# Patient Record
Sex: FEMALE | Race: BLACK OR AFRICAN AMERICAN | Employment: UNEMPLOYED | URBAN - METROPOLITAN AREA
[De-identification: names, ages, dates, MRNs, and addresses within clinical notes are randomized per-mention and may not be internally consistent; named-entity substitution may affect disease eponyms.]

---

## 2017-02-04 ENCOUNTER — APPOINTMENT (OUTPATIENT)
Dept: GENERAL RADIOLOGY | Age: 47
End: 2017-02-04
Attending: PHYSICIAN ASSISTANT
Payer: SELF-PAY

## 2017-02-04 ENCOUNTER — HOSPITAL ENCOUNTER (EMERGENCY)
Age: 47
Discharge: HOME | End: 2017-02-04
Attending: EMERGENCY MEDICINE
Payer: SELF-PAY

## 2017-02-04 VITALS
DIASTOLIC BLOOD PRESSURE: 86 MMHG | TEMPERATURE: 97.3 F | OXYGEN SATURATION: 97 % | RESPIRATION RATE: 20 BRPM | SYSTOLIC BLOOD PRESSURE: 123 MMHG | HEIGHT: 64 IN | BODY MASS INDEX: 43.36 KG/M2 | HEART RATE: 110 BPM | WEIGHT: 254 LBS

## 2017-02-04 DIAGNOSIS — R06.2 WHEEZING: ICD-10-CM

## 2017-02-04 DIAGNOSIS — N30.91 CYSTITIS WITH HEMATURIA: Primary | ICD-10-CM

## 2017-02-04 LAB
APPEARANCE UR: ABNORMAL
BACTERIA URNS QL MICRO: ABNORMAL /HPF
BILIRUB UR QL: NEGATIVE
COLOR UR: ABNORMAL
EPITH CASTS URNS QL MICRO: ABNORMAL /LPF (ref 0–5)
GLUCOSE UR STRIP.AUTO-MCNC: NEGATIVE MG/DL
HGB UR QL STRIP: ABNORMAL
KETONES UR QL STRIP.AUTO: NEGATIVE MG/DL
LEUKOCYTE ESTERASE UR QL STRIP.AUTO: ABNORMAL
NITRITE UR QL STRIP.AUTO: POSITIVE
PH UR STRIP: 5 [PH] (ref 5–8)
PROT UR STRIP-MCNC: 30 MG/DL
RBC #/AREA URNS HPF: ABNORMAL /HPF (ref 0–5)
SP GR UR REFRACTOMETRY: 1.02 (ref 1–1.03)
TRICHOMONAS UR QL MICRO: ABNORMAL
UROBILINOGEN UR QL STRIP.AUTO: 2 EU/DL (ref 0.2–1)
WBC CASTS URNS QL MICRO: ABNORMAL /LPF
WBC URNS QL MICRO: ABNORMAL /HPF (ref 0–4)

## 2017-02-04 PROCEDURE — 77030013140 HC MSK NEB VYRM -A

## 2017-02-04 PROCEDURE — 81001 URINALYSIS AUTO W/SCOPE: CPT

## 2017-02-04 PROCEDURE — 87077 CULTURE AEROBIC IDENTIFY: CPT

## 2017-02-04 PROCEDURE — 94640 AIRWAY INHALATION TREATMENT: CPT

## 2017-02-04 PROCEDURE — 74011000250 HC RX REV CODE- 250: Performed by: PHYSICIAN ASSISTANT

## 2017-02-04 PROCEDURE — 87186 SC STD MICRODIL/AGAR DIL: CPT

## 2017-02-04 PROCEDURE — 71020 XR CHEST PA LAT: CPT

## 2017-02-04 PROCEDURE — 99283 EMERGENCY DEPT VISIT LOW MDM: CPT

## 2017-02-04 PROCEDURE — 74011250637 HC RX REV CODE- 250/637: Performed by: PHYSICIAN ASSISTANT

## 2017-02-04 PROCEDURE — 74011636637 HC RX REV CODE- 636/637: Performed by: PHYSICIAN ASSISTANT

## 2017-02-04 PROCEDURE — 87086 URINE CULTURE/COLONY COUNT: CPT

## 2017-02-04 RX ORDER — PREDNISONE 20 MG/1
60 TABLET ORAL
Status: COMPLETED | OUTPATIENT
Start: 2017-02-04 | End: 2017-02-04

## 2017-02-04 RX ORDER — IPRATROPIUM BROMIDE AND ALBUTEROL SULFATE 2.5; .5 MG/3ML; MG/3ML
3 SOLUTION RESPIRATORY (INHALATION)
Status: COMPLETED | OUTPATIENT
Start: 2017-02-04 | End: 2017-02-04

## 2017-02-04 RX ORDER — ALBUTEROL SULFATE 90 UG/1
2 AEROSOL, METERED RESPIRATORY (INHALATION)
Status: COMPLETED | OUTPATIENT
Start: 2017-02-04 | End: 2017-02-04

## 2017-02-04 RX ORDER — CIPROFLOXACIN 500 MG/1
500 TABLET ORAL 2 TIMES DAILY
Qty: 6 TAB | Refills: 0 | Status: SHIPPED | OUTPATIENT
Start: 2017-02-04 | End: 2017-02-07

## 2017-02-04 RX ORDER — PHENAZOPYRIDINE HYDROCHLORIDE 200 MG/1
200 TABLET, FILM COATED ORAL 3 TIMES DAILY
Qty: 6 TAB | Refills: 0 | Status: SHIPPED | OUTPATIENT
Start: 2017-02-04 | End: 2017-02-06

## 2017-02-04 RX ADMIN — PREDNISONE 60 MG: 20 TABLET ORAL at 13:25

## 2017-02-04 RX ADMIN — IPRATROPIUM BROMIDE AND ALBUTEROL SULFATE 3 ML: .5; 3 SOLUTION RESPIRATORY (INHALATION) at 12:51

## 2017-02-04 RX ADMIN — ALBUTEROL SULFATE 2 PUFF: 90 AEROSOL, METERED RESPIRATORY (INHALATION) at 13:26

## 2017-02-04 NOTE — ED NOTES
I have reviewed discharge instructions with the patient. The patient verbalized understanding. all discharge education and paperwork given including follow up care. No questions at this time.

## 2017-02-04 NOTE — DISCHARGE INSTRUCTIONS
Urinary Tract Infection in Women: Care Instructions  Your Care Instructions    A urinary tract infection, or UTI, is a general term for an infection anywhere between the kidneys and the urethra (where urine comes out). Most UTIs are bladder infections. They often cause pain or burning when you urinate. UTIs are caused by bacteria and can be cured with antibiotics. Be sure to complete your treatment so that the infection goes away. Follow-up care is a key part of your treatment and safety. Be sure to make and go to all appointments, and call your doctor if you are having problems. It's also a good idea to know your test results and keep a list of the medicines you take. How can you care for yourself at home? · Take your antibiotics as directed. Do not stop taking them just because you feel better. You need to take the full course of antibiotics. · Drink extra water and other fluids for the next day or two. This may help wash out the bacteria that are causing the infection. (If you have kidney, heart, or liver disease and have to limit fluids, talk with your doctor before you increase your fluid intake.)  · Avoid drinks that are carbonated or have caffeine. They can irritate the bladder. · Urinate often. Try to empty your bladder each time. · To relieve pain, take a hot bath or lay a heating pad set on low over your lower belly or genital area. Never go to sleep with a heating pad in place. To prevent UTIs  · Drink plenty of water each day. This helps you urinate often, which clears bacteria from your system. (If you have kidney, heart, or liver disease and have to limit fluids, talk with your doctor before you increase your fluid intake.)  · Consider adding cranberry juice to your diet. · Urinate when you need to. · Urinate right after you have sex. · Change sanitary pads often. · Avoid douches, bubble baths, feminine hygiene sprays, and other feminine hygiene products that have deodorants.   · After going to the bathroom, wipe from front to back. When should you call for help? Call your doctor now or seek immediate medical care if:  · Symptoms such as fever, chills, nausea, or vomiting get worse or appear for the first time. · You have new pain in your back just below your rib cage. This is called flank pain. · There is new blood or pus in your urine. · You have any problems with your antibiotic medicine. Watch closely for changes in your health, and be sure to contact your doctor if:  · You are not getting better after taking an antibiotic for 2 days. · Your symptoms go away but then come back. Where can you learn more? Go to http://star-jean.info/. Enter J713 in the search box to learn more about \"Urinary Tract Infection in Women: Care Instructions. \"  Current as of: August 12, 2016  Content Version: 11.1  © 1385-6092 [employer]. Care instructions adapted under license by [employer] (which disclaims liability or warranty for this information). If you have questions about a medical condition or this instruction, always ask your healthcare professional. [place] [address] any warranty or liability for your use of this information. Wheezing or Bronchoconstriction: Care Instructions  Your Care Instructions  Wheezing is a whistling noise made during breathing. It occurs when the small airways, or bronchial tubes, that lead to your lungs swell or contract (spasm) and become narrow. This narrowing is called bronchoconstriction. When your airways constrict, it is hard for air to pass through and this makes it hard for you to breathe. Wheezing and bronchoconstriction can be caused by many problems, including:  · An infection such as the flu or a cold. · Allergies such as hay fever. · Diseases such as asthma or chronic obstructive pulmonary disease. · Smoking. Treatment for your wheezing depends on what is causing the problem.  Your wheezing may get better without treatment. But you may need to pay attention to things that cause your wheezing and avoid them. Or you may need medicine to help treat the wheezing and to reduce the swelling or to relieve spasms in your lungs. Follow-up care is a key part of your treatment and safety. Be sure to make and go to all appointments, and call your doctor if you are having problems. It is also a good idea to know your test results and keep a list of the medicines you take. How can you care for yourself at home? · Take your medicine exactly as prescribed. Call your doctor if you think you are having a problem with your medicine. You will get more details on the specific medicine your doctor prescribes. · If your doctor prescribed antibiotics, take them as directed. Do not stop taking them just because you feel better. You need to take the full course of antibiotics. · Breathe moist air from a humidifier, hot shower, or sink filled with hot water. This may help ease your symptoms and make it easier for you to breathe. · If you have congestion in your nose and throat, drinking plenty of fluids, especially hot fluids, may help relieve your symptoms. If you have kidney, heart, or liver disease and have to limit fluids, talk with your doctor before you increase the amount of fluids you drink. · If you have mucus in your airways, it may help to breathe deeply and cough. · Do not smoke or allow others to smoke around you. Smoking can make your wheezing worse. If you need help quitting, talk to your doctor about stop-smoking programs and medicines. These can increase your chances of quitting for good. · Avoid things that may cause your wheezing. These may include colds, smoke, air pollution, dust, pollen, pets, cockroaches, stress, and cold air. When should you call for help? Call 911 anytime you think you may need emergency care. For example, call if:  · You have severe trouble breathing.   · You passed out (lost consciousness). Call your doctor now or seek immediate medical care if:  · You cough up yellow, dark brown, or bloody mucus (sputum). · You have new or worse shortness of breath. · Your wheezing is not getting better or it gets worse after you start taking your medicine. Watch closely for changes in your health, and be sure to contact your doctor if:  · You do not get better as expected. Where can you learn more? Go to http://star-jean.info/. Enter 454 9693 in the search box to learn more about \"Wheezing or Bronchoconstriction: Care Instructions. \"  Current as of: May 23, 2016  Content Version: 11.1  © 5167-1626 [employer]. Care instructions adapted under license by [employer] (which disclaims liability or warranty for this information). If you have questions about a medical condition or this instruction, always ask your healthcare professional. [address] any warranty or liability for your use of this information.

## 2017-02-04 NOTE — ED TRIAGE NOTES
Pt,  C/op lower abdominal pain, urinary frequency  For 3 days,  Also  C/o wheezing, shortness of breath started today Hx asthma

## 2017-02-04 NOTE — ED PROVIDER NOTES
HPI Comments: 12:29 PM Ganesh Damon is a 40 y.o. female with a history of asthma who presents to the ED c/o abd cramping with increased urinary frequency for ~2-3 days. Pt states she \"just feels awful all over\". Pt is also c/o SOB and wheezing since this AM. No CP. Pt also has a productive cough with green sputum. Pt claims she has not had an inhaler since 2009. Pt had a total hysterectomy, states no chance of pregnancy. Usually smokes, but claims has not this week due to \"being unable to breathe\". No other concerns. PCP: Alla Ybarra MD      The history is provided by the patient. Past Medical History:   Diagnosis Date    Asthma     Heart failure ([address]     Hypertension        Past Surgical History:   Procedure Laterality Date    Hx myomectomy        fibroid tumo removed    Hx orthopaedic  March 2012 ORIF left fibula    Pr breast surgery procedure unlisted       redfuction    Hx gyn       hysterectomy  btl         No family history on file. Social History     Social History    Marital status: SINGLE     Spouse name: N/A    Number of children: N/A    Years of education: N/A     Occupational History    Not on file. Social History Main Topics    Smoking status: Current Every Day Smoker     Packs/day: 0.50     Years: 18.00    Smokeless tobacco: Not on file    Alcohol use Yes      Comment: socially     Drug use: No    Sexual activity: Yes     Partners: Male     Birth control/ protection: Condom     Other Topics Concern    Not on file     Social History Narrative         ALLERGIES: Review of patient's allergies indicates no known allergies. Review of Systems   Constitutional: Negative for chills, fatigue and fever. HENT: Negative. Negative for sore throat. Eyes: Negative. Respiratory: Positive for cough, shortness of breath and wheezing. Cardiovascular: Negative for chest pain and palpitations. Gastrointestinal: Positive for abdominal pain.  Negative for nausea and vomiting. Genitourinary: Positive for frequency. Negative for dysuria. Musculoskeletal: Negative. Skin: Negative. Neurological: Negative for dizziness, weakness, light-headedness and headaches. Psychiatric/Behavioral: Negative. All other systems reviewed and are negative. Vitals:    02/04/17 1230   BP: (!) 146/103   Pulse: (!) 112   Resp: 20   Temp: 97.7 °F (36.5 °C)   SpO2: 97%   Weight: 115.2 kg (254 lb)   Height: 5' 4\" (1.626 m)            Physical Exam   Constitutional: She is oriented to person, place, and time. She appears well-developed and well-nourished. HENT:   Head: Normocephalic and atraumatic. Mouth/Throat: Oropharynx is clear and moist.   Eyes: Conjunctivae are normal. No scleral icterus. Neck: Neck supple. No JVD present. No tracheal deviation present. Cardiovascular: Regular rhythm and normal heart sounds. Tachycardia present. Pulmonary/Chest: Effort normal. No tachypnea. No respiratory distress. She has decreased breath sounds. She has wheezes. She has rhonchi. Abdominal: Soft. There is no tenderness. There is no CVA tenderness. Musculoskeletal: Normal range of motion. Lymphadenopathy:     She has no cervical adenopathy. Neurological: She is alert and oriented to person, place, and time. She has normal strength. Gait normal.   Skin: Skin is warm and dry. Psychiatric: She has a normal mood and affect. Nursing note and vitals reviewed. MDM  Number of Diagnoses or Management Options  Diagnosis management comments: 12:37 PM  41 y/o female c/o wheezing, cough, increased shortness of breath and increased urinary frequency. Resp. Since last night, and urinary freq for several days. Will get UA and give duoneb and plan for reeval.  Pt states she has needed an inhaler in the past, however does not have one. Tony Beebe PA-C    1:12 PM  UA positive for nitrites. Discussed results with pt.   Also improvement in wheezing and chest tightness after duoneb. Will give another albuterol tx and prednisone tab. Pending cxr, will plan for discharge. All questions answered and patient in agreement with plan of care. Will plan for discharge. Briseida Patel PA-C    Clinical Impression:  Acute cystitis w/ hematuria, wheezing, Elevated blood pressure    One or more blood pressure readings were noted elevated during the Pt's presentation in the emergency department this date. This abnormal reading has been cited in the Pt's diagnosis, and they have been encouraged to follow up with their primary care physician, or referred to a consultant for further evaluation and treatment. Amount and/or Complexity of Data Reviewed  Clinical lab tests: ordered and reviewed  Tests in the radiology section of CPT®: ordered    Risk of Complications, Morbidity, and/or Mortality  Presenting problems: low  Diagnostic procedures: low  Management options: low    Critical Care  Total time providing critical care: < 30 minutes    Patient Progress  Patient progress: improved    ED Course       Procedures    Vitals:  Patient Vitals for the past 12 hrs:   Temp Pulse Resp BP SpO2   02/04/17 1230 97.7 °F (36.5 °C) (!) 112 20 (!) 146/103 97 %   97%. Percentage is within normal limits. Medications ordered:   Medications   albuterol-ipratropium (DUO-NEB) 2.5 MG-0.5 MG/3 ML (3 mL Nebulization Given 2/4/17 1251)   albuterol (PROVENTIL HFA, VENTOLIN HFA, PROAIR HFA) inhaler 2 Puff (2 Puffs Inhalation Given 2/4/17 1326)   predniSONE (DELTASONE) tablet 60 mg (60 mg Oral Given 2/4/17 1325)       X-Ray, CT or other radiology findings:  XR CHEST PA LAT      Cardiomegaly, otherwise normal.   (Interpreted by NARCISO Bryant)       Progress notes, Consult notes or Re-evaluation:   Discussed all results with pt and pt agrees with plan for discharge. Pt is to follow up with PCP. All questions answered at this time. ED warnings given for any new or worsening symptoms. Pt voices understanding.  Pt discharged in stable condition. Disposition:  Diagnosis:   1. Cystitis with hematuria    2. Wheezing    3. Elevated blood pressure        Disposition: DISCHARGED    Follow-up Information     Follow up With Details Comments Contact Info    [facility] EMERGENCY DEPT  If symptoms worsen [address] [place] [address]    Uzma Dickey MD Call in 1 week As needed [id number]  27  [phone]            Scribe Attestation:     Deanna Quijano, scribing for and in the presence of Jenny Templeton February 04, 2017 at 1:33 PM     Signed by: Dontrell Roach, February 04, 2017 at 1:33 PM     Physician Attestation:   I personally performed the services described in this documentation, reviewed and edited the documentation which was dictated to the scribe in my presence, and it accurately records my words and actions.  Jenny Templeton  February 04, 2017

## 2017-02-06 ENCOUNTER — APPOINTMENT (OUTPATIENT)
Dept: GENERAL RADIOLOGY | Age: 47
End: 2017-02-06
Attending: EMERGENCY MEDICINE
Payer: SELF-PAY

## 2017-02-06 ENCOUNTER — HOSPITAL ENCOUNTER (EMERGENCY)
Age: 47
Discharge: HOME | End: 2017-02-06
Attending: EMERGENCY MEDICINE
Payer: SELF-PAY

## 2017-02-06 ENCOUNTER — APPOINTMENT (OUTPATIENT)
Dept: CT IMAGING | Age: 47
End: 2017-02-06
Attending: EMERGENCY MEDICINE
Payer: SELF-PAY

## 2017-02-06 VITALS
DIASTOLIC BLOOD PRESSURE: 97 MMHG | HEIGHT: 64 IN | OXYGEN SATURATION: 100 % | TEMPERATURE: 98.1 F | HEART RATE: 99 BPM | RESPIRATION RATE: 16 BRPM | BODY MASS INDEX: 43.36 KG/M2 | SYSTOLIC BLOOD PRESSURE: 119 MMHG | WEIGHT: 254 LBS

## 2017-02-06 DIAGNOSIS — R00.0 TACHYCARDIA: ICD-10-CM

## 2017-02-06 DIAGNOSIS — I50.9 ACUTE ON CHRONIC CONGESTIVE HEART FAILURE, UNSPECIFIED CONGESTIVE HEART FAILURE TYPE: ICD-10-CM

## 2017-02-06 DIAGNOSIS — R06.02 SHORTNESS OF BREATH: Primary | ICD-10-CM

## 2017-02-06 LAB
AMPHET UR QL SCN: NEGATIVE
ANION GAP BLD CALC-SCNC: 11 MMOL/L (ref 3–18)
ATRIAL RATE: 104 BPM
BARBITURATES UR QL SCN: NEGATIVE
BASOPHILS # BLD AUTO: 0 K/UL (ref 0–0.1)
BASOPHILS # BLD: 0 % (ref 0–2)
BENZODIAZ UR QL: NEGATIVE
BNP SERPL-MCNC: 3023 PG/ML (ref 0–450)
BUN SERPL-MCNC: 17 MG/DL (ref 7–18)
BUN/CREAT SERPL: 21 (ref 12–20)
CALCIUM SERPL-MCNC: 9.2 MG/DL (ref 8.5–10.1)
CALCULATED P AXIS, ECG09: 64 DEGREES
CALCULATED R AXIS, ECG10: -30 DEGREES
CALCULATED T AXIS, ECG11: 72 DEGREES
CANNABINOIDS UR QL SCN: NEGATIVE
CHLORIDE SERPL-SCNC: 102 MMOL/L (ref 100–108)
CK MB CFR SERPL CALC: 2.6 % (ref 0–4)
CK MB CFR SERPL CALC: ABNORMAL % (ref 0–4)
CK MB SERPL-MCNC: 1.1 NG/ML (ref 0.5–3.6)
CK MB SERPL-MCNC: <0.5 NG/ML (ref 0.5–3.6)
CK SERPL-CCNC: 43 U/L (ref 26–192)
CK SERPL-CCNC: 48 U/L (ref 26–192)
CO2 SERPL-SCNC: 25 MMOL/L (ref 21–32)
COCAINE UR QL SCN: NEGATIVE
CREAT SERPL-MCNC: 0.81 MG/DL (ref 0.6–1.3)
D DIMER PPP FEU-MCNC: 0.54 UG/ML(FEU)
DIAGNOSIS, 93000: NORMAL
DIFFERENTIAL METHOD BLD: ABNORMAL
EOSINOPHIL # BLD: 0.1 K/UL (ref 0–0.4)
EOSINOPHIL NFR BLD: 1 % (ref 0–5)
ERYTHROCYTE [DISTWIDTH] IN BLOOD BY AUTOMATED COUNT: 16.2 % (ref 11.6–14.5)
FLUAV AG NPH QL IA: NEGATIVE
FLUBV AG NOSE QL IA: NEGATIVE
GLUCOSE SERPL-MCNC: 120 MG/DL (ref 74–99)
HCT VFR BLD AUTO: 36.9 % (ref 35–45)
HDSCOM,HDSCOM: NORMAL
HGB BLD-MCNC: 12.2 G/DL (ref 12–16)
LYMPHOCYTES # BLD AUTO: 37 % (ref 21–52)
LYMPHOCYTES # BLD: 3 K/UL (ref 0.9–3.6)
MCH RBC QN AUTO: 30.1 PG (ref 24–34)
MCHC RBC AUTO-ENTMCNC: 33.1 G/DL (ref 31–37)
MCV RBC AUTO: 91.1 FL (ref 74–97)
METHADONE UR QL: NEGATIVE
MONOCYTES # BLD: 0.4 K/UL (ref 0.05–1.2)
MONOCYTES NFR BLD AUTO: 5 % (ref 3–10)
NEUTS SEG # BLD: 4.7 K/UL (ref 1.8–8)
NEUTS SEG NFR BLD AUTO: 57 % (ref 40–73)
OPIATES UR QL: NEGATIVE
P-R INTERVAL, ECG05: 164 MS
PCP UR QL: NEGATIVE
PLATELET # BLD AUTO: 307 K/UL (ref 135–420)
PMV BLD AUTO: 9.5 FL (ref 9.2–11.8)
POTASSIUM SERPL-SCNC: 3.7 MMOL/L (ref 3.5–5.5)
Q-T INTERVAL, ECG07: 304 MS
QRS DURATION, ECG06: 76 MS
QTC CALCULATION (BEZET), ECG08: 399 MS
RBC # BLD AUTO: 4.05 M/UL (ref 4.2–5.3)
SODIUM SERPL-SCNC: 138 MMOL/L (ref 136–145)
T4 FREE SERPL-MCNC: 1.4 NG/DL (ref 0.7–1.5)
TROPONIN I SERPL-MCNC: <0.02 NG/ML (ref 0–0.04)
TROPONIN I SERPL-MCNC: <0.02 NG/ML (ref 0–0.04)
TSH SERPL DL<=0.05 MIU/L-ACNC: <0.01 UIU/ML (ref 0.36–3.74)
VENTRICULAR RATE, ECG03: 104 BPM
WBC # BLD AUTO: 8.2 K/UL (ref 4.6–13.2)

## 2017-02-06 PROCEDURE — 74011000250 HC RX REV CODE- 250: Performed by: EMERGENCY MEDICINE

## 2017-02-06 PROCEDURE — 80048 BASIC METABOLIC PNL TOTAL CA: CPT | Performed by: EMERGENCY MEDICINE

## 2017-02-06 PROCEDURE — 96374 THER/PROPH/DIAG INJ IV PUSH: CPT

## 2017-02-06 PROCEDURE — 85025 COMPLETE CBC W/AUTO DIFF WBC: CPT | Performed by: EMERGENCY MEDICINE

## 2017-02-06 PROCEDURE — 93005 ELECTROCARDIOGRAM TRACING: CPT

## 2017-02-06 PROCEDURE — 94640 AIRWAY INHALATION TREATMENT: CPT

## 2017-02-06 PROCEDURE — 99285 EMERGENCY DEPT VISIT HI MDM: CPT

## 2017-02-06 PROCEDURE — 84443 ASSAY THYROID STIM HORMONE: CPT | Performed by: EMERGENCY MEDICINE

## 2017-02-06 PROCEDURE — 87804 INFLUENZA ASSAY W/OPTIC: CPT | Performed by: EMERGENCY MEDICINE

## 2017-02-06 PROCEDURE — 80307 DRUG TEST PRSMV CHEM ANLYZR: CPT | Performed by: EMERGENCY MEDICINE

## 2017-02-06 PROCEDURE — 74011250636 HC RX REV CODE- 250/636: Performed by: EMERGENCY MEDICINE

## 2017-02-06 PROCEDURE — 96375 TX/PRO/DX INJ NEW DRUG ADDON: CPT

## 2017-02-06 PROCEDURE — 83880 ASSAY OF NATRIURETIC PEPTIDE: CPT | Performed by: EMERGENCY MEDICINE

## 2017-02-06 PROCEDURE — 82550 ASSAY OF CK (CPK): CPT | Performed by: EMERGENCY MEDICINE

## 2017-02-06 PROCEDURE — 85379 FIBRIN DEGRADATION QUANT: CPT | Performed by: EMERGENCY MEDICINE

## 2017-02-06 PROCEDURE — 77030013140 HC MSK NEB VYRM -A

## 2017-02-06 PROCEDURE — 71275 CT ANGIOGRAPHY CHEST: CPT

## 2017-02-06 PROCEDURE — 71010 XR CHEST PORT: CPT

## 2017-02-06 PROCEDURE — 74011636320 HC RX REV CODE- 636/320: Performed by: EMERGENCY MEDICINE

## 2017-02-06 PROCEDURE — 84439 ASSAY OF FREE THYROXINE: CPT | Performed by: EMERGENCY MEDICINE

## 2017-02-06 RX ORDER — POTASSIUM CHLORIDE 1500 MG/1
20 TABLET, FILM COATED, EXTENDED RELEASE ORAL DAILY
Qty: 5 TAB | Refills: 0 | Status: SHIPPED | OUTPATIENT
Start: 2017-02-06 | End: 2017-02-11

## 2017-02-06 RX ORDER — IPRATROPIUM BROMIDE AND ALBUTEROL SULFATE 2.5; .5 MG/3ML; MG/3ML
3 SOLUTION RESPIRATORY (INHALATION)
Status: COMPLETED | OUTPATIENT
Start: 2017-02-06 | End: 2017-02-06

## 2017-02-06 RX ORDER — FUROSEMIDE 20 MG/1
20 TABLET ORAL DAILY
Qty: 5 TAB | Refills: 0 | Status: SHIPPED | OUTPATIENT
Start: 2017-02-06 | End: 2017-02-11

## 2017-02-06 RX ORDER — MORPHINE SULFATE 2 MG/ML
2 INJECTION, SOLUTION INTRAMUSCULAR; INTRAVENOUS
Status: COMPLETED | OUTPATIENT
Start: 2017-02-06 | End: 2017-02-06

## 2017-02-06 RX ORDER — FUROSEMIDE 10 MG/ML
40 INJECTION INTRAMUSCULAR; INTRAVENOUS
Status: COMPLETED | OUTPATIENT
Start: 2017-02-06 | End: 2017-02-06

## 2017-02-06 RX ADMIN — Medication 2 MG: at 12:03

## 2017-02-06 RX ADMIN — IPRATROPIUM BROMIDE AND ALBUTEROL SULFATE 3 ML: .5; 3 SOLUTION RESPIRATORY (INHALATION) at 12:03

## 2017-02-06 RX ADMIN — FUROSEMIDE 40 MG: 10 INJECTION, SOLUTION INTRAVENOUS at 12:02

## 2017-02-06 RX ADMIN — IOPAMIDOL 100 ML: 755 INJECTION, SOLUTION INTRAVENOUS at 12:44

## 2017-02-06 NOTE — ED PROVIDER NOTES
HPI Comments: Lovina Essex is a 40 y.o. female presenting with complaints of shortness of breath. States symptoms have been present for the past 3-5 days. Was seen over the weekend however symptoms persisted and this morning she developed central chest pain that she describes a \"tightness\" sensation. Endorses worsening dyspnea with exertion and laying flat. Also endorses cough productive of white sputum. Denies hemoptysis, fever, ha, blurry vision, URI symptoms or any other complaints. Does have h/o tobacco use but has not smoked in the past week due to her symptoms. Endorses family h/o CAD. Pt has not used her inhaler today but denies significant relief yesterday. Denies any h/o VTE. The history is provided by the patient. Past Medical History:   Diagnosis Date    Asthma     Heart failure ([address]     Hypertension        Past Surgical History:   Procedure Laterality Date    Hx myomectomy        fibroid tumo removed    Hx orthopaedic  March 2012 ORIF left fibula    Pr breast surgery procedure unlisted       redfuction    Hx gyn       hysterectomy  btl         History reviewed. No pertinent family history. Social History     Social History    Marital status: SINGLE     Spouse name: N/A    Number of children: N/A    Years of education: N/A     Occupational History    Not on file. Social History Main Topics    Smoking status: Current Every Day Smoker     Packs/day: 0.50     Years: 18.00    Smokeless tobacco: Not on file    Alcohol use Yes      Comment: socially     Drug use: No    Sexual activity: Yes     Partners: Male     Birth control/ protection: Condom     Other Topics Concern    Not on file     Social History Narrative         ALLERGIES: Review of patient's allergies indicates no known allergies. Review of Systems   Constitutional: Negative for fever. HENT: Negative for congestion. Eyes: Negative for visual disturbance.    Respiratory: Positive for cough, chest tightness and shortness of breath. Cardiovascular: Positive for chest pain. Negative for palpitations and leg swelling. Gastrointestinal: Negative for abdominal pain, nausea and vomiting. Genitourinary: Negative for dysuria. Musculoskeletal: Negative. Neurological: Negative for speech difficulty and headaches. All other systems reviewed and are negative. Vitals:    02/06/17 1100 02/06/17 1130 02/06/17 1315 02/06/17 1430   BP: 138/86 119/82 125/89    Pulse: (!) 101 99 (!) 105 79   Resp: 25 17 23 27   Temp:       SpO2: 97% 100% 99% 96%   Weight:       Height:                Physical Exam   Constitutional: She is oriented to person, place, and time. No distress. HENT:   Head: Atraumatic. Mouth/Throat: Oropharynx is clear and moist.   Eyes: Conjunctivae are normal. Pupils are equal, round, and reactive to light. No scleral icterus. Neck: Normal range of motion. Neck supple. No JVD present. Cardiovascular: Regular rhythm and normal heart sounds. tachycardic   Pulmonary/Chest: She exhibits no tenderness. Increased work of breathing. Scant wheezing in bilateral upper lobes. Diminished in bilateral lower lobes. Abdominal: Soft. Bowel sounds are normal. She exhibits no distension. There is no tenderness. There is no rebound and no guarding. Musculoskeletal: Normal range of motion. She exhibits no edema or tenderness. Neurological: She is alert and oriented to person, place, and time. No cranial nerve deficit. Skin: Skin is warm and dry. Psychiatric: She has a normal mood and affect. Nursing note and vitals reviewed. MDM  Number of Diagnoses or Management Options  Diagnosis management comments: Blanca Abrams is a 40 y.o. female presenting with cp, sob, orthopnea, cough and tachycardia. DDx includes ACS, PE, aortic pathology, anemia, CHF exacerbation, COPD exacerbation, pna. ekg reassuring. Will give duoneb while work up obtained.      ED Course       Procedures    Vitals:  Patient Vitals for the past 12 hrs:   Temp Pulse Resp BP SpO2   02/06/17 1430 - 79 27 - 96 %   02/06/17 1315 - (!) 105 23 125/89 99 %   02/06/17 1130 - 99 17 119/82 100 %   02/06/17 1100 - (!) 101 25 138/86 97 %   02/06/17 1055 - - - - 99 %   02/06/17 1045 - (!) 104 30 (!) 125/93 98 %   02/06/17 1035 98.1 °F (36.7 °C) (!) 108 18 (!) 145/100 98 %           EKG interpretation by ED Physician:  1050 - sinus tachycardia, rate of 104, left axis deviation, QTc 399ms, no STEMI    1419 - NSR, rate of 99, no significant change from previous, no STEMI      X-Ray, CT or other radiology findings or impressions:  CTA CHEST W WO CONT   Final Result      XR CHEST PORT   Final Result              Progress notes, Consult notes or additional Procedure notes:   4:52 PM I have discussed results of work up with patient. She has had good diuresis with lasix in ED. Ambulating around ED without difficulty. Per chart review h/o tachycardia, worked up previously pt states no clear dx. Free T4 wnl. I have instructed her to follow up with her pcp and cardiologist as outpatient and Return precautions discussed. Patient stated verbal understanding and agrees with course and plan. Disposition:  Diagnosis:   1. Shortness of breath    2. Acute on chronic congestive heart failure, unspecified congestive heart failure type ([address]    3.  Tachycardia        Disposition: Discharged home in stable condition      Chris Mcclellan MD

## 2017-02-06 NOTE — ED NOTES
I have reviewed discharge instructions with the patient. The patient verbalized understanding. All discharge education and paperwork given including follow up care, no questions at this time.

## 2017-02-06 NOTE — ED NOTES
Pt hourly rounding competed. Safety   Pt (*) resting on stretcher with side rails up and call bell in reach. () in chair    () in parents arms. Toileting   Pt offered ()Bedpan     ()Assistance to Restroom     ()Urinal  Ongoing Updates  Updated on plan of care and status of test results.   Pain Management  Inquired as to comfort and offered comfort measures:    () warm blankets   (*) dimmed lights

## 2017-02-06 NOTE — ED TRIAGE NOTES
Patient originally came in for wheezing, but then stated that she was having chest pain since early this morning. Has a prescription for lasix but has not taken in a week as she has run out and has not made an appointment to get a refill. Nurse reminded patient about the importance of making an appointment or requesting a refill when medication is running low as this is an important medication to take. Patient verbalized understanding. Patient was here on Saturday for same symptoms.

## 2017-02-06 NOTE — ED NOTES
Pt hourly rounding competed. Safety   Pt (*) resting on stretcher with side rails up and call bell in reach. () in chair    () in parents arms. Toileting   Pt offered ()Bedpan     ()Assistance to Restroom     ()Urinal  Ongoing Updates  Updated on plan of care and status of test results.   Pain Management  Inquired as to comfort and offered comfort measures:    (*) warm blankets   () dimmed lights

## 2017-02-07 LAB
ATRIAL RATE: 99 BPM
BACTERIA SPEC CULT: NORMAL
BACTERIA SPEC CULT: NORMAL
CALCULATED P AXIS, ECG09: 61 DEGREES
CALCULATED R AXIS, ECG10: -30 DEGREES
CALCULATED T AXIS, ECG11: 62 DEGREES
DIAGNOSIS, 93000: NORMAL
P-R INTERVAL, ECG05: 160 MS
Q-T INTERVAL, ECG07: 370 MS
QRS DURATION, ECG06: 80 MS
QTC CALCULATION (BEZET), ECG08: 474 MS
SERVICE CMNT-IMP: NORMAL
VENTRICULAR RATE, ECG03: 99 BPM

## 2017-02-16 ENCOUNTER — APPOINTMENT (OUTPATIENT)
Dept: GENERAL RADIOLOGY | Age: 47
End: 2017-02-16
Attending: EMERGENCY MEDICINE
Payer: SELF-PAY

## 2017-02-16 ENCOUNTER — HOSPITAL ENCOUNTER (EMERGENCY)
Age: 47
Discharge: HOME | End: 2017-02-16
Attending: EMERGENCY MEDICINE
Payer: SELF-PAY

## 2017-02-16 VITALS
SYSTOLIC BLOOD PRESSURE: 137 MMHG | TEMPERATURE: 97.4 F | HEART RATE: 112 BPM | OXYGEN SATURATION: 91 % | BODY MASS INDEX: 42.17 KG/M2 | DIASTOLIC BLOOD PRESSURE: 95 MMHG | HEIGHT: 64 IN | RESPIRATION RATE: 22 BRPM | WEIGHT: 247 LBS

## 2017-02-16 DIAGNOSIS — R06.02 SOB (SHORTNESS OF BREATH): ICD-10-CM

## 2017-02-16 DIAGNOSIS — I50.9 ACUTE ON CHRONIC CONGESTIVE HEART FAILURE, UNSPECIFIED CONGESTIVE HEART FAILURE TYPE: Primary | ICD-10-CM

## 2017-02-16 LAB
ANION GAP BLD CALC-SCNC: 11 MMOL/L (ref 3–18)
ATRIAL RATE: 109 BPM
BASOPHILS # BLD AUTO: 0 K/UL (ref 0–0.1)
BASOPHILS # BLD: 1 % (ref 0–2)
BNP SERPL-MCNC: 2933 PG/ML (ref 0–450)
BUN SERPL-MCNC: 12 MG/DL (ref 7–18)
BUN/CREAT SERPL: 13 (ref 12–20)
CALCIUM SERPL-MCNC: 8.9 MG/DL (ref 8.5–10.1)
CALCULATED P AXIS, ECG09: 39 DEGREES
CALCULATED R AXIS, ECG10: -13 DEGREES
CALCULATED T AXIS, ECG11: 83 DEGREES
CHLORIDE SERPL-SCNC: 102 MMOL/L (ref 100–108)
CO2 SERPL-SCNC: 24 MMOL/L (ref 21–32)
CREAT SERPL-MCNC: 0.91 MG/DL (ref 0.6–1.3)
DIAGNOSIS, 93000: NORMAL
DIFFERENTIAL METHOD BLD: ABNORMAL
EOSINOPHIL # BLD: 0.1 K/UL (ref 0–0.4)
EOSINOPHIL NFR BLD: 1 % (ref 0–5)
ERYTHROCYTE [DISTWIDTH] IN BLOOD BY AUTOMATED COUNT: 15.8 % (ref 11.6–14.5)
GLUCOSE SERPL-MCNC: 128 MG/DL (ref 74–99)
HCT VFR BLD AUTO: 36.9 % (ref 35–45)
HGB BLD-MCNC: 12 G/DL (ref 12–16)
LYMPHOCYTES # BLD AUTO: 48 % (ref 21–52)
LYMPHOCYTES # BLD: 4.1 K/UL (ref 0.9–3.6)
MAGNESIUM SERPL-MCNC: 2.2 MG/DL (ref 1.8–2.4)
MCH RBC QN AUTO: 29.9 PG (ref 24–34)
MCHC RBC AUTO-ENTMCNC: 32.5 G/DL (ref 31–37)
MCV RBC AUTO: 91.8 FL (ref 74–97)
MONOCYTES # BLD: 0.5 K/UL (ref 0.05–1.2)
MONOCYTES NFR BLD AUTO: 5 % (ref 3–10)
NEUTS SEG # BLD: 3.8 K/UL (ref 1.8–8)
NEUTS SEG NFR BLD AUTO: 45 % (ref 40–73)
P-R INTERVAL, ECG05: 172 MS
PLATELET # BLD AUTO: 324 K/UL (ref 135–420)
PMV BLD AUTO: 9.4 FL (ref 9.2–11.8)
POTASSIUM SERPL-SCNC: 3.8 MMOL/L (ref 3.5–5.5)
Q-T INTERVAL, ECG07: 296 MS
QRS DURATION, ECG06: 80 MS
QTC CALCULATION (BEZET), ECG08: 398 MS
RBC # BLD AUTO: 4.02 M/UL (ref 4.2–5.3)
SODIUM SERPL-SCNC: 137 MMOL/L (ref 136–145)
VENTRICULAR RATE, ECG03: 109 BPM
WBC # BLD AUTO: 8.5 K/UL (ref 4.6–13.2)

## 2017-02-16 PROCEDURE — 99285 EMERGENCY DEPT VISIT HI MDM: CPT

## 2017-02-16 PROCEDURE — 77030013140 HC MSK NEB VYRM -A

## 2017-02-16 PROCEDURE — 74011000250 HC RX REV CODE- 250: Performed by: EMERGENCY MEDICINE

## 2017-02-16 PROCEDURE — 96374 THER/PROPH/DIAG INJ IV PUSH: CPT

## 2017-02-16 PROCEDURE — 80048 BASIC METABOLIC PNL TOTAL CA: CPT | Performed by: EMERGENCY MEDICINE

## 2017-02-16 PROCEDURE — 93005 ELECTROCARDIOGRAM TRACING: CPT

## 2017-02-16 PROCEDURE — 83735 ASSAY OF MAGNESIUM: CPT | Performed by: EMERGENCY MEDICINE

## 2017-02-16 PROCEDURE — 74011250636 HC RX REV CODE- 250/636: Performed by: PHYSICIAN ASSISTANT

## 2017-02-16 PROCEDURE — 94640 AIRWAY INHALATION TREATMENT: CPT

## 2017-02-16 PROCEDURE — 85025 COMPLETE CBC W/AUTO DIFF WBC: CPT | Performed by: EMERGENCY MEDICINE

## 2017-02-16 PROCEDURE — 83880 ASSAY OF NATRIURETIC PEPTIDE: CPT | Performed by: PHYSICIAN ASSISTANT

## 2017-02-16 PROCEDURE — 71010 XR CHEST PORT: CPT

## 2017-02-16 RX ORDER — IPRATROPIUM BROMIDE AND ALBUTEROL SULFATE 2.5; .5 MG/3ML; MG/3ML
3 SOLUTION RESPIRATORY (INHALATION)
Status: COMPLETED | OUTPATIENT
Start: 2017-02-16 | End: 2017-02-16

## 2017-02-16 RX ORDER — ALBUTEROL SULFATE 0.83 MG/ML
5 SOLUTION RESPIRATORY (INHALATION)
Status: COMPLETED | OUTPATIENT
Start: 2017-02-16 | End: 2017-02-16

## 2017-02-16 RX ORDER — FUROSEMIDE 20 MG/1
TABLET ORAL
Qty: 60 TAB | Refills: 0 | Status: SHIPPED | OUTPATIENT
Start: 2017-02-16 | End: 2017-03-06

## 2017-02-16 RX ORDER — ALBUTEROL SULFATE 2.5 MG/.5ML
2.5 SOLUTION RESPIRATORY (INHALATION)
Status: DISCONTINUED | OUTPATIENT
Start: 2017-02-16 | End: 2017-02-16 | Stop reason: CLARIF

## 2017-02-16 RX ORDER — FUROSEMIDE 10 MG/ML
40 INJECTION INTRAMUSCULAR; INTRAVENOUS
Status: COMPLETED | OUTPATIENT
Start: 2017-02-16 | End: 2017-02-16

## 2017-02-16 RX ADMIN — FUROSEMIDE 40 MG: 10 INJECTION, SOLUTION INTRAVENOUS at 04:25

## 2017-02-16 RX ADMIN — ALBUTEROL SULFATE 5 MG: 2.5 SOLUTION RESPIRATORY (INHALATION) at 02:58

## 2017-02-16 RX ADMIN — IPRATROPIUM BROMIDE AND ALBUTEROL SULFATE 3 ML: .5; 3 SOLUTION RESPIRATORY (INHALATION) at 01:23

## 2017-02-16 NOTE — ED PROVIDER NOTES
HPI Comments: 39yoF with hx of asthma, HTN, CHF to ED c/o SOB, cough, wheezing. Reports cough is productive with yellow sputum. Chest is sore from coughing. Reports bilat LE edema which resolves with Lasix. No fever, chills, N/V/D, abd pain, HA, dizziness or any other complaints. Pt states she was seen here on 2/6 for the same, sent home with rx for 5 days of lasix and has run out. Pt states she is unable to get appt with PCP for refill. Pt states she quit smoking 2 months ago. Patient is a 40 y.o. female presenting with cough. The history is provided by the patient. Cough   Associated symptoms include shortness of breath. Past Medical History:   Diagnosis Date    Asthma     Heart failure ([address]     Hypertension        Past Surgical History:   Procedure Laterality Date    Hx myomectomy        fibroid tumo removed    Hx orthopaedic  March 2012 ORIF left fibula    Pr breast surgery procedure unlisted       redfuction    Hx gyn       hysterectomy  btl         History reviewed. No pertinent family history. Social History     Social History    Marital status: SINGLE     Spouse name: N/A    Number of children: N/A    Years of education: N/A     Occupational History    Not on file. Social History Main Topics    Smoking status: Current Every Day Smoker     Packs/day: 0.50     Years: 18.00    Smokeless tobacco: Not on file    Alcohol use Yes      Comment: socially     Drug use: No    Sexual activity: Yes     Partners: Male     Birth control/ protection: Condom     Other Topics Concern    Not on file     Social History Narrative         ALLERGIES: Review of patient's allergies indicates no known allergies. Review of Systems   Respiratory: Positive for cough and shortness of breath. All other systems reviewed and are negative.       Vitals:    02/16/17 0032 02/16/17 0255 02/16/17 0300 02/16/17 0315   BP: (!) 146/107 121/88 116/83 (!) 127/95   Pulse: (!) 117 (!) 107 (!) 108 (!) 109 Resp: 22 23 (!) 31 26   Temp: 97.4 °F (36.3 °C)      SpO2: 99% 100%  (!) 84%   Weight: 112 kg (247 lb)      Height: 5' 4\" (1.626 m)               Physical Exam   Constitutional: She is oriented to person, place, and time. She appears well-developed and well-nourished. No distress. HENT:   Head: Normocephalic and atraumatic. Right Ear: External ear normal.   Left Ear: External ear normal.   Nose: Nose normal.   Mouth/Throat: Oropharynx is clear and moist.   Eyes: Conjunctivae and EOM are normal. Pupils are equal, round, and reactive to light. Neck: Normal range of motion. Neck supple. Cardiovascular: Regular rhythm. Tachycardia present. Pulmonary/Chest: Effort normal and breath sounds normal. No respiratory distress. She has no wheezes. Abdominal: Soft. She exhibits no distension. There is no tenderness. Musculoskeletal: Normal range of motion. She exhibits no edema. Lymphadenopathy:     She has no cervical adenopathy. Neurological: She is alert and oriented to person, place, and time. She has normal strength. Gait normal.   Skin: Skin is warm and dry. No rash noted. She is not diaphoretic. Psychiatric: She has a normal mood and affect. MDM  Number of Diagnoses or Management Options  Acute on chronic congestive heart failure, unspecified congestive heart failure type [facility]):   SOB (shortness of breath):   Diagnosis management comments: Ddx: CHF exacerbation, fluid overload, asthma exacerbation, edema. Pt with SOB and LE edema after running out of lasix. Plan to check labs, ekg, chest xray and give lasix. 5:05 AM Labs and xray reviewed with pt. Pt states \"I feel 1000% better. \" Reports she has urinated x 3 after Lasix. Denies any complaints at this time. Tachycardia noted, previous charts reviewed and tachycardia noted during other visits as well.  NARCISO Ceballos         Amount and/or Complexity of Data Reviewed  Clinical lab tests: ordered and reviewed  Tests in the radiology section of CPT®: ordered and reviewed  Discuss the patient with other providers: yes    Risk of Complications, Morbidity, and/or Mortality  Presenting problems: moderate  Diagnostic procedures: moderate  Management options: low    Patient Progress  Patient progress: improved    ED Course       Procedures

## 2017-02-16 NOTE — ROUTINE PROCESS
I have reviewed discharge instructions with the patient. The patient verbalized understanding. Patient armband removed and given to patient to take home. Patient was informed of the privacy risks if armband lost or stolen. Pt ambulated to car without any distress.

## 2017-02-16 NOTE — ED TRIAGE NOTES
Patient states that for about a week she has had a productive cough of yellowish sputum states that tonight she noticed some red streaks in the sputum. Patient states that her chest is sore from coughing. States that she was seen here the other day for the cough. Patient with expiratory wheezing mainly on left side.

## 2017-03-06 ENCOUNTER — HOSPITAL ENCOUNTER (EMERGENCY)
Age: 47
Discharge: HOME | End: 2017-03-06
Attending: EMERGENCY MEDICINE
Payer: SELF-PAY

## 2017-03-06 ENCOUNTER — APPOINTMENT (OUTPATIENT)
Dept: GENERAL RADIOLOGY | Age: 47
End: 2017-03-06
Attending: EMERGENCY MEDICINE
Payer: SELF-PAY

## 2017-03-06 VITALS
TEMPERATURE: 98 F | SYSTOLIC BLOOD PRESSURE: 116 MMHG | WEIGHT: 254 LBS | BODY MASS INDEX: 43.36 KG/M2 | OXYGEN SATURATION: 98 % | HEART RATE: 112 BPM | DIASTOLIC BLOOD PRESSURE: 86 MMHG | HEIGHT: 64 IN | RESPIRATION RATE: 36 BRPM

## 2017-03-06 DIAGNOSIS — I50.21 ACUTE SYSTOLIC CONGESTIVE HEART FAILURE (HCC): Primary | ICD-10-CM

## 2017-03-06 LAB
ALBUMIN SERPL BCP-MCNC: 2.9 G/DL (ref 3.4–5)
ALBUMIN/GLOB SERPL: 0.6 {RATIO} (ref 0.8–1.7)
ALP SERPL-CCNC: 103 U/L (ref 45–117)
ALT SERPL-CCNC: 27 U/L (ref 13–56)
ANION GAP BLD CALC-SCNC: 9 MMOL/L (ref 3–18)
APPEARANCE UR: ABNORMAL
AST SERPL W P-5'-P-CCNC: 36 U/L (ref 15–37)
BACTERIA URNS QL MICRO: ABNORMAL /HPF
BASOPHILS # BLD AUTO: 0 K/UL (ref 0–0.06)
BASOPHILS # BLD: 0 % (ref 0–2)
BILIRUB SERPL-MCNC: 2 MG/DL (ref 0.2–1)
BILIRUB UR QL: NEGATIVE
BNP SERPL-MCNC: 2699 PG/ML (ref 0–450)
BUN SERPL-MCNC: 9 MG/DL (ref 7–18)
BUN/CREAT SERPL: 10 (ref 12–20)
CALCIUM SERPL-MCNC: 8.7 MG/DL (ref 8.5–10.1)
CHLORIDE SERPL-SCNC: 103 MMOL/L (ref 100–108)
CO2 SERPL-SCNC: 25 MMOL/L (ref 21–32)
COLOR UR: YELLOW
CREAT SERPL-MCNC: 0.94 MG/DL (ref 0.6–1.3)
DIFFERENTIAL METHOD BLD: ABNORMAL
EOSINOPHIL # BLD: 0 K/UL (ref 0–0.4)
EOSINOPHIL NFR BLD: 1 % (ref 0–5)
EPITH CASTS URNS QL MICRO: ABNORMAL /LPF (ref 0–5)
ERYTHROCYTE [DISTWIDTH] IN BLOOD BY AUTOMATED COUNT: 15.5 % (ref 11.6–14.5)
GLOBULIN SER CALC-MCNC: 4.5 G/DL (ref 2–4)
GLUCOSE SERPL-MCNC: 169 MG/DL (ref 74–99)
GLUCOSE UR STRIP.AUTO-MCNC: NEGATIVE MG/DL
HCT VFR BLD AUTO: 35.9 % (ref 35–45)
HGB BLD-MCNC: 11.6 G/DL (ref 12–16)
HGB UR QL STRIP: NEGATIVE
KETONES UR QL STRIP.AUTO: NEGATIVE MG/DL
LEUKOCYTE ESTERASE UR QL STRIP.AUTO: ABNORMAL
LIPASE SERPL-CCNC: 72 U/L (ref 73–393)
LYMPHOCYTES # BLD AUTO: 41 % (ref 21–52)
LYMPHOCYTES # BLD: 3.1 K/UL (ref 0.9–3.6)
MCH RBC QN AUTO: 29.8 PG (ref 24–34)
MCHC RBC AUTO-ENTMCNC: 32.3 G/DL (ref 31–37)
MCV RBC AUTO: 92.3 FL (ref 74–97)
MONOCYTES # BLD: 0.4 K/UL (ref 0.05–1.2)
MONOCYTES NFR BLD AUTO: 5 % (ref 3–10)
NEUTS SEG # BLD: 4 K/UL (ref 1.8–8)
NEUTS SEG NFR BLD AUTO: 53 % (ref 40–73)
NITRITE UR QL STRIP.AUTO: NEGATIVE
PH UR STRIP: 5 [PH] (ref 5–8)
PLATELET # BLD AUTO: 274 K/UL (ref 135–420)
PMV BLD AUTO: 9.4 FL (ref 9.2–11.8)
POTASSIUM SERPL-SCNC: 3.8 MMOL/L (ref 3.5–5.5)
PROT SERPL-MCNC: 7.4 G/DL (ref 6.4–8.2)
PROT UR STRIP-MCNC: NEGATIVE MG/DL
RBC # BLD AUTO: 3.89 M/UL (ref 4.2–5.3)
RBC #/AREA URNS HPF: ABNORMAL /HPF (ref 0–5)
SODIUM SERPL-SCNC: 137 MMOL/L (ref 136–145)
SP GR UR REFRACTOMETRY: 1.02 (ref 1–1.03)
TRICHOMONAS UR QL MICRO: ABNORMAL
UROBILINOGEN UR QL STRIP.AUTO: 2 EU/DL (ref 0.2–1)
WBC # BLD AUTO: 7.6 K/UL (ref 4.6–13.2)
WBC URNS QL MICRO: ABNORMAL /HPF (ref 0–4)

## 2017-03-06 PROCEDURE — 74011250636 HC RX REV CODE- 250/636: Performed by: EMERGENCY MEDICINE

## 2017-03-06 PROCEDURE — 74011250637 HC RX REV CODE- 250/637: Performed by: EMERGENCY MEDICINE

## 2017-03-06 PROCEDURE — 93971 EXTREMITY STUDY: CPT

## 2017-03-06 PROCEDURE — 93005 ELECTROCARDIOGRAM TRACING: CPT

## 2017-03-06 PROCEDURE — 99285 EMERGENCY DEPT VISIT HI MDM: CPT

## 2017-03-06 PROCEDURE — 81001 URINALYSIS AUTO W/SCOPE: CPT | Performed by: EMERGENCY MEDICINE

## 2017-03-06 PROCEDURE — 96374 THER/PROPH/DIAG INJ IV PUSH: CPT

## 2017-03-06 PROCEDURE — 85025 COMPLETE CBC W/AUTO DIFF WBC: CPT | Performed by: EMERGENCY MEDICINE

## 2017-03-06 PROCEDURE — 83690 ASSAY OF LIPASE: CPT | Performed by: EMERGENCY MEDICINE

## 2017-03-06 PROCEDURE — 83880 ASSAY OF NATRIURETIC PEPTIDE: CPT | Performed by: EMERGENCY MEDICINE

## 2017-03-06 PROCEDURE — 71010 XR CHEST PORT: CPT

## 2017-03-06 PROCEDURE — 80053 COMPREHEN METABOLIC PANEL: CPT | Performed by: EMERGENCY MEDICINE

## 2017-03-06 RX ORDER — FUROSEMIDE 40 MG/1
40 TABLET ORAL DAILY
Qty: 20 TAB | Refills: 0 | Status: SHIPPED | OUTPATIENT
Start: 2017-03-06 | End: 2017-03-27

## 2017-03-06 RX ORDER — FUROSEMIDE 10 MG/ML
40 INJECTION INTRAMUSCULAR; INTRAVENOUS
Status: COMPLETED | OUTPATIENT
Start: 2017-03-06 | End: 2017-03-06

## 2017-03-06 RX ORDER — LISINOPRIL 10 MG/1
5 TABLET ORAL DAILY
Qty: 15 TAB | Refills: 0 | Status: SHIPPED | OUTPATIENT
Start: 2017-03-06 | End: 2017-03-27

## 2017-03-06 RX ORDER — ONDANSETRON 4 MG/1
4 TABLET, ORALLY DISINTEGRATING ORAL
Status: COMPLETED | OUTPATIENT
Start: 2017-03-06 | End: 2017-03-06

## 2017-03-06 RX ORDER — LISINOPRIL 10 MG/1
5 TABLET ORAL
Status: COMPLETED | OUTPATIENT
Start: 2017-03-06 | End: 2017-03-06

## 2017-03-06 RX ADMIN — ONDANSETRON 4 MG: 4 TABLET, ORALLY DISINTEGRATING ORAL at 22:10

## 2017-03-06 RX ADMIN — FUROSEMIDE 40 MG: 10 INJECTION, SOLUTION INTRAVENOUS at 22:11

## 2017-03-06 RX ADMIN — LISINOPRIL 5 MG: 10 TABLET ORAL at 22:10

## 2017-03-06 NOTE — ED TRIAGE NOTES
Abdominal pain, bilateral leg swelling, sob x 2 days. History of CHF. States that her abdominal area is swelling as well.

## 2017-03-07 LAB
ATRIAL RATE: 109 BPM
CALCULATED P AXIS, ECG09: 67 DEGREES
CALCULATED R AXIS, ECG10: 18 DEGREES
CALCULATED T AXIS, ECG11: 66 DEGREES
DIAGNOSIS, 93000: NORMAL
P-R INTERVAL, ECG05: 168 MS
Q-T INTERVAL, ECG07: 296 MS
QRS DURATION, ECG06: 80 MS
QTC CALCULATION (BEZET), ECG08: 398 MS
VENTRICULAR RATE, ECG03: 109 BPM

## 2017-03-07 NOTE — DISCHARGE INSTRUCTIONS
Heart Failure: Care Instructions  Your Care Instructions    Heart failure occurs when your heart does not pump as much blood as the body needs. Failure does not mean that the heart has stopped pumping but rather that it is not pumping as well as it should. Over time, this causes fluid buildup in your lungs and other parts of your body. Fluid buildup can cause shortness of breath, fatigue, swollen ankles, and other problems. By taking medicines regularly, reducing sodium (salt) in your diet, checking your weight every day, and making lifestyle changes, you can feel better and live longer. Follow-up care is a key part of your treatment and safety. Be sure to make and go to all appointments, and call your doctor if you are having problems. It's also a good idea to know your test results and keep a list of the medicines you take. How can you care for yourself at home? Medicines  · Be safe with medicines. Take your medicines exactly as prescribed. Call your doctor if you think you are having a problem with your medicine. · Do not take any vitamins, over-the-counter medicine, or herbal products without talking to your doctor first. Zetta Hipps not take ibuprofen (Advil or Motrin) and naproxen (Aleve) without talking to your doctor first. They could make your heart failure worse. · You may be taking some of the following medicine. ¨ Beta-blockers can slow heart rate, decrease blood pressure, and improve your condition. Taking a beta-blocker may lower your chance of needing to be hospitalized. ¨ Angiotensin-converting enzyme inhibitors (ACEIs) reduce the heart's workload, lower blood pressure, and reduce swelling. Taking an ACEI may lower your chance of needing to be hospitalized again. ¨ Angiotensin II receptor blockers (ARBs) work like ACEIs. Your doctor may prescribe them instead of ACEIs. ¨ Diuretics, also called water pills, reduce swelling.   ¨ Potassium supplements replace this important mineral, which is sometimes lost with diuretics. ¨ Aspirin and other blood thinners prevent blood clots, which can cause a stroke or heart attack. You will get more details on the specific medicines your doctor prescribes. Diet  · Your doctor may suggest that you limit sodium to 2,000 milligrams (mg) a day or less. That is less than 1 teaspoon of salt a day, including all the salt you eat in cooking or in packaged foods. People get most of their sodium from processed foods. Fast food and restaurant meals also tend to be very high in sodium. · Ask your doctor how much liquid you can drink each day. You may have to limit liquids. Weight  · Weigh yourself without clothing at the same time each day. Record your weight. Call your doctor if you gain more than 3 pounds in 2 to 3 days. A sudden weight gain may mean that your heart failure is getting worse. Activity level  · Start light exercise (if your doctor says it is okay). Even if you can only do a small amount, exercise will help you get stronger, have more energy, and manage your weight and your stress. Walking is an easy way to get exercise. Start out by walking a little more than you did before. Bit by bit, increase the amount you walk. · When you exercise, watch for signs that your heart is working too hard. You are pushing yourself too hard if you cannot talk while you are exercising. If you become short of breath or dizzy or have chest pain, stop, sit down, and rest.  · If you feel \"wiped out\" the day after you exercise, walk slower or for a shorter distance until you can work up to a better pace. · Get enough rest at night. Sleeping with 1 or 2 pillows under your upper body and head may help you breathe easier. Lifestyle changes  · Do not smoke. Smoking can make a heart condition worse. If you need help quitting, talk to your doctor about stop-smoking programs and medicines. These can increase your chances of quitting for good.  Quitting smoking may be the most important step you can take to protect your heart. · Limit alcohol to 2 drinks a day for men and 1 drink a day for women. Too much alcohol can cause health problems. · Avoid getting sick from colds and the flu. Get a pneumococcal vaccine shot. If you have had one before, ask your doctor whether you need another dose. Get a flu shot each year. If you must be around people with colds or the flu, wash your hands often. When should you call for help? Call 911 if you have symptoms of sudden heart failure such as:  · You have severe trouble breathing. · You cough up pink, foamy mucus. · You have a new irregular or rapid heartbeat. Call your doctor now or seek immediate medical care if:  · You have new or increased shortness of breath. · You are dizzy or lightheaded, or you feel like you may faint. · You have sudden weight gain, such as 3 pounds or more in 2 to 3 days. · You have increased swelling in your legs, ankles, or feet. · You are suddenly so tired or weak that you cannot do your usual activities. Watch closely for changes in your health, and be sure to contact your doctor if:  · You develop new symptoms. Where can you learn more? Go to http://star-jean.info/. Enter F438 in the search box to learn more about \"Heart Failure: Care Instructions. \"  Current as of: January 27, 2016  Content Version: 11.1  © 2934-5684 [employer]. Care instructions adapted under license by [employer] (which disclaims liability or warranty for this information). If you have questions about a medical condition or this instruction, always ask your healthcare professional. [place] [address] any warranty or liability for your use of this information.

## 2017-03-07 NOTE — ED NOTES
Increasing dyspnea, known  CHF,  Suspect recurrence or worsening of condition  Left leg swelling, significant.  Will order a duplex study  Abdominal pain, resolving, follow

## 2017-03-07 NOTE — ED NOTES
Hourly rounding-Pt resting on stretcher, side rails up, call bell in reach, vitals stable, pt updated on plan of care, no issues or complaints at this time.

## 2017-03-07 NOTE — PROCEDURES
[facility]  *** FINAL REPORT ***    Name: Nik Abdi  MRN: [id number]  : 02 Sep 1970  HIS Order #: [id number]  [id number] [facility] Visit #: [id number]  Date: 06 Mar 2017    TYPE OF TEST: Peripheral Venous Testing    REASON FOR TEST  Limb swelling    Left Leg:-  Deep venous thrombosis:           No  Superficial venous thrombosis:    No  Deep venous insufficiency:        Not examined  Superficial venous insufficiency: Not examined      INTERPRETATION/FINDINGS  Left leg :  Duplex images were obtained using 2-D gray scale, color flow, and  spectral Doppler analysis. 1. No evidence of deep venous thrombosis detected in the veins  visualized. 2. Deep veins visualized include the common femoral, femoral,  popliteal, posterior tibial and peroneal veins. 3. No evidence of superficial thrombosis detected. 4. Superficial veins visualized include the proximal great saphenous  vein. 5. No evidence of deep vein thrombosis in the contralateral common  femoral vein. 6. Pulsatile flow detected. ADDITIONAL COMMENTS  Limitations: Swelling  Unable to perform adequate compression analysis of the left peroneal  veins. Patency of these vessels is demonstrated by color flow and  Doppler signal. Cannot rule out non-occlusive thrombus in this  segment. I have personally reviewed the data relevant to the interpretation of  this  study. TECHNOLOGIST: Courtney Tyson RVT  Signed: 2017 09:13 PM    PHYSICIAN: Narendra Hines MD  Signed: 2017 04:40 PM

## 2017-03-07 NOTE — ED PROVIDER NOTES
HPI Comments: 7:36 PM Suzi Stout is a 40 y.o. female with a history of CHF, hypertension, asthma who presents to ED complaining of sudden onset left leg swelling for the last 2 days. Patient denies recent travel or history of blood clots. Patient states that she has had surgery on her left leg in 2011 because it was \"broken in 3 spots. \" Patient also reports intermittent chest pain, shortness of breath, vomiting. Patient was recently treated for a UTI. Patient states that she takes 20 mg of Lasix and 81 mg of Aspirin for her CHF. No known drug allergies. No other complaints, associated symptoms or modifying factors at this time. PCP: Jarad Hager MD    The history is provided by the patient. Past Medical History:   Diagnosis Date    Asthma     Heart failure ([address]     Hypertension        Past Surgical History:   Procedure Laterality Date    BREAST SURGERY PROCEDURE UNLISTED      redfuction    HX GYN      hysterectomy  btl    HX MYOMECTOMY       fibroid tumo removed    HX ORTHOPAEDIC  March 2012 ORIF left fibula         No family history on file. Social History     Social History    Marital status: SINGLE     Spouse name: N/A    Number of children: N/A    Years of education: N/A     Occupational History    Not on file. Social History Main Topics    Smoking status: Current Every Day Smoker     Packs/day: 0.50     Years: 18.00    Smokeless tobacco: Not on file    Alcohol use Yes      Comment: socially     Drug use: No    Sexual activity: Yes     Partners: Male     Birth control/ protection: Condom     Other Topics Concern    Not on file     Social History Narrative         ALLERGIES: Review of patient's allergies indicates no known allergies. Review of Systems   Constitutional: Negative. Negative for activity change, appetite change and fever.    HENT: Negative for congestion, ear discharge, ear pain, facial swelling, nosebleeds, postnasal drip, sinus pressure, sneezing and tinnitus. Eyes: Negative for pain, discharge, redness and visual disturbance. Respiratory: Positive for shortness of breath. Negative for apnea, cough, choking, chest tightness, wheezing and stridor. Cardiovascular: Positive for chest pain and leg swelling (left). Gastrointestinal: Positive for vomiting. Negative for anal bleeding, blood in stool, constipation and diarrhea. Genitourinary: Negative for decreased urine volume, difficulty urinating, dyspareunia, dysuria, flank pain, frequency, hematuria, pelvic pain, urgency, vaginal bleeding and vaginal discharge. Musculoskeletal: Positive for joint swelling (left leg/ankle). Negative for arthralgias, back pain, gait problem, myalgias, neck pain and neck stiffness. Skin: Negative for color change. Neurological: Negative for dizziness, tremors, seizures, speech difficulty, weakness, numbness and headaches. Hematological: Negative for adenopathy. Does not bruise/bleed easily. Psychiatric/Behavioral: Negative for agitation, dysphoric mood and self-injury. The patient is not nervous/anxious. Vitals:    03/06/17 2027 03/06/17 2028 03/06/17 2131 03/06/17 2132   BP: 116/86  110/80    Pulse:  (!) 110  (!) 113   Resp:  24  19   Temp:       SpO2:  100%  99%   Weight:       Height:                Physical Exam   Constitutional: She is oriented to person, place, and time. She appears well-developed and well-nourished. HENT:   Head: Normocephalic and atraumatic. Right Ear: External ear normal.   Left Ear: External ear normal.   Nose: Nose normal.   Mouth/Throat: Oropharynx is clear and moist.   Eyes: Conjunctivae and EOM are normal. Pupils are equal, round, and reactive to light. Neck: Normal range of motion. Neck supple. Cardiovascular: Regular rhythm, normal heart sounds and intact distal pulses. Pulmonary/Chest: Effort normal and breath sounds normal. No respiratory distress. She has no wheezes. She has no rales.  She exhibits no tenderness. Abdominal: Soft. Bowel sounds are normal. She exhibits no distension and no mass. There is no tenderness. There is no rebound and no guarding. Musculoskeletal: Normal range of motion. Left lower leg: She exhibits swelling and edema. Left calf tenderness and swelling is significant compared to right leg. The entire left leg is swollen. Neurological: She is alert and oriented to person, place, and time. Skin: Skin is warm and dry. No rash noted. No erythema. Psychiatric: She has a normal mood and affect. Her behavior is normal. Judgment normal.   Nursing note and vitals reviewed. MDM  Number of Diagnoses or Management Options  Acute systolic congestive heart failure [facility]):   Diagnosis management comments: Acute on chronic CHF. Will evaluate for etiologies for worsening, adjust medications  Left leg swelling, [place] 2, will evaluate for DVT       Amount and/or Complexity of Data Reviewed  Clinical lab tests: ordered and reviewed  Tests in the radiology section of CPT®: ordered and reviewed    Risk of Complications, Morbidity, and/or Mortality  Presenting problems: moderate      ED Course       Procedures    Vitals:  Patient Vitals for the past 12 hrs:   Temp Pulse Resp BP SpO2   03/06/17 2132 - (!) 113 19 - 99 %   03/06/17 2131 - - - 110/80 -   03/06/17 2028 - (!) 110 24 - 100 %   03/06/17 2027 - - - 116/86 -   03/06/17 1916 - (!) 112 24 - 100 %   03/06/17 1915 - - - (!) 130/91 100 %   03/06/17 1857 98 °F (36.7 °C) (!) 117 16 (!) 152/103 99 %     99% on RA, indicating adequate oxygenation.     Medications ordered:   Medications   furosemide (LASIX) injection 40 mg (not administered)         Lab findings:  Recent Results (from the past 12 hour(s))   CBC WITH AUTOMATED DIFF    Collection Time: 03/06/17  7:30 PM   Result Value Ref Range    WBC 7.6 4.6 - 13.2 K/uL    RBC 3.89 (L) 4.20 - 5.30 M/uL    HGB 11.6 (L) 12.0 - 16.0 g/dL    HCT 35.9 35.0 - 45.0 %    MCV 92.3 74.0 - 97.0 FL MCH 29.8 24.0 - 34.0 PG    MCHC 32.3 31.0 - 37.0 g/dL    RDW 15.5 (H) 11.6 - 14.5 %    PLATELET 831 945 - 593 K/uL    MPV 9.4 9.2 - 11.8 FL    NEUTROPHILS 53 40 - 73 %    LYMPHOCYTES 41 21 - 52 %    MONOCYTES 5 3 - 10 %    EOSINOPHILS 1 0 - 5 %    BASOPHILS 0 0 - 2 %    ABS. NEUTROPHILS 4.0 1.8 - 8.0 K/UL    ABS. LYMPHOCYTES 3.1 0.9 - 3.6 K/UL    ABS. MONOCYTES 0.4 0.05 - 1.2 K/UL    ABS. EOSINOPHILS 0.0 0.0 - 0.4 K/UL    ABS. BASOPHILS 0.0 0.0 - 0.06 K/UL    DF AUTOMATED     METABOLIC PANEL, COMPREHENSIVE    Collection Time: 03/06/17  7:30 PM   Result Value Ref Range    Sodium 137 136 - 145 mmol/L    Potassium 3.8 3.5 - 5.5 mmol/L    Chloride 103 100 - 108 mmol/L    CO2 25 21 - 32 mmol/L    Anion gap 9 3.0 - 18 mmol/L    Glucose 169 (H) 74 - 99 mg/dL    BUN 9 7.0 - 18 MG/DL    Creatinine 0.94 0.6 - 1.3 MG/DL    BUN/Creatinine ratio 10 (L) 12 - 20      GFR est AA >60 >60 ml/min/1.73m2    GFR est non-AA >60 >60 ml/min/1.73m2    Calcium 8.7 8.5 - 10.1 MG/DL    Bilirubin, total 2.0 (H) 0.2 - 1.0 MG/DL    ALT (SGPT) 27 13 - 56 U/L    AST (SGOT) 36 15 - 37 U/L    Alk.  phosphatase 103 45 - 117 U/L    Protein, total 7.4 6.4 - 8.2 g/dL    Albumin 2.9 (L) 3.4 - 5.0 g/dL    Globulin 4.5 (H) 2.0 - 4.0 g/dL    A-G Ratio 0.6 (L) 0.8 - 1.7     LIPASE    Collection Time: 03/06/17  7:30 PM   Result Value Ref Range    Lipase 72 (L) 73 - 393 U/L   URINALYSIS W/ RFLX MICROSCOPIC    Collection Time: 03/06/17  7:30 PM   Result Value Ref Range    Color YELLOW      Appearance CLOUDY      Specific gravity 1.016 1.005 - 1.030      pH (UA) 5.0 5.0 - 8.0      Protein NEGATIVE  NEG mg/dL    Glucose NEGATIVE  NEG mg/dL    Ketone NEGATIVE  NEG mg/dL    Bilirubin NEGATIVE  NEG      Blood NEGATIVE  NEG      Urobilinogen 2.0 (H) 0.2 - 1.0 EU/dL    Nitrites NEGATIVE  NEG      Leukocyte Esterase SMALL (A) NEG     PRO-BNP    Collection Time: 03/06/17  7:30 PM   Result Value Ref Range    NT pro-BNP 2699 (H) 0 - 450 PG/ML   URINE MICROSCOPIC ONLY Collection Time: 03/06/17  7:30 PM   Result Value Ref Range    WBC 5 to 9 0 - 4 /hpf    RBC NONE 0 - 5 /hpf    Epithelial cells 1+ 0 - 5 /lpf    Bacteria FEW (A) NEG /hpf    Trichomonas FEW (A) NEG     EKG, 12 LEAD, INITIAL    Collection Time: 03/06/17  7:35 PM   Result Value Ref Range    Ventricular Rate 109 BPM    Atrial Rate 109 BPM    P-R Interval 168 ms    QRS Duration 80 ms    Q-T Interval 296 ms    QTC Calculation (Bezet) 398 ms    Calculated P Axis 67 degrees    Calculated R Axis 18 degrees    Calculated T Axis 66 degrees    Diagnosis       Sinus tachycardia  Possible Left atrial enlargement  Low voltage QRS  Cannot rule out Anterior infarct (cited on or before 06-FEB-2017)  Abnormal ECG  When compared with ECG of 16-FEB-2017 04:00,  No significant change was found         EKG interpretation by ED Physician:  EKG was interpreted by me at 19:37 and showed sinus tachycardia at a rate of 109 bpm. No STEMI. X-Ray, CT or other radiology findings or impressions:  DUPLEX LOWER EXT VENOUS LEFT         XR CHEST PORT   Final Result        Reevaluation of patient:   I informed the patient of the plan for discharge and follow up. Patient voiced understanding and was amendable to the proposed plan. All questions were answered. Disposition:  Diagnosis:   1. Acute systolic congestive heart failure (HCC)        Disposition: Discharged in stable condition. I gave the patient strict verbal and written instructions for discharge, follow up, and to return to the emergency department for any new or worsening signs or symptoms. Follow-up Information     None           Patient's Medications   Start Taking    No medications on file   Continue Taking    ALBUTEROL (ACCUNEB) 1.25 MG/3 ML NEBU    Take 3 mL by inhalation every four (4) hours as needed (wheezing). ASPIRIN 81 MG CHEWABLE TABLET    Take 81 mg by mouth daily.       FUROSEMIDE (LASIX) 20 MG TABLET    1 tab PO BID   These Medications have changed    No medications on file   Stop Taking    No medications on file     Scribe [address] [facility] for and in the presence of Gerardo Huerta MD 7:39 PM, 03/06/17. Signed by: Dontrell Ivy, 7:39 PM, 03/06/17. Provider Attestation:   I personally performed the services described in the documentation, reviewed the documentation, as recorded by the scribe in my presence, and it accurately and completely records my words and actions.  March 07, 2017 at 7:52 AM - Pepito Epstein MD

## 2017-03-07 NOTE — ED NOTES
Pt states she has been felling \"bloated and full\" in her abdomen since yesterday, states she has been vomiting since this morning and having sudden onset intermitent Chest pain 7/10 and SOB since this morning, pt also states her legs have been more swollen than usual, states she has been \"pooping and peeing\" less than usual which is unusual because she is taking lasix and states that she normally urinates a lot more. Pt is clear, had a BM earlier today, was treated recently for UTI, has hx of CHF. Pts left leg is swollen more than right with pitting edema present in both legs at this time. Pts abdomen is soft and non tender, CP is not reproducible. Pt denies recent illness or fever. No hx of blood clots, pedal pulses are present, calves are non tender,.

## 2017-03-15 ENCOUNTER — APPOINTMENT (OUTPATIENT)
Dept: GENERAL RADIOLOGY | Age: 47
End: 2017-03-15
Attending: EMERGENCY MEDICINE
Payer: SELF-PAY

## 2017-03-15 ENCOUNTER — APPOINTMENT (OUTPATIENT)
Dept: CT IMAGING | Age: 47
End: 2017-03-15
Attending: EMERGENCY MEDICINE
Payer: SELF-PAY

## 2017-03-15 ENCOUNTER — HOSPITAL ENCOUNTER (EMERGENCY)
Age: 47
Discharge: HOME | End: 2017-03-16
Attending: EMERGENCY MEDICINE
Payer: SELF-PAY

## 2017-03-15 DIAGNOSIS — R07.9 CHEST PAIN, UNSPECIFIED TYPE: ICD-10-CM

## 2017-03-15 DIAGNOSIS — R06.02 SOB (SHORTNESS OF BREATH): Primary | ICD-10-CM

## 2017-03-15 DIAGNOSIS — R60.0 BILATERAL LOWER EXTREMITY EDEMA: ICD-10-CM

## 2017-03-15 LAB
ANION GAP BLD CALC-SCNC: 6 MMOL/L (ref 3–18)
BASOPHILS # BLD AUTO: 0 K/UL (ref 0–0.1)
BASOPHILS # BLD: 0 % (ref 0–2)
BNP SERPL-MCNC: 1952 PG/ML (ref 0–450)
BUN SERPL-MCNC: 14 MG/DL (ref 7–18)
BUN/CREAT SERPL: 15 (ref 12–20)
CALCIUM SERPL-MCNC: 8.5 MG/DL (ref 8.5–10.1)
CHLORIDE SERPL-SCNC: 104 MMOL/L (ref 100–108)
CK MB CFR SERPL CALC: NORMAL % (ref 0–4)
CK MB SERPL-MCNC: <1 NG/ML (ref 5–25)
CK SERPL-CCNC: 70 U/L (ref 26–192)
CO2 SERPL-SCNC: 31 MMOL/L (ref 21–32)
CREAT SERPL-MCNC: 0.94 MG/DL (ref 0.6–1.3)
D DIMER PPP FEU-MCNC: 0.64 UG/ML(FEU)
DIFFERENTIAL METHOD BLD: ABNORMAL
EOSINOPHIL # BLD: 0.1 K/UL (ref 0–0.4)
EOSINOPHIL NFR BLD: 1 % (ref 0–5)
ERYTHROCYTE [DISTWIDTH] IN BLOOD BY AUTOMATED COUNT: 15.3 % (ref 11.6–14.5)
GLUCOSE SERPL-MCNC: 137 MG/DL (ref 74–99)
HCT VFR BLD AUTO: 35.1 % (ref 35–45)
HGB BLD-MCNC: 11.3 G/DL (ref 12–16)
LYMPHOCYTES # BLD AUTO: 38 % (ref 21–52)
LYMPHOCYTES # BLD: 3.2 K/UL (ref 0.9–3.6)
MAGNESIUM SERPL-MCNC: 2.3 MG/DL (ref 1.8–2.4)
MCH RBC QN AUTO: 29.6 PG (ref 24–34)
MCHC RBC AUTO-ENTMCNC: 32.2 G/DL (ref 31–37)
MCV RBC AUTO: 91.9 FL (ref 74–97)
MONOCYTES # BLD: 0.5 K/UL (ref 0.05–1.2)
MONOCYTES NFR BLD AUTO: 6 % (ref 3–10)
NEUTS SEG # BLD: 4.6 K/UL (ref 1.8–8)
NEUTS SEG NFR BLD AUTO: 55 % (ref 40–73)
PLATELET # BLD AUTO: 338 K/UL (ref 135–420)
PMV BLD AUTO: 9.5 FL (ref 9.2–11.8)
POTASSIUM SERPL-SCNC: 3.8 MMOL/L (ref 3.5–5.5)
RBC # BLD AUTO: 3.82 M/UL (ref 4.2–5.3)
SODIUM SERPL-SCNC: 141 MMOL/L (ref 136–145)
TROPONIN I SERPL-MCNC: <0.02 NG/ML (ref 0–0.04)
WBC # BLD AUTO: 8.4 K/UL (ref 4.6–13.2)

## 2017-03-15 PROCEDURE — 85025 COMPLETE CBC W/AUTO DIFF WBC: CPT | Performed by: EMERGENCY MEDICINE

## 2017-03-15 PROCEDURE — 82550 ASSAY OF CK (CPK): CPT | Performed by: EMERGENCY MEDICINE

## 2017-03-15 PROCEDURE — 71010 XR CHEST PORT: CPT

## 2017-03-15 PROCEDURE — 74011636320 HC RX REV CODE- 636/320: Performed by: EMERGENCY MEDICINE

## 2017-03-15 PROCEDURE — 85379 FIBRIN DEGRADATION QUANT: CPT | Performed by: EMERGENCY MEDICINE

## 2017-03-15 PROCEDURE — 93005 ELECTROCARDIOGRAM TRACING: CPT

## 2017-03-15 PROCEDURE — 80048 BASIC METABOLIC PNL TOTAL CA: CPT | Performed by: EMERGENCY MEDICINE

## 2017-03-15 PROCEDURE — 71275 CT ANGIOGRAPHY CHEST: CPT

## 2017-03-15 PROCEDURE — 83735 ASSAY OF MAGNESIUM: CPT | Performed by: EMERGENCY MEDICINE

## 2017-03-15 PROCEDURE — 83880 ASSAY OF NATRIURETIC PEPTIDE: CPT | Performed by: EMERGENCY MEDICINE

## 2017-03-15 PROCEDURE — 99284 EMERGENCY DEPT VISIT MOD MDM: CPT

## 2017-03-15 RX ADMIN — IOPAMIDOL 100 ML: 755 INJECTION, SOLUTION INTRAVENOUS at 23:56

## 2017-03-15 NOTE — ED TRIAGE NOTES
Patient arrived to ED with complaints of bilateral leg swelling and pain, SOB and chest pain since yesterday.

## 2017-03-16 VITALS
RESPIRATION RATE: 27 BRPM | HEART RATE: 107 BPM | WEIGHT: 257 LBS | SYSTOLIC BLOOD PRESSURE: 102 MMHG | BODY MASS INDEX: 43.87 KG/M2 | OXYGEN SATURATION: 100 % | TEMPERATURE: 97.5 F | DIASTOLIC BLOOD PRESSURE: 78 MMHG | HEIGHT: 64 IN

## 2017-03-16 LAB
ATRIAL RATE: 110 BPM
CALCULATED P AXIS, ECG09: 27 DEGREES
CALCULATED R AXIS, ECG10: -6 DEGREES
CALCULATED T AXIS, ECG11: 40 DEGREES
DIAGNOSIS, 93000: NORMAL
P-R INTERVAL, ECG05: 174 MS
Q-T INTERVAL, ECG07: 296 MS
QRS DURATION, ECG06: 76 MS
QTC CALCULATION (BEZET), ECG08: 400 MS
VENTRICULAR RATE, ECG03: 110 BPM

## 2017-03-16 NOTE — DISCHARGE INSTRUCTIONS
SPECIFIC PATIENT INSTRUCTIONS FROM THE EMERGENCY PHYSICIAN WHO TREATED YOU TODAY:  1. Return if worse.3  2. Follow up with your primary doctor or your cardiologist (if you have one) in the next 2-4 days for reevaluation. Chest Pain: Care Instructions  Your Care Instructions  There are many things that can cause chest pain. Some are not serious and will get better on their own in a few days. But some kinds of chest pain need more testing and treatment. Your doctor may have recommended a follow-up visit in the next 8 to 12 hours. If you are not getting better, you may need more tests or treatment. Even though your doctor has released you, you still need to watch for any problems. The doctor carefully checked you, but sometimes problems can develop later. If you have new symptoms or if your symptoms do not get better, get medical care right away. If you have worse or different chest pain or pressure that lasts more than 5 minutes or you passed out (lost consciousness), call 911 or seek other emergency help right away. A medical visit is only one step in your treatment. Even if you feel better, you still need to do what your doctor recommends, such as going to all suggested follow-up appointments and taking medicines exactly as directed. This will help you recover and help prevent future problems. How can you care for yourself at home? · Rest until you feel better. · Take your medicine exactly as prescribed. Call your doctor if you think you are having a problem with your medicine. · Do not drive after taking a prescription pain medicine. When should you call for help? Call 911 if:  · You passed out (lost consciousness). · You have severe difficulty breathing. · You have symptoms of a heart attack. These may include:  ¨ Chest pain or pressure, or a strange feeling in your chest.  ¨ Sweating. ¨ Shortness of breath. ¨ Nausea or vomiting.   ¨ Pain, pressure, or a strange feeling in your back, neck, jaw, or upper belly or in one or both shoulders or arms. ¨ Lightheadedness or sudden weakness. ¨ A fast or irregular heartbeat. After you call 911, the  may tell you to chew 1 adult-strength or 2 to 4 low-dose aspirin. Wait for an ambulance. Do not try to drive yourself. Call your doctor today if:  · You have any trouble breathing. · Your chest pain gets worse. · You are dizzy or lightheaded, or you feel like you may faint. · You are not getting better as expected. · You are having new or different chest pain. Where can you learn more? Go to http://star-jean.info/. Enter A120 in the search box to learn more about \"Chest Pain: Care Instructions. \"  Current as of: May 27, 2016  Content Version: 11.1  © 7527-3280 [employer]. Care instructions adapted under license by [employer] (which disclaims liability or warranty for this information). If you have questions about a medical condition or this instruction, always ask your healthcare professional. [place] [address] any warranty or liability for your use of this information. Shortness of Breath: Care Instructions  Your Care Instructions  Shortness of breath has many causes. Sometimes conditions such as anxiety can lead to shortness of breath. Some people get mild shortness of breath when they exercise. Trouble breathing also can be a symptom of a serious problem, such as asthma, lung disease, emphysema, heart problems, and pneumonia. If your shortness of breath continues, you may need tests and treatment. Watch for any changes in your breathing and other symptoms. Follow-up care is a key part of your treatment and safety. Be sure to make and go to all appointments, and call your doctor if you are having problems. Its also a good idea to know your test results and keep a list of the medicines you take. How can you care for yourself at home?   · Do not smoke or allow others to smoke around you. If you need help quitting, talk to your doctor about stop-smoking programs and medicines. These can increase your chances of quitting for good. · Get plenty of rest and sleep. · Take your medicines exactly as prescribed. Call your doctor if you think you are having a problem with your medicine. · Find healthy ways to deal with stress. ¨ Exercise daily. ¨ Get plenty of sleep. ¨ Eat regularly and well. When should you call for help? Call 911 anytime you think you may need emergency care. For example, call if:  · You have severe shortness of breath. · You have symptoms of a heart attack. These may include:  ¨ Chest pain or pressure, or a strange feeling in the chest.  ¨ Sweating. ¨ Shortness of breath. ¨ Nausea or vomiting. ¨ Pain, pressure, or a strange feeling in the back, neck, jaw, or upper belly or in one or both shoulders or arms. ¨ Lightheadedness or sudden weakness. ¨ A fast or irregular heartbeat. After you call 911, the  may tell you to chew 1 adult-strength or 2 to 4 low-dose aspirin. Wait for an ambulance. Do not try to drive yourself. Call your doctor now or seek immediate medical care if:  · Your shortness of breath gets worse or you start to wheeze. Wheezing is a high-pitched sound when you breathe. · You wake up at night out of breath or have to prop your head up on several pillows to breathe. · You are short of breath after only light activity or while at rest.  Watch closely for changes in your health, and be sure to contact your doctor if:  · You do not get better over the next 1 to 2 days. Where can you learn more? Go to http://star-jean.info/. Enter S780 in the search box to learn more about \"Shortness of Breath: Care Instructions. \"  Current as of: May 23, 2016  Content Version: 11.1  © 2943-3457 [employer].  Care instructions adapted under license by [employer] (which disclaims liability or warranty for this information). If you have questions about a medical condition or this instruction, always ask your healthcare professional. [address] any warranty or liability for your use of this information. Leg and Ankle Edema: Care Instructions  Your Care Instructions  Swelling in the legs, ankles, and feet is called edema. It is common after you sit or stand for a while. Long plane flights or car rides often cause swelling in the legs and feet. You may also have swelling if you have to stand for long periods of time at your job. Problems with the veins in the legs (varicose veins) and changes in hormones can also cause swelling. Sometimes the swelling in the ankles and feet is caused by a more serious problem, such as heart failure, infection, blood clots, or liver or kidney disease. Follow-up care is a key part of your treatment and safety. Be sure to make and go to all appointments, and call your doctor if you are having problems. Its also a good idea to know your test results and keep a list of the medicines you take. How can you care for yourself at home? · If your doctor gave you medicine, take it as prescribed. Call your doctor if you think you are having a problem with your medicine. · Whenever you are resting, raise your legs up. Try to keep the swollen area higher than the level of your heart. · Take breaks from standing or sitting in one position. ¨ Walk around to increase the blood flow in your lower legs. ¨ Move your feet and ankles often while you stand, or tighten and relax your leg muscles. · Wear support stockings. Put them on in the morning, before swelling gets worse. · Eat a balanced diet. Lose weight if you need to. · Limit the amount of salt (sodium) in your diet. Salt holds fluid in the body and may increase swelling. When should you call for help? Call 911 anytime you think you may need emergency care.  For example, call if:  · You have symptoms of a blood clot in your lung (called a pulmonary embolism). These may include:  ¨ Sudden chest pain. ¨ Trouble breathing. ¨ Coughing up blood. Call your doctor now or seek immediate medical care if:  · You have signs of a blood clot, such as:  ¨ Pain in your calf, back of the knee, thigh, or groin. ¨ Redness and swelling in your leg or groin. · You have symptoms of infection, such as:  ¨ Increased pain, swelling, warmth, or redness. ¨ Red streaks or pus. ¨ A fever. Watch closely for changes in your health, and be sure to contact your doctor if:  · Your swelling is getting worse. · You have new or worsening pain in your legs. · You do not get better as expected. Where can you learn more? Go to http://star-jean.info/. Enter D345 in the search box to learn more about \"Leg and Ankle Edema: Care Instructions. \"  Current as of: May 27, 2016  Content Version: 11.1  © 6237-7179 [employer]. Care instructions adapted under license by [employer] (which disclaims liability or warranty for this information). If you have questions about a medical condition or this instruction, always ask your healthcare professional. [place] [address] any warranty or liability for your use of this information. [employer] Activation    Thank you for requesting access to [employer]. Please follow the instructions below to securely access and download your online medical record. [employer] allows you to send messages to your doctor, view your test results, renew your prescriptions, schedule appointments, and more. How Do I Sign Up? 1. In your internet browser, go to [email or web address]. 2. Click on the First Time User? Click Here link in the Sign In box. You will see the New Member Sign Up page. 3. Enter your [employer] Access Code exactly as it appears below. You will not need to use this code after youve completed the sign-up process.  If you do not sign up before the expiration date, you must request a new code. [employer] Access Code: [email or web address]  Expires: 2017  8:26 PM (This is the date your [employer] access code will )    4. Enter the last four digits of your Social Security Number (xxxx) and Date of Birth (mm/dd/yyyy) as indicated and click Submit. You will be taken to the next sign-up page. 5. Create a [employer] ID. This will be your [employer] login ID and cannot be changed, so think of one that is secure and easy to remember. 6. Create a [employer] password. You can change your password at any time. 7. Enter your Password Reset Question and Answer. This can be used at a later time if you forget your password. 8. Enter your e-mail address. You will receive e-mail notification when new information is available in [address]. 9. Click Sign Up. You can now view and download portions of your medical record. 10. Click the Download Summary menu link to download a portable copy of your medical information. Additional Information    If you have questions, please visit the Frequently Asked Questions section of the [employer] website at [email or web address] Remember, [employer] is NOT to be used for urgent needs. For medical emergencies, dial 911.

## 2017-03-16 NOTE — ED NOTES
I have reviewed discharge instructions with the patient. The patient verbalized understanding. Patient armband removed and shredded. Pt is AxOx4, NAD. Pt states that her breathing feels better, but she is still not feeling well. Pt ambulated out of ED with steady gait.

## 2017-03-16 NOTE — ED PROVIDER NOTES
[facility] EMERGENCY DEPT      40 y.o. female less than 1/2 PPD smoker for the past 10-15 years with noted past medical history who presents to the emergency department c/o SOB that started 12 days ago. She notes associated intermittent sharp chest pain and leg swelling. She is experiencing two different types of chest pain, one that is only present during episodes of worsened shortness of breath and another intermittent, sharp chest pain that is sporadically present for 30-40 second episodes. The sharp chest pain is unaffected by breathing or movement. She was diagnosed with CHF 8 years ago and during her last ED visit she states that she was told that her CHF had worsened. Since then her Lasix dose was increased to 40mg by her PCP, Dr. Osiris Rankin; however, her legs have been swelling more often. She was last seen by her PCP 1.5 months ago. She denies abdominal pain, recent long trips, hormone use, calf pain, and any further complaints      No current facility-administered medications for this encounter. Current Outpatient Prescriptions   Medication Sig    furosemide (LASIX) 40 mg tablet Take 1 Tab by mouth daily for 20 days.  lisinopril (PRINIVIL) 10 mg tablet Take 0.5 Tabs by mouth daily for 30 days.  albuterol (ACCUNEB) 1.25 mg/3 mL nebu Take 3 mL by inhalation every four (4) hours as needed (wheezing).  aspirin 81 mg chewable tablet Take 81 mg by mouth daily. Past Medical History:   Diagnosis Date    Asthma     Heart failure ([address]     Hypertension        Past Surgical History:   Procedure Laterality Date    BREAST SURGERY PROCEDURE UNLISTED      redfuction    HX GYN      hysterectomy  btl    HX MYOMECTOMY       fibroid tumo removed    HX ORTHOPAEDIC  March 2012 ORIF left fibula       No family history on file.     Social History     Social History    Marital status: SINGLE     Spouse name: N/A    Number of children: N/A    Years of education: N/A     Occupational History    Not on file. Social History Main Topics    Smoking status: Current Every Day Smoker     Packs/day: 0.50     Years: 18.00    Smokeless tobacco: Not on file    Alcohol use Yes      Comment: socially     Drug use: No    Sexual activity: Yes     Partners: Male     Birth control/ protection: Condom     Other Topics Concern    Not on file     Social History Narrative       No Known Allergies    Patient's primary care provider (as noted in EPIC):  Aki Wood MD    REVIEW OF SYSTEMS:    Constitutional:  Negative for diaphoresis. HENT:  Negative for congestion. Respiratory:  Negative for cough. Cardiovascular:  Negative for palpitations. Gastrointestinal:  Negative for diarrhea. Genitourinary:  Negative for flank pain. Skin:  Negative for pallor. Neurological:  Negative for weakness. Psychiatric:  Negative for hallucinations. Visit Vitals    /78    Pulse (!) 105    Temp 97.5 °F (36.4 °C)    Resp 22    Ht 5' 4\" (1.626 m)    Wt 116.6 kg (257 lb)    SpO2 100%    BMI 44.11 kg/m2       PHYSICAL EXAM:    CONSTITUTIONAL:  Alert, in no apparent distress;  well developed;  well nourished. HEAD:  Normocephalic, atraumatic. EYES:  EOMI. Non-icteric sclera. Normal conjunctiva. ENTM:  Nose:  no rhinorrhea. Throat:  no erythema or exudate, mucous membranes moist.  NECK:  No JVD. Supple  RESPIRATORY:  Chest clear, equal breath sounds, good air movement. CARDIOVASCULAR:  Regular rate and rhythm. No murmurs, rubs, or gallops. Chest:  No rash, lesions, bruising. No reproducible tenderness to palpation. GI:  Normal bowel sounds, abdomen soft and non-tender. No rebound or guarding. BACK:  Non-tender. UPPER EXT:  Normal inspection. LOWER EXT:  3+ bilateral lower extremity pitting edema, no calf tenderness. Distal pulses intact. NEURO:  Moves all four extremities, and grossly normal motor exam.  SKIN:  No rashes;  Normal for age.   PSYCH:  Alert and normal affect. DIFFERENTIAL DIAGNOSES/ MEDICAL DECISION MAKING:  Chest pain etiologies include acute cardiac events to include possible acute myocardial infarction, acute coronary syndrome, pneumonia, chest wall pain (myofascial/ musculoskeletal etiology), chronic obstructive pulmonary disease (copd), acute asthma exacerbation, congestive heart failure, acute bronchitis, pulmonary embolism, upper respiratory infection, referred abdominal pain, other etiologies, versus combination of the above.     Abnormal lab results from this emergency department encounter:  Labs Reviewed   CBC WITH AUTOMATED DIFF - Abnormal; Notable for the following:        Result Value    RBC 3.82 (*)     HGB 11.3 (*)     RDW 15.3 (*)     All other components within normal limits   METABOLIC PANEL, BASIC - Abnormal; Notable for the following:     Glucose 137 (*)     All other components within normal limits   PRO-BNP - Abnormal; Notable for the following:     NT pro-BNP 1952 (*)     All other components within normal limits   D DIMER - Abnormal; Notable for the following:     D DIMER 0.64 (*)     All other components within normal limits   CARDIAC PANEL,(CK, CKMB & TROPONIN)   MAGNESIUM       Lab values for this patient within approximately the last 12 hours:  Recent Results (from the past 12 hour(s))   EKG, 12 LEAD, INITIAL    Collection Time: 03/15/17  7:06 PM   Result Value Ref Range    Ventricular Rate 110 BPM    Atrial Rate 110 BPM    P-R Interval 174 ms    QRS Duration 76 ms    Q-T Interval 296 ms    QTC Calculation (Bezet) 400 ms    Calculated P Axis 27 degrees    Calculated R Axis -6 degrees    Calculated T Axis 40 degrees    Diagnosis       Sinus tachycardia with fusion complexes  Possible Left atrial enlargement  Low voltage QRS  Possible Anterolateral infarct (cited on or before 06-FEB-2017)  Abnormal ECG  When compared with ECG of 06-MAR-2017 19:35,  fusion complexes are now present  Questionable change in initial forces of Anterolateral leads     CBC WITH AUTOMATED DIFF    Collection Time: 03/15/17  8:30 PM   Result Value Ref Range    WBC 8.4 4.6 - 13.2 K/uL    RBC 3.82 (L) 4.20 - 5.30 M/uL    HGB 11.3 (L) 12.0 - 16.0 g/dL    HCT 35.1 35.0 - 45.0 %    MCV 91.9 74.0 - 97.0 FL    MCH 29.6 24.0 - 34.0 PG    MCHC 32.2 31.0 - 37.0 g/dL    RDW 15.3 (H) 11.6 - 14.5 %    PLATELET 114 016 - 644 K/uL    MPV 9.5 9.2 - 11.8 FL    NEUTROPHILS 55 40 - 73 %    LYMPHOCYTES 38 21 - 52 %    MONOCYTES 6 3 - 10 %    EOSINOPHILS 1 0 - 5 %    BASOPHILS 0 0 - 2 %    ABS. NEUTROPHILS 4.6 1.8 - 8.0 K/UL    ABS. LYMPHOCYTES 3.2 0.9 - 3.6 K/UL    ABS. MONOCYTES 0.5 0.05 - 1.2 K/UL    ABS. EOSINOPHILS 0.1 0.0 - 0.4 K/UL    ABS.  BASOPHILS 0.0 0.0 - 0.1 K/UL    DF AUTOMATED     METABOLIC PANEL, BASIC    Collection Time: 03/15/17  8:30 PM   Result Value Ref Range    Sodium 141 136 - 145 mmol/L    Potassium 3.8 3.5 - 5.5 mmol/L    Chloride 104 100 - 108 mmol/L    CO2 31 21 - 32 mmol/L    Anion gap 6 3.0 - 18 mmol/L    Glucose 137 (H) 74 - 99 mg/dL    BUN 14 7.0 - 18 MG/DL    Creatinine 0.94 0.6 - 1.3 MG/DL    BUN/Creatinine ratio 15 12 - 20      GFR est AA >60 >60 ml/min/1.73m2    GFR est non-AA >60 >60 ml/min/1.73m2    Calcium 8.5 8.5 - 10.1 MG/DL   PRO-BNP    Collection Time: 03/15/17  8:30 PM   Result Value Ref Range    NT pro-BNP 1952 (H) 0 - 450 PG/ML   CARDIAC PANEL,(CK, CKMB & TROPONIN)    Collection Time: 03/15/17  8:30 PM   Result Value Ref Range    CK 70 26 - 192 U/L    CK - MB <1.0 <3.6 ng/ml    CK-MB Index Cannot be calulated 0.0 - 4.0 %    Troponin-I, Qt. <0.02 0.0 - 0.045 NG/ML   MAGNESIUM    Collection Time: 03/15/17  8:30 PM   Result Value Ref Range    Magnesium 2.3 1.8 - 2.4 mg/dL   D DIMER    Collection Time: 03/15/17  8:30 PM   Result Value Ref Range    D DIMER 0.64 (H) <0.46 ug/ml(FEU)       Radiologist and cardiologist interpretations if available at time of this note:  XR CHEST PORT   Final Result      CTA CHEST W WO CONT    (Results Pending) IMPRESSION[de-identified]     1. Negative for acute PE. No distinct alternative acute findings within the  chest.  2. Minimal residual right base pleural effusion, improved. 3. Cardiomegaly with evidence of right-sided cardiac dysfunction as before. Medication(s) ordered for patient during this emergency visit encounter:  Medications - No data to display    Initial EKG interpretation by attending emergency physician:  Sinus tachycardia about 110 pm.     ED COURSE:  One set of cardiac enzymes was normal.      Given elevated D-dimer and patient's presentation and physical exam, will get diagnostic imaging to assess for possible pulmonary embolus. Although the preferred test to assess this would be a CTA chest with contrast, the patient's renal function was reviewed by getting appropriate serum labs. Tests to measure the patient's current level of renal function were ordered so that they would be made available to the radiologist, who would make the final decision about which test to order to evaluate a possible pulmonary embolus, whether it be a CTA chest with contrast or a V/Q scan, along with recommendations that may be made be the radiologist regarding patient preparation for the study, both pre- and/or post-study. IMPRESSION AND MEDICAL DECISION MAKING:  Based upon the patients presentation with noted HPI and PE, along with the work up done in the emergency department, I believe that the patient is having non-cardiac chest pain as noted. Given the time frame of the patients chest pain, an acute cardiac event could be ruled out with one set of normal cardiac enzymes. DIAGNOSIS:  1. Chest pain  2. Shortness of breath. 3. Bilateral lower extremity edema. SPECIFIC PATIENT INSTRUCTIONS FROM THE EMERGENCY PHYSICIAN WHO TREATED YOU TODAY:  1. Return if worse.3  2. Follow up with your primary doctor or your cardiologist (if you have one) in the next 2-4 days for reevaluation. Jamey Norris M.D.    Provider Attestation:  If a scribe was utilized in generation of this patient record, I personally performed the services described in the documentation, reviewed the documentation, as recorded by the scribe in my presence, and it accurately records the patient's history of presenting illness, review of systems, patient physical examination, and procedures performed by me as the attending physician. Jeannette Callahan M.D.   [facility] Board Certified Emergency Physician  3/15/2017.  1:36 AM

## 2017-03-18 ENCOUNTER — APPOINTMENT (OUTPATIENT)
Dept: GENERAL RADIOLOGY | Age: 47
End: 2017-03-18
Attending: EMERGENCY MEDICINE
Payer: SELF-PAY

## 2017-03-18 ENCOUNTER — HOSPITAL ENCOUNTER (EMERGENCY)
Age: 47
Discharge: HOME | End: 2017-03-18
Attending: EMERGENCY MEDICINE
Payer: SELF-PAY

## 2017-03-18 VITALS
DIASTOLIC BLOOD PRESSURE: 82 MMHG | WEIGHT: 278 LBS | BODY MASS INDEX: 46.32 KG/M2 | RESPIRATION RATE: 12 BRPM | TEMPERATURE: 98.4 F | HEART RATE: 98 BPM | OXYGEN SATURATION: 99 % | SYSTOLIC BLOOD PRESSURE: 119 MMHG | HEIGHT: 65 IN

## 2017-03-18 DIAGNOSIS — I50.9 ACUTE ON CHRONIC CONGESTIVE HEART FAILURE, UNSPECIFIED CONGESTIVE HEART FAILURE TYPE: Primary | ICD-10-CM

## 2017-03-18 LAB
ANION GAP BLD CALC-SCNC: 6 MMOL/L (ref 3–18)
BASOPHILS # BLD AUTO: 0 K/UL (ref 0–0.06)
BASOPHILS # BLD: 0 % (ref 0–2)
BNP SERPL-MCNC: 2049 PG/ML (ref 0–450)
BUN SERPL-MCNC: 12 MG/DL (ref 7–18)
BUN/CREAT SERPL: 13 (ref 12–20)
CALCIUM SERPL-MCNC: 8.2 MG/DL (ref 8.5–10.1)
CHLORIDE SERPL-SCNC: 103 MMOL/L (ref 100–108)
CK MB CFR SERPL CALC: NORMAL % (ref 0–4)
CK MB CFR SERPL CALC: NORMAL % (ref 0–4)
CK MB SERPL-MCNC: <1 NG/ML (ref 5–25)
CK MB SERPL-MCNC: <1 NG/ML (ref 5–25)
CK SERPL-CCNC: 66 U/L (ref 26–192)
CK SERPL-CCNC: 78 U/L (ref 26–192)
CO2 SERPL-SCNC: 29 MMOL/L (ref 21–32)
CREAT SERPL-MCNC: 0.93 MG/DL (ref 0.6–1.3)
DIFFERENTIAL METHOD BLD: ABNORMAL
EOSINOPHIL # BLD: 0.1 K/UL (ref 0–0.4)
EOSINOPHIL NFR BLD: 1 % (ref 0–5)
ERYTHROCYTE [DISTWIDTH] IN BLOOD BY AUTOMATED COUNT: 15.2 % (ref 11.6–14.5)
GLUCOSE SERPL-MCNC: 116 MG/DL (ref 74–99)
HCT VFR BLD AUTO: 34.3 % (ref 35–45)
HGB BLD-MCNC: 11 G/DL (ref 12–16)
LYMPHOCYTES # BLD AUTO: 41 % (ref 21–52)
LYMPHOCYTES # BLD: 3 K/UL (ref 0.9–3.6)
MAGNESIUM SERPL-MCNC: 2.2 MG/DL (ref 1.8–2.4)
MCH RBC QN AUTO: 29.4 PG (ref 24–34)
MCHC RBC AUTO-ENTMCNC: 32.1 G/DL (ref 31–37)
MCV RBC AUTO: 91.7 FL (ref 74–97)
MONOCYTES # BLD: 0.4 K/UL (ref 0.05–1.2)
MONOCYTES NFR BLD AUTO: 6 % (ref 3–10)
NEUTS SEG # BLD: 3.8 K/UL (ref 1.8–8)
NEUTS SEG NFR BLD AUTO: 52 % (ref 40–73)
PLATELET # BLD AUTO: 246 K/UL (ref 135–420)
PMV BLD AUTO: 9.7 FL (ref 9.2–11.8)
POTASSIUM SERPL-SCNC: 3.9 MMOL/L (ref 3.5–5.5)
RBC # BLD AUTO: 3.74 M/UL (ref 4.2–5.3)
SODIUM SERPL-SCNC: 138 MMOL/L (ref 136–145)
TROPONIN I SERPL-MCNC: <0.02 NG/ML (ref 0–0.04)
TROPONIN I SERPL-MCNC: <0.02 NG/ML (ref 0–0.04)
WBC # BLD AUTO: 7.3 K/UL (ref 4.6–13.2)

## 2017-03-18 PROCEDURE — 85025 COMPLETE CBC W/AUTO DIFF WBC: CPT | Performed by: PHYSICIAN ASSISTANT

## 2017-03-18 PROCEDURE — 74011250636 HC RX REV CODE- 250/636: Performed by: EMERGENCY MEDICINE

## 2017-03-18 PROCEDURE — 83880 ASSAY OF NATRIURETIC PEPTIDE: CPT | Performed by: PHYSICIAN ASSISTANT

## 2017-03-18 PROCEDURE — 82550 ASSAY OF CK (CPK): CPT | Performed by: PHYSICIAN ASSISTANT

## 2017-03-18 PROCEDURE — 80048 BASIC METABOLIC PNL TOTAL CA: CPT | Performed by: PHYSICIAN ASSISTANT

## 2017-03-18 PROCEDURE — 96374 THER/PROPH/DIAG INJ IV PUSH: CPT

## 2017-03-18 PROCEDURE — 99284 EMERGENCY DEPT VISIT MOD MDM: CPT

## 2017-03-18 PROCEDURE — 96375 TX/PRO/DX INJ NEW DRUG ADDON: CPT

## 2017-03-18 PROCEDURE — 71010 XR CHEST PORT: CPT

## 2017-03-18 PROCEDURE — 93005 ELECTROCARDIOGRAM TRACING: CPT

## 2017-03-18 PROCEDURE — 83735 ASSAY OF MAGNESIUM: CPT | Performed by: PHYSICIAN ASSISTANT

## 2017-03-18 RX ORDER — FUROSEMIDE 10 MG/ML
40 INJECTION INTRAMUSCULAR; INTRAVENOUS
Status: COMPLETED | OUTPATIENT
Start: 2017-03-18 | End: 2017-03-18

## 2017-03-18 RX ORDER — ONDANSETRON 2 MG/ML
4 INJECTION INTRAMUSCULAR; INTRAVENOUS
Status: COMPLETED | OUTPATIENT
Start: 2017-03-18 | End: 2017-03-18

## 2017-03-18 RX ADMIN — ONDANSETRON 4 MG: 2 INJECTION INTRAMUSCULAR; INTRAVENOUS at 14:11

## 2017-03-18 RX ADMIN — FUROSEMIDE 40 MG: 10 INJECTION, SOLUTION INTRAVENOUS at 15:27

## 2017-03-18 NOTE — ED NOTES
I performed a brief evaluation, including history and physical, of the patient here in triage and I have determined that pt will need further treatment and evaluation from the main side ER physician. I have placed initial orders to help in expediting patients care.      March 18, 2017 at 12:04 PM - Lenora Welch PA-C        Visit Vitals    BP (!) 148/102 (BP 1 Location: Left arm, BP Patient Position: Sitting)    Pulse (!) 110    Temp 98.4 °F (36.9 °C)    Resp 20    Ht 5' 5\" (1.651 m)    Wt 126.1 kg (278 lb)    SpO2 97%    BMI 46.26 kg/m2

## 2017-03-18 NOTE — ED TRIAGE NOTES
Pt, c/o shortness of breath,  Bilateral leg swelling  States  Condition got worse since she was seen here  2 days ago

## 2017-03-18 NOTE — ED PROVIDER NOTES
HPI Comments: Leticia Carroll is a 40 y.o. female presenting with complaints of shortness of breath, worse with exertion for the past several days. Endorses progressive bilateral lower extremity edema, weight gain, orthopnea and nonproductive cough. Denies any chest pain, fever, nausea, vomiting, diarrhea or palpitations. Taking diuretics with minimal urine output over the past several days as well. Patient is a 40 y.o. female presenting with shortness of breath and lower extremity edema. The history is provided by the patient. Shortness of Breath   Associated symptoms include leg swelling. Pertinent negatives include no fever, no headaches, no cough, no chest pain, no vomiting and no abdominal pain. Leg Swelling   Associated symptoms include shortness of breath. Pertinent negatives include no chest pain, no abdominal pain and no headaches. Past Medical History:   Diagnosis Date    Asthma     Heart failure ([address]     Hypertension        Past Surgical History:   Procedure Laterality Date    BREAST SURGERY PROCEDURE UNLISTED      redfuction    HX GYN      hysterectomy  btl    HX MYOMECTOMY       fibroid tumo removed    HX ORTHOPAEDIC  March 2012 ORIF left fibula         No family history on file. Social History     Social History    Marital status: SINGLE     Spouse name: N/A    Number of children: N/A    Years of education: N/A     Occupational History    Not on file. Social History Main Topics    Smoking status: Current Every Day Smoker     Packs/day: 0.50     Years: 18.00    Smokeless tobacco: Not on file    Alcohol use Yes      Comment: socially     Drug use: No    Sexual activity: Yes     Partners: Male     Birth control/ protection: Condom     Other Topics Concern    Not on file     Social History Narrative         ALLERGIES: Review of patient's allergies indicates no known allergies. Review of Systems   Constitutional: Positive for unexpected weight change.  Negative for fever.   HENT: Negative for congestion. Respiratory: Positive for shortness of breath. Negative for cough. Cardiovascular: Positive for leg swelling. Negative for chest pain. Gastrointestinal: Negative for abdominal pain, nausea and vomiting. Genitourinary: Negative for dysuria. Musculoskeletal: Negative. Neurological: Negative for speech difficulty and headaches. All other systems reviewed and are negative. Vitals:    03/18/17 1330 03/18/17 1400 03/18/17 1430 03/18/17 1723   BP: 121/85 (!) 124/94 119/80    Pulse: (!) 104 (!) 103 (!) 104 98   Resp: 29 27 17 12   Temp:       SpO2: 96% 99% 100% 99%   Weight:       Height:                Physical Exam   Constitutional: She is oriented to person, place, and time. No distress. HENT:   Head: Atraumatic. Eyes: Conjunctivae are normal.   Neck: Normal range of motion. Neck supple. JVD present. Cardiovascular: Normal rate, regular rhythm and normal heart sounds. Pulmonary/Chest: Effort normal. No respiratory distress. She exhibits no tenderness. Diminished in bilateral bases. Abdominal: Soft. Bowel sounds are normal. She exhibits no distension. There is no tenderness. There is no rebound and no guarding. Musculoskeletal: Normal range of motion. She exhibits edema ( BLE). She exhibits no tenderness. Neurological: She is alert and oriented to person, place, and time. No cranial nerve deficit. Gait normal.   Skin: Skin is warm and dry. Psychiatric: She has a normal mood and affect. Nursing note and vitals reviewed. MDM  Number of Diagnoses or Management Options  Acute on chronic congestive heart failure, unspecified congestive heart failure type [facility]):   Diagnosis management comments: Liana Vallecillo is a 40 y.o. female presenting with signs of symptoms of heart failure exacerbation. Pt in no distress. Labs and cxr obtained and I have discussed results of work up with patient.   History and exam not consistent with aortic pathology or pulmonary embolus at this time. Pt diuresed well and prior to discharge ambulating around ED without difficulty. Denies any cp, sob or any other complaints. Pt has apt with pcp on 3/20 and feels comfortable following up with cardiology as well. Return precautions discussed. Patient stated verbal understanding and agrees with course and plan. ED Course       Procedures    Vitals:  Patient Vitals for the past 12 hrs:   Temp Pulse Resp BP SpO2   03/18/17 1723 - 98 12 - 99 %   03/18/17 1430 - (!) 104 17 119/80 100 %   03/18/17 1400 - (!) 103 27 (!) 124/94 99 %   03/18/17 1330 - (!) 104 29 121/85 96 %   03/18/17 1153 98.4 °F (36.9 °C) (!) 110 20 (!) 148/102 97 %           EKG interpretation by ED Physician:    1235 - sinus tachycardia, rate of 109, normal axis, no STEMI  1538 - NSR, rate of 98, no STEMI      X-Ray, CT or other radiology findings or impressions:  XR CHEST PORT    (Results Pending)             Disposition:  Diagnosis:   1.  Acute on chronic congestive heart failure, unspecified congestive heart failure type [facility])        Disposition: Discharged home in stable condition      Bonita Bateman MD

## 2017-03-18 NOTE — ED NOTES
Assumed care of patient she is experiencing muscle cramps throughout her body and nausea at this time. She is alert and oriented. Patient placed on monitor, call bell within reach and family at the bedside. Patient has generalized edema noted.

## 2017-03-18 NOTE — ED NOTES
If you experience chest pain call 911. Do not drive yourself. I have reviewed discharge instructions with the patient. The patient verbalized understanding. Discharge medications reviewed with patient and appropriate educational materials and side effects teaching were provided. Patient armband removed and shredded

## 2017-03-19 LAB
ATRIAL RATE: 109 BPM
ATRIAL RATE: 98 BPM
CALCULATED P AXIS, ECG09: 49 DEGREES
CALCULATED P AXIS, ECG09: 62 DEGREES
CALCULATED R AXIS, ECG10: -2 DEGREES
CALCULATED R AXIS, ECG10: 15 DEGREES
CALCULATED T AXIS, ECG11: 52 DEGREES
CALCULATED T AXIS, ECG11: 56 DEGREES
DIAGNOSIS, 93000: NORMAL
DIAGNOSIS, 93000: NORMAL
P-R INTERVAL, ECG05: 180 MS
P-R INTERVAL, ECG05: 182 MS
Q-T INTERVAL, ECG07: 298 MS
Q-T INTERVAL, ECG07: 378 MS
QRS DURATION, ECG06: 74 MS
QRS DURATION, ECG06: 76 MS
QTC CALCULATION (BEZET), ECG08: 401 MS
QTC CALCULATION (BEZET), ECG08: 482 MS
VENTRICULAR RATE, ECG03: 109 BPM
VENTRICULAR RATE, ECG03: 98 BPM

## 2017-03-21 ENCOUNTER — APPOINTMENT (OUTPATIENT)
Dept: GENERAL RADIOLOGY | Age: 47
DRG: 292 | End: 2017-03-21
Attending: EMERGENCY MEDICINE
Payer: SELF-PAY

## 2017-03-21 ENCOUNTER — HOSPITAL ENCOUNTER (INPATIENT)
Age: 47
LOS: 6 days | Discharge: HOME HEALTH | DRG: 292 | End: 2017-03-27
Attending: EMERGENCY MEDICINE | Admitting: HOSPITALIST
Payer: SELF-PAY

## 2017-03-21 DIAGNOSIS — I50.9 ACUTE ON CHRONIC CONGESTIVE HEART FAILURE, UNSPECIFIED CONGESTIVE HEART FAILURE TYPE: Primary | ICD-10-CM

## 2017-03-21 DIAGNOSIS — E87.70 HYPERVOLEMIA, UNSPECIFIED HYPERVOLEMIA TYPE: ICD-10-CM

## 2017-03-21 LAB
AMPHET UR QL SCN: NEGATIVE
ANION GAP BLD CALC-SCNC: 7 MMOL/L (ref 3–18)
APPEARANCE UR: CLEAR
BACTERIA URNS QL MICRO: NEGATIVE /HPF
BARBITURATES UR QL SCN: NEGATIVE
BASOPHILS # BLD AUTO: 0 K/UL (ref 0–0.1)
BASOPHILS # BLD: 1 % (ref 0–2)
BENZODIAZ UR QL: NEGATIVE
BILIRUB UR QL: NEGATIVE
BNP SERPL-MCNC: 1975 PG/ML (ref 0–450)
BUN SERPL-MCNC: 8 MG/DL (ref 7–18)
BUN/CREAT SERPL: 9 (ref 12–20)
CALCIUM SERPL-MCNC: 8.7 MG/DL (ref 8.5–10.1)
CANNABINOIDS UR QL SCN: NEGATIVE
CHLORIDE SERPL-SCNC: 103 MMOL/L (ref 100–108)
CK MB CFR SERPL CALC: NORMAL % (ref 0–4)
CK MB SERPL-MCNC: <1 NG/ML (ref 5–25)
CK SERPL-CCNC: 64 U/L (ref 26–192)
CO2 SERPL-SCNC: 28 MMOL/L (ref 21–32)
COCAINE UR QL SCN: NEGATIVE
COLOR UR: YELLOW
CREAT SERPL-MCNC: 0.85 MG/DL (ref 0.6–1.3)
DIFFERENTIAL METHOD BLD: ABNORMAL
EOSINOPHIL # BLD: 0.1 K/UL (ref 0–0.4)
EOSINOPHIL NFR BLD: 2 % (ref 0–5)
EPITH CASTS URNS QL MICRO: ABNORMAL /LPF (ref 0–5)
ERYTHROCYTE [DISTWIDTH] IN BLOOD BY AUTOMATED COUNT: 15.4 % (ref 11.6–14.5)
GLUCOSE SERPL-MCNC: 124 MG/DL (ref 74–99)
GLUCOSE UR STRIP.AUTO-MCNC: NEGATIVE MG/DL
HCG UR QL: NEGATIVE
HCT VFR BLD AUTO: 34.8 % (ref 35–45)
HDSCOM,HDSCOM: NORMAL
HGB BLD-MCNC: 11.1 G/DL (ref 12–16)
HGB UR QL STRIP: NEGATIVE
KETONES UR QL STRIP.AUTO: NEGATIVE MG/DL
LEUKOCYTE ESTERASE UR QL STRIP.AUTO: ABNORMAL
LYMPHOCYTES # BLD AUTO: 35 % (ref 21–52)
LYMPHOCYTES # BLD: 2.6 K/UL (ref 0.9–3.6)
MAGNESIUM SERPL-MCNC: 2.3 MG/DL (ref 1.8–2.4)
MCH RBC QN AUTO: 29.5 PG (ref 24–34)
MCHC RBC AUTO-ENTMCNC: 31.9 G/DL (ref 31–37)
MCV RBC AUTO: 92.6 FL (ref 74–97)
METHADONE UR QL: NEGATIVE
MONOCYTES # BLD: 0.5 K/UL (ref 0.05–1.2)
MONOCYTES NFR BLD AUTO: 7 % (ref 3–10)
NEUTS SEG # BLD: 4.3 K/UL (ref 1.8–8)
NEUTS SEG NFR BLD AUTO: 55 % (ref 40–73)
NITRITE UR QL STRIP.AUTO: NEGATIVE
OPIATES UR QL: NEGATIVE
PCP UR QL: NEGATIVE
PH UR STRIP: 5.5 [PH] (ref 5–8)
PLATELET # BLD AUTO: 308 K/UL (ref 135–420)
PMV BLD AUTO: 9.2 FL (ref 9.2–11.8)
POTASSIUM SERPL-SCNC: 3.6 MMOL/L (ref 3.5–5.5)
PROT UR STRIP-MCNC: NEGATIVE MG/DL
RBC # BLD AUTO: 3.76 M/UL (ref 4.2–5.3)
RBC #/AREA URNS HPF: NEGATIVE /HPF (ref 0–5)
SODIUM SERPL-SCNC: 138 MMOL/L (ref 136–145)
SP GR UR REFRACTOMETRY: 1.01 (ref 1–1.03)
TRICHOMONAS UR QL MICRO: ABNORMAL
TROPONIN I SERPL-MCNC: <0.02 NG/ML (ref 0–0.04)
UROBILINOGEN UR QL STRIP.AUTO: 2 EU/DL (ref 0.2–1)
WBC # BLD AUTO: 7.6 K/UL (ref 4.6–13.2)
WBC URNS QL MICRO: ABNORMAL /HPF (ref 0–4)

## 2017-03-21 PROCEDURE — 74011250636 HC RX REV CODE- 250/636: Performed by: EMERGENCY MEDICINE

## 2017-03-21 PROCEDURE — 84443 ASSAY THYROID STIM HORMONE: CPT

## 2017-03-21 PROCEDURE — 81025 URINE PREGNANCY TEST: CPT | Performed by: EMERGENCY MEDICINE

## 2017-03-21 PROCEDURE — 87086 URINE CULTURE/COLONY COUNT: CPT | Performed by: EMERGENCY MEDICINE

## 2017-03-21 PROCEDURE — 65660000000 HC RM CCU STEPDOWN

## 2017-03-21 PROCEDURE — 74011250636 HC RX REV CODE- 250/636: Performed by: HOSPITALIST

## 2017-03-21 PROCEDURE — 82550 ASSAY OF CK (CPK): CPT | Performed by: EMERGENCY MEDICINE

## 2017-03-21 PROCEDURE — 81001 URINALYSIS AUTO W/SCOPE: CPT | Performed by: EMERGENCY MEDICINE

## 2017-03-21 PROCEDURE — 80048 BASIC METABOLIC PNL TOTAL CA: CPT | Performed by: EMERGENCY MEDICINE

## 2017-03-21 PROCEDURE — 74011250637 HC RX REV CODE- 250/637: Performed by: HOSPITALIST

## 2017-03-21 PROCEDURE — 80061 LIPID PANEL: CPT

## 2017-03-21 PROCEDURE — 71010 XR CHEST PORT: CPT

## 2017-03-21 PROCEDURE — 83735 ASSAY OF MAGNESIUM: CPT | Performed by: EMERGENCY MEDICINE

## 2017-03-21 PROCEDURE — 80307 DRUG TEST PRSMV CHEM ANLYZR: CPT | Performed by: EMERGENCY MEDICINE

## 2017-03-21 PROCEDURE — 74011250637 HC RX REV CODE- 250/637: Performed by: EMERGENCY MEDICINE

## 2017-03-21 PROCEDURE — 83880 ASSAY OF NATRIURETIC PEPTIDE: CPT | Performed by: EMERGENCY MEDICINE

## 2017-03-21 PROCEDURE — 93005 ELECTROCARDIOGRAM TRACING: CPT

## 2017-03-21 PROCEDURE — 99284 EMERGENCY DEPT VISIT MOD MDM: CPT

## 2017-03-21 PROCEDURE — 84439 ASSAY OF FREE THYROXINE: CPT

## 2017-03-21 PROCEDURE — 85025 COMPLETE CBC W/AUTO DIFF WBC: CPT | Performed by: EMERGENCY MEDICINE

## 2017-03-21 RX ORDER — LISINOPRIL 5 MG/1
5 TABLET ORAL DAILY
Status: DISCONTINUED | OUTPATIENT
Start: 2017-03-22 | End: 2017-03-27 | Stop reason: HOSPADM

## 2017-03-21 RX ORDER — FUROSEMIDE 10 MG/ML
80 INJECTION INTRAMUSCULAR; INTRAVENOUS
Status: COMPLETED | OUTPATIENT
Start: 2017-03-21 | End: 2017-03-21

## 2017-03-21 RX ORDER — FUROSEMIDE 10 MG/ML
40 INJECTION INTRAMUSCULAR; INTRAVENOUS 2 TIMES DAILY
Status: DISCONTINUED | OUTPATIENT
Start: 2017-03-21 | End: 2017-03-26

## 2017-03-21 RX ORDER — ALBUTEROL SULFATE 1.25 MG/3ML
1.25 SOLUTION RESPIRATORY (INHALATION)
Status: DISCONTINUED | OUTPATIENT
Start: 2017-03-21 | End: 2017-03-27 | Stop reason: HOSPADM

## 2017-03-21 RX ORDER — GUAIFENESIN 100 MG/5ML
81 LIQUID (ML) ORAL DAILY
Status: DISCONTINUED | OUTPATIENT
Start: 2017-03-22 | End: 2017-03-27 | Stop reason: HOSPADM

## 2017-03-21 RX ORDER — HEPARIN SODIUM 5000 [USP'U]/ML
5000 INJECTION, SOLUTION INTRAVENOUS; SUBCUTANEOUS EVERY 8 HOURS
Status: DISCONTINUED | OUTPATIENT
Start: 2017-03-21 | End: 2017-03-27 | Stop reason: HOSPADM

## 2017-03-21 RX ORDER — CARVEDILOL 3.12 MG/1
3.12 TABLET ORAL 2 TIMES DAILY WITH MEALS
Status: DISCONTINUED | OUTPATIENT
Start: 2017-03-22 | End: 2017-03-27 | Stop reason: HOSPADM

## 2017-03-21 RX ADMIN — FUROSEMIDE 40 MG: 10 INJECTION, SOLUTION INTRAVENOUS at 23:37

## 2017-03-21 RX ADMIN — FUROSEMIDE 80 MG: 10 INJECTION, SOLUTION INTRAVENOUS at 20:17

## 2017-03-21 RX ADMIN — HEPARIN SODIUM 5000 UNITS: 5000 INJECTION, SOLUTION INTRAVENOUS; SUBCUTANEOUS at 23:38

## 2017-03-21 RX ADMIN — NITROGLYCERIN 0.5 INCH: 20 OINTMENT TOPICAL at 23:38

## 2017-03-21 RX ADMIN — NITROGLYCERIN 1 INCH: 20 OINTMENT TOPICAL at 20:24

## 2017-03-21 NOTE — IP AVS SNAPSHOT
Nuno Ego 
 
 
 [address] [facility] [address] Patient: Liana Vallecillo MRN: [id number] JM/[birth year] You are allergic to the following No active allergies Recent Documentation Height Weight BMI OB Status Smoking Status 1.626 m 114.3 kg 43.24 kg/m2 Hysterectomy Former Smoker Emergency Contacts Name Discharge Info Relation Home Work Mobile [facility] DIS[facility]ARE[facility] [3] Daughter [21] [phone] Gabbibarbara Wharton  Mother [14] [phone] About your hospitalization You were admitted on:  2017 You last received care in the:  [facility] You were discharged on:  2017 Unit phone number:  [phone] Why you were hospitalized Your primary diagnosis was:  Not on File Your diagnoses also included:  Fluid Overload, Acute On Chronic Congestive Heart Failure (Hcc) Providers Seen During Your Hospitalizations Provider Role Specialty Primary office phone Cecy Valenzuela MD Attending Provider Emergency Medicine [phone] Pushpa Aldrich MD Attending Provider [facility] [phone] Your Primary Care Physician (PCP) Primary Care Physician Office Phone Office Fax [facility] [phone] Follow-up Information Follow up With Details Comments Contact Info Brissa Carranza MD On 3/30/2017 at 1400, or 2 PM [address] [facility] [id number] [phone] [facility], DO On 2017 at 10:30am [facility] [address] [facility] 
[phone] Your Appointments 2017 10:30 AM EDT Follow Up with Kathe Yusuf NP [facility] [facility] ([facility]) [place] [address] [id number] [phone] Current Discharge Medication List  
  
START taking these medications Dose & Instructions Dispensing Information Comments Morning Noon Evening Bedtime  
 carvedilol 3.125 mg tablet Commonly known as:  Kale Schwartz Your last dose was: Your next dose is:    
   
   
 Dose:  3.125 mg Take 1 Tab by mouth two (2) times daily (with meals). Indications: Chronic Heart Failure Quantity:  60 Tab Refills:  1  
     
   
   
   
  
 nitroglycerin 0.4 mg SL tablet Commonly known as:  NITROSTAT Your last dose was: Your next dose is:    
   
   
 Dose:  0.4 mg  
1 Tab by SubLINGual route every five (5) minutes as needed for Chest Pain. Quantity:  1 Bottle Refills:  1 CONTINUE these medications which have CHANGED Dose & Instructions Dispensing Information Comments Morning Noon Evening Bedtime  
 lisinopril 5 mg tablet Commonly known as:  Cynthia Doe What changed:  medication strength Your last dose was: Your next dose is:    
   
   
 Dose:  5 mg Take 1 Tab by mouth daily. Quantity:  30 Tab Refills:  1 CONTINUE these medications which have NOT CHANGED Dose & Instructions Dispensing Information Comments Morning Noon Evening Bedtime  
 albuterol 1.25 mg/3 mL Nebu Commonly known as:  Veria Iwona Your last dose was: Your next dose is:    
   
   
 Dose:  1.25 mg Take 3 mL by inhalation every four (4) hours as needed (wheezing). Quantity:  25 Each Refills:  0  
     
   
   
   
  
 aspirin 81 mg chewable tablet Your last dose was: Your next dose is:    
   
   
 Dose:  81 mg Take 81 mg by mouth daily. Refills:  0  
     
   
   
   
  
 furosemide 40 mg tablet Commonly known as:  LASIX Your last dose was: Your next dose is:    
   
   
 Dose:  40 mg Take 1 Tab by mouth daily. Quantity:  30 Tab Refills:  1 Where to Get Your Medications Information on where to get these meds will be given to you by the nurse or doctor. ! Ask your nurse or doctor about these medications  
  carvedilol 3.125 mg tablet  
 furosemide 40 mg tablet  
 lisinopril 5 mg tablet  
 nitroglycerin 0.4 mg SL tablet Discharge Instructions Learning About ACE Inhibitors for Heart Failure Introduction ACE (angiotensin-converting enzyme) inhibitors block an enzyme that makes blood vessels narrow. As a result, the blood vessels relax and widen. This lowers blood pressure. These medicines also put more water and salt into the urine. This also lowers blood pressure. In heart failure, your heart does not pump as much blood as your body needs. These medicines: · Make it easier for your heart to pump. · Help reduce symptoms. · Make a heart attack or stroke less likely. Examples These medicines include: · Benazepril (Lotensin). · Lisinopril (Prinivil, Zestril). · Ramipril (Altace). Note: This is not a complete list. 
Possible side effects Side effects may include: · Cough. · Low blood pressure. You may feel dizzy and weak. · High potassium levels. · An allergic reaction. You may have other side effects or reactions not listed here. Check the information that comes with your medicine. What to know about taking this medicine · ACE inhibitors: ¨ Are often used to treat heart failure. They may be the only medicine used if your only symptoms are feeling tired and mildly short of breath with no fluid buildup (edema). But in most other cases, they are used with other medicines. ¨ Can cause a dry cough. If the cough is bad, talk to your doctor. You may need to try a different medicine. ¨ Can relieve symptoms, improve how you feel, and make it less likely you will need to stay in a hospital. And they can reduce the risk of early death. ¨ Can cause an allergic reaction of the skin. Symptoms may range from mild swelling to painful welts. It is rare to have severe swelling that makes it hard to breathe. · Take your medicines exactly as prescribed. Call your doctor if you think you are having a problem with your medicine. · Check with your doctor or pharmacist before you use any other medicines. This includes over-the-counter medicines. Make sure your doctor knows all of the medicines, vitamins, herbal products, and supplements you take. Taking some medicines together can cause problems. · You should not take an ACE inhibitor if you are pregnant or planning to become pregnant. · You may need regular blood tests. Where can you learn more? Go to http://starBitLeapjean.info/. Enter I673 in the search box to learn more about \"Learning About ACE Inhibitors for Heart Failure. \" Current as of: January 27, 2016 Content Version: 11.1 © 2006-2016 Socialscope. Care instructions adapted under license by [employer] (which disclaims liability or warranty for this information). If you have questions about a medical condition or this instruction, always ask your healthcare professional. [place] [address] any warranty or liability for your use of this information. Echocardiogram for Heart Failure: Care Instructions Your Care Instructions An echocardiogram, also called an \"echo,\" is a very useful test to check for heart failure. Heart failure means that your heart can't pump as much blood as your body needs. During an echo, your doctor can check how much blood your heart is pumping during each heartbeat. This is called an ejection fraction. An echo can also show if your heart is enlarged and if your heart valves are working as they should. During an echo, you lie on a table.  A technician moves a handheld device called a transducer across your chest. The device sends sound waves that echo off your heart. They create an image of your heart beating. The technician may ask you to breathe slowly or hold your breath. An echo takes about 30 to 60 minutes. Follow-up care is a key part of your treatment and safety. Be sure to make and go to all appointments, and call your doctor if you are having problems. It's also a good idea to know your test results and keep a list of the medicines you take. How can you care for yourself at home? Preparing for the test 
· You don't need to do anything to prepare. It may help to wear comfortable clothing that you can easily take off. After the test 
· After an echo, you can do your normal activities. If you feel like it, you can drive home. When should you call for help? Call 911 if you have symptoms of sudden heart failure such as: 
· You have severe trouble breathing. · You cough up pink, foamy mucus. · You have a new irregular or rapid heartbeat. Call your doctor now or seek immediate medical care if: 
· You have new or increased shortness of breath. · You are dizzy or lightheaded, or you feel like you may faint. · You have sudden weight gain, such as 3 pounds or more in 2 to 3 days. · You have increased swelling in your legs, ankles, or feet. · You are suddenly so tired or weak that you cannot do your usual activities. Watch closely for changes in your health, and be sure to contact your doctor if you develop new symptoms. Where can you learn more? Go to http://star-jean.info/. Enter H352 in the search box to learn more about \"Echocardiogram for Heart Failure: Care Instructions. \" Current as of: January 27, 2016 Content Version: 11.1 © 6811-7770 [employer] Moon. Care instructions adapted under license by [employer] (which disclaims liability or warranty for this information).  If you have questions about a medical condition or this instruction, always ask your healthcare professional. Carol Brennan[employer] disclaims any warranty or liability for your use of this information. Cardiac Rehabilitation: Care Instructions Your Care Instructions Cardiac rehabilitation is a program for people who have a heart problem, such as a heart attack, heart failure, or a heart valve disease. The program includes exercise, lifestyle changes, education, and emotional support. Cardiac rehab can help you improve the quality of your life through better overall health. It can help you lose weight and feel better about yourself. On your cardiac rehab team, you may have your doctor, a nurse specialist, a dietitian, and a physical therapist. They will design your cardiac rehab program specifically for you. You will learn how to reduce your risk for heart problems, how to manage stress, and how to eat a heart-healthy diet. By the end of the program, you will be ready to maintain a healthier lifestyle on your own. Follow-up care is a key part of your treatment and safety. Be sure to make and go to all appointments, and call your doctor if you are having problems. It's also a good idea to know your test results and keep a list of the medicines you take. How can you care for yourself at home? · Take your medicines exactly as prescribed. Call your doctor if you think you are having a problem with your medicine. You will get more details on the specific medicines your doctor prescribes. · Weigh yourself every day if your doctor tells you to. Watch for sudden weight gain. Weigh yourself on the same scale with the same amount of clothing at the same time of day. · Plan your meals so that you are eating heart-healthy foods. ¨ Eat a variety of foods daily. Fresh fruits and vegetables and whole-grains are good choices. ¨ Limit your fat intake, especially saturated and trans fat. ¨ Limit salt (sodium). ¨ Increase fiber in your diet. ¨ Limit alcohol.  
· Learn how to take your pulse so that you can track your heart rate during exercise. · Always check with your doctor before you begin a new exercise program. 
· Warm up before you exercise and cool down afterward for at least 15 minutes each. This will help your heart gradually prepare for and recover from exercise and avoid pushing your heart too hard. · Stop exercising if you have any unusual discomfort, such as chest pain. · Do not smoke. Smoking can make heart problems worse. If you need help quitting, talk to your doctor about stop-smoking programs and medicines. These can increase your chances of quitting for good. When should you call for help? Call 911 anytime you think you may need emergency care. For example, call if: 
· You have severe trouble breathing. · You cough up pink, foamy mucus and you have trouble breathing. · You have symptoms of a heart attack. These may include: ¨ Chest pain or pressure, or a strange feeling in the chest. 
¨ Sweating. ¨ Shortness of breath. ¨ Nausea or vomiting. ¨ Pain, pressure, or a strange feeling in the back, neck, jaw, or upper belly or in one or both shoulders or arms. ¨ Lightheadedness or sudden weakness. ¨ A fast or irregular heartbeat. After you call 911, the  may tell you to chew 1 adult-strength or 2 to 4 low-dose aspirin. Wait for an ambulance. Do not try to drive yourself. · You have angina symptoms (such as chest pain or pressure) that do not go away with rest or are not getting better within 5 minutes after you take a dose of nitroglycerin. · You have symptoms of a stroke. These may include: 
¨ Sudden numbness, tingling, weakness, or loss of movement in your face, arm, or leg, especially on only one side of your body. ¨ Sudden vision changes. ¨ Sudden trouble speaking. ¨ Sudden confusion or trouble understanding simple statements. ¨ Sudden problems with walking or balance. ¨ A sudden, severe headache that is different from past headaches. · You passed out (lost consciousness). Call your doctor now or seek immediate medical care if: 
· You have new or increased shortness of breath. · You are dizzy or lightheaded, or you feel like you may faint. · You gain weight suddenly, such as 3 pounds or more in 2 to 3 days. (Your doctor may suggest a different range of weight gain.) · You have increased swelling in your legs, ankles, or feet. Watch closely for changes in your health, and be sure to contact your doctor if you have any problems. Where can you learn more? Go to http://star-jean.info/. Enter R546 in the search box to learn more about \"Cardiac Rehabilitation: Care Instructions. \" Current as of: May 5, 2016 Content Version: 11.1 © 1620-0414 Slantrange. Care instructions adapted under license by [employer] (which disclaims liability or warranty for this information). If you have questions about a medical condition or this instruction, always ask your healthcare professional. [place] [address] any warranty or liability for your use of this information. Learning About Saving Energy When You Have a Chronic Condition Introduction Everyday tasks can be tiring when you have COPD, heart failure, or another long-term (chronic) condition. You may feel at times that you've lost your ability to live your life. But learning to conserve, or save, your energy can help you be less tired. Conserving your energy means finding ways of doing daily activities with as little effort as possible. With some small changes in the way you do things, you can get your tasks done more easily. Some treatments are available that might help. Pulmonary rehabilitation can teach you ways to breathe easier. Cardiac rehabilitation can help make your heart stronger. You also may want to see an occupational or physical therapist. The therapist can give you more tips on building strength and moving with less effort. What can you do to conserve your energy? Planning · Make a list of what you have to do every day. Group the tasks by location. · Do all the chores in one part of your house around the same time. · Go out for errands or do chores at the time of day when you have the most energy. · Plan rest periods into your day. Getting things done · Sit down as often as you can when you get dressed, do chores, or cook. · Use a cart with wheels to roll items, such as laundry, from one room to another. · Push or slide boxes or other large items instead of lifting them. Reaching and bending · Put things you use the most on shelves that are at the level of your waist or shoulder. · Use long-handled grabbers or other tools to reach items on a high shelf or to  things off the floor. Use long-handled dusters when you clean the house. · Use a raised toilet seat to avoid bending too far to sit or stand up. Eating · Eat several small meals instead of three larger meals. · If you get too tired to eat much, try to choose healthy foods that have more calories. Have a yogurt-and-fruit smoothie for breakfast. Put avocado on a sandwich. Or add cheese or peanut butter to snacks. · If you don't feel very hungry, try to eat first and drink water or other fluids later, after a meal. This can help keep you from losing weight. Sip small amounts of fluids if you need to drink while you eat. Having sex · Choose the time of day when you have more energy. · A [id number]-[id number] position for sex can be less tiring. Sometimes you may want to focus more on caressing. Watch closely for changes in your health, and be sure to contact your doctor if you have any problems. Where can you learn more? Go to http://star-jean.info/. Enter H190 in the search box to learn more about \"Learning About Saving Energy When You Have a Chronic Condition. \" Current as of: May 23, 2016 Content Version: 11.1 © 2240-6349 Healthwise, CloudAccess. Care instructions adapted under license by Cannon[employer] (which disclaims liability or warranty for this information). If you have questions about a medical condition or this instruction, always ask your healthcare professional. [address] any warranty or liability for your use of this information. DISCHARGE SUMMARY from Nurse The following personal items are in your possession at time of discharge: 
 
Dental Appliances: None Visual Aid: None Home Medications: None Jewelry: None Clothing: At bedside Other Valuables: Cell Phone PATIENT INSTRUCTIONS: 
 
After general anesthesia or intravenous sedation, for 24 hours or while taking prescription Narcotics: · Limit your activities · Do not drive and operate hazardous machinery · Do not make important personal or business decisions · Do  not drink alcoholic beverages · If you have not urinated within 8 hours after discharge, please contact your surgeon on call. Report the following to your surgeon: 
· Excessive pain, swelling, redness or odor of or around the surgical area · Temperature over 100.5 · Nausea and vomiting lasting longer than 4 hours or if unable to take medications · Any signs of decreased circulation or nerve impairment to extremity: change in color, persistent  numbness, tingling, coldness or increase pain · Any questions What to do at Home: 
Recommended activity: Activity as tolerated If you experience any of the following symptoms chest pain shortness of breath palpitations dizziness fainting increased swelling nausea vomiting elevated temperature>100.5, please follow up with Dr or Emergency department. *  Please give a list of your current medications to your Primary Care Provider.  
 
*  Please update this list whenever your medications are discontinued, doses are 
 changed, or new medications (including over-the-counter products) are added. *  Please carry medication information at all times in case of emergency situations. These are general instructions for a healthy lifestyle: No smoking/ No tobacco products/ Avoid exposure to second hand smoke Surgeon General's Warning:  Quitting smoking now greatly reduces serious risk to your health. Obesity, smoking, and sedentary lifestyle greatly increases your risk for illness A healthy diet, regular physical exercise & weight monitoring are important for maintaining a healthy lifestyle You may be retaining fluid if you have a history of heart failure or if you experience any of the following symptoms:  Weight gain of 3 pounds or more overnight or 5 pounds in a week, increased swelling in our hands or feet or shortness of breath while lying flat in bed. Please call your doctor as soon as you notice any of these symptoms; do not wait until your next office visit. Recognize signs and symptoms of STROKE: 
 
F-face looks uneven A-arms unable to move or move unevenly S-speech slurred or non-existent T-time-call 911 as soon as signs and symptoms begin-DO NOT go Back to bed or wait to see if you get better-TIME IS BRAIN. Warning Signs of HEART ATTACK Call 911 if you have these symptoms: 
? Chest discomfort. Most heart attacks involve discomfort in the center of the chest that lasts more than a few minutes, or that goes away and comes back. It can feel like uncomfortable pressure, squeezing, fullness, or pain. ? Discomfort in other areas of the upper body. Symptoms can include pain or discomfort in one or both arms, the back, neck, jaw, or stomach. ? Shortness of breath with or without chest discomfort. ? Other signs may include breaking out in a cold sweat, nausea, or lightheadedness. Don't wait more than five minutes to call [address]!  Fast action can save your life. Calling 911 is almost always the fastest way to get lifesaving treatment. Emergency Medical Services staff can begin treatment when they arrive  up to an hour sooner than if someone gets to the hospital by car. The discharge information has been reviewed with the patient. The patient verbalized understanding. Discharge medications reviewed with the patient and appropriate educational materials and side effects teaching were provided. NinthDecimalhart Activation Thank you for requesting access to [employer]. Please follow the instructions below to securely access and download your online medical record. [employer] allows you to send messages to your doctor, view your test results, renew your prescriptions, schedule appointments, and more. How Do I Sign Up? 1. In your internet browser, go to [email or web address] 
2. Click on the First Time User? Click Here link in the Sign In box. You will be redirect to the New Member Sign Up page. 3. Enter your [employer] Access Code exactly as it appears below. You will not need to use this code after youve completed the sign-up process. If you do not sign up before the expiration date, you must request a new code. [employer] Access Code: [email or web address] Expires: 2017  8:26 PM (This is the date your [employer] access code will ) 4. Enter the last four digits of your Social Security Number (xxxx) and Date of Birth (mm/dd/yyyy) as indicated and click Submit. You will be taken to the next sign-up page. 5. Create a [employer] ID. This will be your [employer] login ID and cannot be changed, so think of one that is secure and easy to remember. 6. Create a [employer] password. You can change your password at any time. 7. Enter your Password Reset Question and Answer. This can be used at a later time if you forget your password. 8. Enter your e-mail address. You will receive e-mail notification when new information is available in [address] 9. Click Sign Up. You can now view and download portions of your medical record. 10. Click the Download Summary menu link to download a portable copy of your medical information. Additional Information If you have questions, please visit the Frequently Asked Questions section of the [employer] website at [email or web address] Remember, [employer] is NOT to be used for urgent needs. For medical emergencies, dial 911. Patient armband removed and shredded Discharge Instructions Attachments/References SMOKING: STOPPING (ENGLISH) Discharge Orders None [employer] Announcement We are excited to announce that we are making your provider's discharge notes available to you in [employer]. You will see these notes when they are completed and signed by the physician that discharged you from your recent hospital stay. If you have any questions or concerns about any information you see in [employer], please call the Health Information Department where you were seen or reach out to your Primary Care Provider for more information about your plan of care. Introducing [facility]! Aguilar Riggs introduces [employer] patient portal. Now you can access parts of your medical record, email your doctor's office, and request medication refills online. 1. In your internet browser, go to [email or web address] 2. Click on the First Time User? Click Here link in the Sign In box. You will see the New Member Sign Up page. 3. Enter your [employer] Access Code exactly as it appears below. You will not need to use this code after youve completed the sign-up process. If you do not sign up before the expiration date, you must request a new code. · [employer] Access Code: [email or web address] Expires: 6/4/2017  8:26 PM 
 
4. Enter the last four digits of your Social Security Number (xxxx) and Date of Birth (mm/dd/yyyy) as indicated and click Submit.  You will be taken to the next sign-up page. 5. Create a [employer] ID. This will be your [employer] login ID and cannot be changed, so think of one that is secure and easy to remember. 6. Create a [employer] password. You can change your password at any time. 7. Enter your Password Reset Question and Answer. This can be used at a later time if you forget your password. 8. Enter your e-mail address. You will receive e-mail notification when new information is available in [address] 9. Click Sign Up. You can now view and download portions of your medical record. 10. Click the Download Summary menu link to download a portable copy of your medical information. If you have questions, please visit the Frequently Asked Questions section of the Vu[employer] website. Remember, [employer] is NOT to be used for urgent needs. For medical emergencies, dial 911. Now available from your iPhone and Android! General Information Please provide this summary of care documentation to your next provider. Patient Signature:  ____________________________________________________________ Date:  ____________________________________________________________  
  
Bautista Keith Provider Signature:  ____________________________________________________________ Date:  ____________________________________________________________ More Information Stopping Smoking: Care Instructions Your Care Instructions Cigarette smokers crave the nicotine in cigarettes. Giving it up is much harder than simply changing a habit. Your body has to stop craving the nicotine. It is hard to quit, but you can do it. There are many tools that people use to quit smoking. You may find that combining tools works best for you. There are several steps to quitting. First you get ready to quit. Then you get support to help you.  After that, you learn new skills and behaviors to become a nonsmoker. For many people, a necessary step is getting and using medicine. Your doctor will help you set up the plan that best meets your needs. You may want to attend a smoking cessation program to help you quit smoking. When you choose a program, look for one that has proven success. Ask your doctor for ideas. You will greatly increase your chances of success if you take medicine as well as get counseling or join a cessation program. 
Some of the changes you feel when you first quit tobacco are uncomfortable. Your body will miss the nicotine at first, and you may feel short-tempered and grumpy. You may have trouble sleeping or concentrating. Medicine can help you deal with these symptoms. You may struggle with changing your smoking habits and rituals. The last step is the tricky one: Be prepared for the smoking urge to continue for a time. This is a lot to deal with, but keep at it. You will feel better. Follow-up care is a key part of your treatment and safety. Be sure to make and go to all appointments, and call your doctor if you are having problems. Its also a good idea to know your test results and keep a list of the medicines you take. How can you care for yourself at home? · Ask your family, friends, and coworkers for support. You have a better chance of quitting if you have help and support. · Join a support group, such as Nicotine Anonymous, for people who are trying to quit smoking. · Consider signing up for a smoking cessation program, such as the American Lung Association's Freedom from Smoking program. 
· Set a quit date. Pick your date carefully so that it is not right in the middle of a big deadline or stressful time. Once you quit, do not even take a puff. Get rid of all ashtrays and lighters after your last cigarette. Clean your house and your clothes so that they do not smell of smoke. · Learn how to be a nonsmoker.  Think about ways you can avoid those things that make you reach for a cigarette. ¨ Avoid situations that put you at greatest risk for smoking. For some people, it is hard to have a drink with friends without smoking. For others, they might skip a coffee break with coworkers who smoke. ¨ Change your daily routine. Take a different route to work or eat a meal in a different place. · Cut down on stress. Calm yourself or release tension by doing an activity you enjoy, such as reading a book, taking a hot bath, or gardening. · Talk to your doctor or pharmacist about nicotine replacement therapy, which replaces the nicotine in your body. You still get nicotine but you do not use tobacco. Nicotine replacement products help you slowly reduce the amount of nicotine you need. These products come in several forms, many of them available over-the-counter: ¨ Nicotine patches ¨ Nicotine gum and lozenges ¨ Nicotine inhaler · Ask your doctor about bupropion (Wellbutrin) or varenicline (Chantix), which are prescription medicines. They do not contain nicotine. They help you by reducing withdrawal symptoms, such as stress and anxiety. · Some people find hypnosis, acupuncture, and massage helpful for ending the smoking habit. · Eat a healthy diet and get regular exercise. Having healthy habits will help your body move past its craving for nicotine. · Be prepared to keep trying. Most people are not successful the first few times they try to quit. Do not get mad at yourself if you smoke again. Make a list of things you learned and think about when you want to try again, such as next week, next month, or next year. Where can you learn more? Go to http://star-jean.info/. Enter C406 in the search box to learn more about \"Stopping Smoking: Care Instructions. \" Current as of: May 26, 2016 Content Version: 11.1 © 6357-5297 [employer].  Care instructions adapted under license by [address] (which disclaims liability or warranty for this information). If you have questions about a medical condition or this instruction, always ask your healthcare professional. [address] any warranty or liability for your use of this information.

## 2017-03-21 NOTE — ED PROVIDER NOTES
HPI Comments: 6:00 PM Kandy Schwab is a 40 y.o. female with a h/o CHF, and HTN presents to the ED as advised by Dr. Sara Nash (cardiology) with c/o bilateral feet swelling onset several days ago. Pt states that Dr. Sara Nash wants her admitted for her CHF. Pt reports gaining 19 ponds in 3 days that she believes is from fluid, bilateral leg swelling, and SOB, especially on exertion. Pt denies nausea, vomiting, diarrhea, chest pain, or cough. Pt noted that she is not able to lay flat and has been sleeping sitting up in a chair. States she is coughing throughout the night. All other sx denied. No other complaints at this time. PCP-Robel Fregoso MD      The history is provided by the patient. Past Medical History:   Diagnosis Date    Asthma     Heart failure ([address]     Hypertension        Past Surgical History:   Procedure Laterality Date    BREAST SURGERY PROCEDURE UNLISTED      redfuction    HX GYN      hysterectomy  btl    HX MYOMECTOMY       fibroid tumo removed    HX ORTHOPAEDIC  March 2012 ORIF left fibula         History reviewed. No pertinent family history. Social History     Social History    Marital status: SINGLE     Spouse name: N/A    Number of children: N/A    Years of education: N/A     Occupational History    Not on file. Social History Main Topics    Smoking status: Current Every Day Smoker     Packs/day: 0.50     Years: 18.00    Smokeless tobacco: Not on file    Alcohol use Yes      Comment: socially     Drug use: No    Sexual activity: Yes     Partners: Male     Birth control/ protection: Condom     Other Topics Concern    Not on file     Social History Narrative         ALLERGIES: Review of patient's allergies indicates not on file. Review of Systems   Constitutional: Positive for unexpected weight change (19 pounds in 3 days). Negative for chills and fever. HENT: Negative for sore throat. Eyes: Negative for redness and visual disturbance.    Respiratory: Positive for shortness of breath. Negative for cough and wheezing. Cardiovascular: Positive for leg swelling (bilateral). Negative for chest pain. Gastrointestinal: Negative for abdominal pain, diarrhea, nausea and vomiting. Genitourinary: Negative for difficulty urinating and dysuria. Musculoskeletal: Negative for neck stiffness. Skin: Negative for pallor and wound. Neurological: Negative for headaches. Hematological: Does not bruise/bleed easily. Psychiatric/Behavioral: Negative for self-injury. All other systems reviewed and are negative. Vitals:    03/21/17 1430 03/21/17 1736 03/21/17 1900   BP: (!) 124/95 104/70 112/78   Pulse: (!) 116 (!) 101 (!) 102   Resp: 16 18 25   Temp: 96.9 °F (36.1 °C) 98.4 °F (36.9 °C)    SpO2: 99% 97% 100%   Weight: 124.3 kg (274 lb)     Height: 5' 4\" (1.626 m)              Physical Exam   Constitutional: She is oriented to person, place, and time. She appears well-developed and well-nourished. No distress. HENT:   Head: Normocephalic and atraumatic. Eyes: Conjunctivae and EOM are normal. Pupils are equal, round, and reactive to light. No scleral icterus. Neck: Trachea normal and normal range of motion. Neck supple. No thyromegaly present. Cardiovascular: Regular rhythm, S1 normal and S2 normal.  Tachycardia present. Exam reveals no gallop and no friction rub. No murmur heard. Pulmonary/Chest: Effort normal. No accessory muscle usage. No respiratory distress. Crackles in the lower lung bilaterally. Abdominal: Soft. Normal appearance. She exhibits no distension. There is no tenderness. There is no rigidity, no rebound and no guarding. Musculoskeletal: Normal range of motion. She exhibits edema. She exhibits no tenderness. +3 Pitting edema to the lower extremities. Neurological: She is alert and oriented to person, place, and time. She has normal strength. No cranial nerve deficit or sensory deficit.  Coordination normal.   Skin: Skin is warm and intact. No rash noted. Psychiatric: She has a normal mood and affect. Her speech is normal and behavior is normal.   Vitals reviewed. MDM  Number of Diagnoses or Management Options  Acute on chronic congestive heart failure, unspecified congestive heart failure type [facility]): Hypervolemia, unspecified hypervolemia type:   Diagnosis management comments: Guillaume Buck is a 40 y.o. Female coming in with leg swelling, JOYA, orthopnea, and PND concerning for CHF exacerbation. Last echo was in 2014 and showed an EF of 45-50%. Not in extremis, but mildly tachycardic.  Will admit for continued diuresis and cardiology eval.    ED Course       Procedures  Vitals:  Patient Vitals for the past 12 hrs:   Temp Pulse Resp BP SpO2   03/21/17 1900 - (!) 102 25 112/78 100 %   03/21/17 1736 98.4 °F (36.9 °C) (!) 101 18 104/70 97 %   03/21/17 1430 96.9 °F (36.1 °C) (!) 116 16 (!) 124/95 99 %     Lab findings:  Recent Results (from the past 12 hour(s))   EKG, 12 LEAD, SUBSEQUENT    Collection Time: 03/21/17  3:13 PM   Result Value Ref Range    Ventricular Rate 104 BPM    Atrial Rate 104 BPM    P-R Interval 182 ms    QRS Duration 72 ms    Q-T Interval 304 ms    QTC Calculation (Bezet) 399 ms    Calculated P Axis 47 degrees    Calculated R Axis -35 degrees    Calculated T Axis 74 degrees    Diagnosis       Sinus tachycardia  Left axis deviation  Low voltage QRS  Cannot rule out Anterior infarct (cited on or before 06-FEB-2017)  Abnormal ECG  When compared with ECG of 18-MAR-2017 15:34,  Nonspecific T wave abnormality, worse in Anterior leads  QT has shortened     CBC WITH AUTOMATED DIFF    Collection Time: 03/21/17  3:24 PM   Result Value Ref Range    WBC 7.6 4.6 - 13.2 K/uL    RBC 3.76 (L) 4.20 - 5.30 M/uL    HGB 11.1 (L) 12.0 - 16.0 g/dL    HCT 34.8 (L) 35.0 - 45.0 %    MCV 92.6 74.0 - 97.0 FL    MCH 29.5 24.0 - 34.0 PG    MCHC 31.9 31.0 - 37.0 g/dL    RDW 15.4 (H) 11.6 - 14.5 %    PLATELET 267 909 - 150 K/uL    MPV 9.2 9.2 - 11.8 FL    NEUTROPHILS 55 40 - 73 %    LYMPHOCYTES 35 21 - 52 %    MONOCYTES 7 3 - 10 %    EOSINOPHILS 2 0 - 5 %    BASOPHILS 1 0 - 2 %    ABS. NEUTROPHILS 4.3 1.8 - 8.0 K/UL    ABS. LYMPHOCYTES 2.6 0.9 - 3.6 K/UL    ABS. MONOCYTES 0.5 0.05 - 1.2 K/UL    ABS. EOSINOPHILS 0.1 0.0 - 0.4 K/UL    ABS.  BASOPHILS 0.0 0.0 - 0.1 K/UL    DF AUTOMATED     METABOLIC PANEL, BASIC    Collection Time: 03/21/17  3:24 PM   Result Value Ref Range    Sodium 138 136 - 145 mmol/L    Potassium 3.6 3.5 - 5.5 mmol/L    Chloride 103 100 - 108 mmol/L    CO2 28 21 - 32 mmol/L    Anion gap 7 3.0 - 18 mmol/L    Glucose 124 (H) 74 - 99 mg/dL    BUN 8 7.0 - 18 MG/DL    Creatinine 0.85 0.6 - 1.3 MG/DL    BUN/Creatinine ratio 9 (L) 12 - 20      GFR est AA >60 >60 ml/min/1.73m2    GFR est non-AA >60 >60 ml/min/1.73m2    Calcium 8.7 8.5 - 10.1 MG/DL   MAGNESIUM    Collection Time: 03/21/17  3:24 PM   Result Value Ref Range    Magnesium 2.3 1.8 - 2.4 mg/dL   CARDIAC PANEL,(CK, CKMB & TROPONIN)    Collection Time: 03/21/17  3:24 PM   Result Value Ref Range    CK 64 26 - 192 U/L    CK - MB <1.0 <3.6 ng/ml    CK-MB Index Cannot be calulated 0.0 - 4.0 %    Troponin-I, Qt. <0.02 0.0 - 0.045 NG/ML   PRO-BNP    Collection Time: 03/21/17  3:24 PM   Result Value Ref Range    NT pro-BNP 1975 (H) 0 - 450 PG/ML   URINALYSIS W/ RFLX MICROSCOPIC    Collection Time: 03/21/17  5:35 PM   Result Value Ref Range    Color YELLOW      Appearance CLEAR      Specific gravity 1.012 1.005 - 1.030      pH (UA) 5.5 5.0 - 8.0      Protein NEGATIVE  NEG mg/dL    Glucose NEGATIVE  NEG mg/dL    Ketone NEGATIVE  NEG mg/dL    Bilirubin NEGATIVE  NEG      Blood NEGATIVE  NEG      Urobilinogen 2.0 (H) 0.2 - 1.0 EU/dL    Nitrites NEGATIVE  NEG      Leukocyte Esterase MODERATE (A) NEG     DRUG SCREEN, URINE    Collection Time: 03/21/17  5:35 PM   Result Value Ref Range    BENZODIAZEPINE NEGATIVE  NEG      BARBITURATES NEGATIVE  NEG      THC (TH-CANNABINOL) NEGATIVE  NEG      OPIATES NEGATIVE  NEG      PCP(PHENCYCLIDINE) NEGATIVE  NEG      COCAINE NEGATIVE  NEG      AMPHETAMINE NEGATIVE  NEG      METHADONE NEGATIVE  NEG      HDSCOM (NOTE)    HCG URINE, QL    Collection Time: 03/21/17  5:35 PM   Result Value Ref Range    HCG urine, Ql. NEGATIVE  NEG     URINE MICROSCOPIC ONLY    Collection Time: 03/21/17  5:35 PM   Result Value Ref Range    WBC 10 to 20 0 - 4 /hpf    RBC NEGATIVE  0 - 5 /hpf    Epithelial cells 2+ 0 - 5 /lpf    Bacteria NEGATIVE  NEG /hpf    Trichomonas 1+ (A) NEG       EKG interpretation by ED Physician:  5:53 PM  Sinus tachycardia, 104 BPM, flatenned t wave inversion and laterally, no other acute changes. X-Ray, CT or other radiology findings or impressions:  XR CHEST PORT   Final Result   Impression:     Stable moderate cardiomegaly. No evidence of acute pulmonary process     Progress notes, Consult notes or additional Procedure notes:   7:06 PM, 3/21/2017   Consult:  Discussed care with Dr. Brennan Enriquez, Hospitalist.  Standard discussion; including history of patients chief complaint, available diagnostic results, and treatment course. Agrees with admission to Telemetry and will continue diuresis. Disposition:  Diagnosis:   1. Acute on chronic congestive heart failure, unspecified congestive heart failure type ([address]    2. Hypervolemia, unspecified hypervolemia type        Disposition: Admit    Follow-up Information     None            Patient's Medications   Start Taking    No medications on file   Continue Taking    ALBUTEROL (ACCUNEB) 1.25 MG/3 ML NEBU    Take 3 mL by inhalation every four (4) hours as needed (wheezing). ASPIRIN 81 MG CHEWABLE TABLET    Take 81 mg by mouth daily. FUROSEMIDE (LASIX) 40 MG TABLET    Take 1 Tab by mouth daily for 20 days. LISINOPRIL (PRINIVIL) 10 MG TABLET    Take 0.5 Tabs by mouth daily for 30 days.    These Medications have changed    No medications on file   Stop Taking    No medications on file     Scribe Attestation  Adeel Drake Mahamed youngibing for and in the presence of Matheus Pyle MD 5:52 PM, 03/21/17. Physician Attestation  I personally performed the services described in the documentation, reviewed the documentation, as recorded by the scribe in my presence, and it accurately and completely records my words and actions.     Matheus Pyle MD 5:52 PM 03/21/17        Signed by : Dontrell Nelson, 03/21/17 at 5:52 PM

## 2017-03-21 NOTE — ED TRIAGE NOTES
C/o leg, ankle and bilateral feet swelling, SOB. Was sent by Dr. Karyn Daniels (cardiologist). Is unable to lie flat. Also pink frothy sputum.

## 2017-03-21 NOTE — ED NOTES
[facility] EMERGENCY DEPT      40 y.o. female with noted past medical history who presents to the emergency department with SOB, JOYA and PND for 2 days in CHF patient who has been compliant with her lasix. Sent by her [facility] cardiologist to ED    I performed a brief evaluation, including history and physical, of the patient here in triage and I have determined that pt will need further treatment and evaluation from the main side ER physician. I have placed initial orders to help in expediting patients care. Asael Hamlin M.D.

## 2017-03-21 NOTE — IP AVS SNAPSHOT
Current Discharge Medication List  
  
START taking these medications Dose & Instructions Dispensing Information Comments Morning Noon Evening Bedtime  
 carvedilol 3.125 mg tablet Commonly known as:  Rodolfo Boyer Your last dose was: Your next dose is:    
   
   
 Dose:  3.125 mg Take 1 Tab by mouth two (2) times daily (with meals). Indications: Chronic Heart Failure Quantity:  60 Tab Refills:  1  
     
   
   
   
  
 nitroglycerin 0.4 mg SL tablet Commonly known as:  NITROSTAT Your last dose was: Your next dose is:    
   
   
 Dose:  0.4 mg  
1 Tab by SubLINGual route every five (5) minutes as needed for Chest Pain. Quantity:  1 Bottle Refills:  1 CONTINUE these medications which have CHANGED Dose & Instructions Dispensing Information Comments Morning Noon Evening Bedtime  
 lisinopril 5 mg tablet Commonly known as:  Lulu Molina What changed:  medication strength Your last dose was: Your next dose is:    
   
   
 Dose:  5 mg Take 1 Tab by mouth daily. Quantity:  30 Tab Refills:  1 CONTINUE these medications which have NOT CHANGED Dose & Instructions Dispensing Information Comments Morning Noon Evening Bedtime  
 albuterol 1.25 mg/3 mL Nebu Commonly known as:  Heike Franklin Your last dose was: Your next dose is:    
   
   
 Dose:  1.25 mg Take 3 mL by inhalation every four (4) hours as needed (wheezing). Quantity:  25 Each Refills:  0  
     
   
   
   
  
 aspirin 81 mg chewable tablet Your last dose was: Your next dose is:    
   
   
 Dose:  81 mg Take 81 mg by mouth daily. Refills:  0  
     
   
   
   
  
 furosemide 40 mg tablet Commonly known as:  LASIX Your last dose was: Your next dose is:    
   
   
 Dose:  40 mg Take 1 Tab by mouth daily. Quantity:  30 Tab Refills:  1 Where to Get Your Medications Information on where to get these meds will be given to you by the nurse or doctor. ! Ask your nurse or doctor about these medications  
  carvedilol 3.125 mg tablet  
 furosemide 40 mg tablet  
 lisinopril 5 mg tablet  
 nitroglycerin 0.4 mg SL tablet

## 2017-03-22 LAB
ANION GAP BLD CALC-SCNC: 9 MMOL/L (ref 3–18)
ATRIAL RATE: 104 BPM
BUN SERPL-MCNC: 9 MG/DL (ref 7–18)
BUN/CREAT SERPL: 9 (ref 12–20)
CALCIUM SERPL-MCNC: 8.6 MG/DL (ref 8.5–10.1)
CALCULATED P AXIS, ECG09: 47 DEGREES
CALCULATED R AXIS, ECG10: -35 DEGREES
CALCULATED T AXIS, ECG11: 74 DEGREES
CHLORIDE SERPL-SCNC: 102 MMOL/L (ref 100–108)
CHOLEST SERPL-MCNC: 81 MG/DL
CK MB CFR SERPL CALC: NORMAL % (ref 0–4)
CK MB CFR SERPL CALC: NORMAL % (ref 0–4)
CK MB SERPL-MCNC: <1 NG/ML (ref 5–25)
CK MB SERPL-MCNC: <1 NG/ML (ref 5–25)
CK SERPL-CCNC: 63 U/L (ref 26–192)
CK SERPL-CCNC: 65 U/L (ref 26–192)
CO2 SERPL-SCNC: 28 MMOL/L (ref 21–32)
CREAT SERPL-MCNC: 0.97 MG/DL (ref 0.6–1.3)
DIAGNOSIS, 93000: NORMAL
ERYTHROCYTE [DISTWIDTH] IN BLOOD BY AUTOMATED COUNT: 15.4 % (ref 11.6–14.5)
GLUCOSE SERPL-MCNC: 100 MG/DL (ref 74–99)
HCT VFR BLD AUTO: 37.3 % (ref 35–45)
HDLC SERPL-MCNC: 27 MG/DL (ref 40–60)
HDLC SERPL: 3 {RATIO} (ref 0–5)
HGB BLD-MCNC: 11.7 G/DL (ref 12–16)
LDLC SERPL CALC-MCNC: 42 MG/DL (ref 0–100)
LIPID PROFILE,FLP: ABNORMAL
MCH RBC QN AUTO: 29.3 PG (ref 24–34)
MCHC RBC AUTO-ENTMCNC: 31.4 G/DL (ref 31–37)
MCV RBC AUTO: 93.3 FL (ref 74–97)
P-R INTERVAL, ECG05: 182 MS
PLATELET # BLD AUTO: 310 K/UL (ref 135–420)
PMV BLD AUTO: 9.6 FL (ref 9.2–11.8)
POTASSIUM SERPL-SCNC: 3.5 MMOL/L (ref 3.5–5.5)
Q-T INTERVAL, ECG07: 304 MS
QRS DURATION, ECG06: 72 MS
QTC CALCULATION (BEZET), ECG08: 399 MS
RBC # BLD AUTO: 4 M/UL (ref 4.2–5.3)
SODIUM SERPL-SCNC: 139 MMOL/L (ref 136–145)
T4 FREE SERPL-MCNC: 1.4 NG/DL (ref 0.7–1.5)
TRIGL SERPL-MCNC: 60 MG/DL (ref ?–150)
TROPONIN I SERPL-MCNC: <0.02 NG/ML (ref 0–0.04)
TROPONIN I SERPL-MCNC: <0.02 NG/ML (ref 0–0.04)
TSH SERPL DL<=0.05 MIU/L-ACNC: <0.01 UIU/ML (ref 0.36–3.74)
VENTRICULAR RATE, ECG03: 104 BPM
VLDLC SERPL CALC-MCNC: 12 MG/DL
WBC # BLD AUTO: 7.7 K/UL (ref 4.6–13.2)

## 2017-03-22 PROCEDURE — 77030027138 HC INCENT SPIROMETER -A

## 2017-03-22 PROCEDURE — 74011250637 HC RX REV CODE- 250/637: Performed by: HOSPITALIST

## 2017-03-22 PROCEDURE — 36415 COLL VENOUS BLD VENIPUNCTURE: CPT

## 2017-03-22 PROCEDURE — 82550 ASSAY OF CK (CPK): CPT

## 2017-03-22 PROCEDURE — 93306 TTE W/DOPPLER COMPLETE: CPT

## 2017-03-22 PROCEDURE — 74011250636 HC RX REV CODE- 250/636: Performed by: HOSPITALIST

## 2017-03-22 PROCEDURE — 3E0234Z INTRODUCTION OF SERUM, TOXOID AND VACCINE INTO MUSCLE, PERCUTANEOUS APPROACH: ICD-10-PCS | Performed by: FAMILY MEDICINE

## 2017-03-22 PROCEDURE — 74011000258 HC RX REV CODE- 258: Performed by: HOSPITALIST

## 2017-03-22 PROCEDURE — B246ZZZ ULTRASONOGRAPHY OF RIGHT AND LEFT HEART: ICD-10-PCS | Performed by: INTERNAL MEDICINE

## 2017-03-22 PROCEDURE — 85027 COMPLETE CBC AUTOMATED: CPT

## 2017-03-22 PROCEDURE — 80048 BASIC METABOLIC PNL TOTAL CA: CPT

## 2017-03-22 PROCEDURE — 65660000000 HC RM CCU STEPDOWN

## 2017-03-22 RX ADMIN — LISINOPRIL 5 MG: 5 TABLET ORAL at 10:01

## 2017-03-22 RX ADMIN — NITROGLYCERIN 0.5 INCH: 20 OINTMENT TOPICAL at 14:10

## 2017-03-22 RX ADMIN — FUROSEMIDE 40 MG: 10 INJECTION, SOLUTION INTRAVENOUS at 10:09

## 2017-03-22 RX ADMIN — NITROGLYCERIN 0.5 INCH: 20 OINTMENT TOPICAL at 05:53

## 2017-03-22 RX ADMIN — CEFTRIAXONE SODIUM 1 G: 1 INJECTION, POWDER, FOR SOLUTION INTRAMUSCULAR; INTRAVENOUS at 00:37

## 2017-03-22 RX ADMIN — HEPARIN SODIUM 5000 UNITS: 5000 INJECTION, SOLUTION INTRAVENOUS; SUBCUTANEOUS at 05:53

## 2017-03-22 RX ADMIN — CEFTRIAXONE SODIUM 1 G: 1 INJECTION, POWDER, FOR SOLUTION INTRAMUSCULAR; INTRAVENOUS at 21:16

## 2017-03-22 RX ADMIN — NITROGLYCERIN 0.5 INCH: 20 OINTMENT TOPICAL at 21:28

## 2017-03-22 RX ADMIN — FUROSEMIDE 40 MG: 10 INJECTION, SOLUTION INTRAVENOUS at 21:20

## 2017-03-22 RX ADMIN — HEPARIN SODIUM 5000 UNITS: 5000 INJECTION, SOLUTION INTRAVENOUS; SUBCUTANEOUS at 14:06

## 2017-03-22 RX ADMIN — CARVEDILOL 3.12 MG: 3.12 TABLET, FILM COATED ORAL at 17:25

## 2017-03-22 RX ADMIN — ASPIRIN 81 MG CHEWABLE TABLET 81 MG: 81 TABLET CHEWABLE at 10:02

## 2017-03-22 RX ADMIN — HEPARIN SODIUM 5000 UNITS: 5000 INJECTION, SOLUTION INTRAVENOUS; SUBCUTANEOUS at 21:26

## 2017-03-22 NOTE — H&P
History & Physical    Patient: Marcel Prabhakar MRN: [id number]  CSN: [id number]    YOB: 1970  Age: 40 y.o. Sex: female      DOA: 3/21/2017    Chief Complaint   Patient presents with    Shortness of Breath    Leg Swelling    Ankle swelling             Assessment/Plan     Acute CHF exacerbation: will place on Lasix panel. Daily weight. Strict I&O. Nitro paste. Aspirin. Coreg. Lisinopril. ECHO. Cycle cardiac enzymes. Follow BMP, TSH, T4. Lipid profile. She usually follows with Dr. Nargis Zurita of [address] [place] [address]. Hypertension: will resume Lisinopril. UTI: start rocephin. Urine culture pending. Asthma: continue Albuterol nebs. Active Problems:    Fluid overload (3/21/2017)      Acute on chronic congestive heart failure ([address] (3/21/2017)         HPI:     Marcel Prabhakar is a 40 y.o. female who presented to Dr. Edward Huff office today with shortness of breath and was noted to be hypoxic. She has been lasix for a few days and has gained about 19 pounds in the interim. Cardiologist sent her here to be admitted to hospital for further evaluation. Patient denies any chest pain, headache or weakness. She has leg swelling and now her pants are getting tighter. No improvement and worsening symptoms. In the ER, she was given some lasix with minimal improvement. Past Medical History:   Diagnosis Date    Asthma     Heart failure ([address]     Hypertension        Past Surgical History:   Procedure Laterality Date    BREAST SURGERY PROCEDURE UNLISTED      redfuction    HX GYN      hysterectomy  btl    HX MYOMECTOMY       fibroid tumo removed    HX ORTHOPAEDIC  March 2012 ORIF left fibula       History reviewed. No pertinent family history.     Social History     Social History    Marital status: SINGLE     Spouse name: N/A    Number of children: N/A    Years of education: N/A     Social History Main Topics    Smoking status: Current Every Day Smoker     Packs/day: 0.50     Years: 18.00    Smokeless tobacco: None    Alcohol use Yes      Comment: socially     Drug use: No    Sexual activity: Yes     Partners: Male     Birth control/ protection: Condom     Other Topics Concern    None     Social History Narrative       Prior to Admission medications    Medication Sig Start Date End Date Taking? Authorizing Provider   furosemide (LASIX) 40 mg tablet Take 1 Tab by mouth daily for 20 days. 3/6/17 3/26/17  Octavio Vang MD   lisinopril (PRINIVIL) 10 mg tablet Take 0.5 Tabs by mouth daily for 30 days. 3/6/17 4/5/17  Octavio Vang MD   albuterol (ACCUNEB) 1.25 mg/3 mL nebu Take 3 mL by inhalation every four (4) hours as needed (wheezing). 12/4/16   NARCISO Mehta   aspirin 81 mg chewable tablet Take 81 mg by mouth daily. Manish Hernandez MD       No Known Allergies      Review of Systems  GENERAL: No fevers or chills. HEENT: No change in vision, no earache, tinnitus, sore throat or sinus congestion. NECK: No pain or stiffness. CARDIOVASCULAR: No chest pain or pressure. No palpitations. PULMONARY: positive for shortness of breath, cough and wheeze. GASTROINTESTINAL: No abdominal pain, nausea, vomiting or diarrhea, melena or       bright red blood per rectum. GENITOURINARY: No urinary frequency, urgency, hesitancy or dysuria. MUSCULOSKELETAL: No joint or muscle pain, no back pain, no recent trauma. DERMATOLOGIC: No rash, no itching, no lesions. ENDOCRINE: No polyuria, polydipsia, no heat or cold intolerance. No recent change in    weight. HEMATOLOGICAL: No anemia or easy bruising or bleeding. NEUROLOGIC: No headache, seizures, numbness, tingling or weakness. PSYCHIATRIC: No depression, anxiety, mood disorder, no loss of interest in normal activity or change in sleep pattern.         Physical Exam:     Physical Exam:  Visit Vitals    /87    Pulse (!) 101    Temp 98.4 °F (36.9 °C)    Resp 25    Ht 5' 4\" (1.626 m)    Wt 124.3 kg (274 lb)    SpO2 100%    BMI 47.03 kg/m2      O2 Device: Room air    Temp (24hrs), Av.7 °F (36.5 °C), Min:96.9 °F (36.1 °C), Max:98.4 °F (36.9 °C)                  General:  Alert, cooperative, no distress, appears stated age. Head:  Normocephalic, without obvious abnormality, atraumatic. Eyes:  Conjunctivae/corneas clear. PERRL, EOMs intact. Nose: Nares normal. No drainage or sinus tenderness. Throat: Lips, mucosa, and tongue normal. Teeth and gums normal.   Neck: Supple, symmetrical, trachea midline, no adenopathy, thyroid: no enlargement/tenderness/nodules, no carotid bruit. + JVD. Back:   ROM normal. No CVA tenderness. Lungs:   Clear to auscultation bilaterally, except crackles and decreased breath sounds. .   Chest wall:  No tenderness or deformity. Heart:  Regular rate and rhythm, S1, S2 normal, no murmur, click, rub or gallop. Abdomen: Soft, non-tender. Bowel sounds normal. No masses,  No organomegaly. Extremities: Extremities normal, atraumatic, no cyanosis. 3+ aristides LE edema. Pulses: 2+ and symmetric all extremities. Skin: Skin color, texture, turgor normal. No rashes or lesions   Neurologic: CNII-XII intact. No focal motor or sensory deficit.        Labs Reviewed:    Recent Results (from the past 24 hour(s))   EKG, 12 LEAD, SUBSEQUENT    Collection Time: 17  3:13 PM   Result Value Ref Range    Ventricular Rate 104 BPM    Atrial Rate 104 BPM    P-R Interval 182 ms    QRS Duration 72 ms    Q-T Interval 304 ms    QTC Calculation (Bezet) 399 ms    Calculated P Axis 47 degrees    Calculated R Axis -35 degrees    Calculated T Axis 74 degrees    Diagnosis       Sinus tachycardia  Left axis deviation  Low voltage QRS  Cannot rule out Anterior infarct (cited on or before 2017)  Abnormal ECG  When compared with ECG of 18-MAR-2017 15:34,  Nonspecific T wave abnormality, worse in Anterior leads  QT has shortened     CBC WITH AUTOMATED DIFF    Collection Time: 17  3:24 PM   Result Value Ref Range    WBC 7.6 4.6 - 13.2 K/uL RBC 3.76 (L) 4.20 - 5.30 M/uL    HGB 11.1 (L) 12.0 - 16.0 g/dL    HCT 34.8 (L) 35.0 - 45.0 %    MCV 92.6 74.0 - 97.0 FL    MCH 29.5 24.0 - 34.0 PG    MCHC 31.9 31.0 - 37.0 g/dL    RDW 15.4 (H) 11.6 - 14.5 %    PLATELET 321 471 - 945 K/uL    MPV 9.2 9.2 - 11.8 FL    NEUTROPHILS 55 40 - 73 %    LYMPHOCYTES 35 21 - 52 %    MONOCYTES 7 3 - 10 %    EOSINOPHILS 2 0 - 5 %    BASOPHILS 1 0 - 2 %    ABS. NEUTROPHILS 4.3 1.8 - 8.0 K/UL    ABS. LYMPHOCYTES 2.6 0.9 - 3.6 K/UL    ABS. MONOCYTES 0.5 0.05 - 1.2 K/UL    ABS. EOSINOPHILS 0.1 0.0 - 0.4 K/UL    ABS.  BASOPHILS 0.0 0.0 - 0.1 K/UL    DF AUTOMATED     METABOLIC PANEL, BASIC    Collection Time: 03/21/17  3:24 PM   Result Value Ref Range    Sodium 138 136 - 145 mmol/L    Potassium 3.6 3.5 - 5.5 mmol/L    Chloride 103 100 - 108 mmol/L    CO2 28 21 - 32 mmol/L    Anion gap 7 3.0 - 18 mmol/L    Glucose 124 (H) 74 - 99 mg/dL    BUN 8 7.0 - 18 MG/DL    Creatinine 0.85 0.6 - 1.3 MG/DL    BUN/Creatinine ratio 9 (L) 12 - 20      GFR est AA >60 >60 ml/min/1.73m2    GFR est non-AA >60 >60 ml/min/1.73m2    Calcium 8.7 8.5 - 10.1 MG/DL   MAGNESIUM    Collection Time: 03/21/17  3:24 PM   Result Value Ref Range    Magnesium 2.3 1.8 - 2.4 mg/dL   CARDIAC PANEL,(CK, CKMB & TROPONIN)    Collection Time: 03/21/17  3:24 PM   Result Value Ref Range    CK 64 26 - 192 U/L    CK - MB <1.0 <3.6 ng/ml    CK-MB Index Cannot be calulated 0.0 - 4.0 %    Troponin-I, Qt. <0.02 0.0 - 0.045 NG/ML   PRO-BNP    Collection Time: 03/21/17  3:24 PM   Result Value Ref Range    NT pro-BNP 1975 (H) 0 - 450 PG/ML   URINALYSIS W/ RFLX MICROSCOPIC    Collection Time: 03/21/17  5:35 PM   Result Value Ref Range    Color YELLOW      Appearance CLEAR      Specific gravity 1.012 1.005 - 1.030      pH (UA) 5.5 5.0 - 8.0      Protein NEGATIVE  NEG mg/dL    Glucose NEGATIVE  NEG mg/dL    Ketone NEGATIVE  NEG mg/dL    Bilirubin NEGATIVE  NEG      Blood NEGATIVE  NEG      Urobilinogen 2.0 (H) 0.2 - 1.0 EU/dL    Nitrites NEGATIVE  NEG      Leukocyte Esterase MODERATE (A) NEG     DRUG SCREEN, URINE    Collection Time: 03/21/17  5:35 PM   Result Value Ref Range    BENZODIAZEPINE NEGATIVE  NEG      BARBITURATES NEGATIVE  NEG      THC (TH-CANNABINOL) NEGATIVE  NEG      OPIATES NEGATIVE  NEG      PCP(PHENCYCLIDINE) NEGATIVE  NEG      COCAINE NEGATIVE  NEG      AMPHETAMINE NEGATIVE  NEG      METHADONE NEGATIVE  NEG      HDSCOM (NOTE)    HCG URINE, QL    Collection Time: 03/21/17  5:35 PM   Result Value Ref Range    HCG urine, Ql. NEGATIVE  NEG     URINE MICROSCOPIC ONLY    Collection Time: 03/21/17  5:35 PM   Result Value Ref Range    WBC 10 to 20 0 - 4 /hpf    RBC NEGATIVE  0 - 5 /hpf    Epithelial cells 2+ 0 - 5 /lpf    Bacteria NEGATIVE  NEG /hpf    Trichomonas 1+ (A) NEG       Procedures/imaging: see electronic medical records for all procedures/Xrays and details which were not copied into this note but were reviewed prior to creation of Yoav Poole MD  March 21, 2017

## 2017-03-22 NOTE — PROGRESS NOTES
SUBJECTIVE:    Feeling somewhat better. No chest pain. Cough present. SOB improving. No abdominal pain or nausea or vomiting. OBJECTIVE:    Visit Vitals    /78    Pulse (!) 102    Temp 97.3 °F (36.3 °C)    Resp 16    Ht 5' 4\" (1.626 m)    Wt 119.8 kg (264 lb 3.2 oz)    SpO2 99%    BMI 45.35 kg/m2     General: NAD, Awake  HEENT: No pallor  Neck: no JVD  RS: CTA bilaterally, no wheezes  CVS: RRR  GI: NT, BS +  Extremities: pedal edema  CNS: moves all extremities    ASSESSMENT:    1. Acute systolic and diastolic CHF exacerbation. 2. Hx of dilated cardiomyopathy with EF 45-50% 2014. 3. HTN. 4. UTI. 5. Chronic intermittent mild Asthma, compensated  6. Tobacco use.     PLAN:    Continue lasix  ECHO pending  CXR report reviewed  Cardiology input noted  Cont current medical management    --> total time greater than 35 minutes    CMP:   Lab Results   Component Value Date/Time     03/22/2017 05:25 AM    K 3.5 03/22/2017 05:25 AM     03/22/2017 05:25 AM    CO2 28 03/22/2017 05:25 AM    AGAP 9 03/22/2017 05:25 AM     (H) 03/22/2017 05:25 AM    BUN 9 03/22/2017 05:25 AM    CREA 0.97 03/22/2017 05:25 AM    GFRAA >60 03/22/2017 05:25 AM    GFRNA >60 03/22/2017 05:25 AM    CA 8.6 03/22/2017 05:25 AM    MG 2.3 03/21/2017 03:24 PM     CBC:   Lab Results   Component Value Date/Time    WBC 7.7 03/22/2017 05:25 AM    HGB 11.7 (L) 03/22/2017 05:25 AM    HCT 37.3 03/22/2017 05:25 AM     03/22/2017 05:25 AM

## 2017-03-22 NOTE — PROGRESS NOTES
Care Management Interventions  PCP Verified by CM: Yes  Palliative Care Consult (Criteria: CHF and RRAT>21): No  Reason for No Palliative Care Consult: Other (see comment)  Mode of Transport at Discharge: Self  Transition of Care Consult (CM Consult): Discharge Planning (will [facility] for CHF)  Enrikehart Signup: No  Discharge Durable Medical Equipment: No  Health Maintenance Reviewed: Yes  Physical Therapy Consult: No  Occupational Therapy Consult: No  Speech Therapy Consult: No  Current Support Network: Relative's Home (patient lives with her mother)  Confirm Follow Up Transport: Family  Plan discussed with Pt/Family/Caregiver: Yes  Discharge Location  Discharge Placement: Home    Patient 40years old female admitted to [facility] with acute on chronic CHF. Saw the patient alone in room, she is AAO x 4, open to conversation. She shares she lives in her mother's house with her mother. Patient claims to be self care & independent w/o any Dmes and no need voiced. Patient confirms to be self pay and unemployed. She states the need for assistance with meds at dc-will be provided with INDIGENT meds. Patient will need [facility] for CHF Dx. [facility] family will provide [facility]. Patient was provided with [facility] packet.

## 2017-03-22 NOTE — ED NOTES
Received report from Kev Burns, [facility]. Pt in bed a/o x3, no complaints of pain except rib pain with cough and soreness from fluid in abdomen. Friend at bedside.

## 2017-03-22 NOTE — PROGRESS NOTES
NUTRITION      Nutrition Consult: General Nutrition Management & Supplements     RECOMMENDATIONS / PLAN:     - Provided diet education.  - Add 1200 mL fluid restriction per discussion with MD.   - Continue RD inpatient monitoring and evaluation. NUTRITION INTERVENTIONS & DIAGNOSIS:     [x] Meals/Snacks: modified diet  [x] Nutrition Education: low sodium and fluid restricted diet (3/22)    Nutrition Diagnosis: Food and nutrition related knowledge deficit related to low sodium and fluid restricted diet as evidenced by pt report of appropriate food choices. ASSESSMENT:     Subjective/Objective:  Admitted with edema. Offered diet education, discussed low sodium and fluid restricted diet. Pt denied eating a lot of high sodium foods, but per intake recall, we identified areas for improvement.     Average po intake adequate to meet patients estimated nutritional needs:   [x] Yes: per pt report of intake     [] No   [] Unable to determine at this time    Diet: DIET CARDIAC Regular      Food Allergies: NKFA  Current Appetite:   [x] Good     [] Fair     [] Poor     [] Other:  Appetite/meal intake prior to admission:   [x] Good     [] Fair     [] Poor     [] Other:  Feeding Limitations:  [] Swallowing difficulty    [] Chewing difficulty    [] Other:  Current Meal Intake: Patient Vitals for the past 100 hrs:   % Diet Eaten   03/22/17 0917 0 %       BM:  3/22  Skin Integrity: WDL  Edema: 1+ generalized, 3+ LEs  Pertinent Medications: Reviewed: Lasix    Recent Labs      03/22/17   0525  03/21/17   1524   NA  139  138   K  3.5  3.6   CL  102  103   CO2  28  28   GLU  100*  124*   BUN  9  8   CREA  0.97  0.85   CA  8.6  8.7   MG   --   2.3       Intake/Output Summary (Last 24 hours) at 03/22/17 1047  Last data filed at 03/22/17 0917   Gross per 24 hour   Intake              110 ml   Output             3200 ml   Net            -3090 ml       Anthropometrics:  Ht Readings from Last 1 Encounters:   03/21/17 5' 4\" (1.626 m) Last 3 Recorded Weights in this Encounter    03/21/17 1430 03/22/17 0400   Weight: 124.3 kg (274 lb) 119.8 kg (264 lb 3.2 oz)     Body mass index is 45.35 kg/(m^2). Obese, class III    Weight History:  Report 19# weight gain over the last couple days related to fluid  Weight Metrics 3/22/2017 3/18/2017 3/15/2017 3/6/2017 2/16/2017 2/6/2017 2/4/2017   Weight 264 lb 3.2 oz 278 lb 257 lb 254 lb 247 lb 254 lb 254 lb   BMI 45.35 kg/m2 46.26 kg/m2 44.11 kg/m2 43.6 kg/m2 42.4 kg/m2 43.6 kg/m2 43.6 kg/m2        Admitting Diagnosis: Acute on chronic congestive heart failure (HCC)  Fluid overload  Past Medical History:   Diagnosis Date    Asthma     Heart failure ([facility]     Hypertension        Education Needs:        [] None identified  [] Identified - Not appropriate at this time  [x]  Identified and addressed - refer to education log  Learning Limitations:   [x] None identified  [] Identified    Cultural, Zoroastrian & ethnic food preferences:  [x] None identified    [] Identified and addressed     ESTIMATED NUTRITION NEEDS:     Calories: 3943-3173 kcal (MSJx1.2-1.3) based on  [x] Actual BW: 120 kg     [] IBW:   Protein:  gm (0.8-1 gm/kg) based on  [x] Actual BW: 120 kg     [] IBW:   Fluid: 1 mL/kcal or fluid restriction per MD order     MONITORING & EVALUATION:     Nutrition Goal(s):   1. Po intake of meals will meet >75% of patient estimated nutritional needs within the next 7 days. Outcome:  [] Met/Ongoing    []  Not Met    [x] New/Initial Goal   2. Patient will increase knowledge of appropriate food choices on a low sodium and fluid restricted diet prior to discharge.   Outcome:  [] Met/Ongoing    []  Not Met    [x] New/Initial Goal        Monitoring:   [x] Diet tolerance   [x] Meal intake   [] Supplement intake   [] GI symptoms/ability to tolerate po diet   [] Respiratory status   [] Plan of care      Previous Recommendations (for follow-up assessments only):     []   Implemented       []   Not Implemented (RD to address)     [] No Recommendation Made     Discharge Planning: cardiac diet with fluid restriction per MD order.    [x] Participated in care planning, discharge planning, & interdisciplinary rounds as appropriate      Kala Stout, BIJAN, [address]    Pager: [phone]

## 2017-03-22 NOTE — ROUTINE PROCESS
Bedside and Verbal shift change report given to Jose Jones RN (oncoming nurse) by Yelena Suarez RN (offgoing nurse). Report included the following information SBAR, Kardex, MAR and Recent Results.     SITUATION:    Code Status: Full Code   Reason for Admission: Acute on chronic congestive heart failure (HCC)   Fluid overload    [facility] day: 1   Problem List:       Hospital Problems  Never Reviewed          Codes Class Noted POA    Fluid overload ICD-10-CM: E87.70  ICD-9-CM: 276.69  3/21/2017 Unknown        Acute on chronic congestive heart failure ([facility] [address] ICD-10-CM: I50.9  ICD-9-CM: 428.0  3/21/2017 Unknown              BACKGROUND:    Past Medical History:   Past Medical History:   Diagnosis Date    Asthma     Heart failure ([facility] [address]     Hypertension          Patient taking anticoagulants yes     ASSESSMENT:    Changes in Assessment Throughout Shift:      Patient has Central Line: no Reasons if yes:    Patient has Juarez Cath: no Reasons if yes:       Last Vitals:     Vitals:    03/22/17 0945 03/22/17 1001 03/22/17 1410 03/22/17 1725   BP: 110/78 110/78 113/82 121/86   Pulse: (!) 102 (!) 102 100 100   Resp: 16   18   Temp: 97.3 °F (36.3 °C)   98.1 °F (36.7 °C)   SpO2: 99%   96%   Weight:       Height:            IV and DRAINS (will only show if present)   Peripheral IV 03/21/17 Left Arm-Site Assessment: Clean, dry, & intact  Peripheral IV 03/21/17 Right Hand-Site Assessment: Clean, dry, & intact     WOUND (if present)   Wound Type:  none   Dressing present Dressing Present : No   Wound Concerns/Notes:  none     PAIN    Pain Assessment    Pain Intensity 1: 0 (03/22/17 1725)    Pain Location 1: Abdomen, Foot         Patient Stated Pain Goal: 0  o Interventions for Pain:  none  o Intervention effective:   o Time of last intervention:    o Reassessment Completed:       Last 3 Weights:  Last 3 Recorded Weights in this Encounter    03/21/17 1430 03/22/17 0400   Weight: 124.3 kg (274 lb) 119.8 kg (264 lb 3.2 oz) Weight change:      INTAKE/OUPUT    Current Shift:      Last three shifts: 03/21 0701 - 03/22 1900  In: 830 [P.O.:830]  Out: 4150 [Urine:4150]     LAB RESULTS     Recent Labs      03/22/17   0525  03/21/17   1524   WBC  7.7  7.6   HGB  11.7*  11.1*   HCT  37.3  34.8*   PLT  310  308        Recent Labs      03/22/17   0525  03/21/17   1524   NA  139  138   K  3.5  3.6   GLU  100*  124*   BUN  9  8   CREA  0.97  0.85   CA  8.6  8.7   MG   --   2.3       RECOMMENDATIONS AND DISCHARGE PLANNING     1. Pending tests/procedures/ Plan of Care or Other Needs:      2. Discharge plan for patient and Needs/Barriers:     3. Estimated Discharge Date: 3/24/17 Posted on Whiteboard in [facility]: yes      4. The patient's care plan was reviewed with the oncoming nurse. \"HEALS\" SAFETY CHECK      Fall Risk    Total Score: 1    Safety Measures: Safety Measures: Bed/Chair-Wheels locked, Bed in low position, Call light within reach, Gripper socks, Side rails X 3    A safety check occurred in the patient's room between off going nurse and oncoming nurse listed above. The safety check included the below items  Area Items   H  High Alert Medications - Verify all high alert medication drips (heparin, PCA, etc.)   E  Equipment - Suction is set up for ALL patients (with yanker)  - Red plugs utilized for all equipment (IV pumps, etc.)  - WOWs wiped down at end of shift.  - Room stocked with oxygen, suction, and other unit-specific supplies   A  Alarms - Bed alarm is set for fall risk patients  - Ensure chair alarm is in place and activated if patient is up in a chair   L  Lines - Check IV for any infiltration  - Juarez bag is empty if patient has a Juarez   - Tubing and IV bags are labeled   S  Safety   - Room is clean, patient is clean, and equipment is clean. - Hallways are clear from equipment besides carts.    - Fall bracelet on for fall risk patients  - Ensure room is clear and free of clutter  - Suction is set up for ALL patients (with yanker)  - Hallways are clear from equipment besides carts.    - Isolation precautions followed, supplies available outside room, sign posted     Aldo Sun RN

## 2017-03-22 NOTE — CDMP QUERY
Please clarify type of chf:     => Diastolic/Systolic/combination - treatment lasix 80ml iv.   =>Other Explanation of clinical findings  =>Unable to Determine (no explanation of clinical findings)      Thank-you   Aime Almeida -[phone]

## 2017-03-22 NOTE — ROUTINE PROCESS
TRANSFER - IN REPORT:    Verbal report received from Eliecer(name) on Amol Ignacio  being received from 3N(unit) for routine progression of care      Report consisted of patients Situation, Background, Assessment and   Recommendations(SBAR). Information from the following report(s) SBAR, Kardex and Recent Results was reviewed with the receiving nurse. Opportunity for questions and clarification was provided. Assessment completed upon patients arrival to unit and care assumed. 0730- Bedside and Verbal shift change report given to Hilary (oncoming nurse) by Kay Davis (offgoing nurse). Report included the following information SBAR, Kardex and Recent Results.

## 2017-03-22 NOTE — CDMP QUERY
This patient is noted to have a BMI of 45.3. Patient is 5'4 and weighs 264 lbs. Please clarify if patient is :     Yes  morbidly Obese____  No   Morbidly  Obese __    BMI scale     < 19 underweight  > 25 overweight  >30 obese  > 40 Morbid obesity         If you DECLINE this query or would like to communicate with [employer], please utilize the \"[employer] message box\" at the TOP of the Progress Note on the right. QUESTIONS?    Lyubov De Oliveira -[phone]

## 2017-03-22 NOTE — PROGRESS NOTES
Received shift report from Cristina Peterson [facility]. Pt asleep. No signs of distress/discomfort. Call bell within reach.

## 2017-03-22 NOTE — PROGRESS NOTES
Visited with Ms Humphrey Kovacs who was sitting up at bedside. Introduced self and role, HF folder provided. Education started, questions answered. Agreed to visit tomorrow.

## 2017-03-22 NOTE — ROUTINE PROCESS
TRANSFER - OUT REPORT:    Verbal report given to Yuri(name) on Eliud Pink  being transferred to (unit) for routine progression of care       Report consisted of patients Situation, Background, Assessment and   Recommendations(SBAR). Information from the following report(s) SBAR was reviewed with the receiving nurse. Lines:   Peripheral IV 03/21/17 Left Arm (Active)   Site Assessment Clean, dry, & intact 3/21/2017  5:53 PM   Phlebitis Assessment 0 3/21/2017  5:53 PM   Infiltration Assessment 0 3/21/2017  5:53 PM   Dressing Status Clean, dry, & intact 3/21/2017  5:53 PM   Dressing Type Tape;Transparent 3/21/2017  5:53 PM   Hub Color/Line Status Pink;Flushed;Patent 3/21/2017  5:53 PM       Peripheral IV 03/21/17 Right Hand (Active)   Site Assessment Clean, dry, & intact 3/21/2017 11:58 PM   Phlebitis Assessment 0 3/21/2017 11:58 PM   Infiltration Assessment 0 3/21/2017 11:58 PM   Dressing Status Clean, dry, & intact 3/21/2017 11:58 PM        Opportunity for questions and clarification was provided.       Patient transported with:   Monitor  Registered Nurse

## 2017-03-22 NOTE — CONSULTS
Cardiovascular Specialists - Consult Note    Consultation request by Ivana Montoya MD for advice/opinion related to evaluating Acute on chronic congestive heart failure [facility])  Fluid overload    Date of  Admission: 3/21/2017  5:09 PM   Primary Care Physician:  Elsi Palma MD     Assessment:     Patient Active Problem List   Diagnosis Code    Fluid overload E87.70    Acute on chronic congestive heart failure (HCC) I50.9     -Acute CHF exacerbation with volume overload. Most likely d/t component of diet non-compliance and elevated BP. Pt previously followed with Dr. Lawson Garcia recently transitioned to [facility] with first appt yesterday.  -Hx of CHF, EF 45-50% 2014 with mod LA dilation and trivial MR  -HTN, pt reports being taken off of her antihypertensives by her PCP stating her BP had been running low. Continued only on lasix 40mg daily as outpatient. -UTI, on abx  -Asthma  -Tobacco use, quit one month ago     Plan:     -BP acceptable. Continue Coreg.   -Caution ACEi use with IV diuresis. Watch renal function closely.   -Daily standing weights.   -Strict I&O's. -Good urine output. Continue IV diuresis.  -Discussed importance of adherence to diet (monitoring salt and fluid intake) as well as exercise and medication compliance.   -Discussed importance of remaining tobacco free. Pt endorses she quit one month ago and plans to remain tobacco free. -Spoke with [address] [place] [address] office. They will be faxing last office note which will be placed in her chart for review.   -Echo completed, will review. History of Present Illness: This is a 40 y.o. female admitted for Acute on chronic congestive heart failure (HCC)  Fluid overload. Patient complains of:  Sob and LE edema, sent from cardiology office    Pt is a 40yo AA female (recently quit smoking - one month ago) who presents from [address] [place] [address] (Dr. Nohemy Garcia) for acute heart failure exacerbation with volume overload. Pt was seen in [facility] ED on 3/18 for similar symptoms.  Was given IV diuretics and sent home with outpt follow up with PCP and cardiologist. Pt notes having significant weight gain ~20lbs over past week despite 40mg lasix daily. She was noted to be hypoxic and sent to the ER. Pt received IV lasix and had good response. Vascular congestion on CXR consistent with volume overload. No recent echo on file. We are asked to follow to assist with volume and BP and HF management. Cardiac risk factors:   HTN  CHF  Tobacco use    Review of Symptoms:  Except as stated above include:  Constitutional:  Negative for fevers/chills  Respiratory:  Positive for sob and cough   Cardiovascular:  Positive for LE edema, sob, orthopnea, negative for chest pain  Gastrointestinal: negative for abd pain, nausea or vomiting  Genitourinary:  Negative for hematuria  Musculoskeletal:  Negative for muscle aches and joint pains  Neurological:  Negative for dizziness or syncope  Dermatological:  Negative for rashes  Endocrinological: Negative  Psychological:  Negative for anxiety     Past Medical History:     Past Medical History:   Diagnosis Date    Asthma     Heart failure ([facility] [address]     Hypertension          Social History:     Social History     Social History    Marital status: SINGLE     Spouse name: N/A    Number of children: N/A    Years of education: N/A     Social History Main Topics    Smoking status: Former Smoker     Packs/day: 0.00     Years: 18.00     Quit date: 2/28/2017    Smokeless tobacco: None    Alcohol use Yes      Comment: socially     Drug use: No    Sexual activity: Yes     Partners: Male     Birth control/ protection: Condom     Other Topics Concern    None     Social History Narrative        Family History:   History reviewed. No pertinent family history.      Medications:   No Known Allergies     Current Facility-Administered Medications   Medication Dose Route Frequency    lisinopril (PRINIVIL, ZESTRIL) tablet 5 mg  5 mg Oral DAILY    albuterol (ACCUNEB) nebulizer solution 1.25 mg  1.25 mg Inhalation Q4H PRN    aspirin chewable tablet 81 mg  81 mg Oral DAILY    carvedilol (COREG) tablet 3.125 mg  3.125 mg Oral BID WITH MEALS    furosemide (LASIX) injection 40 mg  40 mg IntraVENous BID    nitroglycerin (NITROBID) 2 % ointment 0.5 Inch  0.5 Inch Topical Q6H    heparin (porcine) injection 5,000 Units  5,000 Units SubCUTAneous Q8H    cefTRIAXone (ROCEPHIN) 1 g in 0.9% sodium chloride (MBP/ADV) 50 mL MBP  1 g IntraVENous Q24H         Physical Exam:     Visit Vitals    /78    Pulse (!) 102    Temp 97.8 °F (36.6 °C)    Resp 21    Ht 5' 4\" (1.626 m)    Wt 119.8 kg (264 lb 3.2 oz)    SpO2 95%    BMI 45.35 kg/m2     BP Readings from Last 3 Encounters:   03/22/17 110/78   03/18/17 119/82   03/16/17 102/78     Pulse Readings from Last 3 Encounters:   03/22/17 (!) 102   03/18/17 98   03/16/17 (!) 107     Wt Readings from Last 3 Encounters:   03/22/17 119.8 kg (264 lb 3.2 oz)   03/18/17 126.1 kg (278 lb)   03/15/17 116.6 kg (257 lb)       General:  Awake, alert, oriented x3   Neck:  Supple, no jvd  Lungs:  Clear to auscultation bilaterally on room air  Heart:  Reg rate and rhythm, + S3 gallop  Abdomen:  Obese, soft  Extremities:  1+ bilat LE edema extending up to knees  Skin: warm and dry  Neuro: PERRL, speech is clear, moves all extremities well  Psych: normal mood and affect     Data Review:     Recent Labs      03/22/17   0525  03/21/17   1524   WBC  7.7  7.6   HGB  11.7*  11.1*   HCT  37.3  34.8*   PLT  310  308     Recent Labs      03/22/17   0525  03/21/17   1524   NA  139  138   K  3.5  3.6   CL  102  103   CO2  28  28   GLU  100*  124*   BUN  9  8   CREA  0.97  0.85   CA  8.6  8.7   MG   --   2.3       Results for orders placed or performed during the hospital encounter of 03/18/17   EKG, 12 LEAD, INITIAL   Result Value Ref Range    Ventricular Rate 109 BPM    Atrial Rate 109 BPM    P-R Interval 180 ms    QRS Duration 74 ms    Q-T Interval 298 ms    QTC Calculation (Bezet) 401 ms    Calculated P Axis 49 degrees    Calculated R Axis 15 degrees    Calculated T Axis 52 degrees    Diagnosis       Sinus tachycardia  Low voltage QRS  Cannot rule out Anterior infarct (cited on or before 06-FEB-2017)  Abnormal ECG  When compared with ECG of 15-MAR-2017 19:06,  fusion complexes are no longer present  Confirmed by Kiana Petersen MD, Narendra Meek ([id number]) on 3/19/2017 1:56:06 PM         All Cardiac Markers in the last 24 hours:    Lab Results   Component Value Date/Time    CPK 65 03/22/2017 05:25 AM    CPK 64 03/21/2017 03:24 PM    CKMB <1.0 03/22/2017 05:25 AM    CKMB <1.0 03/21/2017 03:24 PM    CKND1 Cannot be calulated 03/22/2017 05:25 AM    CKND1 Cannot be calulated 03/21/2017 03:24 PM    TROIQ <0.02 03/22/2017 05:25 AM    TROIQ <0.02 03/21/2017 03:24 PM       Last Lipid:    Lab Results   Component Value Date/Time    Cholesterol, total 81 03/21/2017 03:24 PM    HDL Cholesterol 27 03/21/2017 03:24 PM    LDL, calculated 42 03/21/2017 03:24 PM    Triglyceride 60 03/21/2017 03:24 PM    CHOL/HDL Ratio 3.0 03/21/2017 03:24 PM       Signed By: NARCISO Quigley     March 22, 2017

## 2017-03-23 LAB
ANION GAP BLD CALC-SCNC: 6 MMOL/L (ref 3–18)
BACTERIA SPEC CULT: NORMAL
BUN SERPL-MCNC: 12 MG/DL (ref 7–18)
BUN/CREAT SERPL: 11 (ref 12–20)
CALCIUM SERPL-MCNC: 8.6 MG/DL (ref 8.5–10.1)
CHLORIDE SERPL-SCNC: 101 MMOL/L (ref 100–108)
CO2 SERPL-SCNC: 33 MMOL/L (ref 21–32)
CREAT SERPL-MCNC: 1.07 MG/DL (ref 0.6–1.3)
GLUCOSE SERPL-MCNC: 136 MG/DL (ref 74–99)
MAGNESIUM SERPL-MCNC: 2.5 MG/DL (ref 1.8–2.4)
POTASSIUM SERPL-SCNC: 3.9 MMOL/L (ref 3.5–5.5)
SERVICE CMNT-IMP: NORMAL
SODIUM SERPL-SCNC: 140 MMOL/L (ref 136–145)

## 2017-03-23 PROCEDURE — 74011000258 HC RX REV CODE- 258: Performed by: HOSPITALIST

## 2017-03-23 PROCEDURE — 36415 COLL VENOUS BLD VENIPUNCTURE: CPT | Performed by: INTERNAL MEDICINE

## 2017-03-23 PROCEDURE — 74011250637 HC RX REV CODE- 250/637: Performed by: HOSPITALIST

## 2017-03-23 PROCEDURE — 80048 BASIC METABOLIC PNL TOTAL CA: CPT | Performed by: INTERNAL MEDICINE

## 2017-03-23 PROCEDURE — 83735 ASSAY OF MAGNESIUM: CPT | Performed by: INTERNAL MEDICINE

## 2017-03-23 PROCEDURE — 74011250636 HC RX REV CODE- 250/636: Performed by: HOSPITALIST

## 2017-03-23 PROCEDURE — 65660000000 HC RM CCU STEPDOWN

## 2017-03-23 RX ADMIN — HEPARIN SODIUM 5000 UNITS: 5000 INJECTION, SOLUTION INTRAVENOUS; SUBCUTANEOUS at 06:23

## 2017-03-23 RX ADMIN — HEPARIN SODIUM 5000 UNITS: 5000 INJECTION, SOLUTION INTRAVENOUS; SUBCUTANEOUS at 13:43

## 2017-03-23 RX ADMIN — NITROGLYCERIN 0.5 INCH: 20 OINTMENT TOPICAL at 21:08

## 2017-03-23 RX ADMIN — HEPARIN SODIUM 5000 UNITS: 5000 INJECTION, SOLUTION INTRAVENOUS; SUBCUTANEOUS at 21:08

## 2017-03-23 RX ADMIN — FUROSEMIDE 40 MG: 10 INJECTION, SOLUTION INTRAVENOUS at 10:12

## 2017-03-23 RX ADMIN — NITROGLYCERIN 0.5 INCH: 20 OINTMENT TOPICAL at 10:09

## 2017-03-23 RX ADMIN — ASPIRIN 81 MG CHEWABLE TABLET 81 MG: 81 TABLET CHEWABLE at 10:09

## 2017-03-23 RX ADMIN — NITROGLYCERIN 0.5 INCH: 20 OINTMENT TOPICAL at 03:40

## 2017-03-23 RX ADMIN — CARVEDILOL 3.12 MG: 3.12 TABLET, FILM COATED ORAL at 18:28

## 2017-03-23 RX ADMIN — LISINOPRIL 5 MG: 5 TABLET ORAL at 10:09

## 2017-03-23 RX ADMIN — CARVEDILOL 3.12 MG: 3.12 TABLET, FILM COATED ORAL at 10:09

## 2017-03-23 RX ADMIN — FUROSEMIDE 40 MG: 10 INJECTION, SOLUTION INTRAVENOUS at 18:25

## 2017-03-23 RX ADMIN — NITROGLYCERIN 0.5 INCH: 20 OINTMENT TOPICAL at 18:29

## 2017-03-23 RX ADMIN — CEFTRIAXONE SODIUM 1 G: 1 INJECTION, POWDER, FOR SOLUTION INTRAMUSCULAR; INTRAVENOUS at 21:08

## 2017-03-23 NOTE — ROUTINE PROCESS
Bedside and Verbal shift change report given to  (oncoming nurse) by Lalito Medrano RN (offgoing nurse). Report included the following information SBAR, Kardex, MAR and Recent Results.     SITUATION:    Code Status: Full Code   Reason for Admission: Acute on chronic congestive heart failure ([facility] [address]   Fluid overload    [facility] day: 2   Problem List:       Hospital Problems  Never Reviewed          Codes Class Noted POA    Fluid overload ICD-10-CM: E87.70  ICD-9-CM: 276.69  3/21/2017 Unknown        Acute on chronic congestive heart failure ([facility] [address] ICD-10-CM: I50.9  ICD-9-CM: 428.0  3/21/2017 Unknown              BACKGROUND:    Past Medical History:   Past Medical History:   Diagnosis Date    Asthma     Heart failure ([facility] [address]     Hypertension          Patient taking anticoagulants yes     ASSESSMENT:    Changes in Assessment Throughout Shift:      Patient has Central Line: no Reasons if yes:    Patient has Juarez Cath: no Reasons if yes:       Last Vitals:     Vitals:    03/23/17 0602 03/23/17 0800 03/23/17 1225 03/23/17 1825   BP: (!) 126/91 123/88 107/77 105/77   Pulse: 99 97 90 81   Resp: 18 16 16    Temp: 98 °F (36.7 °C) 98.1 °F (36.7 °C) 97.3 °F (36.3 °C)    SpO2: 99% 96% 99%    Weight:       Height:            IV and DRAINS (will only show if present)   [REMOVED] Peripheral IV 03/21/17 Left Arm-Site Assessment: Clean, dry, & intact  Peripheral IV 03/21/17 Right Hand-Site Assessment: Clean, dry, & intact     WOUND (if present)   Wound Type:  none   Dressing present Dressing Present : No   Wound Concerns/Notes:  none     PAIN    Pain Assessment    Pain Intensity 1: 0 (03/23/17 1350)    Pain Location 1: Abdomen, Foot         Patient Stated Pain Goal: 0  o Interventions for Pain:  none  o Intervention effective:   o Time of last intervention:    o Reassessment Completed:       Last 3 Weights:    Weight change: -5.216 kg (-11 lb 8 oz)     INTAKE/OUPUT    Current Shift:      Last three shifts: 03/22 0701 - 03/23 1900  In: 1596 [P.O.:1596]  Out: 2650 [Urine:2650]     LAB RESULTS     Recent Labs      03/22/17   0525  03/21/17   1524   WBC  7.7  7.6   HGB  11.7*  11.1*   HCT  37.3  34.8*   PLT  310  308        Recent Labs      03/23/17   0333  03/22/17   0525  03/21/17   1524   NA  140  139  138   K  3.9  3.5  3.6   GLU  136*  100*  124*   BUN  12  9  8   CREA  1.07  0.97  0.85   CA  8.6  8.6  8.7   MG  2.5*   --   2.3       RECOMMENDATIONS AND DISCHARGE PLANNING     1. Pending tests/procedures/ Plan of Care or Other Needs: Nuclear stress test tomorrow     2. Discharge plan for patient and Needs/Barriers:     3. Estimated Discharge Date: 3/25/17  4. Posted on Whiteboard in [facility] Room: yes      4. The patient's care plan was reviewed with the oncoming nurse. \"HEALS\" SAFETY CHECK      Fall Risk    Total Score: 1    Safety Measures: Safety Measures: Bed/Chair-Wheels locked, Bed in low position, Call light within reach, Gripper socks, Side rails X 3    A safety check occurred in the patient's room between off going nurse and oncoming nurse listed above. The safety check included the below items  Area Items   H  High Alert Medications - Verify all high alert medication drips (heparin, PCA, etc.)   E  Equipment - Suction is set up for ALL patients (with yanker)  - Red plugs utilized for all equipment (IV pumps, etc.)  - WOWs wiped down at end of shift.  - Room stocked with oxygen, suction, and other unit-specific supplies   A  Alarms - Bed alarm is set for fall risk patients  - Ensure chair alarm is in place and activated if patient is up in a chair   L  Lines - Check IV for any infiltration  - Juarez bag is empty if patient has a Juarez   - Tubing and IV bags are labeled   S  Safety   - Room is clean, patient is clean, and equipment is clean. - Hallways are clear from equipment besides carts.    - Fall bracelet on for fall risk patients  - Ensure room is clear and free of clutter  - Suction is set up for ALL patients (with yanker)  - Hallways are clear from equipment besides carts.    - Isolation precautions followed, supplies available outside room, sign posted     Bryson Gold RN

## 2017-03-23 NOTE — PROGRESS NOTES
Received shift report from [place]. Pt asleep. No signs of distress/discomfort. Call bell within reach.

## 2017-03-23 NOTE — PROGRESS NOTES
Visited with Ms Humphrey Kovacs who was resting in bed. She stated she had been up for meals and to BR. HF education continued, including purpose of Life Vest and scheduled nuc stress, need for compliance with meds, diet, FR, weights, and not smoking. IS done, improved from 750 to 1250 with encouragement. She asked if mother could listen by phone to some education, and she was included in part of discussion. Opportunity given for questions.

## 2017-03-23 NOTE — PROGRESS NOTES
SUBJECTIVE:    Feeling somewhat better. No chest pain. Cough present. SOB improving. No abdominal pain or nausea or vomiting. OBJECTIVE:    Visit Vitals    /77 (BP 1 Location: Right arm, BP Patient Position: At rest)    Pulse 90    Temp 97.3 °F (36.3 °C)    Resp 16    Ht 5' 4\" (1.626 m)    Wt 119.1 kg (262 lb 8 oz)    SpO2 99%    BMI 45.06 kg/m2     General: NAD, Awake  HEENT: No pallor  Neck: no JVD  RS: CTA bilaterally, no wheezes  CVS: RRR  GI: NT, BS +  Extremities: pedal edema  CNS: moves all extremities    ASSESSMENT:    1. Acute systolic CHF exacerbation with EF 15% 2014. 2. Hx of dilated cardiomyopathy   3. HTN. 4. UTI. 5. Chronic intermittent mild Asthma, compensated  6. Tobacco use.     PLAN:    Continue lasix per cardiology  ECHO report reviewed   Chay Duron in am   Cardiology input noted  Cont current medical management    --> total time greater than 30 minutes    CMP:   Lab Results   Component Value Date/Time     03/23/2017 03:33 AM    K 3.9 03/23/2017 03:33 AM     03/23/2017 03:33 AM    CO2 33 (H) 03/23/2017 03:33 AM    AGAP 6 03/23/2017 03:33 AM     (H) 03/23/2017 03:33 AM    BUN 12 03/23/2017 03:33 AM    CREA 1.07 03/23/2017 03:33 AM    GFRAA >60 03/23/2017 03:33 AM    GFRNA 55 (L) 03/23/2017 03:33 AM    CA 8.6 03/23/2017 03:33 AM    MG 2.5 (H) 03/23/2017 03:33 AM     CBC:   No results found for: WBC, HGB, HGBEXT, HCT, HCTEXT, PLT, PLTEXT, HGBEXT, HCTEXT, PLTEXT

## 2017-03-23 NOTE — PROGRESS NOTES
conducted an initial consultation and Spiritual Assessment for [employer], who is a 40 y.o.,female. Patients Primary Language is: GigSky. According to the patients EMR Evangelical Affiliation is: [facility].     The reason the Patient came to the hospital is:   Patient Active Problem List    Diagnosis Date Noted    Fluid overload 03/21/2017    Acute on chronic congestive heart failure ([address] 03/21/2017        The  provided the following Interventions:  Initiated a relationship of care and support. Explored issues of monty, belief, spirituality and Church/ritual needs while hospitalized. Listened empathically. Provided chaplaincy education. Provided information about Spiritual Care Services. Offered prayer and assurance of continued prayers on patient's behalf. Chart reviewed. The following outcomes where achieved:  Patient shared limited information about both their medical narrative and spiritual journey/beliefs.  confirmed Patient's Evangelical Affiliation. Patient processed feeling about current hospitalization. Patient expressed gratitude for 's visit. Assessment:  Patient does not have any Church/cultural needs that will affect patients preferences in health care. There are no spiritual or Church issues which require intervention at this time. Plan:  Chaplains will continue to follow and will provide pastoral care on an as needed/requested basis.  recommends bedside caregivers page  on duty if patient shows signs of acute spiritual or emotional distress. Chaplain Paola RODRIGUEZ  [address] [facility]  [phone]

## 2017-03-23 NOTE — PROGRESS NOTES
Cardiovascular Specialists  -  Progress Note      Patient: Sandra Amaro MRN: [id number]  SSN: [id number]    YOB: 1970  Age: 40 y.o. Sex: female      Admit Date: 3/21/2017    Hospital Problem List:     Hospital Problems  Never Reviewed          Codes Class Noted POA    Fluid overload ICD-10-CM: E87.70  ICD-9-CM: 276.69  3/21/2017 Unknown        Acute on chronic congestive heart failure ([facility] [address] ICD-10-CM: I50.9  ICD-9-CM: 428.0  3/21/2017 Unknown            -Dilated CYMO, dx in 2009 EF 45-50% in 2014  -Acute on chronic systolic CHF exacerbation with EF 15% on echo this admission with bilat dilated atria and mod MR.  -Volume overload, diuresing. -HTN, off antihypertensives by PCP for recent low reads. Improving.   -Pulm HTN, peak pressures 44mmHg by echo  -UTI, on abx  -Asthma  -Tobacco use, quit one month ago (18yr pk hx)  -Hilar adenopathy by CT Feb 2017, recommended to follow up with a pulmonologist.  -No contributing FHx  -Non-compliance, diet    Plan:     -Records from [facility] have been obtained and placed in patient's chart. EF with significant decline from 2014 (45-50% to now 15%). Pt with presumed non-ischemic dilated cardiomyopathy. Although she does have risk factors including HTN and tobacco use. -Will pursue further ischemic work up with pharm nuc tomorrow morning. If with significan perfusion defect on nuc stress will proceed with cardiac cath.  -Would continue to diurese with IV lasix. Her renal function and hemodynamics remain stable. Symptoms are slowing improving.  -    Subjective:     -LE edema improving. SOB improving. Able to ambulate to restroom without sob. No chest pain or palpitations.    -Pt with hx of dilated CYMO, previously followed by Dr. James May, recently switched to [facility] cardiology. Pt has been struggling with LE edema, sob, and orthopnea for past few weeks. She was seen in the ED at [facility] and given IV diuretics with discharge home last week.  Seen by  Doug on 3/21 and referred for inpatient intravenous diuresis for acute on chronic HF exacerbation. Objective:      Patient Vitals for the past 8 hrs:   Temp Pulse Resp BP SpO2   03/23/17 0800 98.1 °F (36.7 °C) 97 16 123/88 96 %   03/23/17 0602 98 °F (36.7 °C) 99 18 (!) 126/91 99 %         Patient Vitals for the past 96 hrs:   Weight   03/23/17 0453 119.1 kg (262 lb 8 oz)   03/22/17 0400 119.8 kg (264 lb 3.2 oz)   03/21/17 1430 124.3 kg (274 lb)         Intake/Output Summary (Last 24 hours) at 03/23/17 1033  Last data filed at 03/23/17 2234   Gross per 24 hour   Intake              766 ml   Output             1650 ml   Net             -884 ml       Physical Exam:  General:  Awake, alert, oriented x 3  Neck:  Supple, no jvd  Lungs:  Diminished bases, R sided mid field rales, on room air with normal effort  Heart:  Reg rate and rhythm  Abdomen: obese, soft, non-tender  Extremities:  1+ pitting LE edema,     Data Review:     Labs: Results:       Chemistry Recent Labs      03/23/17   0333  03/22/17   0525  03/21/17   1524   GLU  136*  100*  124*   NA  140  139  138   K  3.9  3.5  3.6   CL  101  102  103   CO2  33*  28  28   BUN  12  9  8   CREA  1.07  0.97  0.85   CA  8.6  8.6  8.7   MG  2.5*   --   2.3   AGAP  6  9  7   BUCR  11*  9*  9*      CBC w/Diff Recent Labs      03/22/17   0525  03/21/17   1524   WBC  7.7  7.6   RBC  4.00*  3.76*   HGB  11.7*  11.1*   HCT  37.3  34.8*   PLT  310  308   GRANS   --   55   LYMPH   --   35   EOS   --   2      Cardiac Enzymes Lab Results   Component Value Date/Time    CPK 63 03/22/2017 12:30 PM    CKMB <1.0 03/22/2017 12:30 PM    CKND1 Cannot be calulated 03/22/2017 12:30 PM    TROIQ <0.02 03/22/2017 12:30 PM      Coagulation No results for input(s): PTP, INR, APTT in the last 72 hours.     No lab exists for component: INREXT    Lipid Panel Lab Results   Component Value Date/Time    Cholesterol, total 81 03/21/2017 03:24 PM    HDL Cholesterol 27 03/21/2017 03:24 PM    LDL, calculated 42 03/21/2017 03:24 PM    VLDL, calculated 12 03/21/2017 03:24 PM    Triglyceride 60 03/21/2017 03:24 PM    CHOL/HDL Ratio 3.0 03/21/2017 03:24 PM      BNP No results found for: BNP, BNPP, XBNPT   Liver Enzymes No results for input(s): TP, ALB, TBIL, AP, SGOT, GPT in the last 72 hours.     No lab exists for component: DBIL   Digoxin    Thyroid Studies Lab Results   Component Value Date/Time    T4, Total 8.6 10/26/2009 02:52 PM    TSH <0.01 03/21/2017 03:24 PM            Signed By: NARCISO Vidal     March 23, 2017

## 2017-03-23 NOTE — ROUTINE PROCESS
80 - Received report from [address], patient was resting in bed and talking on the telephone with family at bedside. [address] report to [address], using SBAR, MAR, and Kardex.

## 2017-03-24 PROCEDURE — 74011250636 HC RX REV CODE- 250/636: Performed by: PHYSICIAN ASSISTANT

## 2017-03-24 PROCEDURE — 65660000000 HC RM CCU STEPDOWN

## 2017-03-24 PROCEDURE — 93017 CV STRESS TEST TRACING ONLY: CPT | Performed by: PHYSICIAN ASSISTANT

## 2017-03-24 PROCEDURE — A9500 TC99M SESTAMIBI: HCPCS

## 2017-03-24 PROCEDURE — 78452 HT MUSCLE IMAGE SPECT MULT: CPT | Performed by: PHYSICIAN ASSISTANT

## 2017-03-24 PROCEDURE — 74011250636 HC RX REV CODE- 250/636: Performed by: HOSPITALIST

## 2017-03-24 PROCEDURE — 74011250637 HC RX REV CODE- 250/637: Performed by: HOSPITALIST

## 2017-03-24 PROCEDURE — 74011250637 HC RX REV CODE- 250/637: Performed by: PHYSICIAN ASSISTANT

## 2017-03-24 RX ORDER — ACETAMINOPHEN 325 MG/1
650 TABLET ORAL
Status: DISCONTINUED | OUTPATIENT
Start: 2017-03-24 | End: 2017-03-27 | Stop reason: HOSPADM

## 2017-03-24 RX ORDER — SODIUM CHLORIDE 9 MG/ML
250 INJECTION, SOLUTION INTRAVENOUS ONCE
Status: COMPLETED | OUTPATIENT
Start: 2017-03-24 | End: 2017-03-24

## 2017-03-24 RX ORDER — ACETAMINOPHEN 325 MG/1
650 TABLET ORAL ONCE
Status: COMPLETED | OUTPATIENT
Start: 2017-03-24 | End: 2017-03-24

## 2017-03-24 RX ADMIN — CARVEDILOL 3.12 MG: 3.12 TABLET, FILM COATED ORAL at 18:56

## 2017-03-24 RX ADMIN — HEPARIN SODIUM 5000 UNITS: 5000 INJECTION, SOLUTION INTRAVENOUS; SUBCUTANEOUS at 05:15

## 2017-03-24 RX ADMIN — CARVEDILOL 3.12 MG: 3.12 TABLET, FILM COATED ORAL at 10:28

## 2017-03-24 RX ADMIN — ACETAMINOPHEN 650 MG: 325 TABLET ORAL at 13:07

## 2017-03-24 RX ADMIN — FUROSEMIDE 40 MG: 10 INJECTION, SOLUTION INTRAVENOUS at 18:57

## 2017-03-24 RX ADMIN — HEPARIN SODIUM 5000 UNITS: 5000 INJECTION, SOLUTION INTRAVENOUS; SUBCUTANEOUS at 21:53

## 2017-03-24 RX ADMIN — REGADENOSON 0.4 MG: 0.08 INJECTION, SOLUTION INTRAVENOUS at 08:40

## 2017-03-24 RX ADMIN — LISINOPRIL 5 MG: 5 TABLET ORAL at 10:28

## 2017-03-24 RX ADMIN — NITROGLYCERIN 0.5 INCH: 20 OINTMENT TOPICAL at 03:44

## 2017-03-24 RX ADMIN — FUROSEMIDE 40 MG: 10 INJECTION, SOLUTION INTRAVENOUS at 10:28

## 2017-03-24 RX ADMIN — SODIUM CHLORIDE 250 ML/HR: 900 INJECTION, SOLUTION INTRAVENOUS at 08:30

## 2017-03-24 RX ADMIN — ASPIRIN 81 MG CHEWABLE TABLET 81 MG: 81 TABLET CHEWABLE at 10:28

## 2017-03-24 NOTE — ROUTINE PROCESS
Bedside and Verbal shift change report given to Kimani Pena (oncoming nurse) by Tika Almeida (offgoing nurse). Report included the following information SBAR, Kardex and Recent Results. -0745-Bedside and Verbal shift change report given to [facility] (oncoming nurse) by Kimani Pena (offgoing nurse). Report included the following information SBAR, Kardex and Recent Results.

## 2017-03-24 NOTE — PROGRESS NOTES
SUBJECTIVE:    Feeling better. No chest pain or Cough. SOB  better. No abdominal pain or nausea or vomiting. OBJECTIVE:    Visit Vitals    /87 (BP 1 Location: Right arm, BP Patient Position: At rest)    Pulse 100    Temp 96.1 °F (35.6 °C)    Resp 14    Ht 5' 4\" (1.626 m)    Wt 117 kg (257 lb 14.4 oz)    SpO2 99%    BMI 44.27 kg/m2     General: NAD, Awake  RS: CTA bilaterally, no wheezes  CVS: RRR  GI: NT, BS +  Extremities: pedal edema  CNS: moves all extremities    ASSESSMENT:    1. Acute systolic CHF exacerbation with EF 15%. 2. Hx of dilated cardiomyopathy   3. HTN. 4. UTI s/p treatment with 3 doses of rocephin  5. Chronic intermittent mild Asthma, compensated  6. Tobacco use.     PLAN:    Continue lasix per cardiology  Chay Duron report reviewed   Cardiology input noted  Cont current medical management  Possible discharge on Monday if okay with cardiology as patient will need lifevest on discharge    --> total time greater than 30 minutes    CMP:   No results found for: NA, K, CL, CO2, AGAP, GLU, BUN, CREA, GFRAA, GFRNA, CA, MG, PHOS, ALB, TBIL, TP, ALB, GLOB, AGRAT, SGOT, ALT, GPT  CBC:   No results found for: WBC, HGB, HGBEXT, HCT, HCTEXT, PLT, PLTEXT, HGBEXT, HCTEXT, PLTEXT

## 2017-03-24 NOTE — PROGRESS NOTES
[facility] - Progress Note  Admit Date: 3/21/2017    Assessment:     Hospital Problems  Never Reviewed          Codes Class Noted POA    Fluid overload ICD-10-CM: E87.70  ICD-9-CM: 276.69  3/21/2017 Unknown        Acute on chronic congestive heart failure ([facility] [address] ICD-10-CM: I50.9  ICD-9-CM: 428.0  3/21/2017 Unknown              -Acute on chronic systolic CHF exacerbation with presumed [address] EF 15% on echo this admission with bilat dilated atria and mod MR (EF 45-50% 2014). -Volume overload, diuresing. -HTN, off antihypertensives by PCP for recent low reads. Improving.   -Pulm HTN, peak pressures 44mmHg by echo  -UTI, on abx  -Asthma  -Tobacco use, quit one month ago (18yr pk hx)  -Hilar adenopathy by CT Feb 2017, recommended to follow up with a pulmonologist.  -No contributing FHx  -Non-compliance, diet    Plan: Will proceed with nuclear stress test this AM, if abnormal will proceed to cath. Continues to diurese with IV lasix. .  Continued on coreg and ace. Will need to consider lifevest prior to discharge if deemed gregoria case    Subjective:     No new complaints.      Objective:      Patient Vitals for the past 8 hrs:   Temp Pulse Resp BP SpO2   03/24/17 0342 96.1 °F (35.6 °C) 100 14 120/87 99 %   03/24/17 0207 97 °F (36.1 °C) 98 18 130/80 98 %         Patient Vitals for the past 96 hrs:   Weight   03/24/17 0715 117 kg (257 lb 14.4 oz)   03/24/17 0504 118.4 kg (261 lb)   03/23/17 0453 119.1 kg (262 lb 8 oz)   03/22/17 0400 119.8 kg (264 lb 3.2 oz)   03/21/17 1430 124.3 kg (274 lb)                    Intake/Output Summary (Last 24 hours) at 03/24/17 0856  Last data filed at 03/23/17 1422   Gross per 24 hour   Intake              240 ml   Output              600 ml   Net             -360 ml       Physical Exam:  General:  alert, cooperative, no distress, appears stated age  Neck:  no JVD  Lungs:  diminished breath sounds   Heart:  regular rate and rhythm  Abdomen:  abdomen is soft obese  Extremities:  1+ edema    Data Review:     Labs: Results:       Chemistry Recent Labs      03/23/17   0333  03/22/17   0525  03/21/17   1524   GLU  136*  100*  124*   NA  140  139  138   K  3.9  3.5  3.6   CL  101  102  103   CO2  33*  28  28   BUN  12  9  8   CREA  1.07  0.97  0.85   CA  8.6  8.6  8.7   MG  2.5*   --   2.3   AGAP  6  9  7   BUCR  11*  9*  9*      CBC w/Diff Recent Labs      03/22/17   0525  03/21/17   1524   WBC  7.7  7.6   RBC  4.00*  3.76*   HGB  11.7*  11.1*   HCT  37.3  34.8*   PLT  310  308   GRANS   --   55   LYMPH   --   35   EOS   --   2      Cardiac Enzymes No results found for: CPK, CKMMB, CKMB, RCK3, CKMBT, CKNDX, CKND1, MARIAMA, TROPT, TROIQ, KALIN, TROPT, TNIPOC, BNP, BNPP   Coagulation No results for input(s): PTP, INR, APTT in the last 72 hours. No lab exists for component: INREXT    Lipid Panel Lab Results   Component Value Date/Time    Cholesterol, total 81 03/21/2017 03:24 PM    HDL Cholesterol 27 03/21/2017 03:24 PM    LDL, calculated 42 03/21/2017 03:24 PM    VLDL, calculated 12 03/21/2017 03:24 PM    Triglyceride 60 03/21/2017 03:24 PM    CHOL/HDL Ratio 3.0 03/21/2017 03:24 PM      BNP No results found for: BNP, BNPP, XBNPT   Liver Enzymes No results for input(s): TP, ALB, TBIL, AP, SGOT, GPT in the last 72 hours.     No lab exists for component: DBIL   Digoxin    Thyroid Studies Lab Results   Component Value Date/Time    T4, Total 8.6 10/26/2009 02:52 PM    TSH <0.01 03/21/2017 03:24 PM          Signed By: NARCISO Candelario     March 24, 2017

## 2017-03-24 NOTE — PROGRESS NOTES
Patient was given 11 mCi of Sestamibi for the Resting pictures. Patient received 0.4 mg of Lexiscan for the exercise portion of the Stress test. Patient was then given 33 mCi of Sestamibi for the Stress pictures. Patient went back to room with armband still on.

## 2017-03-25 LAB
ANION GAP BLD CALC-SCNC: 7 MMOL/L (ref 3–18)
ATTENDING PHYSICIAN, CST07: NORMAL
BASOPHILS # BLD AUTO: 0 K/UL (ref 0–0.06)
BASOPHILS # BLD: 0 % (ref 0–2)
BUN SERPL-MCNC: 17 MG/DL (ref 7–18)
BUN/CREAT SERPL: 16 (ref 12–20)
CALCIUM SERPL-MCNC: 8.6 MG/DL (ref 8.5–10.1)
CHLORIDE SERPL-SCNC: 103 MMOL/L (ref 100–108)
CO2 SERPL-SCNC: 28 MMOL/L (ref 21–32)
CREAT SERPL-MCNC: 1.06 MG/DL (ref 0.6–1.3)
DIAGNOSIS, 93000: NORMAL
DIFFERENTIAL METHOD BLD: ABNORMAL
DUKE TM SCORE RESULT, CST14: NORMAL
DUKE TREADMILL SCORE, CST13: NORMAL
ECG INTERP BEFORE EX, CST11: NORMAL
ECG INTERP DURING EX, CST12: NORMAL
EOSINOPHIL # BLD: 0.1 K/UL (ref 0–0.4)
EOSINOPHIL NFR BLD: 1 % (ref 0–5)
ERYTHROCYTE [DISTWIDTH] IN BLOOD BY AUTOMATED COUNT: 15.1 % (ref 11.6–14.5)
FUNCTIONAL CAPACITY, CST17: NORMAL
GLUCOSE SERPL-MCNC: 127 MG/DL (ref 74–99)
HCT VFR BLD AUTO: 36 % (ref 35–45)
HGB BLD-MCNC: 11.3 G/DL (ref 12–16)
KNOWN CARDIAC CONDITION, CST08: NORMAL
LYMPHOCYTES # BLD AUTO: 48 % (ref 21–52)
LYMPHOCYTES # BLD: 3.3 K/UL (ref 0.9–3.6)
MAX. DIASTOLIC BP, CST04: 91 MMHG
MAX. HEART RATE, CST05: 99 BPM
MAX. SYSTOLIC BP, CST03: 129 MMHG
MCH RBC QN AUTO: 28.9 PG (ref 24–34)
MCHC RBC AUTO-ENTMCNC: 31.4 G/DL (ref 31–37)
MCV RBC AUTO: 92.1 FL (ref 74–97)
MONOCYTES # BLD: 0.4 K/UL (ref 0.05–1.2)
MONOCYTES NFR BLD AUTO: 6 % (ref 3–10)
NEUTS SEG # BLD: 3.1 K/UL (ref 1.8–8)
NEUTS SEG NFR BLD AUTO: 45 % (ref 40–73)
OVERALL BP RESPONSE TO EXERCISE, CST16: NORMAL
OVERALL HR RESPONSE TO EXERCISE, CST15: NORMAL
PEAK EX METS, CST10: 1 METS
PLATELET # BLD AUTO: 288 K/UL (ref 135–420)
PMV BLD AUTO: 9.6 FL (ref 9.2–11.8)
POTASSIUM SERPL-SCNC: 3.8 MMOL/L (ref 3.5–5.5)
PROTOCOL NAME, CST01: NORMAL
RBC # BLD AUTO: 3.91 M/UL (ref 4.2–5.3)
SODIUM SERPL-SCNC: 138 MMOL/L (ref 136–145)
TEST INDICATION, CST09: NORMAL
WBC # BLD AUTO: 6.9 K/UL (ref 4.6–13.2)

## 2017-03-25 PROCEDURE — 85025 COMPLETE CBC W/AUTO DIFF WBC: CPT | Performed by: INTERNAL MEDICINE

## 2017-03-25 PROCEDURE — 74011250636 HC RX REV CODE- 250/636: Performed by: HOSPITALIST

## 2017-03-25 PROCEDURE — 74011250636 HC RX REV CODE- 250/636: Performed by: INTERNAL MEDICINE

## 2017-03-25 PROCEDURE — 74011250637 HC RX REV CODE- 250/637: Performed by: HOSPITALIST

## 2017-03-25 PROCEDURE — 80048 BASIC METABOLIC PNL TOTAL CA: CPT | Performed by: INTERNAL MEDICINE

## 2017-03-25 PROCEDURE — 36415 COLL VENOUS BLD VENIPUNCTURE: CPT | Performed by: INTERNAL MEDICINE

## 2017-03-25 PROCEDURE — 65660000000 HC RM CCU STEPDOWN

## 2017-03-25 RX ORDER — ONDANSETRON 2 MG/ML
INJECTION INTRAMUSCULAR; INTRAVENOUS
Status: DISPENSED
Start: 2017-03-25 | End: 2017-03-25

## 2017-03-25 RX ORDER — ONDANSETRON 2 MG/ML
4 INJECTION INTRAMUSCULAR; INTRAVENOUS ONCE
Status: COMPLETED | OUTPATIENT
Start: 2017-03-25 | End: 2017-03-25

## 2017-03-25 RX ADMIN — HEPARIN SODIUM 5000 UNITS: 5000 INJECTION, SOLUTION INTRAVENOUS; SUBCUTANEOUS at 21:31

## 2017-03-25 RX ADMIN — HEPARIN SODIUM 5000 UNITS: 5000 INJECTION, SOLUTION INTRAVENOUS; SUBCUTANEOUS at 13:51

## 2017-03-25 RX ADMIN — ONDANSETRON HYDROCHLORIDE 4 MG: 2 SOLUTION INTRAMUSCULAR; INTRAVENOUS at 06:58

## 2017-03-25 RX ADMIN — ASPIRIN 81 MG CHEWABLE TABLET 81 MG: 81 TABLET CHEWABLE at 10:50

## 2017-03-25 RX ADMIN — HEPARIN SODIUM 5000 UNITS: 5000 INJECTION, SOLUTION INTRAVENOUS; SUBCUTANEOUS at 05:32

## 2017-03-25 NOTE — ROUTINE PROCESS
Bedside and Verbal shift change report given to ISSA RN (oncoming nurse) by Rhys George RN (offgoing nurse). Report included the following information SBAR, Kardex and MAR.

## 2017-03-25 NOTE — PROGRESS NOTES
[facility]  Progress Note    Patient: Liana Vallecillo Age: 40 y.o. : 1970 MR#: [id number] SSN: [id number]  Date/Time: 3/25/2017 5:44 PM    Subjective:     Feels better overall, but still gets easily SOB. No F/C, N/V, CP, SOB @ rest. Chart reviewed. Cardiology input appreciated. Assessment/Plan:   1. Acute on chronic systolic CHF exac - EF 39%. Diuresing well, still easily JOYA. Maintain current med tx per cardiology: on ASA, BBlocker, ACEi. Plan for LifeVest prior to d/c.  2. HTN with pulmonary HTN noted on echo - BPs wnl, following as most recent BPs a little on the low side. 3. UTI - completed abx course. Urine cx with 50k colonies mixed. No new issues. 4. Chronic mild intermittent asthma - no acute. 5. Tobacco abuse - will support cessation. 6. Nuclear stress test negative with estimated EF 25%. Additional Notes:      Case discussed with:  [x]Patient  []Family  []Nursing  []Case Management  DVT Prophylaxis:  []Lovenox  [x]Hep SQ  []SCDs  []Coumadin   []On Heparin gtt    Objective:   VS:   Visit Vitals    BP 91/69 (BP 1 Location: Right arm, BP Patient Position: At rest)    Pulse 95    Temp 97.8 °F (36.6 °C)    Resp 18    Ht 5' 4\" (1.626 m)    Wt 115 kg (253 lb 8.5 oz)    SpO2 98%    BMI 43.52 kg/m2      Tmax/24hrs: Temp (24hrs), Av.2 °F (36.2 °C), Min:96.5 °F (35.8 °C), Max:97.8 °F (36.6 °C)    Intake/Output Summary (Last 24 hours) at 17 1744  Last data filed at 17 1005   Gross per 24 hour   Intake             1030 ml   Output              800 ml   Net              230 ml       General:  Awake, alert, NAD. Cardiovascular:  RRR. Pulmonary:  CTA B.  GI:  Soft, NT/ND, NABS. Extremities:  No CT or edema.    Additional:      Labs:    Recent Results (from the past 24 hour(s))   CBC WITH AUTOMATED DIFF    Collection Time: 17  6:38 AM   Result Value Ref Range    WBC 6.9 4.6 - 13.2 K/uL    RBC 3.91 (L) 4.20 - 5.30 M/uL    HGB 11.3 (L) 12.0 - 16.0 g/dL    HCT 36.0 35.0 - 45.0 %    MCV 92.1 74.0 - 97.0 FL    MCH 28.9 24.0 - 34.0 PG    MCHC 31.4 31.0 - 37.0 g/dL    RDW 15.1 (H) 11.6 - 14.5 %    PLATELET 006 776 - 480 K/uL    MPV 9.6 9.2 - 11.8 FL    NEUTROPHILS 45 40 - 73 %    LYMPHOCYTES 48 21 - 52 %    MONOCYTES 6 3 - 10 %    EOSINOPHILS 1 0 - 5 %    BASOPHILS 0 0 - 2 %    ABS. NEUTROPHILS 3.1 1.8 - 8.0 K/UL    ABS. LYMPHOCYTES 3.3 0.9 - 3.6 K/UL    ABS. MONOCYTES 0.4 0.05 - 1.2 K/UL    ABS. EOSINOPHILS 0.1 0.0 - 0.4 K/UL    ABS.  BASOPHILS 0.0 0.0 - 0.06 K/UL    DF AUTOMATED     METABOLIC PANEL, BASIC    Collection Time: 03/25/17  6:38 AM   Result Value Ref Range    Sodium 138 136 - 145 mmol/L    Potassium 3.8 3.5 - 5.5 mmol/L    Chloride 103 100 - 108 mmol/L    CO2 28 21 - 32 mmol/L    Anion gap 7 3.0 - 18 mmol/L    Glucose 127 (H) 74 - 99 mg/dL    BUN 17 7.0 - 18 MG/DL    Creatinine 1.06 0.6 - 1.3 MG/DL    BUN/Creatinine ratio 16 12 - 20      GFR est AA >60 >60 ml/min/1.73m2    GFR est non-AA 56 (L) >60 ml/min/1.73m2    Calcium 8.6 8.5 - 10.1 MG/DL       Signed By: Monik Kang MD     March 25, 2017 5:44 PM

## 2017-03-25 NOTE — ROUTINE PROCESS
Bedside and Verbal shift change report given to Mounika Louis RN (oncoming nurse) by Corey Jeans, RN (offgoing nurse). Report given with Waylon MCADAMS and MAR.

## 2017-03-25 NOTE — ROUTINE PROCESS
Bedside shift change report given to [address], Sw, RN (oncoming nurse) by Brianna Lundberg RN (offgoing nurse). Report included the following information SBAR, Kardex and MAR.     1900 - Bedside shift change report given to Brianna Lundberg RN (oncoming nurse) by Jessica Lieberman RN (offgoing nurse). Report included the following information SBAR, Kardex and MAR.

## 2017-03-25 NOTE — PROGRESS NOTES
[facility] - Progress Note  Admit Date: 3/21/2017     The patient was seen, examined, and independently evaluated and I agree with the below assessment and plan by Marit Curling, PA-C with the following comments. This lady has lost 21 pounds since admission, but still appears a little volume overloaded and will continue to diurese. Probably home in a couple of days with Life Vest if possible. Remaining in sinus on telemetry without ventricular ectopy, but would be using Life Vest for primary prevention due to severity of LV dysfunction. Assessment:     -Acute on chronic systolic CHF exacerbation with [place] EF 15% on echo this admission with bilat dilated atria and mod MR (EF 45-50% 2014). - Nuclear Stress 03/24/17 with no obvious infarct or ischemia  -Volume overload, diuresing. -HTN, off antihypertensives by PCP for recent low reads. Improving.   -Pulm HTN, peak pressures 44mmHg by echo  -UTI, on abx  -Asthma  -Tobacco use, quit one month ago (18yr pk hx)  -Hilar adenopathy by CT Feb 2017, recommended to follow up with a pulmonologist.  -No contributing FHx  -Non-compliance, diet    Plan:     - Continue with IV diuresis, still looking volume overloaded on exam. Creatinine stable  - Will need LifeVest for non-ischemic cardiomyopathy and EF 15%. - Addendum: discussed with LifeVest rep. Arranging for approval. Patient is self pay and company will need discussion with case management who will be available on Monday. - Continue with ASA, Coreg, and Lisinopril     Subjective:     No new complaints. Still with some edema, wheezing.     Objective:      Patient Vitals for the past 8 hrs:   Temp Pulse Resp BP SpO2   03/25/17 0855 97.2 °F (36.2 °C) 100 18 101/77 95 %   03/25/17 0400 96.5 °F (35.8 °C) 91 18 92/72 99 %         Patient Vitals for the past 96 hrs:   Weight   03/25/17 0400 253 lb 8.5 oz (115 kg)   03/24/17 0715 257 lb 14.4 oz (117 kg)   03/24/17 0504 261 lb (118.4 kg)   03/23/17 0453 262 lb 8 oz (119.1 kg)   03/22/17 0400 264 lb 3.2 oz (119.8 kg)   03/21/17 1430 274 lb (124.3 kg)                    Intake/Output Summary (Last 24 hours) at 03/25/17 0914  Last data filed at 03/25/17 0412   Gross per 24 hour   Intake              920 ml   Output             1800 ml   Net             -880 ml       Physical Exam:  General:  alert, cooperative, no distress  Neck:  nontender, no JVD  Lungs:  Wheezing, rales  Heart:  regular rate and rhythm, S1, S2 normal, no murmur, click, rub. There is an intermittent S3 at the apex. Abdomen:  abdomen is soft without significant tenderness, masses, organomegaly or guarding  Extremities:  edema 1 + bilateral LE's    Data Review:     Labs: Results:       Chemistry Recent Labs      03/25/17   0638  03/23/17   0333   GLU  127*  136*   NA  138  140   K  3.8  3.9   CL  103  101   CO2  28  33*   BUN  17  12   CREA  1.06  1.07   CA  8.6  8.6   MG   --   2.5*   AGAP  7  6   BUCR  16  11*      CBC w/Diff Recent Labs      03/25/17   0638   WBC  6.9   RBC  3.91*   HGB  11.3*   HCT  36.0   PLT  288   GRANS  45   LYMPH  48   EOS  1      Cardiac Enzymes No results found for: CPK, CKMMB, CKMB, RCK3, CKMBT, CKNDX, CKND1, MARIAMA, TROPT, TROIQ, KALIN, TROPT, TNIPOC, BNP, BNPP   Coagulation No results for input(s): PTP, INR, APTT in the last 72 hours. No lab exists for component: INREXT    Lipid Panel Lab Results   Component Value Date/Time    Cholesterol, total 81 03/21/2017 03:24 PM    HDL Cholesterol 27 03/21/2017 03:24 PM    LDL, calculated 42 03/21/2017 03:24 PM    VLDL, calculated 12 03/21/2017 03:24 PM    Triglyceride 60 03/21/2017 03:24 PM    CHOL/HDL Ratio 3.0 03/21/2017 03:24 PM      BNP No results found for: BNP, BNPP, XBNPT   Liver Enzymes No results for input(s): TP, ALB, TBIL, AP, SGOT, GPT in the last 72 hours.     No lab exists for component: DBIL   Digoxin    Thyroid Studies Lab Results   Component Value Date/Time    T4, Total 8.6 10/26/2009 02:52 PM    TSH <0.01 03/21/2017 03:24 PM          Signed By: Julianna Grant.  Fletcher Fraire     March 25, 2017

## 2017-03-26 LAB
ANION GAP BLD CALC-SCNC: 9 MMOL/L (ref 3–18)
BNP SERPL-MCNC: 1424 PG/ML (ref 0–450)
BUN SERPL-MCNC: 14 MG/DL (ref 7–18)
BUN/CREAT SERPL: 13 (ref 12–20)
CALCIUM SERPL-MCNC: 8.6 MG/DL (ref 8.5–10.1)
CHLORIDE SERPL-SCNC: 102 MMOL/L (ref 100–108)
CO2 SERPL-SCNC: 26 MMOL/L (ref 21–32)
CREAT SERPL-MCNC: 1.08 MG/DL (ref 0.6–1.3)
GLUCOSE SERPL-MCNC: 107 MG/DL (ref 74–99)
POTASSIUM SERPL-SCNC: 3.8 MMOL/L (ref 3.5–5.5)
SODIUM SERPL-SCNC: 137 MMOL/L (ref 136–145)

## 2017-03-26 PROCEDURE — 36415 COLL VENOUS BLD VENIPUNCTURE: CPT | Performed by: INTERNAL MEDICINE

## 2017-03-26 PROCEDURE — 80048 BASIC METABOLIC PNL TOTAL CA: CPT | Performed by: INTERNAL MEDICINE

## 2017-03-26 PROCEDURE — 74011250637 HC RX REV CODE- 250/637: Performed by: HOSPITALIST

## 2017-03-26 PROCEDURE — 83880 ASSAY OF NATRIURETIC PEPTIDE: CPT | Performed by: INTERNAL MEDICINE

## 2017-03-26 PROCEDURE — 65660000000 HC RM CCU STEPDOWN

## 2017-03-26 PROCEDURE — 74011250636 HC RX REV CODE- 250/636: Performed by: HOSPITALIST

## 2017-03-26 PROCEDURE — 74011250637 HC RX REV CODE- 250/637: Performed by: PHYSICIAN ASSISTANT

## 2017-03-26 RX ORDER — FUROSEMIDE 40 MG/1
40 TABLET ORAL DAILY
Status: DISCONTINUED | OUTPATIENT
Start: 2017-03-27 | End: 2017-03-27 | Stop reason: HOSPADM

## 2017-03-26 RX ORDER — POLYETHYLENE GLYCOL 3350 17 G/17G
17 POWDER, FOR SOLUTION ORAL DAILY
Status: DISCONTINUED | OUTPATIENT
Start: 2017-03-26 | End: 2017-03-27 | Stop reason: HOSPADM

## 2017-03-26 RX ADMIN — POLYETHYLENE GLYCOL 3350 17 G: 17 POWDER, FOR SOLUTION ORAL at 12:53

## 2017-03-26 RX ADMIN — HEPARIN SODIUM 5000 UNITS: 5000 INJECTION, SOLUTION INTRAVENOUS; SUBCUTANEOUS at 12:53

## 2017-03-26 RX ADMIN — HEPARIN SODIUM 5000 UNITS: 5000 INJECTION, SOLUTION INTRAVENOUS; SUBCUTANEOUS at 04:58

## 2017-03-26 RX ADMIN — HEPARIN SODIUM 5000 UNITS: 5000 INJECTION, SOLUTION INTRAVENOUS; SUBCUTANEOUS at 21:36

## 2017-03-26 RX ADMIN — ASPIRIN 81 MG CHEWABLE TABLET 81 MG: 81 TABLET CHEWABLE at 09:48

## 2017-03-26 RX ADMIN — FUROSEMIDE 40 MG: 10 INJECTION, SOLUTION INTRAVENOUS at 09:48

## 2017-03-26 NOTE — ROUTINE PROCESS
Bedside and Verbal shift change report given to ISSA RN (oncoming nurse) by Klaudia Guillaume RN (offgoing nurse). Report included the following information SBAR, Kardex and MAR.

## 2017-03-26 NOTE — PROGRESS NOTES
[facility] - Progress Note  Admit Date: 3/21/2017     The patient was seen, examined, and independently evaluated and I agree with the below assessment and plan by Marit Curling, PA-C with the following comments. This lady's NT pro BNP is still elevated, but improved. Doing better and hopefully will be ready for discharge in AM. Arrangements being made for Life Vest. Will need to get bathroom scale or be put on tele health on discharge so we can adjust diuretics due to her weight. Assessment:     -Acute on chronic systolic CHF exacerbation with [place]IC[place] EF 15% on echo this admission with bilat dilated atria and mod MR (EF 45-50% 2014). - Nuclear Stress 03/24/17 with no obvious infarct or ischemia  -Volume overload, diuresing. -HTN, off antihypertensives by PCP for recent low reads. Improving.   -Pulm HTN, peak pressures 44mmHg by echo  -UTI, on abx  -Asthma  -Tobacco use, quit one month ago (18yr pk hx)  -Hilar adenopathy by CT Feb 2017, recommended to follow up with a pulmonologist.  -No contributing FHx  -Non-compliance, diet    Plan:     - Volume status continues to improve. Transition to PO Lasix tomorrow. Creatinine and lytes stable this AM. BP marginal so will hold tonight's IV Lasix dose. - Patient complains of constipation, Miralax ordered  - LifeVest arrangements in progress. Needs approval from case management tomorrow  - Continue with rest of present Rx: ASA, Coreg, Lisinopril    Subjective:     No new complaints.      Objective:      Patient Vitals for the past 8 hrs:   Pulse Resp BP SpO2   03/26/17 1216 95 18 97/64 98 %         Patient Vitals for the past 96 hrs:   Weight   03/26/17 0502 115.8 kg (255 lb 6.4 oz)   03/25/17 0400 115 kg (253 lb 8.5 oz)   03/24/17 0715 117 kg (257 lb 14.4 oz)   03/24/17 0504 118.4 kg (261 lb)   03/23/17 0453 119.1 kg (262 lb 8 oz)                    Intake/Output Summary (Last 24 hours) at 03/26/17 1820  Last data filed at 03/26/17 1617   Gross per 24 hour   Intake              630 ml   Output             1600 ml   Net             -970 ml       Physical Exam:  General:  alert, cooperative, no distress  Neck:  nontender, no JVD  Lungs:  clear to auscultation bilaterally  Heart:  regular rate and rhythm, S1, S2 normal, no murmur, click, rub. There is an intermittent S3 gallop. Abdomen:  abdomen is soft without significant tenderness, masses, organomegaly or guarding  Extremities:  Trace bilateral edema LE's    Data Review:     Labs: Results:       Chemistry Recent Labs      03/26/17   0305  03/25/17   0638   GLU  107*  127*   NA  137  138   K  3.8  3.8   CL  102  103   CO2  26  28   BUN  14  17   CREA  1.08  1.06   CA  8.6  8.6   AGAP  9  7   BUCR  13  16      CBC w/Diff Recent Labs      03/25/17   0638   WBC  6.9   RBC  3.91*   HGB  11.3*   HCT  36.0   PLT  288   GRANS  45   LYMPH  48   EOS  1      Cardiac Enzymes No results found for: CPK, CKMMB, CKMB, RCK3, CKMBT, CKNDX, CKND1, MARIAMA, TROPT, TROIQ, KALIN, TROPT, TNIPOC, BNP, BNPP   Coagulation No results for input(s): PTP, INR, APTT in the last 72 hours. No lab exists for component: INREXT, INREXT    Lipid Panel Lab Results   Component Value Date/Time    Cholesterol, total 81 03/21/2017 03:24 PM    HDL Cholesterol 27 03/21/2017 03:24 PM    LDL, calculated 42 03/21/2017 03:24 PM    VLDL, calculated 12 03/21/2017 03:24 PM    Triglyceride 60 03/21/2017 03:24 PM    CHOL/HDL Ratio 3.0 03/21/2017 03:24 PM      BNP No results found for: BNP, BNPP, XBNPT   Liver Enzymes No results for input(s): TP, ALB, TBIL, AP, SGOT, GPT in the last 72 hours.     No lab exists for component: DBIL   Digoxin    Thyroid Studies Lab Results   Component Value Date/Time    T4, Total 8.6 10/26/2009 02:52 PM    TSH <0.01 03/21/2017 03:24 PM          Signed By: Yomaira Nieves DO     March 26, 2017

## 2017-03-26 NOTE — ROUTINE PROCESS
Bedside shift change report given to [address], RN (oncoming nurse) by Alicia Bush RN (offgoing nurse). Report included the following information SBAR, Kardex and MAR.

## 2017-03-26 NOTE — PROGRESS NOTES
[facility]  Progress Note    Patient: Trell Pelayo Age: 40 y.o. : 1970 MR#: [id number] SSN: [id number]  Date/Time: 3/26/2017 5:44 PM    Subjective:     Pt reports feeling better today. Less SOB. Ambulates to bathroom without any JOYA. No F/C, N/V, CP. Assessment/Plan:   1. Acute on chronic systolic CHF exac - EF 64%. Continues to diurese. Much less JOYA today. Maintain current med tx per cardiology: on ASA, BBlocker, ACEi. Plan for LifeVest prior to d/c. PO Lasix tomorrow, hold tonight's dose based on low BP.   2. HTN with pulmonary HTN noted on echo - BPs wnl, following as most recent BPs a little on the low side. Holding Lasix tonight for this reason. 3. UTI - completed abx course. Urine cx with 50k colonies mixed. No new issues. 4. Chronic mild intermittent asthma - no acute. 5. Tobacco abuse - will support cessation. 6. Nuclear stress test negative with estimated EF 25%. Additional Notes:      Case discussed with:  [x]Patient  []Family  []Nursing  []Case Management  DVT Prophylaxis:  []Lovenox  [x]Hep SQ  []SCDs  []Coumadin   []On Heparin gtt    Objective:   VS:   Visit Vitals    BP 97/64 (BP 1 Location: Right arm, BP Patient Position: At rest)    Pulse 95    Temp 97.6 °F (36.4 °C)    Resp 18    Ht 5' 4\" (1.626 m)    Wt 115.8 kg (255 lb 6.4 oz)    SpO2 98%    BMI 43.84 kg/m2      Tmax/24hrs: Temp (24hrs), Av.1 °F (36.2 °C), Min:96.6 °F (35.9 °C), Max:97.8 °F (36.6 °C)      Intake/Output Summary (Last 24 hours) at 17 1243  Last data filed at 17 0933   Gross per 24 hour   Intake              835 ml   Output             1050 ml   Net             -215 ml       General:  Awake, alert, NAD. Cardiovascular:  RRR. Pulmonary:  CTA B.  GI:  Soft, NT/ND, NABS. Extremities:  No CT or edema.    Additional:      Labs:    Recent Results (from the past 24 hour(s))   PRO-BNP    Collection Time: 17  3:05 AM   Result Value Ref Range NT pro-BNP 1424 (H) 0 - 781 PG/ML   METABOLIC PANEL, BASIC    Collection Time: 03/26/17  3:05 AM   Result Value Ref Range    Sodium 137 136 - 145 mmol/L    Potassium 3.8 3.5 - 5.5 mmol/L    Chloride 102 100 - 108 mmol/L    CO2 26 21 - 32 mmol/L    Anion gap 9 3.0 - 18 mmol/L    Glucose 107 (H) 74 - 99 mg/dL    BUN 14 7.0 - 18 MG/DL    Creatinine 1.08 0.6 - 1.3 MG/DL    BUN/Creatinine ratio 13 12 - 20      GFR est AA >60 >60 ml/min/1.73m2    GFR est non-AA 55 (L) >60 ml/min/1.73m2    Calcium 8.6 8.5 - 10.1 MG/DL       Signed By: Klaudia Cartwright MD     March 26, 2017 5:44 PM

## 2017-03-27 ENCOUNTER — HOME HEALTH ADMISSION (OUTPATIENT)
Dept: HOME HEALTH SERVICES | Facility: HOME HEALTH | Age: 47
End: 2017-03-27

## 2017-03-27 VITALS
TEMPERATURE: 98.8 F | RESPIRATION RATE: 17 BRPM | HEART RATE: 103 BPM | WEIGHT: 251.9 LBS | BODY MASS INDEX: 43.01 KG/M2 | OXYGEN SATURATION: 95 % | SYSTOLIC BLOOD PRESSURE: 106 MMHG | DIASTOLIC BLOOD PRESSURE: 85 MMHG | HEIGHT: 64 IN

## 2017-03-27 LAB
ANION GAP BLD CALC-SCNC: 5 MMOL/L (ref 3–18)
BUN SERPL-MCNC: 13 MG/DL (ref 7–18)
BUN/CREAT SERPL: 12 (ref 12–20)
CALCIUM SERPL-MCNC: 8.8 MG/DL (ref 8.5–10.1)
CHLORIDE SERPL-SCNC: 101 MMOL/L (ref 100–108)
CO2 SERPL-SCNC: 32 MMOL/L (ref 21–32)
CREAT SERPL-MCNC: 1.05 MG/DL (ref 0.6–1.3)
GLUCOSE SERPL-MCNC: 102 MG/DL (ref 74–99)
POTASSIUM SERPL-SCNC: 4.1 MMOL/L (ref 3.5–5.5)
SODIUM SERPL-SCNC: 138 MMOL/L (ref 136–145)

## 2017-03-27 PROCEDURE — 36415 COLL VENOUS BLD VENIPUNCTURE: CPT | Performed by: FAMILY MEDICINE

## 2017-03-27 PROCEDURE — 74011250637 HC RX REV CODE- 250/637: Performed by: PHYSICIAN ASSISTANT

## 2017-03-27 PROCEDURE — 74011250637 HC RX REV CODE- 250/637: Performed by: HOSPITALIST

## 2017-03-27 PROCEDURE — 74011250636 HC RX REV CODE- 250/636: Performed by: HOSPITALIST

## 2017-03-27 PROCEDURE — 80048 BASIC METABOLIC PNL TOTAL CA: CPT | Performed by: FAMILY MEDICINE

## 2017-03-27 PROCEDURE — 74011000258 HC RX REV CODE- 258: Performed by: HOSPITALIST

## 2017-03-27 RX ORDER — LISINOPRIL 5 MG/1
5 TABLET ORAL DAILY
Qty: 30 TAB | Refills: 1 | Status: SHIPPED | OUTPATIENT
Start: 2017-03-27 | End: 2017-05-05 | Stop reason: SDUPTHER

## 2017-03-27 RX ORDER — CARVEDILOL 3.12 MG/1
3.12 TABLET ORAL 2 TIMES DAILY WITH MEALS
Qty: 60 TAB | Refills: 1 | Status: SHIPPED | OUTPATIENT
Start: 2017-03-27 | End: 2017-05-05 | Stop reason: SDUPTHER

## 2017-03-27 RX ORDER — FUROSEMIDE 40 MG/1
40 TABLET ORAL DAILY
Qty: 30 TAB | Refills: 1 | Status: SHIPPED | OUTPATIENT
Start: 2017-03-27 | End: 2017-05-05 | Stop reason: SDUPTHER

## 2017-03-27 RX ORDER — NITROGLYCERIN 0.4 MG/1
0.4 TABLET SUBLINGUAL
Qty: 1 BOTTLE | Refills: 1 | Status: SHIPPED | OUTPATIENT
Start: 2017-03-27 | End: 2022-04-06 | Stop reason: SDUPTHER

## 2017-03-27 RX ADMIN — HEPARIN SODIUM 5000 UNITS: 5000 INJECTION, SOLUTION INTRAVENOUS; SUBCUTANEOUS at 12:46

## 2017-03-27 RX ADMIN — POLYETHYLENE GLYCOL 3350 17 G: 17 POWDER, FOR SOLUTION ORAL at 09:11

## 2017-03-27 RX ADMIN — LISINOPRIL 5 MG: 5 TABLET ORAL at 09:10

## 2017-03-27 RX ADMIN — FUROSEMIDE 40 MG: 40 TABLET ORAL at 09:10

## 2017-03-27 RX ADMIN — ASPIRIN 81 MG CHEWABLE TABLET 81 MG: 81 TABLET CHEWABLE at 09:10

## 2017-03-27 RX ADMIN — PROMETHAZINE HYDROCHLORIDE 12.5 MG: 25 INJECTION INTRAMUSCULAR; INTRAVENOUS at 03:24

## 2017-03-27 RX ADMIN — HEPARIN SODIUM 5000 UNITS: 5000 INJECTION, SOLUTION INTRAVENOUS; SUBCUTANEOUS at 06:38

## 2017-03-27 NOTE — ROUTINE PROCESS
1932 Assumed care of patient from off going nurse. Patient resting in bed. No distress noted. Call bell within reach, siderails up x 3, bed in lowest position, and patient instructed to use call bell for assistance. Will continue to monitor. 7737 Bedside and Verbal shift change report given to Rosemarie Pepper RN (oncoming nurse) by Kandy Alexandra RN (offgoing nurse). Report included the following information Kardex, Intake/Output, MAR and Recent Results.

## 2017-03-27 NOTE — PROGRESS NOTES
[facility] - Progress Note  Admit Date: 3/21/2017    Assessment:     Hospital Problems  Never Reviewed          Codes Class Noted POA    Fluid overload ICD-10-CM: E87.70  ICD-9-CM: 276.69  3/21/2017 Unknown        Acute on chronic congestive heart failure ([facility] [address] ICD-10-CM: I50.9  ICD-9-CM: 428.0  3/21/2017 Unknown              -Acute on chronic systolic CHF exacerbation with NICMY EF 15% on echo this admission with bilat dilated atria and mod MR (EF 45-50% 2014). Nuclear Stress 03/24/17 with no obvious infarct or ischemia  -HTN, off antihypertensives by PCP for recent low reads. Improving.   -Pulm HTN, peak pressures 44mmHg by echo  -UTI, on abx  -Asthma  -Tobacco use, quit one month ago (18yr pk hx)  -Hilar adenopathy by CT Feb 2017, recommended to follow up with a pulmonologist.  -No contributing FHx  -Non-compliance, diet      Plan:     Diuresed well with IV lasix, transitioned to PO. Hemodynamics stable on coreg and lisinopril, would continue. Lifevest to be fitted later today if approved, self pay. Advised importance of compliance. Dispo planning in progress. Subjective:     No new complaints.      Objective:      Patient Vitals for the past 8 hrs:   Temp Pulse Resp BP SpO2   03/27/17 0734 99.2 °F (37.3 °C) 84 18 108/79 96 %   03/27/17 0311 97.9 °F (36.6 °C) (!) 108 18 126/90 91 %         Patient Vitals for the past 96 hrs:   Weight   03/27/17 0411 114.3 kg (251 lb 14.4 oz)   03/26/17 0502 115.8 kg (255 lb 6.4 oz)   03/25/17 0400 115 kg (253 lb 8.5 oz)   03/24/17 0715 117 kg (257 lb 14.4 oz)   03/24/17 0504 118.4 kg (261 lb)                    Intake/Output Summary (Last 24 hours) at 03/27/17 0959  Last data filed at 03/27/17 0900   Gross per 24 hour   Intake             1356 ml   Output             1800 ml   Net             -444 ml       Physical Exam:  General:  alert, cooperative, no distress, appears stated age  Neck:  no JVD  Lungs:  clear to auscultation bilaterally  Heart:  regular rate and rhythm, S1, S2 normal, no murmur, click, rub or gallop  Abdomen:  abdomen is soft without significant tenderness, masses, organomegaly or guarding  Extremities:  extremities normal, atraumatic, no cyanosis trace edema    Data Review:     Labs: Results:       Chemistry Recent Labs      03/27/17   0330  03/26/17   0305  03/25/17   0638   GLU  102*  107*  127*   NA  138  137  138   K  4.1  3.8  3.8   CL  101  102  103   CO2  32  26  28   BUN  13  14  17   CREA  1.05  1.08  1.06   CA  8.8  8.6  8.6   AGAP  5  9  7   BUCR  12  13  16      CBC w/Diff Recent Labs      03/25/17   0638   WBC  6.9   RBC  3.91*   HGB  11.3*   HCT  36.0   PLT  288   GRANS  45   LYMPH  48   EOS  1      Cardiac Enzymes No results found for: CPK, CKMMB, CKMB, RCK3, CKMBT, CKNDX, CKND1, MARIAMA, TROPT, TROIQ, KALIN, TROPT, TNIPOC, BNP, BNPP   Coagulation No results for input(s): PTP, INR, APTT in the last 72 hours. No lab exists for component: INREXT    Lipid Panel Lab Results   Component Value Date/Time    Cholesterol, total 81 03/21/2017 03:24 PM    HDL Cholesterol 27 03/21/2017 03:24 PM    LDL, calculated 42 03/21/2017 03:24 PM    VLDL, calculated 12 03/21/2017 03:24 PM    Triglyceride 60 03/21/2017 03:24 PM    CHOL/HDL Ratio 3.0 03/21/2017 03:24 PM      BNP No results found for: BNP, BNPP, XBNPT   Liver Enzymes No results for input(s): TP, ALB, TBIL, AP, SGOT, GPT in the last 72 hours.     No lab exists for component: DBIL   Digoxin    Thyroid Studies Lab Results   Component Value Date/Time    T4, Total 8.6 10/26/2009 02:52 PM    TSH <0.01 03/21/2017 03:24 PM          Signed By: NARCISO Hickey     March 27, 2017

## 2017-03-27 NOTE — DISCHARGE INSTRUCTIONS
Learning About ACE Inhibitors for Heart Failure  Introduction  ACE (angiotensin-converting enzyme) inhibitors block an enzyme that makes blood vessels narrow. As a result, the blood vessels relax and widen. This lowers blood pressure. These medicines also put more water and salt into the urine. This also lowers blood pressure. In heart failure, your heart does not pump as much blood as your body needs. These medicines:  · Make it easier for your heart to pump. · Help reduce symptoms. · Make a heart attack or stroke less likely. Examples  These medicines include:  · Benazepril (Lotensin). · Lisinopril (Prinivil, Zestril). · Ramipril (Altace). Note: This is not a complete list.  Possible side effects  Side effects may include:  · Cough. · Low blood pressure. You may feel dizzy and weak. · High potassium levels. · An allergic reaction. You may have other side effects or reactions not listed here. Check the information that comes with your medicine. What to know about taking this medicine  · ACE inhibitors:  ¨ Are often used to treat heart failure. They may be the only medicine used if your only symptoms are feeling tired and mildly short of breath with no fluid buildup (edema). But in most other cases, they are used with other medicines. ¨ Can cause a dry cough. If the cough is bad, talk to your doctor. You may need to try a different medicine. ¨ Can relieve symptoms, improve how you feel, and make it less likely you will need to stay in a hospital. And they can reduce the risk of early death. ¨ Can cause an allergic reaction of the skin. Symptoms may range from mild swelling to painful welts. It is rare to have severe swelling that makes it hard to breathe. · Take your medicines exactly as prescribed. Call your doctor if you think you are having a problem with your medicine. · Check with your doctor or pharmacist before you use any other medicines. This includes over-the-counter medicines.  Make sure your doctor knows all of the medicines, vitamins, herbal products, and supplements you take. Taking some medicines together can cause problems. · You should not take an ACE inhibitor if you are pregnant or planning to become pregnant. · You may need regular blood tests. Where can you learn more? Go to http://star-jean.info/. Enter H830 in the search box to learn more about \"Learning About ACE Inhibitors for Heart Failure. \"  Current as of: January 27, 2016  Content Version: 11.1  © 1426-7361 [email or web address]. Care instructions adapted under license by [employer] (which disclaims liability or warranty for this information). If you have questions about a medical condition or this instruction, always ask your healthcare professional. [address] any warranty or liability for your use of this information. Echocardiogram for Heart Failure: Care Instructions  Your Care Instructions    An echocardiogram, also called an \"echo,\" is a very useful test to check for heart failure. Heart failure means that your heart can't pump as much blood as your body needs. During an echo, your doctor can check how much blood your heart is pumping during each heartbeat. This is called an ejection fraction. An echo can also show if your heart is enlarged and if your heart valves are working as they should. During an echo, you lie on a table. A technician moves a handheld device called a transducer across your chest. The device sends sound waves that echo off your heart. They create an image of your heart beating. The technician may ask you to breathe slowly or hold your breath. An echo takes about 30 to 60 minutes. Follow-up care is a key part of your treatment and safety. Be sure to make and go to all appointments, and call your doctor if you are having problems. It's also a good idea to know your test results and keep a list of the medicines you take.   How can you care for yourself at home? Preparing for the test  · You don't need to do anything to prepare. It may help to wear comfortable clothing that you can easily take off. After the test  · After an echo, you can do your normal activities. If you feel like it, you can drive home. When should you call for help? Call 911 if you have symptoms of sudden heart failure such as:  · You have severe trouble breathing. · You cough up pink, foamy mucus. · You have a new irregular or rapid heartbeat. Call your doctor now or seek immediate medical care if:  · You have new or increased shortness of breath. · You are dizzy or lightheaded, or you feel like you may faint. · You have sudden weight gain, such as 3 pounds or more in 2 to 3 days. · You have increased swelling in your legs, ankles, or feet. · You are suddenly so tired or weak that you cannot do your usual activities. Watch closely for changes in your health, and be sure to contact your doctor if you develop new symptoms. Where can you learn more? Go to http://star-jean.info/. Enter M251 in the search box to learn more about \"Echocardiogram for Heart Failure: Care Instructions. \"  Current as of: January 27, 2016  Content Version: 11.1  © 0227-2692 [employer]. Care instructions adapted under license by [employer] (which disclaims liability or warranty for this information). If you have questions about a medical condition or this instruction, always ask your healthcare professional. [place] [address] any warranty or liability for your use of this information. Cardiac Rehabilitation: Care Instructions  Your Care Instructions    Cardiac rehabilitation is a program for people who have a heart problem, such as a heart attack, heart failure, or a heart valve disease. The program includes exercise, lifestyle changes, education, and emotional support.  Cardiac rehab can help you improve the quality of your life through better overall health. It can help you lose weight and feel better about yourself. On your cardiac rehab team, you may have your doctor, a nurse specialist, a dietitian, and a physical therapist. They will design your cardiac rehab program specifically for you. You will learn how to reduce your risk for heart problems, how to manage stress, and how to eat a heart-healthy diet. By the end of the program, you will be ready to maintain a healthier lifestyle on your own. Follow-up care is a key part of your treatment and safety. Be sure to make and go to all appointments, and call your doctor if you are having problems. It's also a good idea to know your test results and keep a list of the medicines you take. How can you care for yourself at home? · Take your medicines exactly as prescribed. Call your doctor if you think you are having a problem with your medicine. You will get more details on the specific medicines your doctor prescribes. · Weigh yourself every day if your doctor tells you to. Watch for sudden weight gain. Weigh yourself on the same scale with the same amount of clothing at the same time of day. · Plan your meals so that you are eating heart-healthy foods. ¨ Eat a variety of foods daily. Fresh fruits and vegetables and whole-grains are good choices. ¨ Limit your fat intake, especially saturated and trans fat. ¨ Limit salt (sodium). ¨ Increase fiber in your diet. ¨ Limit alcohol. · Learn how to take your pulse so that you can track your heart rate during exercise. · Always check with your doctor before you begin a new exercise program.  · Warm up before you exercise and cool down afterward for at least 15 minutes each. This will help your heart gradually prepare for and recover from exercise and avoid pushing your heart too hard. · Stop exercising if you have any unusual discomfort, such as chest pain. · Do not smoke. Smoking can make heart problems worse.  If you need help quitting, talk to your doctor about stop-smoking programs and medicines. These can increase your chances of quitting for good. When should you call for help? Call 911 anytime you think you may need emergency care. For example, call if:  · You have severe trouble breathing. · You cough up pink, foamy mucus and you have trouble breathing. · You have symptoms of a heart attack. These may include:  ¨ Chest pain or pressure, or a strange feeling in the chest.  ¨ Sweating. ¨ Shortness of breath. ¨ Nausea or vomiting. ¨ Pain, pressure, or a strange feeling in the back, neck, jaw, or upper belly or in one or both shoulders or arms. ¨ Lightheadedness or sudden weakness. ¨ A fast or irregular heartbeat. After you call 911, the  may tell you to chew 1 adult-strength or 2 to 4 low-dose aspirin. Wait for an ambulance. Do not try to drive yourself. · You have angina symptoms (such as chest pain or pressure) that do not go away with rest or are not getting better within 5 minutes after you take a dose of nitroglycerin. · You have symptoms of a stroke. These may include:  ¨ Sudden numbness, tingling, weakness, or loss of movement in your face, arm, or leg, especially on only one side of your body. ¨ Sudden vision changes. ¨ Sudden trouble speaking. ¨ Sudden confusion or trouble understanding simple statements. ¨ Sudden problems with walking or balance. ¨ A sudden, severe headache that is different from past headaches. · You passed out (lost consciousness). Call your doctor now or seek immediate medical care if:  · You have new or increased shortness of breath. · You are dizzy or lightheaded, or you feel like you may faint. · You gain weight suddenly, such as 3 pounds or more in 2 to 3 days. (Your doctor may suggest a different range of weight gain.)  · You have increased swelling in your legs, ankles, or feet.   Watch closely for changes in your health, and be sure to contact your doctor if you have any problems. Where can you learn more? Go to http://star-jean.info/. Enter B060 in the search box to learn more about \"Cardiac Rehabilitation: Care Instructions. \"  Current as of: May 5, 2016  Content Version: 11.1  © 2369-9633 [employer] Care instructions adapted under license by "[employer] (which disclaims liability or warranty for this information). If you have questions about a medical condition or this instruction, always ask your healthcare professional. [address] any warranty or liability for your use of this information. Learning About Saving Energy When You Have a Chronic Condition  Introduction  Everyday tasks can be tiring when you have COPD, heart failure, or another long-term (chronic) condition. You may feel at times that you've lost your ability to live your life. But learning to conserve, or save, your energy can help you be less tired. Conserving your energy means finding ways of doing daily activities with as little effort as possible. With some small changes in the way you do things, you can get your tasks done more easily. Some treatments are available that might help. Pulmonary rehabilitation can teach you ways to breathe easier. Cardiac rehabilitation can help make your heart stronger. You also may want to see an occupational or physical therapist. The therapist can give you more tips on building strength and moving with less effort. What can you do to conserve your energy? Planning  · Make a list of what you have to do every day. Group the tasks by location. · Do all the chores in one part of your house around the same time. · Go out for errands or do chores at the time of day when you have the most energy. · Plan rest periods into your day. Getting things done  · Sit down as often as you can when you get dressed, do chores, or cook.   · Use a cart with wheels to roll items, such as laundry, from one room to another. · Push or slide boxes or other large items instead of lifting them. Reaching and bending  · Put things you use the most on shelves that are at the level of your waist or shoulder. · Use long-handled grabbers or other tools to reach items on a high shelf or to  things off the floor. Use long-handled dusters when you clean the house. · Use a raised toilet seat to avoid bending too far to sit or stand up. Eating  · Eat several small meals instead of three larger meals. · If you get too tired to eat much, try to choose healthy foods that have more calories. Have a yogurt-and-fruit smoothie for breakfast. Put avocado on a sandwich. Or add cheese or peanut butter to snacks. · If you don't feel very hungry, try to eat first and drink water or other fluids later, after a meal. This can help keep you from losing weight. Sip small amounts of fluids if you need to drink while you eat. Having sex  · Choose the time of day when you have more energy. · A [id number] position for sex can be less tiring. Sometimes you may want to focus more on caressing. Watch closely for changes in your health, and be sure to contact your doctor if you have any problems. Where can you learn more? Go to http://star-jean.info/. Enter H190 in the search box to learn more about \"Learning About Saving Energy When You Have a Chronic Condition. \"  Current as of: May 23, 2016  Content Version: 11.1  © 1060-2227 [employer]. Care instructions adapted under license by [employer] (which disclaims liability or warranty for this information). If you have questions about a medical condition or this instruction, always ask your healthcare professional. [place] [address] any warranty or liability for your use of this information.     DISCHARGE SUMMARY from Nurse    The following personal items are in your possession at time of discharge:    Dental Appliances: None  Visual Aid: None     Home Medications: None  Jewelry: None  Clothing: At bedside  Other Valuables: Cell Phone             PATIENT INSTRUCTIONS:    After general anesthesia or intravenous sedation, for 24 hours or while taking prescription Narcotics:  · Limit your activities  · Do not drive and operate hazardous machinery  · Do not make important personal or business decisions  · Do  not drink alcoholic beverages  · If you have not urinated within 8 hours after discharge, please contact your surgeon on call. Report the following to your surgeon:  · Excessive pain, swelling, redness or odor of or around the surgical area  · Temperature over 100.5  · Nausea and vomiting lasting longer than 4 hours or if unable to take medications  · Any signs of decreased circulation or nerve impairment to extremity: change in color, persistent  numbness, tingling, coldness or increase pain  · Any questions        What to do at Home:  Recommended activity: Activity as tolerated    If you experience any of the following symptoms chest pain shortness of breath palpitations dizziness fainting increased swelling nausea vomiting elevated temperature>100.5, please follow up with Dr or Emergency department. *  Please give a list of your current medications to your Primary Care Provider. *  Please update this list whenever your medications are discontinued, doses are      changed, or new medications (including over-the-counter products) are added. *  Please carry medication information at all times in case of emergency situations. These are general instructions for a healthy lifestyle:    No smoking/ No tobacco products/ Avoid exposure to second hand smoke    Surgeon General's Warning:  Quitting smoking now greatly reduces serious risk to your health.     Obesity, smoking, and sedentary lifestyle greatly increases your risk for illness    A healthy diet, regular physical exercise & weight monitoring are important for maintaining a healthy lifestyle    You may be retaining fluid if you have a history of heart failure or if you experience any of the following symptoms:  Weight gain of 3 pounds or more overnight or 5 pounds in a week, increased swelling in our hands or feet or shortness of breath while lying flat in bed. Please call your doctor as soon as you notice any of these symptoms; do not wait until your next office visit. Recognize signs and symptoms of STROKE:    F-face looks uneven    A-arms unable to move or move unevenly    S-speech slurred or non-existent    T-time-call 911 as soon as signs and symptoms begin-DO NOT go       Back to bed or wait to see if you get better-TIME IS BRAIN. Warning Signs of HEART ATTACK     Call 911 if you have these symptoms:   Chest discomfort. Most heart attacks involve discomfort in the center of the chest that lasts more than a few minutes, or that goes away and comes back. It can feel like uncomfortable pressure, squeezing, fullness, or pain.  Discomfort in other areas of the upper body. Symptoms can include pain or discomfort in one or both arms, the back, neck, jaw, or stomach.  Shortness of breath with or without chest discomfort.  Other signs may include breaking out in a cold sweat, nausea, or lightheadedness. Don't wait more than five minutes to call 911 - MINUTES MATTER! Fast action can save your life. Calling 911 is almost always the fastest way to get lifesaving treatment. Emergency Medical Services staff can begin treatment when they arrive -- up to an hour sooner than if someone gets to the hospital by car. The discharge information has been reviewed with the patient. The patient verbalized understanding. Discharge medications reviewed with the patient and appropriate educational materials and side effects teaching were provided. [employer] Activation    Thank you for requesting access to [employer].  Please follow the instructions below to securely access and download your online medical record. [facility] allows you to send messages to your doctor, view your test results, renew your prescriptions, schedule appointments, and more. How Do I Sign Up? 1. In your internet browser, go to [email or web address]  2. Click on the First Time User? Click Here link in the Sign In box. You will be redirect to the New Member Sign Up page. 3. Enter your [facility]osee Access Code exactly as it appears below. You will not need to use this code after youve completed the sign-up process. If you do not sign up before the expiration date, you must request a new code. Verosee Access Code: [email or web address]  Expires: 2017  8:26 PM (This is the date your Verosee access code will )    4. Enter the last four digits of your Social Security Number (xxxx) and Date of Birth (mm/dd/yyyy) as indicated and click Submit. You will be taken to the next sign-up page. 5. Create a Verosee ID. This will be your [facility] login ID and cannot be changed, so think of one that is secure and easy to remember. 6. Create a Verosee password. You can change your password at any time. 7. Enter your Password Reset Question and Answer. This can be used at a later time if you forget your password. 8. Enter your e-mail address. You will receive e-mail notification when new information is available in [address] 9. Click Sign Up. You can now view and download portions of your medical record. 10. Click the Download Summary menu link to download a portable copy of your medical information. Additional Information    If you have questions, please visit the Frequently Asked Questions section of the Verosee website at [email or web address] Remember, Ver[employer]e is NOT to be used for urgent needs. For medical emergencies, dial 911.     Patient armband removed and shredded

## 2017-03-27 NOTE — INTERDISCIPLINARY ROUNDS
IDR/SLIDR Summary          Patient: Ganesh Damon MRN: [id number]    Age: 40 y.o. YOB: 1970 Room/Bed: [facility]   Admit Diagnosis: Acute on chronic congestive heart failure (HCC)  Fluid overload  Principal Diagnosis: <principal problem not specified>   Goals: Home   Readmission: NO  Quality Measure: CHF  VTE Prophylaxis: Chemical  Influenza Vaccine screening completed? YES  Pneumococcal Vaccine screening completed? NO  Mobility needs: No   Nutrition plan:No  Consults:Case Management    Financial concerns:Yes  Escalated to ? YES  RRAT Score:    Interventions:Home Health  Testing due for pt today?  NO  LOS: 6 days Expected length of stay 4 days  Discharge plan: Home with [facility] [address]   PCP: Alla Ybarra MD  Transportation needs: No    Days before discharge:two or more days before discharge   Discharge disposition: Home    Signed:     Tayler Murphy RN  3/27/2017  10:15 AM

## 2017-03-27 NOTE — PROGRESS NOTES
NUTRITION      Nutrition Consult: General Nutrition Management & Supplements     RECOMMENDATIONS / PLAN:     - Add supplements: Ensure Enlive once daily. - Continue RD inpatient monitoring and evaluation. NUTRITION INTERVENTIONS & DIAGNOSIS:     [x] Meals/Snacks: modified diet  [x] Medical food supplementation: initiate     Nutrition Diagnosis: Inadequate oral intake related to decreased appetite as evidenced by poor po intake, patient consuming 25% or less of recent meals. ASSESSMENT:     Subjective/Objective: Appetite and meal intake decreased since admission. Consuming 25% of meals on average per report. Agreeable to trying Ensure.       Average po intake adequate to meet patients estimated nutritional needs:   [] Yes     [x] No   [] Unable to determine at this time    Diet: DIET CARDIAC Regular      Food Allergies: NKFA  Current Appetite:   [] Good     [x] Fair     [] Poor     [] Other:  Appetite/meal intake prior to admission:   [x] Good     [] Fair     [] Poor     [] Other:  Feeding Limitations:  [] Swallowing difficulty    [] Chewing difficulty    [] Other:  Current Meal Intake:   Patient Vitals for the past 100 hrs:   % Diet Eaten   03/27/17 1248 20 %   03/26/17 1341 10 %   03/26/17 0933 0 %   03/25/17 1808 90 %   03/25/17 1005 5 %   03/24/17 1830 100 %   03/23/17 1351 75 %       BM:  3/23  Skin Integrity: WDL  Edema: 1+ generalized, trace LEs  Pertinent Medications: Reviewed: Lasix, miralax, phenergan     Recent Labs      03/27/17   0330  03/26/17   0305  03/25/17   0638   NA  138  137  138   K  4.1  3.8  3.8   CL  101  102  103   CO2  32  26  28   GLU  102*  107*  127*   BUN  13  14  17   CREA  1.05  1.08  1.06   CA  8.8  8.6  8.6       Intake/Output Summary (Last 24 hours) at 03/27/17 1355  Last data filed at 03/27/17 1248   Gross per 24 hour   Intake             1146 ml   Output             1700 ml   Net             -554 ml       Anthropometrics:  Ht Readings from Last 1 Encounters:   03/21/17 5' 4\" (1.626 m)     Last 3 Recorded Weights in this Encounter    03/25/17 0400 03/26/17 0502 03/27/17 0411   Weight: 115 kg (253 lb 8.5 oz) 115.8 kg (255 lb 6.4 oz) 114.3 kg (251 lb 14.4 oz)     Body mass index is 43.24 kg/(m^2). Obese, class III    Weight History:  Report 19# weight gain over the last couple days related to fluid, reports usual weight of 247 lb with no fluid accumulation   Weight Metrics 3/27/2017 3/18/2017 3/15/2017 3/6/2017 2/16/2017 2/6/2017 2/4/2017   Weight 251 lb 14.4 oz 278 lb 257 lb 254 lb 247 lb 254 lb 254 lb   BMI 43.24 kg/m2 46.26 kg/m2 44.11 kg/m2 43.6 kg/m2 42.4 kg/m2 43.6 kg/m2 43.6 kg/m2        Admitting Diagnosis: Acute on chronic congestive heart failure (HCC)  Fluid overload  Past Medical History:   Diagnosis Date    Asthma     Heart failure ([facility] [address]     Hypertension        Education Needs:        [] None identified  [] Identified - Not appropriate at this time  [x]  Identified and addressed - refer to education log  Learning Limitations:   [x] None identified  [] Identified    Cultural, Evangelical & ethnic food preferences:  [x] None identified    [] Identified and addressed     ESTIMATED NUTRITION NEEDS:     Calories: 4133-4125 kcal (MSJx1.2-1.3) based on  [x] Actual BW: 120 kg     [] IBW:   Protein:  gm (0.8-1 gm/kg) based on  [x] Actual BW: 120 kg     [] IBW:   Fluid: 1 mL/kcal or fluid restriction per MD order     MONITORING & EVALUATION:     Nutrition Goal(s):   1. Po intake of meals will meet >75% of patient estimated nutritional needs within the next 7 days. Outcome:  [] Met/Ongoing    [x]  Not Met    [] New/Initial Goal   2. Patient will increase knowledge of appropriate food choices on a low sodium and fluid restricted diet prior to discharge.   Outcome:  [x] Met/Ongoing    []  Not Met    [] New/Initial Goal        Monitoring:   [x] Diet tolerance   [x] Meal intake   [x] Supplement intake   [] GI symptoms/ability to tolerate po diet   [] Respiratory status   [] Plan of care      Previous Recommendations (for follow-up assessments only):     [x]   Implemented       []   Not Implemented (RD to address)     [] No Recommendation Made     Discharge Planning: cardiac diet with fluid restriction per MD order.    [x] Participated in care planning, discharge planning, & interdisciplinary rounds as appropriate      Pippa Olivarez, [address], [address] [place] [address]    Pager: [phone]

## 2017-03-27 NOTE — HOME CARE
[address] received referral for [facility] for CHF - verified that patient will be discharging to daughter's home at [place] [address], [address] [facility] - daughter is Rosalie Little, phone #: [phone] - normally the patient resides with her mother Rosa Maria Alcove in her mother's home - noted client to be fitted for Life Vest prior to discharge - indigent application has been sent to [facility] for [facility] approval. Liaison nurses will continue to follow for home care needs prior to discharge - TYRONE Sommer LPN

## 2017-03-27 NOTE — PROGRESS NOTES
Visited with Ms Ivelisse Nogueira who was sitting at bedside. Life Vest applied, and we discussed importance of wearing and reporting any concerns to Life Vest rep. Reviewed all education, opportunity given for questions. She stated she will have scale at home. She stated she understands importance of f/u appts and that she will get f/u calls.

## 2017-03-27 NOTE — PROGRESS NOTES
Noted the plan for the patient to be dc home with LifeVest.    I contacted Ele Mcmahan [phone], she will fax me [phone] form shortly. When its filled, this form and following papers I will need to provide to Rosemarie: facesheet, echo report, initial cardiology note and last progress note, H&P.     11:22 AM INDIGENT LifeVest form signed by nurse Daron Najera. Tigg. All requested by LifeVest rep paperwork faxed to # [phone]. According to Rosemarie patient will be fitted today for lifevest.   Patient will also require INDIGENT meds prior she leaves the hospital today. 1:14 PM received call from Rosemarie, she informed me that a nurse will be here in the hospital around 3 PM to perform LifeVest fitting.

## 2017-03-27 NOTE — ROUTINE PROCESS
Report received from [facility]. L  Siting up in bed. Denies pain or shortness of breath this AM. See assessment. Call bell in reach.

## 2017-03-28 ENCOUNTER — TELEPHONE (OUTPATIENT)
Dept: MEDSURG UNIT | Age: 47
End: 2017-03-28

## 2017-03-28 NOTE — TELEPHONE ENCOUNTER
Spoke with Ms Romy Lindo who stated she is doing well today. She stated she has all ordered meds, and these were reviewed. She has scale and stated she will weigh qam and follow FR and low Na diet. Reviewed PCP and cardiology f/u appts and reinforced need for compliance. She stated she has IS and will continue to use until seen. HH called to say they will visit today or tomorrow. Reinforced reportable s/s. Opportunity given for questions. Will follow.

## 2017-03-28 NOTE — DISCHARGE SUMMARY
[facility] SUMMARY    Name:  Arturo Horvath  MR#:  [id number]  :  1970  Account #:  [de-identified]  Date of Adm:  2017  Date of Discharge:  2017      DISCHARGE DIAGNOSES  1. Acute on chronic systolic congestive heart failure exacerbation. 2. Hypertension with pulmonary hypertension. 3. Urinary tract infection. 4. Chronic mild intermittent asthma. 5. Tobacco abuse. 6. Morbid obesity. SERVICE: The patient was admitted to the hospitalist service. CONSULTS: Dr. Berta Velasquez was consulted for Cardiology. PROCEDURES: The patient underwent nuclear stress test on  2017, showing abnormal perfusion study, negative  pharmacologic stress test from EKG standpoint. Perfusion imaging  showing no obvious evidence of reversible or fixed defects. Somewhat patchy uptake is noted. LV estimated EF of 25%. HISTORY OF PRESENT ILLNESS: Please refer to admission H and  P.    Briefly, the patient is a 28-year-old female with a past history significant  for the above, who came to the emergency department complaining of  shortness of breath when she went to her outpatient PCP. She was  also noted to be hypoxic. She has been on Lasix for a few days, she  apparently gained about 19 pounds. She was sent to the emergency  department for further evaluation. Denies any chest pain. She  reported leg swelling, and felt as if her pants were getting tighter. Vital signs at presentation were stable and normal except for a pulse of  101. She is saturating well on room air, 100%. She appeared to be in no  distress. She had a regular heart, clear lungs with crackles and  decreased breath sounds bilaterally. Benign abdomen. No calf  tenderness, 3+ lower extremity edema. LABORATORY DATA: On admission included a metabolic panel,  which was within normal limits. LFTs within normal limits. First set of cardiac biomarkers negative. BN peptide of .     CBC on admission within normal limits with an hemoglobin and  hematocrit of 11.1/34.8. IMAGING: She had a chest x-ray on admission showed stable  moderate cardiomegaly. No evidence of acute pulmonary process. DIAGNOSTIC DATA: EKG shows sinus tachycardia, rate 104. On my  review, low amplitude, nothing to suggest acute ischemia or infarct. HOSPITAL COURSE BY PROBLEM  1. Acute on chronic systolic congestive heart failure exacerbation:  Echocardiogram this time around ended up showing an EF of 15%. She had severe diffuse hypokinesis. Underwent nuclear stress test as  per above. She has diuresed very well with IV Lasix, and has had  much improvement in terms of her shortness of breath and dyspnea on  exertion. She is maintained on medical therapy at the current time  consisting of aspirin, beta blocker, statin therapy. She is on Lasix 40  mg daily. Aniceto Hernandez has been fitted today. No other acute issues at the  current time. Counseled on diet and medication compliance. 2. Hypertension: With pulmonary hypertension noted on  echocardiogram. Pressures have been essentially normal. She had a  slightly low pressure yesterday, we held her Lasix for a day, but this  has rebounded. Pressure has been otherwise stable and acceptable. 4. UTI: On admission she had a urinalysis that showed moderate  leukocyte esterase, but negative nitrites, and 10-20 WBCs. She  received empiric antibiotic therapy. Urine culture ended up growing out  50,000 colonies of mixed dale. Likely contaminant. She has had no  symptomatology on my review. We stopped her antibiotics. 5. Chronic mild intermittent asthma: No acute issues. Known tobacco  abuse, counseled on cessation. 6. Morbid obesity: With a BMI of 45. Discussed dietary measures. Outpatient followup otherwise. PHYSICAL EXAMINATION: On examination today, vital signs are  stable and normal. The patient denies any fevers, chills, nausea,  vomiting, chest pain, shortness of breath, palpitations, or edema. She  ambulates around the room without any difficulty. She reports feeling  much better. LifeVest in place. She has otherwise regular heart, clear  lungs, benign abdomen. No calf tenderness. She has trace lower  extremity edema. LABORATORY DATA: Today include a metabolic panel within normal  limits. CBC within normal limits with an hemoglobin and hematocrit of  11.3/36.0, stable. DISPOSITION: The patient will be discharged home with [facility] for  home health CHF. She will be discharged with indigent medications. MEDICATIONS ON DISCHARGE  1. Coreg 3.125 mg p.o. b.i.d.  2. Sublingual nitroglycerin 0.4 mg under the tongue every 5 minutes as  needed for chest pain. 3. Lisinopril 5 mg p.o. daily - new dose. Otherwise, resume the followin. Albuterol nebulizers as needed every 4 hours for wheezing. 2. Aspirin 81 mg p.o. daily. 3. Lasix 40 mg p.o. daily. FOLLOWUP  1. With her PCP, Dr. Nanda Aldrich, in 7-10 days. 2. With [facility] in 3-4 weeks. DISCHARGE TIME: Total time of discharge greater than 30 minutes.         Eyad Lane MD PP / Oc Salazar  D:  2017   18:25  T:  2017   08:40  Job #:  [id number]

## 2017-04-03 ENCOUNTER — HOME CARE VISIT (OUTPATIENT)
Dept: SCHEDULING | Facility: HOME HEALTH | Age: 47
End: 2017-04-03

## 2017-04-03 PROCEDURE — G0299 HHS/HOSPICE OF RN EA 15 MIN: HCPCS

## 2017-04-05 ENCOUNTER — HOME CARE VISIT (OUTPATIENT)
Dept: HOME HEALTH SERVICES | Facility: HOME HEALTH | Age: 47
End: 2017-04-05

## 2017-04-07 ENCOUNTER — TELEPHONE (OUTPATIENT)
Dept: MEDSURG UNIT | Age: 47
End: 2017-04-07

## 2017-04-07 NOTE — TELEPHONE ENCOUNTER
Spoke with Ms Lety Pradhan who stated she was doing \"great. \" She is wearing LifeVest and tolerating it. She has had [facility] visit and saw PCP. She is aware of [facility] f/u on 4/17 and then sees PCP again. She is weighing and has lost >30lbs. She stated she is following all instructions, taking all meds, eating low Na diet and watching FR. She denies reportable s/s. Will follow.

## 2017-04-14 ENCOUNTER — TELEPHONE (OUTPATIENT)
Dept: CARDIAC REHAB | Age: 47
End: 2017-04-14

## 2017-04-25 ENCOUNTER — TELEPHONE (OUTPATIENT)
Dept: MEDSURG UNIT | Age: 47
End: 2017-04-25

## 2017-05-05 RX ORDER — CARVEDILOL 3.12 MG/1
3.12 TABLET ORAL 2 TIMES DAILY WITH MEALS
Qty: 60 TAB | Refills: 1 | Status: SHIPPED | OUTPATIENT
Start: 2017-05-05 | End: 2017-06-27 | Stop reason: DRUGHIGH

## 2017-05-05 RX ORDER — LISINOPRIL 5 MG/1
5 TABLET ORAL DAILY
Qty: 30 TAB | Refills: 1 | Status: SHIPPED | OUTPATIENT
Start: 2017-05-05 | End: 2017-06-27 | Stop reason: DRUGHIGH

## 2017-05-05 RX ORDER — FUROSEMIDE 40 MG/1
40 TABLET ORAL DAILY
Qty: 30 TAB | Refills: 0 | Status: SHIPPED | OUTPATIENT
Start: 2017-05-05 | End: 2017-07-11 | Stop reason: SDUPTHER

## 2017-05-05 RX ORDER — SPIRONOLACTONE 25 MG/1
25 TABLET ORAL DAILY
Qty: 30 TAB | Refills: 0 | Status: SHIPPED | OUTPATIENT
Start: 2017-05-05 | End: 2017-12-28 | Stop reason: SDUPTHER

## 2017-05-05 NOTE — TELEPHONE ENCOUNTER
Patient is establishing care with Dr. Job Jane 5/18/17, states she's out off all medications, was seen by Dr. Aye Guerin in the hospital.

## 2017-06-27 ENCOUNTER — OFFICE VISIT (OUTPATIENT)
Dept: CARDIOLOGY CLINIC | Age: 47
End: 2017-06-27

## 2017-06-27 VITALS
DIASTOLIC BLOOD PRESSURE: 88 MMHG | HEIGHT: 64 IN | OXYGEN SATURATION: 90 % | WEIGHT: 234 LBS | BODY MASS INDEX: 39.95 KG/M2 | SYSTOLIC BLOOD PRESSURE: 130 MMHG

## 2017-06-27 DIAGNOSIS — I50.9 ACUTE ON CHRONIC CONGESTIVE HEART FAILURE, UNSPECIFIED CONGESTIVE HEART FAILURE TYPE: ICD-10-CM

## 2017-06-27 DIAGNOSIS — I10 ESSENTIAL HYPERTENSION: ICD-10-CM

## 2017-06-27 DIAGNOSIS — I50.22 CHRONIC SYSTOLIC CONGESTIVE HEART FAILURE (HCC): ICD-10-CM

## 2017-06-27 DIAGNOSIS — E87.70 HYPERVOLEMIA, UNSPECIFIED HYPERVOLEMIA TYPE: Primary | ICD-10-CM

## 2017-06-27 RX ORDER — LISINOPRIL 10 MG/1
10 TABLET ORAL DAILY
Qty: 30 TAB | Refills: 6 | Status: SHIPPED | OUTPATIENT
Start: 2017-06-27 | End: 2017-12-28 | Stop reason: SDUPTHER

## 2017-06-27 RX ORDER — CARVEDILOL 25 MG/1
25 TABLET ORAL 2 TIMES DAILY WITH MEALS
COMMUNITY
End: 2017-12-28 | Stop reason: SDUPTHER

## 2017-06-27 RX ORDER — PREDNISONE 20 MG/1
TABLET ORAL
COMMUNITY
End: 2017-07-11 | Stop reason: ALTCHOICE

## 2017-06-27 RX ORDER — METHIMAZOLE 10 MG/1
TABLET ORAL DAILY
COMMUNITY
End: 2018-12-26 | Stop reason: SDUPTHER

## 2017-06-27 NOTE — MR AVS SNAPSHOT
Visit Information Date & Time Provider Department Dept. Phone Encounter #  
 6/27/2017  9:30 AM Carlos Benson NP [facility] [address] [id number] Your Appointments 7/11/2017  9:30 AM  
Follow Up with Carlos Benson NP [facility] [address] ([address]) Appt Note: 2 week  f/u HTN  
 [address] [id number] [phone] [address] [phone]  
  
    
 9/1/2017 10:20 AM  
Follow Up with Celestina Esquivel DO [facility] [place] [address] ([address]) Appt Note: [address] f/u; Cynthia Mtz saw in late June, has [address] [place] [id number] [phone] [address] Upcoming Health Maintenance Date Due Pneumococcal 19-64 Medium Risk (1 of 1 - PPSV23) 9/2/1990 DTaP/Tdap/Td series (1 - Tdap) 9/2/1992 PAP AKA CERVICAL CYTOLOGY 9/2/1992 INFLUENZA AGE 9 TO ADULT 8/1/2017 Allergies as of 6/27/2017  Review Complete On: 6/27/2017 By: Carlos Benson NP No Known Allergies Current Immunizations  Never Reviewed No immunizations on file. Not reviewed this visit You Were Diagnosed With   
  
 Codes Comments Hypervolemia, unspecified hypervolemia type    -  Primary ICD-10-CM: E87.70 ICD-9-CM: 276.69 Acute on chronic congestive heart failure, unspecified congestive heart failure type ([facility] [address]     ICD-10-CM: I50.9 ICD-9-CM: 428.0 Chronic systolic congestive heart failure (HCC)     ICD-10-CM: I50.22 ICD-9-CM: 428.22, 428.0 Essential hypertension     ICD-10-CM: I10 
ICD-9-CM: 401.9 Vitals BP Height(growth percentile) Weight(growth percentile) SpO2 BMI OB Status  130/88 (BP 1 Location: Left arm, BP Patient Position: Sitting) 5' 4\" (1.626 m) 234 lb (106.1 kg) 90% 40.17 kg/m2 Hysterectomy Smoking Status Former Smoker Vitals History BMI and BSA Data Body Mass Index Body Surface Area  
 40.17 kg/m 2 2.19 m 2 Preferred Pharmacy Pharmacy Name Phone Cait Huntley @ [address] [facility], [address] [place] [address] Kristine Jacinto [phone] Your Updated Medication List  
  
   
This list is accurate as of: 6/27/17 10:23 AM.  Always use your most recent med list.  
  
  
  
  
 albuterol 1.25 mg/3 mL Nebu Commonly known as:  Chuyita Po Take 3 mL by inhalation every four (4) hours as needed (wheezing). aspirin 81 mg chewable tablet Take 81 mg by mouth daily. atorvastatin 80 mg tablet Commonly known as:  LIPITOR Take 80 mg by mouth daily. Pt. to take at night  
  
 carvedilol 25 mg tablet Commonly known as:  Donald Halifax Take 25 mg by mouth two (2) times daily (with meals). furosemide 40 mg tablet Commonly known as:  LASIX Take 1 Tab by mouth daily. indapamide 2.5 mg tablet Commonly known as:  Gwyn Shalini Take 2.5 mg by mouth daily. To take in AM. lisinopril 5 mg tablet Commonly known as:  Dorean Peeks Take 1 Tab by mouth daily. methIMAzole 10 mg tablet Commonly known as:  TAPAZOLE Take  by mouth daily. nitroglycerin 0.4 mg SL tablet Commonly known as:  NITROSTAT  
1 Tab by SubLINGual route every five (5) minutes as needed for Chest Pain. predniSONE 20 mg tablet Commonly known as:  Demetris Rosemarie Take  by mouth daily (with breakfast). spironolactone 25 mg tablet Commonly known as:  ALDACTONE Take 1 Tab by mouth daily. We Performed the Following AMB POC EKG ROUTINE W/ 12 LEADS, INTER & REP [78972 CPT(R)] To-Do List   
 07/27/2017 ECHO:  2D ECHO COMPLETE ADULT (TTE) W OR WO CONTR Patient Instructions Increase lisinopril to 10mg once a day.   Take 2 of the 5mg tablets once a day until you run out and then begin using the 10mg tablets and take 1 tablet daily Follow-up with Krupa Smith in 2 weeks Follow-up with Dr. Yumiko Arce as scheduled Echocardiogram to be done in July Weigh daily and record Limit sodium intake to 2000mg per day Limit fluid intake to no more than  6, eight ounce glasses of any type of fluids per day (48 ounces per day) Call if you notice sudden or progressive weight gain (3-5 pounds in 2-3 days), increasing shortness of breath, abdominal bloating, increasing lower extremity edema, inability to lie flat or on your normal number of pillows, having to sit up to catch your breath, fatigue, increased somnolence (sleeping more), poor appetite Heart Failure: Care Instructions Your Care Instructions Heart failure occurs when your heart does not pump as much blood as the body needs. Failure does not mean that the heart has stopped pumping but rather that it is not pumping as well as it should. Over time, this causes fluid buildup in your lungs and other parts of your body. Fluid buildup can cause shortness of breath, fatigue, swollen ankles, and other problems. By taking medicines regularly, reducing sodium (salt) in your diet, checking your weight every day, and making lifestyle changes, you can feel better and live longer. Follow-up care is a key part of your treatment and safety. Be sure to make and go to all appointments, and call your doctor if you are having problems. It's also a good idea to know your test results and keep a list of the medicines you take. How can you care for yourself at home? Medicines · Be safe with medicines. Take your medicines exactly as prescribed. Call your doctor if you think you are having a problem with your medicine.  
· Do not take any vitamins, over-the-counter medicine, or herbal products without talking to your doctor first. Ellen Dun not take ibuprofen (Advil or Motrin) and naproxen (Aleve) without talking to your doctor first. They could make your heart failure worse. · You may be taking some of the following medicine. ¨ Beta-blockers can slow heart rate, decrease blood pressure, and improve your condition. Taking a beta-blocker may lower your chance of needing to be hospitalized. ¨ Angiotensin-converting enzyme inhibitors (ACEIs) reduce the heart's workload, lower blood pressure, and reduce swelling. Taking an ACEI may lower your chance of needing to be hospitalized again. ¨ Angiotensin II receptor blockers (ARBs) work like ACEIs. Your doctor may prescribe them instead of ACEIs. ¨ Diuretics, also called water pills, reduce swelling. ¨ Potassium supplements replace this important mineral, which is sometimes lost with diuretics. ¨ Aspirin and other blood thinners prevent blood clots, which can cause a stroke or heart attack. You will get more details on the specific medicines your doctor prescribes. Diet · Your doctor may suggest that you limit sodium to 2,000 milligrams (mg) a day or less. That is less than 1 teaspoon of salt a day, including all the salt you eat in cooking or in packaged foods. People get most of their sodium from processed foods. Fast food and restaurant meals also tend to be very high in sodium. · Ask your doctor how much liquid you can drink each day. You may have to limit liquids. Weight · Weigh yourself without clothing at the same time each day. Record your weight. Call your doctor if you have a sudden weight gain, such as more than 2 to 3 pounds in a day or 5 pounds in a week. (Your doctor may suggest a different range of weight gain.) A sudden weight gain may mean that your heart failure is getting worse. Activity level · Start light exercise (if your doctor says it is okay). Even if you can only do a small amount, exercise will help you get stronger, have more energy, and manage your weight and your stress.  Walking is an easy way to get exercise. Start out by walking a little more than you did before. Bit by bit, increase the amount you walk. · When you exercise, watch for signs that your heart is working too hard. You are pushing yourself too hard if you cannot talk while you are exercising. If you become short of breath or dizzy or have chest pain, stop, sit down, and rest. 
· If you feel \"wiped out\" the day after you exercise, walk slower or for a shorter distance until you can work up to a better pace. · Get enough rest at night. Sleeping with 1 or 2 pillows under your upper body and head may help you breathe easier. Lifestyle changes · Do not smoke. Smoking can make a heart condition worse. If you need help quitting, talk to your doctor about stop-smoking programs and medicines. These can increase your chances of quitting for good. Quitting smoking may be the most important step you can take to protect your heart. · Limit alcohol to 2 drinks a day for men and 1 drink a day for women. Too much alcohol can cause health problems. · Avoid getting sick from colds and the flu. Get a pneumococcal vaccine shot. If you have had one before, ask your doctor whether you need another dose. Get a flu shot each year. If you must be around people with colds or the flu, wash your hands often. When should you call for help? Call 911 if you have symptoms of sudden heart failure such as: 
· You have severe trouble breathing. · You cough up pink, foamy mucus. · You have a new irregular or rapid heartbeat. Call your doctor now or seek immediate medical care if: 
· You have new or increased shortness of breath. · You are dizzy or lightheaded, or you feel like you may faint. · You have sudden weight gain, such as more than 2 to 3 pounds in a day or 5 pounds in a week. (Your doctor may suggest a different range of weight gain.) · You have increased swelling in your legs, ankles, or feet. · You are suddenly so tired or weak that you cannot do your usual activities. Watch closely for changes in your health, and be sure to contact your doctor if: 
· You develop new symptoms. Where can you learn more? Go to http://star-jean.info/. Enter R369 in the search box to learn more about \"Heart Failure: Care Instructions. \" Current as of: April 3, 2017 Content Version: 11.3 © 4406-3645 PenPath. Care instructions adapted under license by [employer]ic[employer] (which disclaims liability or warranty for this information). If you have questions about a medical condition or this instruction, always ask your healthcare professional. [place] [address] any warranty or liability for your use of this information. Limiting Sodium and Fluids With Heart Failure: Care Instructions Your Care Instructions Sodium causes your body to hold on to extra water. This may cause your heart failure symptoms to get worse. Limiting sodium may help you feel better and lower your risk of having to go to the hospital. 
People get most of their sodium from processed foods. Fast food and restaurant meals also tend to be very high in sodium. Your doctor may suggest that you limit sodium to 2,000 milligrams (mg) a day or less. That is less than 1 teaspoon of salt a day, including all the salt you eat in cooked or packaged foods. Usually, you have to limit the amount of liquids you drink only if your heart failure is severe. Limiting sodium alone often is enough to help your body get rid of extra fluids. However, your doctor may tell you to limit your fluid intake to a set amount each day. Follow-up care is a key part of your treatment and safety. Be sure to make and go to all appointments, and call your doctor if you are having problems. It's also a good idea to know your test results and keep a list of the medicines you take. How can you care for yourself at home? Read food labels · Read food labels on cans and food packages. The labels tell you how much sodium is in each serving. Make sure that you look at the serving size. If you eat more than the serving size, you have eaten more sodium than is listed for one serving. · Food labels also tell you the Percent Daily Value. If the Percent Daily Value says 50%, it means that you will get at least 50% of all the sodium you need for the entire day in one serving. Choose products with low Percent Daily Values for sodium. · Be aware that sodium can come in forms other than salt, including monosodium glutamate (MSG), sodium citrate, and sodium bicarbonate (baking soda). MSG is often added to Asian food. You can sometimes ask for food without MSG or salt. Buy low-sodium foods · Buy foods that are labeled \"unsalted\" (no salt added), \"sodium-free\" (less than 5 mg of sodium per serving), or \"low-sodium\" (less than 140 mg of sodium per serving). A food labeled \"light sodium\" has less than half of the full-sodium version of that food. Foods labeled \"reduced-sodium\" may still have too much sodium. · Buy fresh vegetables or plain, frozen vegetables. Buy low-sodium versions of canned vegetables, soups, and other canned goods. Prepare low-sodium meals · Use less salt each day when cooking. Reducing salt in this way will help you adjust to the taste. Do not add salt after cooking. Take the salt shaker off the table. · Flavor your food with garlic, lemon juice, onion, vinegar, herbs, and spices instead of salt. Do not use soy sauce, steak sauce, onion salt, garlic salt, mustard, or ketchup on your food. · Make your own salad dressings, sauces, and ketchup without adding salt. · Use less salt (or none) when recipes call for it. You can often use half the salt a recipe calls for without losing flavor. Other dishes like rice, pasta, and grains do not need added salt. · Rinse canned vegetables. This removes somebut not allof the salt. · Avoid water that has a naturally high sodium content or that has been treated with water softeners, which add sodium. Call your local water company to find out the sodium content of your water supply. If you buy bottled water, read the label and choose a sodium-free brand. Avoid high-sodium foods, such as: 
· Smoked, cured, salted, and canned meat, fish, and poultry. · Ham, hill, hot dogs, and luncheon meats. · Regular, hard, and processed cheese and regular peanut butter. · Crackers with salted tops. · Frozen prepared meals. · Canned and dried soups, broths, and bouillon, unless labeled sodium-free or low-sodium. · Canned vegetables, unless labeled sodium-free or low-sodium. · Salted snack foods such as chips and pretzels. · Western Olivia fries, pizza, tacos, and other fast foods. · Pickles, olives, ketchup, and other condiments, especially soy sauce, unless labeled sodium-free or low-sodium. If you cannot cook for yourself · Have family members or friends help you, or have someone cook low-sodium meals. · Check with your local senior nutrition program to find out where meals are served and whether they offer a low-sodium option. You can often find these programs through your local health department or hospital. 
· Have meals delivered to your home. Most [facility] have a Meals on OmniVec. These programs provide one hot meal a day for older adults, delivered to their homes. Ask whether these meals are low-sodium. Let them know that you are on a low-sodium diet. Limiting fluid intake · Find a method that works for you. You might simply write down how much you drink every time you do. Some people keep a container filled with the amount of fluid allowed for that day. If they drink from a source other than the container, then they pour out that amount.  
· Measure your regular drinking glasses to find out how much fluid each one holds. Once you know this, you will not have to measure every time. · Besides water, milk, juices, and other drinks, some foods have a lot of fluid. Count any foods that will melt (such as ice cream or gelatin dessert) or liquid foods (such as soup) as part of your fluid intake for the day. Where can you learn more? Go to http://star-jean.info/. Enter A166 in the search box to learn more about \"Limiting Sodium and Fluids With Heart Failure: Care Instructions. \" Current as of: November 15, 2016 Content Version: 11.3 © 5316-9064 PayrollHero. Care instructions adapted under license by Discourse (which disclaims liability or warranty for this information). If you have questions about a medical condition or this instruction, always ask your healthcare professional. [address] any warranty or liability for your use of this information. Low Sodium Diet (2,000 Milligram): Care Instructions Your Care Instructions Too much sodium causes your body to hold on to extra water. This can raise your blood pressure and force your heart and kidneys to work harder. In very serious cases, this could cause you to be put in the hospital. It might even be life-threatening. By limiting sodium, you will feel better and lower your risk of serious problems. The most common source of sodium is salt. People get most of the salt in their diet from canned, prepared, and packaged foods. Fast food and restaurant meals also are very high in sodium. Your doctor will probably limit your sodium to less than 2,000 milligrams (mg) a day. This limit counts all the sodium in prepared and packaged foods and any salt you add to your food. Follow-up care is a key part of your treatment and safety. Be sure to make and go to all appointments, and call your doctor if you are having problems.  It's also a good idea to know your test results and keep a list of the medicines you take. How can you care for yourself at home? Read food labels · Read labels on cans and food packages. The labels tell you how much sodium is in each serving. Make sure that you look at the serving size. If you eat more than the serving size, you have eaten more sodium. · Food labels also tell you the Percent Daily Value for sodium. Choose products with low Percent Daily Values for sodium. · Be aware that sodium can come in forms other than salt, including monosodium glutamate (MSG), sodium citrate, and sodium bicarbonate (baking soda). MSG is often added to Asian food. When you eat out, you can sometimes ask for food without MSG or added salt. Buy low-sodium foods · Buy foods that are labeled \"unsalted\" (no salt added), \"sodium-free\" (less than 5 mg of sodium per serving), or \"low-sodium\" (less than 140 mg of sodium per serving). Foods labeled \"reduced-sodium\" and \"light sodium\" may still have too much sodium. Be sure to read the label to see how much sodium you are getting. · Buy fresh vegetables, or frozen vegetables without added sauces. Buy low-sodium versions of canned vegetables, soups, and other canned goods. Prepare low-sodium meals · Cut back on the amount of salt you use in cooking. This will help you adjust to the taste. Do not add salt after cooking. One teaspoon of salt has about 2,300 mg of sodium. · Take the salt shaker off the table. · Flavor your food with garlic, lemon juice, onion, vinegar, herbs, and spices. Do not use soy sauce, lite soy sauce, steak sauce, onion salt, garlic salt, celery salt, mustard, or ketchup on your food. · Use low-sodium salad dressings, sauces, and ketchup. Or make your own salad dressings and sauces without adding salt. · Use less salt (or none) when recipes call for it. You can often use half the salt a recipe calls for without losing flavor. Other foods such as rice, pasta, and grains do not need added salt. · Rinse canned vegetables, and cook them in fresh water. This removes somebut not allof the salt. · Avoid water that is naturally high in sodium or that has been treated with water softeners, which add sodium. Call your local water company to find out the sodium content of your water supply. If you buy bottled water, read the label and choose a sodium-free brand. Avoid high-sodium foods · Avoid eating: ¨ Smoked, cured, salted, and canned meat, fish, and poultry. ¨ Ham, hill, hot dogs, and luncheon meats. ¨ Regular, hard, and processed cheese and regular peanut butter. ¨ Crackers with salted tops, and other salted snack foods such as pretzels, chips, and salted popcorn. ¨ Frozen prepared meals, unless labeled low-sodium. ¨ Canned and dried soups, broths, and bouillon, unless labeled sodium-free or low-sodium. ¨ Canned vegetables, unless labeled sodium-free or low-sodium. ¨ Western Olivia fries, pizza, tacos, and other fast foods. ¨ Pickles, olives, ketchup, and other condiments, especially soy sauce, unless labeled sodium-free or low-sodium. Where can you learn more? Go to http://star-jean.info/. Enter O608 in the search box to learn more about \"Low Sodium Diet (2,000 Milligram): Care Instructions. \" Current as of: July 26, 2016 Content Version: 11.3 © 6959-1173 NudgeRx. Care instructions adapted under license by Pendo Systems (which disclaims liability or warranty for this information). If you have questions about a medical condition or this instruction, always ask your healthcare professional. [place] [address] any warranty or liability for your use of this information. Introducing [facility]! [facility] introduces MedTel24 patient portal. Now you can access parts of your medical record, email your doctor's office, and request medication refills online.    
 
1. In your internet browser, go to [email or web address] 2. Click on the First Time User? Click Here link in the Sign In box. You will see the New Member Sign Up page. 3. Enter your We Access Code exactly as it appears below. You will not need to use this code after youve completed the sign-up process. If you do not sign up before the expiration date, you must request a new code. · We Access Code: [email or web address] Expires: 9/25/2017 10:05 AM 
 
4. Enter the last four digits of your Social Security Number (xxxx) and Date of Birth (mm/dd/yyyy) as indicated and click Submit. You will be taken to the next sign-up page. 5. Create a Caliper Life Sciencest ID. This will be your We login ID and cannot be changed, so think of one that is secure and easy to remember. 6. Create a We password. You can change your password at any time. 7. Enter your Password Reset Question and Answer. This can be used at a later time if you forget your password. 8. Enter your e-mail address. You will receive e-mail notification when new information is available in [address] 9. Click Sign Up. You can now view and download portions of your medical record. 10. Click the Download Summary menu link to download a portable copy of your medical information. If you have questions, please visit the Frequently Asked Questions section of the We website. Remember, We is NOT to be used for urgent needs. For medical emergencies, dial 911. Now available from your iPhone and Android! Please provide this summary of care documentation to your next provider. Your primary care clinician is listed as Jasminaaung Kim. If you have any questions after today's visit, please call [phone].

## 2017-06-27 NOTE — PROGRESS NOTES
1. Have you been to the ER, urgent care clinic since your last visit? Hospitalized since your last visit? Yes, [facility] on March 21, 2017 with complaints of Fluid overload. 2. Have you seen or consulted any other health care providers outside of the [address] [place] [address] since your last visit? Include any pap smears or colon screening.  No    Verbal order and read back per Arnold Perez NP

## 2017-06-27 NOTE — PROGRESS NOTES
Stan Epperson presents today for an overdue post-hospital follow-up. She was supposed to come in for follow-up in April but due to financial constraints, she did not come in for that appointment. She was hospitalized from 3/21/17 through 3/27/17 for acute on chronic systolic heart failure. She presented to the hospital after being seen at her PCP's office for complaints of shortness of breath. She was noted to be hypoxic at the office and she had reportedly gained 19 pounds. She was sent to the ER for further evaluation and treatment. Her NT pro-BNP was 1975. Cardiac enzymes were negative and were serial troponins. An echocardiogram done during her hospitalization showed an ejection fraction of 15%, severe diffuse hypokinesis, and RVSP 44 mmHg. She also had a pharmacologic nuclear stress test done which showed abnormal perfusion imaging. There is no evidence of reversible or fixed defects. Ejection fraction estimated at 25%. Prior to discharge home from the hospital, she was fitted with a LifeVest.    She is a 40year old female with history of chronic systolic heart failure, hypertension, pulmonary hypertension, chronic mild intermittent asthma, tobacco abuse, and obesity. She states that since being discharged home, she has been feeling \"pretty well. \"  She reports that she has modified her diet and has not smoked or had any alcohol since prior to admission to hospital.  She reports that she has been wearing the LifeVest continuously. She denies chest pain, tightness, heaviness, and admits to occasional \"flutters\" in her chest.  She denies shortness of breath at rest, admits to dyspnea on exertion, denies orthopnea and PND. She denies abdominal bloating. She denies lightheadedness, dizziness, and syncope. She denies lower extremity edema and claudication. Denies nausea, vomiting, diarrhea, fever, chills.        PMH:  Past Medical History:   Diagnosis Date    Asthma     Heart failure ([facility] [place] [address]     Hypertension        PSH:  Past Surgical History:   Procedure Laterality Date    BREAST SURGERY PROCEDURE UNLISTED      redfuction    HX GYN      hysterectomy  btl    HX MYOMECTOMY       fibroid tumo removed    HX ORTHOPAEDIC  March 2012 ORIF left fibula       MEDS:  Current Outpatient Prescriptions   Medication Sig    predniSONE (DELTASONE) 20 mg tablet Take  by mouth daily (with breakfast).  methIMAzole (TAPAZOLE) 10 mg tablet Take  by mouth daily.  carvedilol (COREG) 25 mg tablet Take 25 mg by mouth two (2) times daily (with meals).  furosemide (LASIX) 40 mg tablet Take 1 Tab by mouth daily.  lisinopril (PRINIVIL, ZESTRIL) 5 mg tablet Take 1 Tab by mouth daily.  spironolactone (ALDACTONE) 25 mg tablet Take 1 Tab by mouth daily.  atorvastatin (LIPITOR) 80 mg tablet Take 80 mg by mouth daily. Pt. to take at night    indapamide (LOZOL) 2.5 mg tablet Take 2.5 mg by mouth daily. To take in AM.    nitroglycerin (NITROSTAT) 0.4 mg SL tablet 1 Tab by SubLINGual route every five (5) minutes as needed for Chest Pain.  albuterol (ACCUNEB) 1.25 mg/3 mL nebu Take 3 mL by inhalation every four (4) hours as needed (wheezing).  aspirin 81 mg chewable tablet Take 81 mg by mouth daily. No current facility-administered medications for this visit. Allergies and Sensitivities:  No Known Allergies    Family History:  No family history on file. Social History:  She  reports that she quit smoking about 3 months ago. She smoked 0.00 packs per day for 18.00 years. She does not have any smokeless tobacco history on file. She  reports that she drinks alcohol.       Physical:  Visit Vitals    /88 (BP 1 Location: Left arm, BP Patient Position: Sitting)    Ht 5' 4\" (1.626 m)    Wt 106.1 kg (234 lb)    SpO2 90%    BMI 40.17 kg/m2       She is down 17 pounds from her last hospital weight of 251 lbs on 3/21/17    Exam:  Neck:  Supple, no JVD, no carotid bruits  CV:  Normal S1 and  S2, no murmurs, rubs, or gallops noted  Lungs:  Clear to ausculation throughout, no wheezes or rales  Abd:  Soft, non-tender, non-distended with good bowel sounds. No hepatosplenomegaly  Extremities:  No edema      Data:  EKG:       Normal sinus rhythm, rate 85.  1 degree AVB.  Diffuse non-specific ST-T changes         LABS:  Lab Results   Component Value Date/Time    Sodium 138 03/27/2017 03:30 AM    Potassium 4.1 03/27/2017 03:30 AM    Chloride 101 03/27/2017 03:30 AM    CO2 32 03/27/2017 03:30 AM    Glucose 102 03/27/2017 03:30 AM    BUN 13 03/27/2017 03:30 AM    Creatinine 1.05 03/27/2017 03:30 AM     Lab Results   Component Value Date/Time    Cholesterol, total 81 03/21/2017 03:24 PM    HDL Cholesterol 27 03/21/2017 03:24 PM    LDL, calculated 42 03/21/2017 03:24 PM    Triglyceride 60 03/21/2017 03:24 PM    CHOL/HDL Ratio 3.0 03/21/2017 03:24 PM     Lab Results   Component Value Date/Time    ALT (SGPT) 27 03/06/2017 07:30 PM         Impression / Plan:  1. Chronic systolic heart failure, appears compensated  2. Hypertension, blood pressure slightly elevated  3. Pulmonary hypertension, RVSP 44 mmHg  4. Chronic, intermittent asthma  5. Severe LV dysfunction, EF 15% (by echo March 2017)    Ms. Baron Brooke was seen today for an overdue post-hospital follow-up. She was supposed to come in for follow-up in April but due to financial constraints, she did not come in for that appointment. She was hospitalized from 3/21/17 through 3/27/17 for acute on chronic systolic heart failure. An echocardiogram done during her hospitalization showed an ejection fraction of 15%, severe diffuse hypokinesis, and RVSP 44 mmHg. She also had a pharmacologic nuclear stress test done which showed abnormal perfusion imaging. There is no evidence of reversible or fixed defects. Ejection fraction estimated at 25%.   Prior to discharge home from the hospital, she was fitted with a LifeVest.    She states that she has seen her PCP, Dr. Phillip aRmesh Lili Shore, and that her Coreg was recently adjusted by him. She reports that her carvedilol was increased to 25mg BID last week. She reports compliance with wearing her LifeVest, modifying her diet, and taking her medications. She became tearful when she told me that she did not realize how severe her heart issues were until after she was discharged home. We had a long discussion regarding the importance of compliance with taking her medications, eating properly, and follow-up. She is aware that more than likely, her current medication regimen will be life long. We also had a discussion regarding the possibility that she may require an implantable defibrillator if her EF does not improve after being on maximal medical therapy for 3 months. She understands the purpose and function of the LifeVest as this was discussed with her again today. Will request that she have an echocardiogram done in late July to re-evaluate her EF. Her blood pressure today is slightly elevated. Will increase her lisinopril to 10mg daily and she will return for follow-up in 2 weeks with me. Her breath sounds are clear and she does not exhibit any signs of volume overload. Overall, she states that she has been feeling well. She reports that she will be seeing her PCP in the near future for follow-up and labs. Congestive heart failure teaching done today. Advised to limit sodium intake to no more than 2000mg per day and to also watch fluid intake. Advised to weigh daily every morning and record weights. Instructed to call our office if progressive weight gain is noted over a 2 to 3 day period of time, shortness of breath increases, or if abdominal bloating, nausea, fatigue, or increased lower extremity edema is noted. Patient education material re:  CHF, sodium and fluid restrictions, and low sodium diet attached to her after visit summary.   Greater than 50% of a 45 minute visit was spent counseling, providing reassurance, and answering questions. She has been scheduled to follow-up with Dr. Anatoliy Cordoba in early September.       Jennifer Marin MSN, FNP-BC

## 2017-06-27 NOTE — PATIENT INSTRUCTIONS
Increase lisinopril to 10mg once a day. Take 2 of the 5mg tablets once a day until you run out and then begin using the 10mg tablets and take 1 tablet daily  Follow-up with Cynthia Mtz in 2 weeks  Follow-up with  [facility] as scheduled  Echocardiogram to be done in July  Weigh daily and record  Limit sodium intake to 2000mg per day  Limit fluid intake to no more than  6, eight ounce glasses of any type of fluids per day (48 ounces per day)  Call if you notice sudden or progressive weight gain (3-5 pounds in 2-3 days), increasing shortness of breath, abdominal bloating, increasing lower extremity edema, inability to lie flat or on your normal number of pillows, having to sit up to catch your breath, fatigue, increased somnolence (sleeping more), poor appetite       Heart Failure: Care Instructions  Your Care Instructions    Heart failure occurs when your heart does not pump as much blood as the body needs. Failure does not mean that the heart has stopped pumping but rather that it is not pumping as well as it should. Over time, this causes fluid buildup in your lungs and other parts of your body. Fluid buildup can cause shortness of breath, fatigue, swollen ankles, and other problems. By taking medicines regularly, reducing sodium (salt) in your diet, checking your weight every day, and making lifestyle changes, you can feel better and live longer. Follow-up care is a key part of your treatment and safety. Be sure to make and go to all appointments, and call your doctor if you are having problems. It's also a good idea to know your test results and keep a list of the medicines you take. How can you care for yourself at home? Medicines  · Be safe with medicines. Take your medicines exactly as prescribed. Call your doctor if you think you are having a problem with your medicine.   · Do not take any vitamins, over-the-counter medicine, or herbal products without talking to your doctor first. Anaid Can not take ibuprofen (Advil or Motrin) and naproxen (Aleve) without talking to your doctor first. They could make your heart failure worse. · You may be taking some of the following medicine. ¨ Beta-blockers can slow heart rate, decrease blood pressure, and improve your condition. Taking a beta-blocker may lower your chance of needing to be hospitalized. ¨ Angiotensin-converting enzyme inhibitors (ACEIs) reduce the heart's workload, lower blood pressure, and reduce swelling. Taking an ACEI may lower your chance of needing to be hospitalized again. ¨ Angiotensin II receptor blockers (ARBs) work like ACEIs. Your doctor may prescribe them instead of ACEIs. ¨ Diuretics, also called water pills, reduce swelling. ¨ Potassium supplements replace this important mineral, which is sometimes lost with diuretics. ¨ Aspirin and other blood thinners prevent blood clots, which can cause a stroke or heart attack. You will get more details on the specific medicines your doctor prescribes. Diet  · Your doctor may suggest that you limit sodium to 2,000 milligrams (mg) a day or less. That is less than 1 teaspoon of salt a day, including all the salt you eat in cooking or in packaged foods. People get most of their sodium from processed foods. Fast food and restaurant meals also tend to be very high in sodium. · Ask your doctor how much liquid you can drink each day. You may have to limit liquids. Weight  · Weigh yourself without clothing at the same time each day. Record your weight. Call your doctor if you have a sudden weight gain, such as more than 2 to 3 pounds in a day or 5 pounds in a week. (Your doctor may suggest a different range of weight gain.) A sudden weight gain may mean that your heart failure is getting worse. Activity level  · Start light exercise (if your doctor says it is okay). Even if you can only do a small amount, exercise will help you get stronger, have more energy, and manage your weight and your stress.  Walking is an easy way to get exercise. Start out by walking a little more than you did before. Bit by bit, increase the amount you walk. · When you exercise, watch for signs that your heart is working too hard. You are pushing yourself too hard if you cannot talk while you are exercising. If you become short of breath or dizzy or have chest pain, stop, sit down, and rest.  · If you feel \"wiped out\" the day after you exercise, walk slower or for a shorter distance until you can work up to a better pace. · Get enough rest at night. Sleeping with 1 or 2 pillows under your upper body and head may help you breathe easier. Lifestyle changes  · Do not smoke. Smoking can make a heart condition worse. If you need help quitting, talk to your doctor about stop-smoking programs and medicines. These can increase your chances of quitting for good. Quitting smoking may be the most important step you can take to protect your heart. · Limit alcohol to 2 drinks a day for men and 1 drink a day for women. Too much alcohol can cause health problems. · Avoid getting sick from colds and the flu. Get a pneumococcal vaccine shot. If you have had one before, ask your doctor whether you need another dose. Get a flu shot each year. If you must be around people with colds or the flu, wash your hands often. When should you call for help? Call 911 if you have symptoms of sudden heart failure such as:  · You have severe trouble breathing. · You cough up pink, foamy mucus. · You have a new irregular or rapid heartbeat. Call your doctor now or seek immediate medical care if:  · You have new or increased shortness of breath. · You are dizzy or lightheaded, or you feel like you may faint. · You have sudden weight gain, such as more than 2 to 3 pounds in a day or 5 pounds in a week. (Your doctor may suggest a different range of weight gain.)  · You have increased swelling in your legs, ankles, or feet.   · You are suddenly so tired or weak that you cannot do your usual activities. Watch closely for changes in your health, and be sure to contact your doctor if:  · You develop new symptoms. Where can you learn more? Go to http://star-ejan.info/. Enter A512 in the search box to learn more about \"Heart Failure: Care Instructions. \"  Current as of: April 3, 2017  Content Version: 11.3  © 0883-1086 [employer]. Care instructions adapted under license by [employer] (which disclaims liability or warranty for this information). If you have questions about a medical condition or this instruction, always ask your healthcare professional. [place] [address] any warranty or liability for your use of this information. Limiting Sodium and Fluids With Heart Failure: Care Instructions  Your Care Instructions  Sodium causes your body to hold on to extra water. This may cause your heart failure symptoms to get worse. Limiting sodium may help you feel better and lower your risk of having to go to the hospital.  People get most of their sodium from processed foods. Fast food and restaurant meals also tend to be very high in sodium. Your doctor may suggest that you limit sodium to 2,000 milligrams (mg) a day or less. That is less than 1 teaspoon of salt a day, including all the salt you eat in cooked or packaged foods. Usually, you have to limit the amount of liquids you drink only if your heart failure is severe. Limiting sodium alone often is enough to help your body get rid of extra fluids. However, your doctor may tell you to limit your fluid intake to a set amount each day. Follow-up care is a key part of your treatment and safety. Be sure to make and go to all appointments, and call your doctor if you are having problems. It's also a good idea to know your test results and keep a list of the medicines you take. How can you care for yourself at home?   Read food labels  · Read food labels on cans and food packages. The labels tell you how much sodium is in each serving. Make sure that you look at the serving size. If you eat more than the serving size, you have eaten more sodium than is listed for one serving. · Food labels also tell you the Percent Daily Value. If the Percent Daily Value says 50%, it means that you will get at least 50% of all the sodium you need for the entire day in one serving. Choose products with low Percent Daily Values for sodium. · Be aware that sodium can come in forms other than salt, including monosodium glutamate (MSG), sodium citrate, and sodium bicarbonate (baking soda). MSG is often added to Asian food. You can sometimes ask for food without MSG or salt. Buy low-sodium foods  · Buy foods that are labeled \"unsalted\" (no salt added), \"sodium-free\" (less than 5 mg of sodium per serving), or \"low-sodium\" (less than 140 mg of sodium per serving). A food labeled \"light sodium\" has less than half of the full-sodium version of that food. Foods labeled \"reduced-sodium\" may still have too much sodium. · Buy fresh vegetables or plain, frozen vegetables. Buy low-sodium versions of canned vegetables, soups, and other canned goods. Prepare low-sodium meals  · Use less salt each day when cooking. Reducing salt in this way will help you adjust to the taste. Do not add salt after cooking. Take the salt shaker off the table. · Flavor your food with garlic, lemon juice, onion, vinegar, herbs, and spices instead of salt. Do not use soy sauce, steak sauce, onion salt, garlic salt, mustard, or ketchup on your food. · Make your own salad dressings, sauces, and ketchup without adding salt. · Use less salt (or none) when recipes call for it. You can often use half the salt a recipe calls for without losing flavor. Other dishes like rice, pasta, and grains do not need added salt. · Rinse canned vegetables. This removes some--but not all--of the salt.   · Avoid water that has a naturally high sodium content or that has been treated with water softeners, which add sodium. Call your local water company to find out the sodium content of your water supply. If you buy bottled water, read the label and choose a sodium-free brand. Avoid high-sodium foods, such as:  · Smoked, cured, salted, and canned meat, fish, and poultry. · Ham, hill, hot dogs, and luncheon meats. · Regular, hard, and processed cheese and regular peanut butter. · Crackers with salted tops. · Frozen prepared meals. · Canned and dried soups, broths, and bouillon, unless labeled sodium-free or low-sodium. · Canned vegetables, unless labeled sodium-free or low-sodium. · Salted snack foods such as chips and pretzels. · [place] fries, pizza, tacos, and other fast foods. · Pickles, olives, ketchup, and other condiments, especially soy sauce, unless labeled sodium-free or low-sodium. If you cannot cook for yourself  · Have family members or friends help you, or have someone cook low-sodium meals. · Check with your local senior nutrition program to find out where meals are served and whether they offer a low-sodium option. You can often find these programs through your local health department or hospital.  · Have meals delivered to your home. Most [facility] have a Meals on [employer] [place]. These programs provide one hot meal a day for older adults, delivered to their homes. Ask whether these meals are low-sodium. Let them know that you are on a low-sodium diet. Limiting fluid intake  · Find a method that works for you. You might simply write down how much you drink every time you do. Some people keep a container filled with the amount of fluid allowed for that day. If they drink from a source other than the container, then they pour out that amount. · Measure your regular drinking glasses to find out how much fluid each one holds. Once you know this, you will not have to measure every time.   · Besides water, milk, juices, and other drinks, some foods have a lot of fluid. Count any foods that will melt (such as ice cream or gelatin dessert) or liquid foods (such as soup) as part of your fluid intake for the day. Where can you learn more? Go to http://star-jean.info/. Enter A166 in the search box to learn more about \"Limiting Sodium and Fluids With Heart Failure: Care Instructions. \"  Current as of: November 15, 2016  Content Version: 11.3  © 0562-5071 [employer]. Care instructions adapted under license by [employer] (which disclaims liability or warranty for this information). If you have questions about a medical condition or this instruction, always ask your healthcare professional. [place] [address] any warranty or liability for your use of this information. Low Sodium Diet (2,000 Milligram): Care Instructions  Your Care Instructions  Too much sodium causes your body to hold on to extra water. This can raise your blood pressure and force your heart and kidneys to work harder. In very serious cases, this could cause you to be put in the hospital. It might even be life-threatening. By limiting sodium, you will feel better and lower your risk of serious problems. The most common source of sodium is salt. People get most of the salt in their diet from canned, prepared, and packaged foods. Fast food and restaurant meals also are very high in sodium. Your doctor will probably limit your sodium to less than 2,000 milligrams (mg) a day. This limit counts all the sodium in prepared and packaged foods and any salt you add to your food. Follow-up care is a key part of your treatment and safety. Be sure to make and go to all appointments, and call your doctor if you are having problems. It's also a good idea to know your test results and keep a list of the medicines you take. How can you care for yourself at home? Read food labels  · Read labels on cans and food packages.  The labels tell you how much sodium is in each serving. Make sure that you look at the serving size. If you eat more than the serving size, you have eaten more sodium. · Food labels also tell you the Percent Daily Value for sodium. Choose products with low Percent Daily Values for sodium. · Be aware that sodium can come in forms other than salt, including monosodium glutamate (MSG), sodium citrate, and sodium bicarbonate (baking soda). MSG is often added to Asian food. When you eat out, you can sometimes ask for food without MSG or added salt. Buy low-sodium foods  · Buy foods that are labeled \"unsalted\" (no salt added), \"sodium-free\" (less than 5 mg of sodium per serving), or \"low-sodium\" (less than 140 mg of sodium per serving). Foods labeled \"reduced-sodium\" and \"light sodium\" may still have too much sodium. Be sure to read the label to see how much sodium you are getting. · Buy fresh vegetables, or frozen vegetables without added sauces. Buy low-sodium versions of canned vegetables, soups, and other canned goods. Prepare low-sodium meals  · Cut back on the amount of salt you use in cooking. This will help you adjust to the taste. Do not add salt after cooking. One teaspoon of salt has about 2,300 mg of sodium. · Take the salt shaker off the table. · Flavor your food with garlic, lemon juice, onion, vinegar, herbs, and spices. Do not use soy sauce, lite soy sauce, steak sauce, onion salt, garlic salt, celery salt, mustard, or ketchup on your food. · Use low-sodium salad dressings, sauces, and ketchup. Or make your own salad dressings and sauces without adding salt. · Use less salt (or none) when recipes call for it. You can often use half the salt a recipe calls for without losing flavor. Other foods such as rice, pasta, and grains do not need added salt. · Rinse canned vegetables, and cook them in fresh water. This removes some--but not all--of the salt.   · Avoid water that is naturally high in sodium or that has been treated with water softeners, which add sodium. Call your local water company to find out the sodium content of your water supply. If you buy bottled water, read the label and choose a sodium-free brand. Avoid high-sodium foods  · Avoid eating:  ¨ Smoked, cured, salted, and canned meat, fish, and poultry. ¨ Ham, hill, hot dogs, and luncheon meats. ¨ Regular, hard, and processed cheese and regular peanut butter. ¨ Crackers with salted tops, and other salted snack foods such as pretzels, chips, and salted popcorn. ¨ Frozen prepared meals, unless labeled low-sodium. ¨ Canned and dried soups, broths, and bouillon, unless labeled sodium-free or low-sodium. ¨ Canned vegetables, unless labeled sodium-free or low-sodium. ¨ [place] fries, pizza, tacos, and other fast foods. ¨ Pickles, olives, ketchup, and other condiments, especially soy sauce, unless labeled sodium-free or low-sodium. Where can you learn more? Go to http://star-jean.info/. Enter A199 in the search box to learn more about \"Low Sodium Diet (2,000 Milligram): Care Instructions. \"  Current as of: July 26, 2016  Content Version: 11.3  © 1556-0927 [employer]. Care instructions adapted under license by [employer] (which disclaims liability or warranty for this information). If you have questions about a medical condition or this instruction, always ask your healthcare professional. [place] [address] any warranty or liability for your use of this information.

## 2017-07-11 ENCOUNTER — OFFICE VISIT (OUTPATIENT)
Dept: CARDIOLOGY CLINIC | Age: 47
End: 2017-07-11

## 2017-07-11 VITALS
HEIGHT: 64 IN | HEART RATE: 79 BPM | DIASTOLIC BLOOD PRESSURE: 82 MMHG | BODY MASS INDEX: 41.15 KG/M2 | SYSTOLIC BLOOD PRESSURE: 118 MMHG | WEIGHT: 241 LBS | OXYGEN SATURATION: 98 %

## 2017-07-11 DIAGNOSIS — I50.22 CHRONIC SYSTOLIC CONGESTIVE HEART FAILURE (HCC): ICD-10-CM

## 2017-07-11 DIAGNOSIS — I51.9 LV DYSFUNCTION: ICD-10-CM

## 2017-07-11 DIAGNOSIS — I10 ESSENTIAL HYPERTENSION: Primary | ICD-10-CM

## 2017-07-11 RX ORDER — FUROSEMIDE 40 MG/1
40 TABLET ORAL DAILY
Qty: 30 TAB | Refills: 8 | Status: SHIPPED | OUTPATIENT
Start: 2017-07-11 | End: 2017-12-28 | Stop reason: SDUPTHER

## 2017-07-11 NOTE — PATIENT INSTRUCTIONS
Continue present medication regimen  Echocardiogram as scheduled for 7/18/17  Will await results of echo to determine whether or not implantable defibrillator will be needed  Follow-up with Dr. Beronica Garrison as scheduled and as needed  Referral to Dr. Lydia Mariscal daily and record  Limit sodium intake to 2000mg per day  Limit fluid intake to no more than  6, eight ounce glasses of any type of fluids per day  Call if you notice sudden or progressive weight gain (3-5 pounds in 2-3 days), increasing shortness of breath, abdominal bloating, increasing lower extremity edema, inability to lie flat or on your normal number of pillows, having to sit up to catch your breath, fatigue, increased somnolence (sleeping more), poor appetite

## 2017-07-11 NOTE — PROGRESS NOTES
1. Have you been to the ER, urgent care clinic since your last visit? Hospitalized since your last visit? No     2. Have you seen or consulted any other health care providers outside of the [address] [place] [address] since your last visit? Include any pap smears or colon screening.  No

## 2017-07-11 NOTE — PROGRESS NOTES
Bijal Small presents today for 2 week follow-up of hypertension after her lisinopril was increased to 10mg daily for suboptimally controlled blood pressure when seen 2 weeks ago. She states that she saw her PCP, Dr. Vidya Dillon, yesterday and she reports that she was instructed to discontinue her Lasix as it was \"not beneficial\" and she was also told that the lisinopril was \"not necessary with the spironolactone\" and that she \"did not need\" the LifeVest but \"can wear it if you want to. \"   She is very upset that she was told these things about her current medical therapy for the treatment of her cardiomyopathy and heart failure especially since she is feeling markedly better since being restarted on all of these medications during her last hospitalization. She states that he would not refill her Lasix and that she informed him that she was going to continue taking all of her medications and that she had an appointment with us today. She showed me her list of medications and the comments that were placed beside the medications. She is a 40year old female with history of chronic systolic heart failure, hypertension, pulmonary hypertension, chronic mild intermittent asthma, tobacco abuse, and obesity. She was hospitalized from 3/21/17 through 3/27/17 for acute on chronic systolic heart failure. She presented to the hospital after being seen at her PCP's office for complaints of shortness of breath. She was noted to be hypoxic at the office and she had reportedly gained 19 pounds. She was sent to the ER for further evaluation and treatment. Her NT pro-BNP was 1975. Cardiac enzymes were negative and were serial troponins. An echocardiogram done during her hospitalization showed an ejection fraction of 15%, severe diffuse hypokinesis, and RVSP 44 mmHg. She also had a pharmacologic nuclear stress test done which showed abnormal perfusion imaging. There is no evidence of reversible or fixed defects. Ejection fraction estimated at 25%. Prior to discharge home from the hospital, she was fitted with a LifeVest.    She is feeling well and states that she is now able to walk further distances before she has to stop to catch her breath. Prior to her last hospitalization, she states that she would become short of breath with minimal exertion and would experience orthopnea and PND. She reports that she has been wearing the LifeVest continuously and that it has not discharged. She denies chest pain, tightness, heaviness, and admits to occasional \"flutters\" in her chest.  She denies shortness of breath at rest, admits to dyspnea on exertion, denies orthopnea and PND. She is able to sleep in her bed on 1-2 pillows. Previously, she was sleeping upright in a chair at night. She denies abdominal bloating. She denies lightheadedness, dizziness, and syncope. She denies lower extremity edema and claudication. Denies nausea, vomiting, diarrhea, fever, chills. PMH:  Past Medical History:   Diagnosis Date    Asthma     Cardiac echocardiogram 03/22/2017    LVE. EF 15% (prev 50% on study of 12/10/14). Severe diffuse hypk. Indeterminate diastolic fx.  RVE. RVSP at least 44 mmHg. Mod LAE.  BISMARK. Mild-mod MR.  Cardiac nuclear imaging test 03/24/2017    High risk. Somewhat patchy uptake. No evidence of ischemia or infarction. No RWMA. Severe LVE. EF 25%. Neg EKG on pharm stress test.    Cardiovascular LLE venous duplex 03/06/2017    Left leg:  No DVT. Pulsatile flow.  Heart failure (HCC)     Hypertension        PSH:  Past Surgical History:   Procedure Laterality Date    BREAST SURGERY PROCEDURE UNLISTED      redfuction    HX GYN      hysterectomy  btl    HX MYOMECTOMY       fibroid tumo removed    HX ORTHOPAEDIC  March 2012 ORIF left fibula       MEDS:  Current Outpatient Prescriptions   Medication Sig    predniSONE (DELTASONE) 20 mg tablet Take  by mouth daily (with breakfast).     methIMAzole (TAPAZOLE) 10 mg tablet Take  by mouth daily.  carvedilol (COREG) 25 mg tablet Take 25 mg by mouth two (2) times daily (with meals).  lisinopril (PRINIVIL, ZESTRIL) 10 mg tablet Take 1 Tab by mouth daily.  furosemide (LASIX) 40 mg tablet Take 1 Tab by mouth daily.  spironolactone (ALDACTONE) 25 mg tablet Take 1 Tab by mouth daily.  atorvastatin (LIPITOR) 80 mg tablet Take 80 mg by mouth daily. Pt. to take at night    indapamide (LOZOL) 2.5 mg tablet Take 2.5 mg by mouth daily. To take in AM.    nitroglycerin (NITROSTAT) 0.4 mg SL tablet 1 Tab by SubLINGual route every five (5) minutes as needed for Chest Pain.  albuterol (ACCUNEB) 1.25 mg/3 mL nebu Take 3 mL by inhalation every four (4) hours as needed (wheezing).  aspirin 81 mg chewable tablet Take 81 mg by mouth daily. No current facility-administered medications for this visit. Allergies and Sensitivities:  No Known Allergies    Family History:  History reviewed. No pertinent family history. Social History:  She  reports that she quit smoking about 4 months ago. She smoked 0.00 packs per day for 18.00 years. She has never used smokeless tobacco.  She  reports that she drinks alcohol. Physical:  Visit Vitals    /82    Pulse 79    Ht 5' 4\" (1.626 m)    Wt 109.3 kg (241 lb)    SpO2 98%    BMI 41.37 kg/m2         She is up 7 pounds since her last visit      Exam:  Neck:  Supple, no JVD, no carotid bruits  CV:  Normal S1 and  S2, no murmurs, rubs, or gallops noted  Lungs:  Clear to ausculation throughout, no wheezes or rales  Abd:  Soft, non-tender, non-distended with good bowel sounds.   No hepatosplenomegaly  Extremities:  No edema      Data:  EKG:  Not done today      LABS:  Lab Results   Component Value Date/Time    Sodium 138 03/27/2017 03:30 AM    Potassium 4.1 03/27/2017 03:30 AM    Chloride 101 03/27/2017 03:30 AM    CO2 32 03/27/2017 03:30 AM    Glucose 102 03/27/2017 03:30 AM    BUN 13 03/27/2017 03:30 AM    Creatinine 1.05 03/27/2017 03:30 AM     Lab Results   Component Value Date/Time    Cholesterol, total 81 03/21/2017 03:24 PM    HDL Cholesterol 27 03/21/2017 03:24 PM    LDL, calculated 42 03/21/2017 03:24 PM    Triglyceride 60 03/21/2017 03:24 PM    CHOL/HDL Ratio 3.0 03/21/2017 03:24 PM     Lab Results   Component Value Date/Time    ALT (SGPT) 27 03/06/2017 07:30 PM         Impression / Plan:  1. Chronic systolic heart failure, appears compensated  2. Hypertension, blood pressure much improved  3. Pulmonary hypertension, RVSP 44 mmHg  4. Chronic, intermittent asthma  5. Severe LV dysfunction, EF 15% (by echo March 2017)    Ms. Cristiano Mirza was seen today for follow-up after her lisinopril was increased to 10mg daily for suboptimally controlled blood pressure 2 weeks ago. She was supposed to come in for follow-up in April but due to financial constraints, she did not come in for that appointment but she reported that her PCP had increased her Coreg. She was hospitalized from 3/21/17 through 3/27/17 for acute on chronic systolic heart failure and was restarted on her medications. She reports that about 3-4 months prior to her hospitalization, her medications were discontinued by her PCP. An echocardiogram done during her hospitalization showed an ejection fraction of 15%, severe diffuse hypokinesis, and RVSP 44 mmHg. She also had a pharmacologic nuclear stress test done which showed abnormal perfusion imaging. There is no evidence of reversible or fixed defects. Ejection fraction estimated at 25%. Prior to discharge home from the hospital, she was fitted with a LifeVest.    She is feeling much better. Her blood pressure is now much better controlled. Her breath sounds are clear and she exhibits no signs of volume overload. No medication changes will be made today and she was asked to continue taking her present medication regimen.   She brought in a copy of recent labs and a summary of her visit with PCP on 7/10/17. She asked that I recommend a new PCP for her as she is quite upset that her current PCP recommended that stop taking her Lasix and lisinopril and that she was told that she did not need to wear the LifeVest.  She asked that the physician be in [place] and near the hospital, if possible, as it is closer to home. She will be referred to Dr. Grant Arboleda. She is scheduled for an echocardiogram on 7/18/17. She is aware that if her EF has not improved after being on maximal medical therapy for 3 months, we will likely recommend and implantable AICD. Congestive heart failure teaching reinforced today. Advised to limit sodium intake to no more than 2000mg per day and to also watch fluid intake. Advised to weigh daily every morning and record weights. Instructed to call our office if progressive weight gain is noted over a 2 to 3 day period of time, shortness of breath increases, or if abdominal bloating, nausea, fatigue, or increased lower extremity edema is noted. She will follow-up with Dr. Kristel Oneill in early September.       Tevin Vieyra MSN, FNP-BC

## 2017-07-11 NOTE — MR AVS SNAPSHOT
Visit Information Date & Time Provider Department Dept. Phone Encounter #  
 7/11/2017  9:30 AM Nellie Harrell NP [facility] [id number] Your Appointments 9/1/2017 10:20 AM  
Follow Up with Trinidad Salter DO [facility] ([address]) Appt Note: Alissa 30 f/u; Elena Veronica saw in late June, has [address] [address] [address] [address] [phone] [address] [place] [address] [address]  [address] Upcoming Health Maintenance Date Due Pneumococcal 19-64 Medium Risk (1 of 1 - PPSV23) 9/2/1990 DTaP/Tdap/Td series (1 - Tdap) 9/2/1992 PAP AKA CERVICAL CYTOLOGY 9/2/1992 INFLUENZA AGE 9 TO ADULT 8/1/2017 Allergies as of 7/11/2017  Review Complete On: 7/11/2017 By: Nellie [facility] [address], NP No Known Allergies Current Immunizations  Never Reviewed No immunizations on file. Not reviewed this visit You Were Diagnosed With   
  
 Codes Comments Essential hypertension    -  Primary ICD-10-CM: I10 
ICD-9-CM: 401.9 Chronic systolic congestive heart failure (HCC)     ICD-10-CM: I50.22 ICD-9-CM: 428.22, 428.0 LV dysfunction     ICD-10-CM: I51.9 ICD-9-CM: 429.9 Vitals BP Pulse Height(growth percentile) Weight(growth percentile) SpO2 BMI  
 118/82 79 5' 4\" (1.626 m) 241 lb (109.3 kg) 98% 41.37 kg/m2 OB Status Smoking Status Hysterectomy Former Smoker Vitals History BMI and BSA Data Body Mass Index Body Surface Area  
 41.37 kg/m 2 2.22 m 2 Preferred Pharmacy Pharmacy Name Phone [facility] [address] [phone] Your Updated Medication List  
  
   
This list is accurate as of: 7/11/17 10:16 AM.  Always use your most recent med list.  
  
  
  
  
 albuterol 1.25 mg/3 mL Nebu Commonly known as:  Tish Burden  
 Take 3 mL by inhalation every four (4) hours as needed (wheezing). aspirin 81 mg chewable tablet Take 81 mg by mouth daily. atorvastatin 80 mg tablet Commonly known as:  LIPITOR Take 80 mg by mouth daily. Pt. to take at night  
  
 carvedilol 25 mg tablet Commonly known as:  [place] Take 25 mg by mouth two (2) times daily (with meals). furosemide 40 mg tablet Commonly known as:  LASIX Take 1 Tab by mouth daily. indapamide 2.5 mg tablet Commonly known as:  Carletha Come Take 2.5 mg by mouth daily. To take in AM. lisinopril 10 mg tablet Commonly known as:  Valene Pencil Take 1 Tab by mouth daily. methIMAzole 10 mg tablet Commonly known as:  TAPAZOLE Take  by mouth daily. nitroglycerin 0.4 mg SL tablet Commonly known as:  NITROSTAT  
1 Tab by SubLINGual route every five (5) minutes as needed for Chest Pain. spironolactone 25 mg tablet Commonly known as:  ALDACTONE Take 1 Tab by mouth daily. Prescriptions Sent to Pharmacy Refills  
 furosemide (LASIX) 40 mg tablet 8 Sig: Take 1 Tab by mouth daily. Class: Normal  
 Pharmacy: [address] [place] [address].  #: [phone] Route: Oral  
  
We Performed the Following REFERRAL TO PRIMARY CARE [QIC161 CPT(R)] Comments:  
 Patient requesting new primary care MD  
  
To-Do List   
 07/18/2017 11:00 AM  
  Appointment with [facility] [place] [address] at [facility] NON-INVASIVE [address] [place] [address] ([phone]) Age Limit for ALL Heart procedures @ all [facility] facilities: 18 yrs and older only. Under the age of 25, refer to [facility] ([phone]). This study requires patient to bring a written physician's order or MD office may fax the order to Central Scheduling at [phone]. Patient needs to bring a current list of all medications. No preparation is required for this study. Referral Information Referral ID Referred By Referred To [id number] [facility] Jazz Trejo MD   
   [address] [place] [address] [address], [address] [place] Phone: [phone] Fax: [phone] Visits Status Start Date End Date 1 New Request 7/11/17 7/11/18 If your referral has a status of pending review or denied, additional information will be sent to support the outcome of this decision. Patient Instructions Continue present medication regimen Echocardiogram as scheduled for 7/18/17 Will await results of echo to determine whether or not implantable defibrillator will be needed Follow-up with Dr. Aftab Valerio as scheduled and as needed Referral to Dr. Jazz Trejo Weigh daily and record Limit sodium intake to 2000mg per day Limit fluid intake to no more than  6, eight ounce glasses of any type of fluids per day Call if you notice sudden or progressive weight gain (3-5 pounds in 2-3 days), increasing shortness of breath, abdominal bloating, increasing lower extremity edema, inability to lie flat or on your normal number of pillows, having to sit up to catch your breath, fatigue, increased somnolence (sleeping more), poor appetite Introducing [facility]! [facility] introduces [employer] patient portal. Now you can access parts of your medical record, email your doctor's office, and request medication refills online. 1. In your internet browser, go to [email or web address] 2. Click on the First Time User? Click Here link in the Sign In box. You will see the New Member Sign Up page. 3. Enter your [employer] Access Code exactly as it appears below. You will not need to use this code after youve completed the sign-up process. If you do not sign up before the expiration date, you must request a new code. · [employer] Access Code: [email or web address] Expires: 9/25/2017 10:05 AM 
 
4.  Enter the last four digits of your Social Security Number (xxxx) and Date of Birth (mm/dd/yyyy) as indicated and click Submit. You will be taken to the next sign-up page. 5. Create a [employer] ID. This will be your [employer] login ID and cannot be changed, so think of one that is secure and easy to remember. 6. Create a [employer] password. You can change your password at any time. 7. Enter your Password Reset Question and Answer. This can be used at a later time if you forget your password. 8. Enter your e-mail address. You will receive e-mail notification when new information is available in [address] 9. Click Sign Up. You can now view and download portions of your medical record. 10. Click the Download Summary menu link to download a portable copy of your medical information. If you have questions, please visit the Frequently Asked Questions section of the [employer] website. Remember, [employer] is NOT to be used for urgent needs. For medical emergencies, dial 911. Now available from your iPhone and Android! Please provide this summary of care documentation to your next provider. Your primary care clinician is listed as Prisca Castillo. If you have any questions after today's visit, please call [phone].

## 2017-07-18 ENCOUNTER — HOSPITAL ENCOUNTER (OUTPATIENT)
Dept: NON INVASIVE DIAGNOSTICS | Age: 47
Discharge: HOME | End: 2017-07-18
Attending: NURSE PRACTITIONER
Payer: MEDICAID

## 2017-07-18 DIAGNOSIS — I50.22 CHRONIC SYSTOLIC CONGESTIVE HEART FAILURE (HCC): ICD-10-CM

## 2017-07-18 DIAGNOSIS — I10 ESSENTIAL HYPERTENSION: ICD-10-CM

## 2017-07-18 PROCEDURE — 93308 TTE F-UP OR LMTD: CPT

## 2017-07-18 NOTE — PROGRESS NOTES
Completed echocardiogram. Report to follow. I removed the band off and placed in shred box.      Verdie Prader, TU, RDCS

## 2017-08-10 ENCOUNTER — TELEPHONE (OUTPATIENT)
Dept: CARDIOLOGY CLINIC | Age: 47
End: 2017-08-10

## 2017-08-10 NOTE — PROGRESS NOTES
Wilfrido Turpin,    If this lady is still wearing a Life Vest looks like she should get a an AICD which means I probably should just see her ASAP and get it set up. I will have Ganesh Magaña or Micah Kaba add her on for me next week.  ES

## 2017-08-10 NOTE — TELEPHONE ENCOUNTER
Patient scheduled 8/16/17 @ 0400 pm with Dr Salena Harding. Pt was called. Pt is aware.    Pt was sent work excuse note

## 2017-08-10 NOTE — TELEPHONE ENCOUNTER
I called and spoke to the patient. She is still wearing Life Vest. This lady's LV dysfunction is still significant and she should be set up for an AICD. Her appointment with me is apparently for next month, so I think we should get her in next week and we may need to determine if she qualifies for an AICD from an insurance point of view so we will need to have her insurance information available. She says she is trying to get disability which means she may not have any insurance and has not been able to get it so we will have to help her and then maybe get her an AICD. Please try to add her on at the end of the day sometime next week.  ES

## 2017-08-11 ENCOUNTER — TELEPHONE (OUTPATIENT)
Dept: CARDIOLOGY CLINIC | Age: 47
End: 2017-08-11

## 2017-08-11 NOTE — TELEPHONE ENCOUNTER
Patient was informed that Dr. Beronica Garrison would like to talk to her about getting an AICD. Patient verbalized understanding and was put on the schedule for Monday Aug. 14, 2017 with Dr. Beronica Garrison.

## 2017-08-11 NOTE — TELEPHONE ENCOUNTER
----- Message from Nancy Lilly DO sent at 8/10/2017  2:56 PM EDT -----  Flor Hsieh,    If this lady is still wearing a Life Vest looks like she should get a an AICD which means I probably should just see her ASAP and get it set up. I will have Emmett Bajwa or José Miguel add her on for me next week.  ES

## 2017-08-14 ENCOUNTER — OFFICE VISIT (OUTPATIENT)
Dept: CARDIOLOGY CLINIC | Age: 47
End: 2017-08-14

## 2017-08-14 VITALS
WEIGHT: 246 LBS | OXYGEN SATURATION: 97 % | HEIGHT: 64 IN | DIASTOLIC BLOOD PRESSURE: 80 MMHG | BODY MASS INDEX: 42 KG/M2 | HEART RATE: 79 BPM | SYSTOLIC BLOOD PRESSURE: 126 MMHG

## 2017-08-14 DIAGNOSIS — I10 ESSENTIAL HYPERTENSION: ICD-10-CM

## 2017-08-14 DIAGNOSIS — I51.9 LV DYSFUNCTION: Primary | ICD-10-CM

## 2017-08-14 DIAGNOSIS — I50.22 SYSTOLIC CHF, CHRONIC (HCC): ICD-10-CM

## 2017-08-14 DIAGNOSIS — I42.0 DILATED CARDIOMYOPATHY (HCC): ICD-10-CM

## 2017-08-14 NOTE — PROGRESS NOTES
1. Have you been to the ER, urgent care clinic since your last visit? Hospitalized since your last visit? Yes, 3/21/17 - 3/27/17 for CHF     2. Have you seen or consulted any other health care providers outside of the [address] [place] [address] since your last visit? Include any pap smears or colon screening.   No

## 2017-08-14 NOTE — PROGRESS NOTES
Review of Systems   Constitutional: Negative. Respiratory: Negative. Cardiovascular: Positive for claudication. Negative for chest pain, palpitations, orthopnea, leg swelling and PND. Gastrointestinal: Negative. Musculoskeletal: Negative.

## 2017-08-14 NOTE — PROGRESS NOTES
HPI: I saw Smith Cuenca in my office today in cardiovascular evaluation regarding her dilated cardiomyopathy with severe left ventricular dysfunction to discuss her recent echocardiogram.  Ms. Brooke Decker is a pleasant 80-year-old [facility] American female with history of presentation in March 2017 to [facility] with acute on chronic systolic heart failure. She subsequently was treated and during that hospitalization was found to have an echocardiogram showing an ejection fraction of 15%. She also had a pharmacologic myocardial perfusion defect at that time which demonstrated an ejection fraction estimated at 25% without reversible or fixed defects. She was placed on Coreg, lisinopril, Aldactone, and Lasix and had a LifeVest placed on discharge from the hospital.  He has done quite well clinically, but her repeat echocardiogram completed on July 18, 2017 though better than her echo back in March 2017 still demonstrated a reduced ejection fraction in the 30% range. She comes in today for reevaluation and relates that she is doing fairly well. She is still wearing her LifeVest, but has had to take it off on a few occasions due to sweating a lot in the hot weather. She still has some shortness of breath with exertion from time to time, but in general has been feeling reasonably well on her current medical regimen. Encounter Diagnoses   Name Primary?  Dilated cardiomyopathy (HCC)     LV dysfunction Yes    Systolic CHF, chronic ([address] compensated     Essential hypertension    '    Discussion: This patient appears to be doing about as well as we could expect on her current medication.   However, it has been well over 90 days since she has had evidence of severe left ventricular dysfunction documented on echocardiogram and although her most recent echocardiogram shows improved LV function her ejection fraction still in the 30% range so I think at her young age she should be considered for an AICD for primary prevention which I am going to try to get scheduled in the near future. She otherwise appears to be stable on her current medical regimen which I am not going to change and will simply plan to see her again in about 2-3 months. PCP: Rufina Case MD      Past Medical History:   Diagnosis Date    Asthma     Cardiac echocardiogram 03/22/2017    LVE. EF 15% (prev 50% on study of 12/10/14). Severe diffuse hypk. Indeterminate diastolic fx.  RVE. RVSP at least 44 mmHg. Mod LAE.  BISMARK. Mild-mod MR.  Cardiac nuclear imaging test 03/24/2017    High risk. Somewhat patchy uptake. No evidence of ischemia or infarction. No RWMA. Severe LVE. EF 25%. Neg EKG on pharm stress test.    Cardiovascular LLE venous duplex 03/06/2017    Left leg:  No DVT. Pulsatile flow.  Heart failure ([address]     Hypertension        Past Surgical History:   Procedure Laterality Date    BREAST SURGERY PROCEDURE UNLISTED      redfuction    HX GYN      hysterectomy  btl    HX MYOMECTOMY       fibroid tumo removed    HX ORTHOPAEDIC  March 2012 ORIF left fibula       Current Outpatient Prescriptions   Medication Sig    furosemide (LASIX) 40 mg tablet Take 1 Tab by mouth daily.  methIMAzole (TAPAZOLE) 10 mg tablet Take  by mouth daily.  carvedilol (COREG) 25 mg tablet Take 25 mg by mouth two (2) times daily (with meals).  lisinopril (PRINIVIL, ZESTRIL) 10 mg tablet Take 1 Tab by mouth daily.  spironolactone (ALDACTONE) 25 mg tablet Take 1 Tab by mouth daily.  atorvastatin (LIPITOR) 80 mg tablet Take 80 mg by mouth daily. Pt. to take at night    indapamide (LOZOL) 2.5 mg tablet Take 2.5 mg by mouth daily. To take in AM.    nitroglycerin (NITROSTAT) 0.4 mg SL tablet 1 Tab by SubLINGual route every five (5) minutes as needed for Chest Pain.  albuterol (ACCUNEB) 1.25 mg/3 mL nebu Take 3 mL by inhalation every four (4) hours as needed (wheezing).     aspirin 81 mg chewable tablet Take 81 mg by mouth daily. No current facility-administered medications for this visit. No Known Allergies    Social History :  Social History   Substance Use Topics    Smoking status: Former Smoker     Packs/day: 0.00     Years: 18.00     Quit date: 2/28/2017    Smokeless tobacco: Never Used    Alcohol use Yes      Comment: socially         Family History: family history is not on file. Review of Systems:   Constitutional: Negative. Respiratory: Negative. Cardiovascular: Positive for claudication. Negative for chest pain, palpitations, orthopnea, leg swelling and PND. Gastrointestinal: Negative. Musculoskeletal: Negative. Physical Exam:    The patient is a cooperative, alert, well developed, well nourished 40 y.o. morbidly obese -American female who is in no acute distress at the time of the examination. Visit Vitals    /80    Pulse 79    Ht 5' 4\" (1.626 m)    Wt 111.6 kg (246 lb)    SpO2 97%    BMI 42.23 kg/m2       HEENT: Conjuctiva white, mucosa moist, no pallor or cyanosis. NECK: Supple without masses, tenderness or thyromegaly. There was no jugular venous distention. Carotid are full bilaterally without bruits. CHEST: Symmetrical with good excursion. LUNGS: Clear to auscultation in all fields. HEART: The apex is not displaced. There were no lifts, thrills or heaves. There is a normal S1 and S2 without appreciable murmurs, rubs, clicks, or gallops auscultated. ABDOMEN: Soft without masses, tenderness or organomegaly. EXTREMITIES: Full peripheral pulses without peripheral edema. INTEGUMENT: Warm and dry   NEUROLOGICAL: The patient is oriented x 3 with motor function grossly intact.     Review of Data: See PMH and Cardiology and Imaging sections for cardiac testing  Lab Results   Component Value Date/Time    Cholesterol, total 81 03/21/2017 03:24 PM    HDL Cholesterol 27 03/21/2017 03:24 PM    LDL, calculated 42 03/21/2017 03:24 PM    Triglyceride 60 03/21/2017 03:24 PM CHOL/HDL Ratio 3.0 03/21/2017 03:24 PM       Results for orders placed or performed in visit on 08/14/17   AMB POC EKG ROUTINE W/ 12 LEADS, INTER & REP     Status: None    Narrative    Normal sinus rhythm, rate 79. Diffuse nonspecific ST-T flattening. Compared to the EKG of June 27, 2017 there is no significant interval change. Eve Orta D.O., F.A.C.C. [facility]  [facility]  [address] [place] [address]. [address]    PLEASE NOTE:  This document has been produced using voice recognition software. Unrecognized errors in transcription may be present.

## 2017-08-14 NOTE — MR AVS SNAPSHOT
Visit Information Date & Time Provider Department Dept. Phone Encounter #  
 8/14/2017  9:20 AM Winsome Mcdowell DO [facility] [address] [id number] Follow-up Instructions Return in about 3 months (around 11/14/2017), or if symptoms worsen or fail to improve. Follow-up and Disposition History Your Appointments 8/25/2017 10:00 AM  
PRE PROCEDURE with Bp Hv Csi [facility] [address] ([facility]) Appt Note: BIV AICD 9/1  
 [address] [place] [address] [address] [address] [address] [phone] [address]  
  
    
 9/21/2017  5:15 PM  
New Patient with MD Hardy Renner [facility]) Appt Note: establish care [address] [facility] [address]  
[facility] [address] Upcoming Health Maintenance Date Due Pneumococcal 19-64 Medium Risk (1 of 1 - PPSV23) 9/2/1990 DTaP/Tdap/Td series (1 - Tdap) 9/2/1992 PAP AKA CERVICAL CYTOLOGY 9/2/1992 INFLUENZA AGE 9 TO ADULT 8/1/2017 Allergies as of 8/14/2017  Review Complete On: 8/14/2017 By: Winsome Mcdowell DO No Known Allergies Current Immunizations  Never Reviewed No immunizations on file. Not reviewed this visit You Were Diagnosed With   
  
 Codes Comments LV dysfunction    -  Primary ICD-10-CM: I51.9 ICD-9-CM: 429.9 Dilated cardiomyopathy ([facility]     ICD-10-CM: I42.0 ICD-9-CM: 425.4 Systolic CHF, chronic (HCC)     ICD-10-CM: I50.22 ICD-9-CM: 428.22, 428.0 Essential hypertension     ICD-10-CM: I10 
ICD-9-CM: 401.9 Vitals BP Pulse Height(growth percentile) Weight(growth percentile) SpO2 BMI  
 126/80 79 5' 4\" (1.626 m) 246 lb (111.6 kg) 97% 42.23 kg/m2 OB Status Smoking Status Hysterectomy Former Smoker Vitals History BMI and BSA Data Body Mass Index Body Surface Area  
 42.23 kg/m 2 2.24 m 2 Preferred Pharmacy Pharmacy Name Phone [facility] [address] [phone] Your Updated Medication List  
  
   
This list is accurate as of: 8/14/17 11:22 AM.  Always use your most recent med list.  
  
  
  
  
 albuterol 1.25 mg/3 mL Nebu Commonly known as:  Cierra Loach Take 3 mL by inhalation every four (4) hours as needed (wheezing). aspirin 81 mg chewable tablet Take 81 mg by mouth daily. atorvastatin 80 mg tablet Commonly known as:  LIPITOR Take 80 mg by mouth daily. Pt. to take at night  
  
 carvedilol 25 mg tablet Commonly known as:  Miranda Salts Take 25 mg by mouth two (2) times daily (with meals). furosemide 40 mg tablet Commonly known as:  LASIX Take 1 Tab by mouth daily. indapamide 2.5 mg tablet Commonly known as:  Nhan Finical Take 2.5 mg by mouth daily. To take in AM. lisinopril 10 mg tablet Commonly known as:  Karn Desanctis Take 1 Tab by mouth daily. methIMAzole 10 mg tablet Commonly known as:  TAPAZOLE Take  by mouth daily. nitroglycerin 0.4 mg SL tablet Commonly known as:  NITROSTAT  
1 Tab by SubLINGual route every five (5) minutes as needed for Chest Pain. spironolactone 25 mg tablet Commonly known as:  ALDACTONE Take 1 Tab by mouth daily. We Performed the Following AMB POC EKG ROUTINE W/ 12 LEADS, INTER & REP [71130 CPT(R)] Follow-up Instructions Return in about 3 months (around 11/14/2017), or if symptoms worsen or fail to improve. Introducing [facility]! Winter Jha introduces [employer] patient portal. Now you can access parts of your medical record, email your doctor's office, and request medication refills online. 1. In your internet browser, go to [email or web address] 2. Click on the First Time User? Click Here link in the Sign In box. You will see the New Member Sign Up page. 3. Enter your Dexetra Access Code exactly as it appears below. You will not need to use this code after youve completed the sign-up process. If you do not sign up before the expiration date, you must request a new code. · Dexetra Access Code: [email or web address] Expires: 9/25/2017 10:05 AM 
 
4. Enter the last four digits of your Social Security Number (xxxx) and Date of Birth (mm/dd/yyyy) as indicated and click Submit. You will be taken to the next sign-up page. 5. Create a Dexetra ID. This will be your Dexetra login ID and cannot be changed, so think of one that is secure and easy to remember. 6. Create a Dexetra password. You can change your password at any time. 7. Enter your Password Reset Question and Answer. This can be used at a later time if you forget your password. 8. Enter your e-mail address. You will receive e-mail notification when new information is available in [address] 9. Click Sign Up. You can now view and download portions of your medical record. 10. Click the Download Summary menu link to download a portable copy of your medical information. If you have questions, please visit the Frequently Asked Questions section of the Dexetra website. Remember, Dexetra is NOT to be used for urgent needs. For medical emergencies, dial 911. Now available from your iPhone and Android! Please provide this summary of care documentation to your next provider. Your primary care clinician is listed as Daniel Abdullahi. If you have any questions after today's visit, please call [phone].

## 2017-08-28 ENCOUNTER — HOSPITAL ENCOUNTER (OUTPATIENT)
Dept: LAB | Age: 47
Discharge: HOME | End: 2017-08-28
Payer: MEDICAID

## 2017-08-28 ENCOUNTER — OFFICE VISIT (OUTPATIENT)
Dept: CARDIOLOGY CLINIC | Age: 47
End: 2017-08-28

## 2017-08-28 ENCOUNTER — HOSPITAL ENCOUNTER (OUTPATIENT)
Dept: GENERAL RADIOLOGY | Age: 47
Discharge: HOME | End: 2017-08-28
Payer: MEDICAID

## 2017-08-28 DIAGNOSIS — Z01.812 PRE-PROCEDURE LAB EXAM: ICD-10-CM

## 2017-08-28 DIAGNOSIS — I51.9 LV DYSFUNCTION: Primary | ICD-10-CM

## 2017-08-28 DIAGNOSIS — I51.9 LV DYSFUNCTION: ICD-10-CM

## 2017-08-28 LAB
ALBUMIN SERPL-MCNC: 3.4 G/DL (ref 3.4–5)
ALBUMIN/GLOB SERPL: 0.8 {RATIO} (ref 0.8–1.7)
ALP SERPL-CCNC: 136 U/L (ref 45–117)
ALT SERPL-CCNC: 16 U/L (ref 13–56)
ANION GAP SERPL CALC-SCNC: 8 MMOL/L (ref 3–18)
AST SERPL-CCNC: 11 U/L (ref 15–37)
BASOPHILS # BLD: 0 K/UL (ref 0–0.06)
BASOPHILS NFR BLD: 0 % (ref 0–2)
BILIRUB SERPL-MCNC: 0.6 MG/DL (ref 0.2–1)
BUN SERPL-MCNC: 16 MG/DL (ref 7–18)
BUN/CREAT SERPL: 18 (ref 12–20)
CALCIUM SERPL-MCNC: 9.1 MG/DL (ref 8.5–10.1)
CHLORIDE SERPL-SCNC: 99 MMOL/L (ref 100–108)
CO2 SERPL-SCNC: 28 MMOL/L (ref 21–32)
CREAT SERPL-MCNC: 0.89 MG/DL (ref 0.6–1.3)
DIFFERENTIAL METHOD BLD: ABNORMAL
EOSINOPHIL # BLD: 0.5 K/UL (ref 0–0.4)
EOSINOPHIL NFR BLD: 5 % (ref 0–5)
ERYTHROCYTE [DISTWIDTH] IN BLOOD BY AUTOMATED COUNT: 12.7 % (ref 11.6–14.5)
GLOBULIN SER CALC-MCNC: 4.3 G/DL (ref 2–4)
GLUCOSE SERPL-MCNC: 110 MG/DL (ref 74–99)
HCT VFR BLD AUTO: 36.4 % (ref 35–45)
HGB BLD-MCNC: 12.2 G/DL (ref 12–16)
INR PPP: 1.2 (ref 0.8–1.2)
LYMPHOCYTES # BLD: 3.3 K/UL (ref 0.9–3.6)
LYMPHOCYTES NFR BLD: 32 % (ref 21–52)
MCH RBC QN AUTO: 30.9 PG (ref 24–34)
MCHC RBC AUTO-ENTMCNC: 33.5 G/DL (ref 31–37)
MCV RBC AUTO: 92.2 FL (ref 74–97)
MONOCYTES # BLD: 0.6 K/UL (ref 0.05–1.2)
MONOCYTES NFR BLD: 6 % (ref 3–10)
NEUTS SEG # BLD: 5.7 K/UL (ref 1.8–8)
NEUTS SEG NFR BLD: 57 % (ref 40–73)
PLATELET # BLD AUTO: 304 K/UL (ref 135–420)
PMV BLD AUTO: 9.7 FL (ref 9.2–11.8)
POTASSIUM SERPL-SCNC: 3.8 MMOL/L (ref 3.5–5.5)
PROT SERPL-MCNC: 7.7 G/DL (ref 6.4–8.2)
PROTHROMBIN TIME: 14.6 SEC (ref 11.5–15.2)
RBC # BLD AUTO: 3.95 M/UL (ref 4.2–5.3)
SODIUM SERPL-SCNC: 135 MMOL/L (ref 136–145)
WBC # BLD AUTO: 10.2 K/UL (ref 4.6–13.2)

## 2017-08-28 PROCEDURE — 85025 COMPLETE CBC W/AUTO DIFF WBC: CPT | Performed by: INTERNAL MEDICINE

## 2017-08-28 PROCEDURE — 80053 COMPREHEN METABOLIC PANEL: CPT | Performed by: INTERNAL MEDICINE

## 2017-08-28 PROCEDURE — 36415 COLL VENOUS BLD VENIPUNCTURE: CPT | Performed by: INTERNAL MEDICINE

## 2017-08-28 PROCEDURE — 71020 XR CHEST PA LAT: CPT

## 2017-08-28 PROCEDURE — 85610 PROTHROMBIN TIME: CPT | Performed by: INTERNAL MEDICINE

## 2017-08-28 NOTE — MR AVS SNAPSHOT
Visit Information Date & Time Provider Department Dept. Phone Encounter #  
 8/28/2017  2:00 PM Sam Pereyra Aidan Longolesly [facility] [address] [id number] Your Appointments 8/28/2017  2:00 PM  
PRE PROCEDURE with Bp Hv Csi [facility] ([facility]) Appt Note: BIV AICD 9/1; pt r/s due to death in family [place] [id number] [phone] [facility] [address]  
  
    
 9/21/2017  5:15 PM  
New Patient with MD Javier Juarez [address] [facility]) Appt Note: establish care [address] [facility] [address]  
[facility] [address] Upcoming Health Maintenance Date Due Pneumococcal 19-64 Medium Risk (1 of 1 - PPSV23) 9/2/1990 DTaP/Tdap/Td series (1 - Tdap) 9/2/1992 PAP AKA CERVICAL CYTOLOGY 9/2/1992 INFLUENZA AGE 9 TO ADULT 8/1/2017 Allergies as of 8/28/2017  Review Complete On: 8/14/2017 By: Giovanny Petersen, DO No Known Allergies Current Immunizations  Never Reviewed No immunizations on file. Not reviewed this visit You Were Diagnosed With   
  
 Codes Comments LV dysfunction    -  Primary ICD-10-CM: I51.9 ICD-9-CM: 429.9 Pre-procedure lab exam     ICD-10-CM: G91.903 ICD-9-CM: V72.63 Vitals OB Status Smoking Status Hysterectomy Former Smoker Preferred Pharmacy Pharmacy Name Phone [facility] [address] [phone] Your Updated Medication List  
  
   
This list is accurate as of: 8/28/17  1:30 PM.  Always use your most recent med list.  
  
  
  
  
 albuterol 1.25 mg/3 mL Nebu Commonly known as:  Brandt Plain Take 3 mL by inhalation every four (4) hours as needed (wheezing). aspirin 81 mg chewable tablet Take 81 mg by mouth daily. atorvastatin 80 mg tablet Commonly known as:  LIPITOR Take 80 mg by mouth daily. Pt. to take at night  
  
 carvedilol 25 mg tablet Commonly known as:  Peggi Medicine Take 25 mg by mouth two (2) times daily (with meals). furosemide 40 mg tablet Commonly known as:  LASIX Take 1 Tab by mouth daily. indapamide 2.5 mg tablet Commonly known as:  Nanine Chute Take 2.5 mg by mouth daily. To take in AM. lisinopril 10 mg tablet Commonly known as:  Derek Chandni Take 1 Tab by mouth daily. methIMAzole 10 mg tablet Commonly known as:  TAPAZOLE Take  by mouth daily. nitroglycerin 0.4 mg SL tablet Commonly known as:  NITROSTAT  
1 Tab by SubLINGual route every five (5) minutes as needed for Chest Pain. spironolactone 25 mg tablet Commonly known as:  ALDACTONE Take 1 Tab by mouth daily. To-Do List   
 08/28/2017 Lab:  CBC WITH AUTOMATED DIFF   
  
 08/28/2017 Lab:  METABOLIC PANEL, COMPREHENSIVE   
  
 08/28/2017 Lab:  PROTHROMBIN TIME + INR   
  
 08/28/2017 Imaging:  XR CHEST PA LAT   
  
 09/01/2017 8:00 AM  
  Appointment with [facility] ANESTHESIA 2; [facility]; [facility] CATH HOLDING at [address] [place] [address] ([phone]) Patient Instructions [facility] Patient  EP Instructions 1. You are scheduled to have a BIV AICD device implant on  9/1/17 , at 8 am.    Please check in at 7 am. 
 
2. Please go to [facility] and robby in the outpatient parking lot that is located around to the back of the hospital and enter through the [facility] building. Once you enter through the [facility] check in with the  there. The  will either give you directions or assist you in getting to the cath holding area. 3.         You are not to eat or drink anything after midnight the morning of the procedure. 4. Please continue to take your medications with a small sip of water on the morning of the procedure with the following exceptions:  Do not take furosemide and indapamide 5. If you are diabetic, do not take your insulin/sugar pill the morning of the procedure. 6. We encourage families to wait in the waiting room on the first floor while the procedure is being done. The Doctor will come out and talk with you as soon as the procedure is over. 7. There is the possibility that you may spend the night in the hospital, depending on the results of the procedure. This will be determined after the procedure is done. 8.   If you or your family have any questions, please call our office Monday-Friday 9:00am  
      -4:30 pm , at [phone], and ask to speak to one of the nurses. Introducing [facility]! Vee Julien introduces [employer] patient portal. Now you can access parts of your medical record, email your doctor's office, and request medication refills online. 1. In your internet browser, go to [email or web address] 2. Click on the First Time User? Click Here link in the Sign In box. You will see the New Member Sign Up page. 3. Enter your [employer] Access Code exactly as it appears below. You will not need to use this code after youve completed the sign-up process. If you do not sign up before the expiration date, you must request a new code. · [employer] Access Code: [email or web address] Expires: 9/25/2017 10:05 AM 
 
4. Enter the last four digits of your Social Security Number (xxxx) and Date of Birth (mm/dd/yyyy) as indicated and click Submit. You will be taken to the next sign-up page. 5. Create a RE2t ID. This will be your [employer] login ID and cannot be changed, so think of one that is secure and easy to remember. 6. Create a RE2t password. You can change your password at any time. 7. Enter your Password Reset Question and Answer.  This can be used at a later time if you forget your password. 8. Enter your e-mail address. You will receive e-mail notification when new information is available in [address] 9. Click Sign Up. You can now view and download portions of your medical record. 10. Click the Download Summary menu link to download a portable copy of your medical information. If you have questions, please visit the Frequently Asked Questions section of the [employer] website. Remember, [employer] is NOT to be used for urgent needs. For medical emergencies, dial 911. Now available from your iPhone and Android! Please provide this summary of care documentation to your next provider. Your primary care clinician is listed as Adama Christiansen. If you have any questions after today's visit, please call [phone].

## 2017-08-28 NOTE — PATIENT INSTRUCTIONS
[facility]\""          Patient  EP Instructions                  1. You are scheduled to have a BIV AICD device implant on  9/1/17 , at 8 am.    Please check in at 7 am.    2. Please go to [facility] and park in the outpatient parking lot that is located around to the back of the hospital and enter through the [facility] building. Once you enter through the [facility] check in with the  there. The  will either give you directions or assist you in getting to the cath holding area. 3.  [x]       You are not to eat or drink anything after midnight the morning of the procedure. 4. Please continue to take your medications with a small sip of water on the morning of the procedure with the following exceptions:  Do not take furosemide and indapamide     5. If you are diabetic, do not take your insulin/sugar pill the morning of the procedure. 6. We encourage families to wait in the waiting room on the first floor while the procedure is being done. The Doctor will come out and talk with you as soon as the procedure is over. 7. There is the possibility that you may spend the night in the hospital, depending on the results of the procedure. This will be determined after the procedure is done. 8.   If you or your family have any questions, please call our office Monday-Friday 9:00am         -4:30 pm , at [phone], and ask to speak to one of the nurses.

## 2017-08-28 NOTE — PROGRESS NOTES
Patient given written instructions at time of visit and allowed time for questions to be answered Aubrie Thakur LPN

## 2017-08-30 NOTE — H&P
Please see full H&P by  [facility] Ying BENTLEY. Plan dual chamber AICD implantation for primary prevention associated with heart failure. I discussed 90% chance of placement of all leads. All risks, benefits, alternatives discussed. Plan moderate sedation if anesthesia not available. Risks included but not limited to pain, infection, bleeding, deep venous thrombosis with chronic swelling of arm, anesthesia reactions, pneumothorax, hemothorax, emergent open heart surgery, and death. All questions answered. In regards to AICD, I discussed long term issues of regular monitoring at least every 3 months, possibilities of lead and device malfunction including inappropriate shocks, as well as inability to obtain a commercial drivers license, and interference with arc welding and magnetic field exposure restrictions. If not MRI compatible device, I discussed inability to have future MRI scans but CT scans are acceptable. Diagnosis:  Primary cardiologist [facility]. [facility] Ying Wishram  -Dilated nonischemic cardiomyopathy at 15% diagnosed March 2017. Followup echo 7/18/17 at 30%. -Pharm nuc March 2017 without ischemia.  -Chronic class II systolic heart failure  -LifeVest in place  -h/o HTN  -QRS duration 96 msec, unlikely benefit from biventricular pacing.  -First degree AV block.  -Coreg + lisinopril therapy > 3 months  -Life expectancy > 1 year    HPI: I saw Doyle Bills in my office today in cardiovascular evaluation regarding her dilated cardiomyopathy with severe left ventricular dysfunction to discuss her recent echocardiogram.  Ms. Riki Nguyen is a pleasant 73-year-old [facility] American female with history of presentation in March 2017 to [facility] with acute on chronic systolic heart failure. She subsequently was treated and during that hospitalization was found to have an echocardiogram showing an ejection fraction of 15%.   She also had a pharmacologic myocardial perfusion defect at that time which demonstrated an ejection fraction estimated at 25% without reversible or fixed defects. She was placed on Coreg, lisinopril, Aldactone, and Lasix and had a LifeVest placed on discharge from the hospital.  He has done quite well clinically, but her repeat echocardiogram completed on July 18, 2017 though better than her echo back in March 2017 still demonstrated a reduced ejection fraction in the 30% range.       She comes in today for reevaluation and relates that she is doing fairly well. She is still wearing her LifeVest, but has had to take it off on a few occasions due to sweating a lot in the hot weather. She still has some shortness of breath with exertion from time to time, but in general has been feeling reasonably well on her current medical regimen.          Encounter Diagnoses   Name Primary?  Dilated cardiomyopathy (HCC)      LV dysfunction Yes    Systolic CHF, chronic ([address] compensated      Essential hypertension     '     Discussion: This patient appears to be doing about as well as we could expect on her current medication. However, it has been well over 90 days since she has had evidence of severe left ventricular dysfunction documented on echocardiogram and although her most recent echocardiogram shows improved LV function her ejection fraction still in the 30% range so I think at her young age she should be considered for an AICD for primary prevention which I am going to try to get scheduled in the near future.     She otherwise appears to be stable on her current medical regimen which I am not going to change and will simply plan to see her again in about 2-3 months.     PCP: Tereza Bliss MD             Past Medical History:   Diagnosis Date    Asthma      Cardiac echocardiogram 03/22/2017     LVE. EF 15% (prev 50% on study of 12/10/14). Severe diffuse hypk. Indeterminate diastolic fx.  RVE. RVSP at least 44 mmHg. Mod LAE.  BISMARK. Mild-mod MR.       Cardiac nuclear imaging test 03/24/2017     High risk. Somewhat patchy uptake. No evidence of ischemia or infarction. No RWMA. Severe LVE. EF 25%. Neg EKG on pharm stress test.    Cardiovascular LLE venous duplex 03/06/2017     Left leg:  No DVT. Pulsatile flow.  Heart failure ([address]      Hypertension                 Past Surgical History:   Procedure Laterality Date    BREAST SURGERY PROCEDURE UNLISTED         redfuction    HX GYN         hysterectomy  btl    HX MYOMECTOMY          fibroid tumo removed    HX ORTHOPAEDIC   March 2012 ORIF left fibula              Current Outpatient Prescriptions   Medication Sig    furosemide (LASIX) 40 mg tablet Take 1 Tab by mouth daily.  methIMAzole (TAPAZOLE) 10 mg tablet Take  by mouth daily.  carvedilol (COREG) 25 mg tablet Take 25 mg by mouth two (2) times daily (with meals).  lisinopril (PRINIVIL, ZESTRIL) 10 mg tablet Take 1 Tab by mouth daily.  spironolactone (ALDACTONE) 25 mg tablet Take 1 Tab by mouth daily.  atorvastatin (LIPITOR) 80 mg tablet Take 80 mg by mouth daily. Pt. to take at night    indapamide (LOZOL) 2.5 mg tablet Take 2.5 mg by mouth daily. To take in AM.    nitroglycerin (NITROSTAT) 0.4 mg SL tablet 1 Tab by SubLINGual route every five (5) minutes as needed for Chest Pain.  albuterol (ACCUNEB) 1.25 mg/3 mL nebu Take 3 mL by inhalation every four (4) hours as needed (wheezing).  aspirin 81 mg chewable tablet Take 81 mg by mouth daily.        No current facility-administered medications for this visit.          No Known Allergies     Social History :          Social History   Substance Use Topics    Smoking status: Former Smoker       Packs/day: 0.00       Years: 18.00       Quit date: 2/28/2017    Smokeless tobacco: Never Used    Alcohol use Yes          Comment: socially          Family History: family history is not on file.       Review of Systems:   Constitutional: Negative. Respiratory: Negative. Cardiovascular: Positive for claudication.  Negative for chest pain, palpitations, orthopnea, leg swelling and PND. Gastrointestinal: Negative. Musculoskeletal: Negative.       Physical Exam:    The patient is a cooperative, alert, well developed, well nourished 40 y.o. morbidly obese -American female who is in no acute distress at the time of the examination. Visit Vitals    /80    Pulse 79    Ht 5' 4\" (1.626 m)    Wt 111.6 kg (246 lb)    SpO2 97%    BMI 42.23 kg/m2       HEENT: Conjuctiva white, mucosa moist, no pallor or cyanosis. NECK: Supple without masses, tenderness or thyromegaly. There was no jugular venous distention. Carotid are full bilaterally without bruits. CHEST: Symmetrical with good excursion. LUNGS: Clear to auscultation in all fields. HEART: The apex is not displaced. There were no lifts, thrills or heaves. There is a normal S1 and S2 without appreciable murmurs, rubs, clicks, or gallops auscultated. ABDOMEN: Soft without masses, tenderness or organomegaly. EXTREMITIES: Full peripheral pulses without peripheral edema. INTEGUMENT: Warm and dry   NEUROLOGICAL: The patient is oriented x 3 with motor function grossly intact.     Review of Data: See PMH and Cardiology and Imaging sections for cardiac testing        Lab Results   Component Value Date/Time     Cholesterol, total 81 03/21/2017 03:24 PM     HDL Cholesterol 27 03/21/2017 03:24 PM     LDL, calculated 42 03/21/2017 03:24 PM     Triglyceride 60 03/21/2017 03:24 PM     CHOL/HDL Ratio 3.0 03/21/2017 03:24 PM             Results for orders placed or performed in visit on 08/14/17   AMB POC EKG ROUTINE W/ 12 LEADS, INTER & REP     Status: None     Narrative     Normal sinus rhythm, rate 79. Diffuse nonspecific ST-T flattening. Compared to the EKG of June 27, 2017 there is no significant interval change.               Sharron Kaufman D.O., F.A.C.C. [facility]  [facility]  [address] [place] [address].   [address]     PLEASE NOTE:  This document has been produced using voice recognition software.  Unrecognized errors in transcription may be present.

## 2017-08-31 ENCOUNTER — ANESTHESIA EVENT (OUTPATIENT)
Dept: CARDIAC CATH/INVASIVE PROCEDURES | Age: 47
End: 2017-08-31
Payer: MEDICAID

## 2017-09-01 ENCOUNTER — APPOINTMENT (OUTPATIENT)
Dept: GENERAL RADIOLOGY | Age: 47
End: 2017-09-01
Attending: INTERNAL MEDICINE
Payer: MEDICAID

## 2017-09-01 ENCOUNTER — ANESTHESIA (OUTPATIENT)
Dept: CARDIAC CATH/INVASIVE PROCEDURES | Age: 47
End: 2017-09-01
Payer: MEDICAID

## 2017-09-01 ENCOUNTER — HOSPITAL ENCOUNTER (OUTPATIENT)
Dept: CARDIAC CATH/INVASIVE PROCEDURES | Age: 47
Setting detail: OBSERVATION
Discharge: HOME | End: 2017-09-02
Attending: INTERNAL MEDICINE | Admitting: INTERNAL MEDICINE
Payer: MEDICAID

## 2017-09-01 PROBLEM — Z95.810 S/P ICD (INTERNAL CARDIAC DEFIBRILLATOR) PROCEDURE: Status: ACTIVE | Noted: 2017-09-01

## 2017-09-01 PROBLEM — Z95.810 AICD (AUTOMATIC CARDIOVERTER/DEFIBRILLATOR) PRESENT: Status: ACTIVE | Noted: 2017-09-01

## 2017-09-01 LAB
ANION GAP BLD CALC-SCNC: 16 MMOL/L (ref 10–20)
ANION GAP SERPL CALC-SCNC: 5 MMOL/L (ref 3–18)
BUN BLD-MCNC: 13 MG/DL (ref 7–18)
BUN SERPL-MCNC: 13 MG/DL (ref 7–18)
BUN/CREAT SERPL: 18 (ref 12–20)
CA-I BLD-MCNC: 1 MMOL/L (ref 1.12–1.32)
CALCIUM SERPL-MCNC: 8.6 MG/DL (ref 8.5–10.1)
CHLORIDE BLD-SCNC: 97 MMOL/L (ref 100–108)
CHLORIDE SERPL-SCNC: 100 MMOL/L (ref 100–108)
CO2 BLD-SCNC: 28 MMOL/L (ref 19–24)
CO2 SERPL-SCNC: 29 MMOL/L (ref 21–32)
CREAT SERPL-MCNC: 0.74 MG/DL (ref 0.6–1.3)
CREAT UR-MCNC: 0.7 MG/DL (ref 0.6–1.3)
GLUCOSE BLD STRIP.AUTO-MCNC: 114 MG/DL (ref 74–106)
GLUCOSE SERPL-MCNC: 96 MG/DL (ref 74–99)
HCT VFR BLD CALC: 40 % (ref 36–49)
HGB BLD-MCNC: 13.6 G/DL (ref 12–16)
POTASSIUM BLD-SCNC: 3.6 MMOL/L (ref 3.5–5.5)
POTASSIUM SERPL-SCNC: 3.5 MMOL/L (ref 3.5–5.5)
SODIUM BLD-SCNC: 136 MMOL/L (ref 136–145)
SODIUM SERPL-SCNC: 134 MMOL/L (ref 136–145)

## 2017-09-01 PROCEDURE — 74011636320 HC RX REV CODE- 636/320: Performed by: INTERNAL MEDICINE

## 2017-09-01 PROCEDURE — 77030019580 HC CBL PACE MEDT -B

## 2017-09-01 PROCEDURE — 80047 BASIC METABLC PNL IONIZED CA: CPT

## 2017-09-01 PROCEDURE — 74011000258 HC RX REV CODE- 258: Performed by: INTERNAL MEDICINE

## 2017-09-01 PROCEDURE — 71010 XR CHEST PORT: CPT

## 2017-09-01 PROCEDURE — C1892 INTRO/SHEATH,FIXED,PEEL-AWAY: HCPCS

## 2017-09-01 PROCEDURE — 77030031139 HC SUT VCRL2 J&J -A

## 2017-09-01 PROCEDURE — 74011250636 HC RX REV CODE- 250/636: Performed by: NURSE ANESTHETIST, CERTIFIED REGISTERED

## 2017-09-01 PROCEDURE — 77030018673

## 2017-09-01 PROCEDURE — 74011000250 HC RX REV CODE- 250: Performed by: NURSE ANESTHETIST, CERTIFIED REGISTERED

## 2017-09-01 PROCEDURE — 74011250637 HC RX REV CODE- 250/637: Performed by: INTERNAL MEDICINE

## 2017-09-01 PROCEDURE — C1721 AICD, DUAL CHAMBER: HCPCS

## 2017-09-01 PROCEDURE — 76060000034 HC ANESTHESIA 1.5 TO 2 HR

## 2017-09-01 PROCEDURE — C1895 LEAD, AICD, ENDO DUAL COIL: HCPCS

## 2017-09-01 PROCEDURE — 74011250636 HC RX REV CODE- 250/636

## 2017-09-01 PROCEDURE — 77030011640 HC PAD GRND REM COVD -A

## 2017-09-01 PROCEDURE — 99218 HC RM OBSERVATION: CPT

## 2017-09-01 PROCEDURE — 74011250636 HC RX REV CODE- 250/636: Performed by: INTERNAL MEDICINE

## 2017-09-01 PROCEDURE — C1898 LEAD, PMKR, OTHER THAN TRANS: HCPCS

## 2017-09-01 PROCEDURE — 74011000250 HC RX REV CODE- 250: Performed by: INTERNAL MEDICINE

## 2017-09-01 PROCEDURE — 33249 INSJ/RPLCMT DEFIB W/LEAD(S): CPT

## 2017-09-01 PROCEDURE — 77030018729 HC ELECTRD DEFIB PAD CARD -B

## 2017-09-01 PROCEDURE — 77030002933 HC SUT MCRYL J&J -A

## 2017-09-01 PROCEDURE — 77030002996 HC SUT SLK J&J -A

## 2017-09-01 PROCEDURE — 80048 BASIC METABOLIC PNL TOTAL CA: CPT | Performed by: NURSE ANESTHETIST, CERTIFIED REGISTERED

## 2017-09-01 PROCEDURE — 36415 COLL VENOUS BLD VENIPUNCTURE: CPT | Performed by: NURSE ANESTHETIST, CERTIFIED REGISTERED

## 2017-09-01 RX ORDER — SODIUM CHLORIDE, SODIUM LACTATE, POTASSIUM CHLORIDE, CALCIUM CHLORIDE 600; 310; 30; 20 MG/100ML; MG/100ML; MG/100ML; MG/100ML
50 INJECTION, SOLUTION INTRAVENOUS CONTINUOUS
Status: DISCONTINUED | OUTPATIENT
Start: 2017-09-01 | End: 2017-09-01

## 2017-09-01 RX ORDER — CEFAZOLIN SODIUM 2 G/50ML
2 SOLUTION INTRAVENOUS ONCE
Status: COMPLETED | OUTPATIENT
Start: 2017-09-01 | End: 2017-09-01

## 2017-09-01 RX ORDER — METHIMAZOLE 10 MG/1
10 TABLET ORAL DAILY
Status: DISCONTINUED | OUTPATIENT
Start: 2017-09-02 | End: 2017-09-02 | Stop reason: HOSPADM

## 2017-09-01 RX ORDER — CARVEDILOL 25 MG/1
25 TABLET ORAL 2 TIMES DAILY WITH MEALS
Status: DISCONTINUED | OUTPATIENT
Start: 2017-09-01 | End: 2017-09-02 | Stop reason: HOSPADM

## 2017-09-01 RX ORDER — SODIUM CHLORIDE 0.9 % (FLUSH) 0.9 %
5-10 SYRINGE (ML) INJECTION EVERY 8 HOURS
Status: DISCONTINUED | OUTPATIENT
Start: 2017-09-01 | End: 2017-09-02 | Stop reason: HOSPADM

## 2017-09-01 RX ORDER — MAGNESIUM SULFATE 100 %
4 CRYSTALS MISCELLANEOUS AS NEEDED
Status: DISCONTINUED | OUTPATIENT
Start: 2017-09-01 | End: 2017-09-01

## 2017-09-01 RX ORDER — ALBUTEROL SULFATE 1.25 MG/3ML
1.25 SOLUTION RESPIRATORY (INHALATION)
Status: DISCONTINUED | OUTPATIENT
Start: 2017-09-01 | End: 2017-09-02 | Stop reason: HOSPADM

## 2017-09-01 RX ORDER — GUAIFENESIN 100 MG/5ML
81 LIQUID (ML) ORAL DAILY
Status: DISCONTINUED | OUTPATIENT
Start: 2017-09-02 | End: 2017-09-02 | Stop reason: HOSPADM

## 2017-09-01 RX ORDER — CEFAZOLIN SODIUM 2 G/50ML
2 SOLUTION INTRAVENOUS EVERY 8 HOURS
Status: COMPLETED | OUTPATIENT
Start: 2017-09-01 | End: 2017-09-02

## 2017-09-01 RX ORDER — DEXTROSE 50 % IN WATER (D50W) INTRAVENOUS SYRINGE
25-50 AS NEEDED
Status: DISCONTINUED | OUTPATIENT
Start: 2017-09-01 | End: 2017-09-01

## 2017-09-01 RX ORDER — SODIUM CHLORIDE 0.9 % (FLUSH) 0.9 %
5-10 SYRINGE (ML) INJECTION AS NEEDED
Status: DISCONTINUED | OUTPATIENT
Start: 2017-09-01 | End: 2017-09-02 | Stop reason: HOSPADM

## 2017-09-01 RX ORDER — SODIUM CHLORIDE, SODIUM LACTATE, POTASSIUM CHLORIDE, CALCIUM CHLORIDE 600; 310; 30; 20 MG/100ML; MG/100ML; MG/100ML; MG/100ML
25 INJECTION, SOLUTION INTRAVENOUS CONTINUOUS
Status: DISCONTINUED | OUTPATIENT
Start: 2017-09-01 | End: 2017-09-01

## 2017-09-01 RX ORDER — SPIRONOLACTONE 25 MG/1
25 TABLET ORAL DAILY
Status: DISCONTINUED | OUTPATIENT
Start: 2017-09-02 | End: 2017-09-02 | Stop reason: HOSPADM

## 2017-09-01 RX ORDER — LIDOCAINE HYDROCHLORIDE 10 MG/ML
1-30 INJECTION, SOLUTION EPIDURAL; INFILTRATION; INTRACAUDAL; PERINEURAL
Status: DISCONTINUED | OUTPATIENT
Start: 2017-09-01 | End: 2017-09-01

## 2017-09-01 RX ORDER — PROPOFOL 10 MG/ML
INJECTION, EMULSION INTRAVENOUS AS NEEDED
Status: DISCONTINUED | OUTPATIENT
Start: 2017-09-01 | End: 2017-09-01 | Stop reason: HOSPADM

## 2017-09-01 RX ORDER — NITROGLYCERIN 0.4 MG/1
0.4 TABLET SUBLINGUAL
Status: DISCONTINUED | OUTPATIENT
Start: 2017-09-01 | End: 2017-09-02 | Stop reason: HOSPADM

## 2017-09-01 RX ORDER — SODIUM CHLORIDE 0.9 % (FLUSH) 0.9 %
5-10 SYRINGE (ML) INJECTION EVERY 8 HOURS
Status: DISCONTINUED | OUTPATIENT
Start: 2017-09-01 | End: 2017-09-01

## 2017-09-01 RX ORDER — SODIUM CHLORIDE 0.9 % (FLUSH) 0.9 %
5-10 SYRINGE (ML) INJECTION AS NEEDED
Status: DISCONTINUED | OUTPATIENT
Start: 2017-09-01 | End: 2017-09-01

## 2017-09-01 RX ORDER — MIDAZOLAM HYDROCHLORIDE 1 MG/ML
INJECTION, SOLUTION INTRAMUSCULAR; INTRAVENOUS AS NEEDED
Status: DISCONTINUED | OUTPATIENT
Start: 2017-09-01 | End: 2017-09-01 | Stop reason: HOSPADM

## 2017-09-01 RX ORDER — FUROSEMIDE 40 MG/1
40 TABLET ORAL DAILY
Status: DISCONTINUED | OUTPATIENT
Start: 2017-09-02 | End: 2017-09-02 | Stop reason: HOSPADM

## 2017-09-01 RX ORDER — INDAPAMIDE 2.5 MG/1
2.5 TABLET, FILM COATED ORAL DAILY
Status: DISCONTINUED | OUTPATIENT
Start: 2017-09-02 | End: 2017-09-02 | Stop reason: HOSPADM

## 2017-09-01 RX ORDER — ATORVASTATIN CALCIUM 40 MG/1
80 TABLET, FILM COATED ORAL DAILY
Status: DISCONTINUED | OUTPATIENT
Start: 2017-09-02 | End: 2017-09-02 | Stop reason: HOSPADM

## 2017-09-01 RX ORDER — CEFAZOLIN SODIUM 2 G/50ML
2 SOLUTION INTRAVENOUS
Status: DISCONTINUED | OUTPATIENT
Start: 2017-09-01 | End: 2017-09-01

## 2017-09-01 RX ORDER — HEPARIN SODIUM 200 [USP'U]/100ML
500 INJECTION, SOLUTION INTRAVENOUS ONCE
Status: COMPLETED | OUTPATIENT
Start: 2017-09-01 | End: 2017-09-01

## 2017-09-01 RX ORDER — LISINOPRIL 10 MG/1
10 TABLET ORAL DAILY
Status: DISCONTINUED | OUTPATIENT
Start: 2017-09-02 | End: 2017-09-02 | Stop reason: HOSPADM

## 2017-09-01 RX ORDER — OXYCODONE AND ACETAMINOPHEN 5; 325 MG/1; MG/1
1 TABLET ORAL
Status: DISCONTINUED | OUTPATIENT
Start: 2017-09-01 | End: 2017-09-02 | Stop reason: HOSPADM

## 2017-09-01 RX ORDER — NALOXONE HYDROCHLORIDE 0.4 MG/ML
0.4 INJECTION, SOLUTION INTRAMUSCULAR; INTRAVENOUS; SUBCUTANEOUS AS NEEDED
Status: DISCONTINUED | OUTPATIENT
Start: 2017-09-01 | End: 2017-09-01

## 2017-09-01 RX ADMIN — LIDOCAINE HYDROCHLORIDE 30 ML: 10 INJECTION, SOLUTION EPIDURAL; INFILTRATION; INTRACAUDAL; PERINEURAL at 08:44

## 2017-09-01 RX ADMIN — PROPOFOL 50 MG: 10 INJECTION, EMULSION INTRAVENOUS at 08:40

## 2017-09-01 RX ADMIN — OXYCODONE HYDROCHLORIDE AND ACETAMINOPHEN 1 TABLET: 5; 325 TABLET ORAL at 13:00

## 2017-09-01 RX ADMIN — CEFAZOLIN SODIUM 2 G: 2 SOLUTION INTRAVENOUS at 08:11

## 2017-09-01 RX ADMIN — LIDOCAINE HYDROCHLORIDE 17 ML: 10 INJECTION, SOLUTION EPIDURAL; INFILTRATION; INTRACAUDAL; PERINEURAL at 08:50

## 2017-09-01 RX ADMIN — OXYCODONE HYDROCHLORIDE AND ACETAMINOPHEN 1 TABLET: 5; 325 TABLET ORAL at 17:55

## 2017-09-01 RX ADMIN — Medication 10 ML: at 14:00

## 2017-09-01 RX ADMIN — IOPAMIDOL 15 ML: 612 INJECTION, SOLUTION INTRAVENOUS at 08:45

## 2017-09-01 RX ADMIN — PROPOFOL 30 MG: 10 INJECTION, EMULSION INTRAVENOUS at 08:35

## 2017-09-01 RX ADMIN — HEPARIN SODIUM IN SODIUM CHLORIDE 1000 UNITS: 200 INJECTION INTRAVENOUS at 08:38

## 2017-09-01 RX ADMIN — PROPOFOL 50 MG: 10 INJECTION, EMULSION INTRAVENOUS at 09:00

## 2017-09-01 RX ADMIN — CEFAZOLIN SODIUM 1 G: 1 INJECTION, POWDER, FOR SOLUTION INTRAMUSCULAR; INTRAVENOUS at 09:10

## 2017-09-01 RX ADMIN — SODIUM CHLORIDE, SODIUM LACTATE, POTASSIUM CHLORIDE, AND CALCIUM CHLORIDE: 600; 310; 30; 20 INJECTION, SOLUTION INTRAVENOUS at 07:55

## 2017-09-01 RX ADMIN — CARVEDILOL 25 MG: 25 TABLET, FILM COATED ORAL at 17:55

## 2017-09-01 RX ADMIN — FAMOTIDINE 20 MG: 10 INJECTION, SOLUTION INTRAVENOUS at 08:10

## 2017-09-01 RX ADMIN — Medication 10 ML: at 08:00

## 2017-09-01 RX ADMIN — MIDAZOLAM HYDROCHLORIDE 2 MG: 1 INJECTION, SOLUTION INTRAMUSCULAR; INTRAVENOUS at 08:15

## 2017-09-01 RX ADMIN — CEFAZOLIN SODIUM 2 G: 2 SOLUTION INTRAVENOUS at 17:56

## 2017-09-01 RX ADMIN — PROPOFOL 50 MG: 10 INJECTION, EMULSION INTRAVENOUS at 08:45

## 2017-09-01 RX ADMIN — OXYCODONE HYDROCHLORIDE AND ACETAMINOPHEN 1 TABLET: 5; 325 TABLET ORAL at 22:57

## 2017-09-01 RX ADMIN — PROPOFOL 20 MG: 10 INJECTION, EMULSION INTRAVENOUS at 08:30

## 2017-09-01 NOTE — ROUTINE PROCESS
TRANSFER - OUT REPORT:    Verbal report given to Bassem Palmer RN (name) on [employer]  being transferred to [facility] ([facility]) for routine progression of care       Report consisted of patients Situation, Background, Assessment and   Recommendations(SBAR). Information from the following report(s) SBAR, Procedure Summary and MAR was reviewed with the receiving nurse. Lines:   Peripheral IV 88/34/23 Left Basilic (Active)   Site Assessment Clean, dry, & intact 9/1/2017  9:03 AM   Phlebitis Assessment 0 9/1/2017  9:03 AM   Infiltration Assessment 0 9/1/2017  9:03 AM   Dressing Status Clean, dry, & intact 9/1/2017  9:03 AM   Dressing Type Transparent 9/1/2017  9:03 AM   Hub Color/Line Status Pink;Patent; Flushed 9/1/2017  9:03 AM   Alcohol Cap Used No 9/1/2017  9:03 AM       Peripheral IV 09/01/17 Left Hand (Active)   Site Assessment Clean, dry, & intact 9/1/2017  9:03 AM   Phlebitis Assessment 0 9/1/2017  9:03 AM   Dressing Status Clean, dry, & intact 9/1/2017  9:03 AM   Dressing Type Transparent 9/1/2017  9:03 AM   Hub Color/Line Status Blue;Patent; Flushed 9/1/2017  9:03 AM   Alcohol Cap Used No 9/1/2017  9:03 AM        Opportunity for questions and clarification was provided.       Patient transported with:   [employer]

## 2017-09-01 NOTE — IP AVS SNAPSHOT
[address] [address] 
[phone] Patient: Mckayla Curry MRN: [id number] CBD:8/9/0814 You are allergic to the following No active allergies Recent Documentation Height Weight Breastfeeding? BMI OB Status Smoking Status 1.626 m 109 kg No 41.23 kg/m2 Hysterectomy Former Smoker Emergency Contacts Name Discharge Info Relation Home Work Mobile Bette George DISCHARGE CAREGIVER [3] Daughter [21] [facility] DISCHARGE CAREGIVER [3] Mother [14] [phone] Nat Shultz DISCHARGE CAREGIVER [3] Daughter [21] [phone] Keyla BELL DISCHARGE CAREGIVER [3] Boyfriend [17] [phone] About your hospitalization You were admitted on:  September 1, 2017 You last received care in the:  [facility] 2 CV STEPDOWN You were discharged on:  September 2, 2017 Unit phone number:  [phone] Why you were hospitalized Your primary diagnosis was:  Not on File Your diagnoses also included:  S/P Icd (Internal Cardiac Defibrillator) Procedure, Aicd (Automatic Cardioverter/Defibrillator) Present Providers Seen During Your Hospitalizations Provider Role Specialty Primary office phone Lakshmi Henry MD Attending Provider Cardiology [phone] Your Primary Care Physician (PCP) Primary Care Physician Office Phone Office [facility]ax [facility] [phone] Follow-up Information Follow up With Details Comments Contact Info Maite Greenwood MD Schedule an appointment as soon as possible for a visit in 1 week Will call with appointment date and time  [facility] [address] [address] [facility] [address] [phone] Kathi Briggs RN Schedule an appointment as soon as possible for a visit in 2 weeks wound check, will call with appointment date and time  Estella [address] [place] [address] [address] [facility] [address] 
[phone] Your Appointments Friday September 22, 2017  3:00 PM EDT New Patient with Maite Greenwood MD  
[address] [facility] Tiffany Correa [facility] [place] [id number] [phone] Current Discharge Medication List  
  
START taking these medications Dose & Instructions Dispensing Information Comments Morning Noon Evening Bedtime  
 oxyCODONE-acetaminophen 5-325 mg per tablet Commonly known as:  PERCOCET Your last dose was:  9/2/17 @ 10:30am  
   
 Dose:  1 Tab Take 1 Tab by mouth every six (6) hours as needed. Max Daily Amount: 4 Tabs. Quantity:  30 Tab Refills:  0 CONTINUE these medications which have NOT CHANGED Dose & Instructions Dispensing Information Comments Morning Noon Evening Bedtime  
 albuterol 1.25 mg/3 mL Nebu Commonly known as:  Kathryn Meaghan Dose:  1.25 mg Take 3 mL by inhalation every four (4) hours as needed (wheezing). Quantity:  25 Each Refills:  0  
     
   
   
   
  
 aspirin 81 mg chewable tablet Dose:  81 mg Take 81 mg by mouth daily. Refills:  0  
     
  
   
   
   
  
 atorvastatin 80 mg tablet Commonly known as:  LIPITOR Dose:  80 mg Take 80 mg by mouth daily. Pt. to take at night Refills:  0  
     
   
   
  
   
  
 carvedilol 25 mg tablet Commonly known as:  Karyn Finder Dose:  25 mg Take 25 mg by mouth two (2) times daily (with meals). Refills:  0  
     
  
   
   
  
   
  
 furosemide 40 mg tablet Commonly known as:  LASIX Dose:  40 mg Take 1 Tab by mouth daily. Quantity:  30 Tab Refills:  8  
     
  
   
   
   
  
 indapamide 2.5 mg tablet Commonly known as:  Angely Kaykay Dose:  2.5 mg Take 2.5 mg by mouth daily. To take in AM. Refills:  0  
     
  
   
   
   
  
 lisinopril 10 mg tablet Commonly known as:  Rock Yennifer Dose:  10 mg Take 1 Tab by mouth daily. Quantity:  30 Tab Refills:  6  
     
  
   
   
   
  
 methIMAzole 10 mg tablet Commonly known as:  TAPAZOLE Take  by mouth daily. Refills:  0  
     
  
   
   
   
  
 nitroglycerin 0.4 mg SL tablet Commonly known as:  NITROSTAT Dose:  0.4 mg  
1 Tab by SubLINGual route every five (5) minutes as needed for Chest Pain. Quantity:  1 Bottle Refills:  1  
     
   
   
   
  
 spironolactone 25 mg tablet Commonly known as:  ALDACTONE Dose:  25 mg Take 1 Tab by mouth daily. Quantity:  30 Tab Refills:  0 Where to Get Your Medications Information on where to get these meds will be given to you by the nurse or doctor. ! Ask your nurse or doctor about these medications  
  oxyCODONE-acetaminophen 5-325 mg per tablet Discharge Instructions Disposition: 
Will need follow-up with device/wound check in 7-10 days in my office. Please contact office at [phone] to confirm appointment. Main Office:   
[address], [address] [place] [address] Restrictions: For affected arm:  No lifting greater than 10 lbs or lifting elbow above shoulder for 4 weeks. Keep incision clean and dry for a total of 72 hours after procedure. Remove dressing in 24 hours if not already removed. Please remove the steristrips (small white adhesive strips over wound) after 7 days if they have not already fallen off. No hot tubs or pools for 2 weeks. OK to shower with \"pat\" dry incision after 72 hours. No driving ideally for 2 weeks due to concern for airbag. Pacemaker Placement for Heart Failure: What to Expect at Home Your Recovery A pacemaker for heart failure is a biventricular pacemaker (say \"by-angela-TRICK-yuh-román\"). Treatment that uses this type of pacemaker is called cardiac resynchronization therapy (CRT). When you have heart failure, the lower chambers of your heart may not pump at the same time. The pacemaker sends painless electrical signals to your heart. These signals make the chambers pump at the same time. This can help your heart pump blood better and help you feel better. Your pacemaker may be combined with an ICD, or implantable cardioverter-defibrillator. It can control abnormal heart rhythms. This can prevent sudden death. Your chest may be sore where the doctor made the cut (incision) and put in the pacemaker. You also may have a bruise and mild swelling. These symptoms usually get better in 1 to 2 weeks. You may feel a hard ridge along the incision. This usually gets softer in the months after surgery. You may be able to see or feel the outline of the pacemaker under your skin. You will probably be able to go back to work or your usual routine 1 to 2 weeks after surgery. Pacemaker batteries usually last 5 to 15 years. Your doctor will talk to you about how often you will need to have your pacemaker checked. You can safely use most household and office electronics such as kitchen appliances, electric power tools, and computers. You will need to stay away from things with strong magnetic and electrical fields such as an MRI machine (unless your pacemaker is safe for an MRI), welding equipment, and power generators. You can use a cell phone, but keep it at least 6 inches away from your pacemaker. Check with your doctor about what you need to stay away from, what you need to use with care, and what is okay to use. This care sheet gives you a general idea about how long it will take for you to recover. But each person recovers at a different pace. Follow the steps below to get better as quickly as possible. How can you care for yourself at home? Activity · Rest when you feel tired. · Be active. Walking is a good choice. · For 4 to 6 weeks: ¨ Avoid activities that strain your chest or upper arm muscles. This includes pushing a  or vacuum, mopping floors, swimming, or swinging a golf club or tennis racquet. ¨ Do not raise your arm (the one on the side of your body where the pacemaker is located) above your shoulder. ¨ Allow your body to heal. Don't move quickly or lift anything heavy until you are feeling better. · Many people are able to return to work within 1 to 2 weeks after surgery. · Ask your doctor when it is okay for you to have sex. Diet · You can eat your normal diet. If your stomach is upset, try bland, low-fat foods like plain rice, broiled chicken, toast, and yogurt. Medicines · Your doctor will tell you if and when you can restart your medicines. He or she will also give you instructions about taking any new medicines. · If you take aspirin or some other blood thinner, be sure to talk to your doctor. He or she will tell you if and when to start taking this medicine again. Make sure that you understand exactly what your doctor wants you to do. · Be safe with medicines. Read and follow all instructions on the label. ¨ If the doctor gave you a prescription medicine for pain, take it as prescribed. ¨ If you are not taking a prescription pain medicine, ask your doctor if you can take an over-the-counter medicine. ¨ Do not take aspirin, ibuprofen (Advil, Motrin), naproxen (Aleve), or other nonsteroidal anti-inflammatory drugs (NSAIDs) unless your doctor says it is okay. · If your doctor prescribed antibiotics, take them as directed. Do not stop taking them just because you feel better. You need to take the full course of antibiotics. Incision care · If you have strips of tape on the incision, leave the tape on for a week or until it falls off. · Keep the incision dry while it heals. Your doctor may recommend sponge baths for about 7 days, but do not get the incision wet.  Your doctor will let you know when you may take showers. After a shower, pat the incision dry. · Don't use hydrogen peroxide or alcohol on the incision, which can slow healing. You may cover the area with a gauze bandage if it oozes fluid or rubs against clothing. Change the bandage every day. · Do not take a bath or get into a hot tub for the first 2 weeks, or until your doctor tells you it is okay. Other instructions · Keep a medical ID card with you at all times that says you have a pacemaker. The card should include the  and model information. · Wear medical alert jewelry stating that you have a pacemaker. You can buy this at most [employer]. · Check your pulse as directed by your doctor. · Have your pacemaker checked as often as your doctor recommends. In some cases, this may be done over the phone or the Internet. Your doctor will give you instructions about how to do this. Follow-up care is a key part of your treatment and safety. Be sure to make and go to all appointments, and call your doctor if you are having problems. It's also a good idea to know your test results and keep a list of the medicines you take. When should you call for help? Call 911 anytime you think you may need emergency care. For example, call if: 
· You passed out (lost consciousness). · You have severe trouble breathing. · You have sudden chest pain and shortness of breath, or you cough up blood. · You have symptoms of a heart attack. These may include: ¨ Chest pain or pressure, or a strange feeling in the chest. 
¨ Sweating. ¨ Shortness of breath. ¨ Nausea or vomiting. ¨ Pain, pressure, or a strange feeling in the back, neck, jaw, or upper belly or in one or both shoulders or arms. ¨ Lightheadedness or sudden weakness. ¨ A fast or irregular heartbeat. After you call 911, the  may tell you to chew 1 adult-strength or 2 to 4 low-dose aspirin. Wait for an ambulance. Do not try to drive yourself. · You have symptoms of a stroke. These may include: 
¨ Sudden numbness, tingling, weakness, or loss of movement in your face, arm, or leg, especially on only one side of your body. ¨ Sudden vision changes. ¨ Sudden trouble speaking. ¨ Sudden confusion or trouble understanding simple statements. ¨ Sudden problems with walking or balance. ¨ A sudden, severe headache that is different from past headaches. Call your doctor now or seek immediate medical care if: 
· Your heartbeat feels very fast or slow, skips beats, or flutters. · You are dizzy or lightheaded, or you feel like you may faint. · You have new or increased shortness of breath. · You have pain that does not get better after you take pain medicine. · You hear an alarm or feel a vibration from your pacemaker. · You have loose stitches, or your incision comes open. · Bright red blood has soaked through the bandage over your incision. · You have signs of infection, such as: 
¨ Increased pain, swelling, warmth, or redness. ¨ Red streaks leading from the incision. ¨ Pus draining from the incision. ¨ A fever. · You have signs of a blood clot, such as: 
¨ Pain in your arm on the same side that the pacemaker is placed, or in your calf, back of the knee, thigh, or groin. ¨ Redness and swelling in your arm on the same side that the pacemaker is placed, or in your leg or groin. Watch closely for changes in your health, and be sure to contact your doctor if: 
· You have any problems with your pacemaker. DISCHARGE SUMMARY from Nurse The following personal items are in your possession at time of discharge: 
 
Dental Appliances: None Visual Aid: None Home Medications: None Jewelry: None Clothing: At bedside Other Valuables: Cell Phone, [place] PATIENT INSTRUCTIONS: 
 
 
F-face looks uneven A-arms unable to move or move unevenly S-speech slurred or non-existent T-time-call 911 as soon as signs and symptoms begin-DO NOT go Back to bed or wait to see if you get better-TIME IS BRAIN. Warning Signs of HEART ATTACK Call 911 if you have these symptoms: 
? Chest discomfort. Most heart attacks involve discomfort in the center of the chest that lasts more than a few minutes, or that goes away and comes back. It can feel like uncomfortable pressure, squeezing, fullness, or pain. ? Discomfort in other areas of the upper body. Symptoms can include pain or discomfort in one or both arms, the back, neck, jaw, or stomach. ? Shortness of breath with or without chest discomfort. ? Other signs may include breaking out in a cold sweat, nausea, or lightheadedness. Don't wait more than five minutes to call [address]! Fast action can save your life. Calling 911 is almost always the fastest way to get lifesaving treatment. Emergency Medical Services staff can begin treatment when they arrive  up to an hour sooner than if someone gets to the hospital by car. The discharge information has been reviewed with the patient. The patient verbalized understanding. Discharge medications reviewed with the patient and appropriate educational materials and side effects teaching were provided. Discharge Instructions Attachments/References OXYCODONE/ACETAMINOPHEN (BY MOUTH) (ENGLISH) Discharge Orders None [facility] Announcement We are excited to announce that we are making your provider's discharge notes available to you in [employer]. You will see these notes when they are completed and signed by the physician that discharged you from your recent hospital stay.   If you have any questions or concerns about any information you see in [employer], please call the [facility] Department where you were seen or reach out to your Primary Care Provider for more information about your plan of care. Introducing [facility]! Ifrah Sellers introduces [employer] patient portal. Now you can access parts of your medical record, email your doctor's office, and request medication refills online. 1. In your internet browser, go to [email or web address] 2. Click on the First Time User? Click Here link in the Sign In box. You will see the New Member Sign Up page. 3. Enter your [facility] Access Code exactly as it appears below. You will not need to use this code after youve completed the sign-up process. If you do not sign up before the expiration date, you must request a new code. · [facility] Access Code: [email or web address] Expires: 9/25/2017 10:05 AM 
 
4. Enter the last four digits of your Social Security Number (xxxx) and Date of Birth (mm/dd/yyyy) as indicated and click Submit. You will be taken to the next sign-up page. 5. Create a [facility] ID. This will be your [facility] login ID and cannot be changed, so think of one that is secure and easy to remember. 6. Create a [facility] password. You can change your password at any time. 7. Enter your Password Reset Question and Answer. This can be used at a later time if you forget your password. 8. Enter your e-mail address. You will receive e-mail notification when new information is available in [address] 9. Click Sign Up. You can now view and download portions of your medical record. 10. Click the Download Summary menu link to download a portable copy of your medical information. If you have questions, please visit the Frequently Asked Questions section of the [employer] website. Remember, [employer] [facility] is NOT to be used for urgent needs. For medical emergencies, dial 911. Now available from your iPhone and Android! General Information Please provide this summary of care documentation to your next provider. Patient Signature:  ____________________________________________________________ Date:  ____________________________________________________________  
  
Sharron Rosen Provider Signature:  ____________________________________________________________ Date:  ____________________________________________________________ More Information Oxycodone/Acetaminophen (By mouth) Acetaminophen (f-zyhk-z-MIN-oh-fen), Oxycodone Hydrochloride (tw-v-PCK-done elizabeth-droe-KLOR-deangelo) Treats moderate to moderately severe pain. This medicine is a narcotic pain reliever. Brand Name(s): Endocet, Percocet, Primlev, Roxicet, Xartemis XR There may be other brand names for this medicine. When This Medicine Should Not Be Used: This medicine is not right for everyone. Do not use it if you had an allergic reaction to acetaminophen or oxycodone, or if you have serious breathing problems or paralytic ileus. How to Use This Medicine:  
Capsule, Liquid, Tablet, Long Acting Tablet · Your doctor will tell you how much medicine to use. Do not use more than directed. · An overdose can be dangerous. Follow directions carefully so you do not get too much medicine at one time. · Oral liquid: Measure the oral liquid medicine with a marked measuring spoon, oral syringe, or medicine cup. · Swallow the extended-release tablet whole. Do not crush, break, or chew it. Do not lick or wet the tablet before placing it in your mouth. Do not give this medicine through a feeding tube. · This medicine should come with a Medication Guide. Ask your pharmacist for a copy if you do not have one. · Missed dose: If you miss a dose of this medicine, skip the missed dose and go back to your regular dosing schedule. Do not double doses.  
· Store the medicine in a closed container at room temperature, away from heat, moisture, and direct light. Ask your pharmacist about the best way to dispose of medicine you do not use. Drugs and Foods to Avoid: Ask your doctor or pharmacist before using any other medicine, including over-the-counter medicines, vitamins, and herbal products. · Do not use Xartemis XR if you are using or have used an MAO inhibitor in the past 14 days. · Some medicines can affect how this medicine works. Tell your doctor if you are using any of the following: ¨ Carbamazepine, erythromycin, ketoconazole, lamotrigine, mirtazapine, naltrexone, phenytoin, propranolol, rifampin, ritonavir, tramadol, trazodone, or zidovudine ¨ Birth control pills ¨ Diuretic (water pill) ¨ Medicine to treat depression ¨ Phenothiazine medicine ¨ Triptan medicine to treat migraine headaches · Do not drink alcohol while you are using this medicine. Acetaminophen can damage your liver, and alcohol can increase this risk. Do not take acetaminophen without asking your doctor if you have 3 or more drinks of alcohol every day. · Tell your doctor if you use anything else that makes you sleepy. Some examples are allergy medicine, narcotic pain medicine, and alcohol. Tell your doctor if you are using buprenorphine, butorphanol, nalbuphine, pentazocine, a benzodiazepine, or a muscle relaxer. Warnings While Using This Medicine: · Tell your doctor if you are pregnant or breastfeeding, or if you have kidney disease, liver disease, heart disease, low blood pressure, breathing problems or lung disease (such as asthma, COPD), thyroid problems, [place] disease, pancreas or gallbladder problems, prostate problems, trouble urinating, or a stomach problems, or a history of head injury or brain damage, seizures, or alcohol or drug abuse. Tell your doctor if you are allergic to codeine. · This medicine may cause the following problems: 
¨ High risk of overdose, which can lead to death ¨ Respiratory depression (serious breathing problem that can be life-threatening) ¨ Liver problems ¨ Serious skin reactions ¨ Serotonin syndrome (when used with certain medicines) · This medicine may make you dizzy or drowsy. Do not drive or do anything that could be dangerous until you know how this medicine affects you. Sit or lie down if you feel dizzy. Stand up carefully. · This medicine contains acetaminophen. Read the labels of all other medicines you are using to see if they also contain acetaminophen, or ask your doctor or pharmacist. Leopoldo Freeze not use more than 4 grams (4,000 milligrams) total of acetaminophen in one day. · This medicine can be habit-forming. Do not use more than your prescribed dose. Call your doctor if you think your medicine is not working. · Do not stop using this medicine suddenly. Your doctor will need to slowly decrease your dose before you stop it completely. · This medicine could cause infertility. Talk with your doctor before using this medicine if you plan to have children. · This medicine may cause constipation, especially with long-term use. Ask your doctor if you should use a laxative to prevent and treat constipation. · Keep all medicine out of the reach of children. Never share your medicine with anyone. Possible Side Effects While Using This Medicine:  
Call your doctor right away if you notice any of these side effects: · Allergic reaction: Itching or hives, swelling in your face or hands, swelling or tingling in your mouth or throat, chest tightness, trouble breathing · Anxiety, restlessness, fast heartbeat, fever, muscle spasms, twitching, diarrhea, seeing or hearing things that are not there · Blistering, peeling, red skin rash · Blue lips, fingernails, or skin · Dark urine or pale stools, loss of appetite, stomach pain, yellow skin or eyes · Extreme weakness, shallow breathing, uneven heartbeat, seizures, sweating, or cold or clammy skin · Severe confusion, lightheadedness, dizziness, or fainting · Severe constipation, nausea, or vomiting · Trouble breathing or slow breathing If you notice these less serious side effects, talk with your doctor:  
· Headache · Mild constipation, nausea, or vomiting · Mild sleepiness or drowsiness If you notice other side effects that you think are caused by this medicine, tell your doctor. Call your doctor for medical advice about side effects. You may report side effects to FDA at 1-053-UGE-7684 © 2017 [address]  Information is for End User's use only and may not be sold, redistributed or otherwise used for commercial purposes. The above information is an  only. It is not intended as medical advice for individual conditions or treatments. Talk to your doctor, nurse or pharmacist before following any medical regimen to see if it is safe and effective for you.

## 2017-09-01 NOTE — ANESTHESIA POSTPROCEDURE EVALUATION
Post-Anesthesia Evaluation and Assessment    Patient: Ifrah Paniagua MRN: [id number]  SSN: [id number]    YOB: 1970  Age: 40 y.o. Sex: female       Cardiovascular Function/Vital Signs  Visit Vitals    /75    Pulse 74    Temp 35.9 °C (96.6 °F)    Resp 17    Ht 5' 4\" (1.626 m)    Wt 109.3 kg (241 lb)    SpO2 100%    Breastfeeding No    BMI 41.37 kg/m2       Patient is status post MAC anesthesia for * No procedures listed *. Nausea/Vomiting: None    Postoperative hydration reviewed and adequate. Pain:  Pain Scale 1: (P) Numeric (0 - 10) (09/01/17 7851)   Managed    Neurological Status: At baseline    Mental Status and Level of Consciousness: Arousable    Pulmonary Status:   O2 Device: Room air (09/01/17 0943)   Adequate oxygenation and airway patent    Complications related to anesthesia: None    Post-anesthesia assessment completed.  No concerns    Signed By: Jose Alfredo Root MD     September 1, 2017

## 2017-09-01 NOTE — ROUTINE PROCESS
0935: Pt received from cath lab, alert and oriented x4, denies pain. Left chest surgical site c/d/i, verbalizes understanding of arm restrictions. Yeeply Mobile at bedside for education  1100: CXR results complete, Pt tolerating po, denies nausea. Surgical site c/d/i. Report called to [facility], RN in CVT Stepdown  1130: Bedrest complete, pt ambulated to bathroom; voided clear yellow urine. Escorted to CVT Stepdown via wheelchair.

## 2017-09-01 NOTE — ANESTHESIA PREPROCEDURE EVALUATION
Anesthetic History               Review of Systems / Medical History  Patient summary reviewed, nursing notes reviewed and pertinent labs reviewed    Pulmonary          Undiagnosed apnea  Asthma : well controlled       Neuro/Psych              Cardiovascular    Hypertension          CAD         GI/Hepatic/Renal  Within defined limits              Endo/Other        Morbid obesity     Other Findings   Comments: Risk Factors for Postoperative nausea/vomiting:       History of postoperative nausea/vomiting? NO       Female? YES       Motion sickness? NO       Intended opioid administration for postoperative analgesia? NO      Smoking Abstinence  Current Smoker? NO  Elective Surgery? YES  Seen preoperatively by anesthesiologist or proxy prior to day of surgery? YES  Pt abstained from smoking 24 hours prior to anesthesia?  N/A         Physical Exam    Airway  Mallampati: III    Neck ROM: short neck   Mouth opening: Diminished (comment)     Cardiovascular    Rhythm: irregular           Dental    Dentition: Poor dentition     Pulmonary  Breath sounds clear to auscultation               Abdominal  GI exam deferred       Other Findings            Anesthetic Plan    ASA: 4  Anesthesia type: MAC            Anesthetic plan and risks discussed with: Patient and Mother

## 2017-09-01 NOTE — ROUTINE PROCESS
TRANSFER - IN REPORT:    Verbal report received from Sela Goldberg, RN (name) on Rosa Lennon  being received from [facility] ([facility]) for routine progression of care      Report consisted of patients Situation, Background, Assessment and   Recommendations(SBAR). Information from the following report(s) SBAR, Procedure Summary and MAR was reviewed with the receiving nurse. Opportunity for questions and clarification was provided. Assessment completed upon patients arrival to unit and care assumed.

## 2017-09-01 NOTE — DISCHARGE INSTRUCTIONS
Disposition:  Will need follow-up with device/wound check in 7-10 days in my office. Please contact office at [phone] to confirm appointment. Main Office:    [address], [place] [address]    Restrictions: For affected arm:  No lifting greater than 10 lbs or lifting elbow above shoulder for 4 weeks. Keep incision clean and dry for a total of 72 hours after procedure. Remove dressing in 24 hours if not already removed. Please remove the steristrips (small white adhesive strips over wound) after 7 days if they have not already fallen off. No hot tubs or pools for 2 weeks. OK to shower with \"pat\" dry incision after 72 hours. No driving ideally for 2 weeks due to concern for airbag. Pacemaker Placement for Heart Failure: What to Expect at [address] pacemaker for heart failure is a biventricular pacemaker (say \"by-angela-TRICK-yuh-ler\"). Treatment that uses this type of pacemaker is called cardiac resynchronization therapy (CRT). When you have heart failure, the lower chambers of your heart may not pump at the same time. The pacemaker sends painless electrical signals to your heart. These signals make the chambers pump at the same time. This can help your heart pump blood better and help you feel better. Your pacemaker may be combined with an ICD, or implantable cardioverter-defibrillator. It can control abnormal heart rhythms. This can prevent sudden death. Your chest may be sore where the doctor made the cut (incision) and put in the pacemaker. You also may have a bruise and mild swelling. These symptoms usually get better in 1 to 2 weeks. You may feel a hard ridge along the incision. This usually gets softer in the months after surgery. You may be able to see or feel the outline of the pacemaker under your skin. You will probably be able to go back to work or your usual routine 1 to 2 weeks after surgery. Pacemaker batteries usually last 5 to 15 years. Your doctor will talk to you about how often you will need to have your pacemaker checked. You can safely use most household and office electronics such as kitchen appliances, electric power tools, and computers. You will need to stay away from things with strong magnetic and electrical fields such as an MRI machine (unless your pacemaker is safe for an MRI), welding equipment, and power generators. You can use a cell phone, but keep it at least 6 inches away from your pacemaker. Check with your doctor about what you need to stay away from, what you need to use with care, and what is okay to use. This care sheet gives you a general idea about how long it will take for you to recover. But each person recovers at a different pace. Follow the steps below to get better as quickly as possible. How can you care for yourself at home? Activity  · Rest when you feel tired. · Be active. Walking is a good choice. · For 4 to 6 weeks:  ¨ Avoid activities that strain your chest or upper arm muscles. This includes pushing a  or vacuum, mopping floors, swimming, or swinging a golf club or tennis racquet. ¨ Do not raise your arm (the one on the side of your body where the pacemaker is located) above your shoulder. ¨ Allow your body to heal. Don't move quickly or lift anything heavy until you are feeling better. · Many people are able to return to work within 1 to 2 weeks after surgery. · Ask your doctor when it is okay for you to have sex. Diet  · You can eat your normal diet. If your stomach is upset, try bland, low-fat foods like plain rice, broiled chicken, toast, and yogurt. Medicines  · Your doctor will tell you if and when you can restart your medicines. He or she will also give you instructions about taking any new medicines. · If you take aspirin or some other blood thinner, be sure to talk to your doctor. He or she will tell you if and when to start taking this medicine again.  Make sure that you understand exactly what your doctor wants you to do. · Be safe with medicines. Read and follow all instructions on the label. ¨ If the doctor gave you a prescription medicine for pain, take it as prescribed. ¨ If you are not taking a prescription pain medicine, ask your doctor if you can take an over-the-counter medicine. ¨ Do not take aspirin, ibuprofen (Advil, Motrin), naproxen (Aleve), or other nonsteroidal anti-inflammatory drugs (NSAIDs) unless your doctor says it is okay. · If your doctor prescribed antibiotics, take them as directed. Do not stop taking them just because you feel better. You need to take the full course of antibiotics. Incision care  · If you have strips of tape on the incision, leave the tape on for a week or until it falls off. · Keep the incision dry while it heals. Your doctor may recommend sponge baths for about 7 days, but do not get the incision wet. Your doctor will let you know when you may take showers. After a shower, pat the incision dry. · Don't use hydrogen peroxide or alcohol on the incision, which can slow healing. You may cover the area with a gauze bandage if it oozes fluid or rubs against clothing. Change the bandage every day. · Do not take a bath or get into a hot tub for the first 2 weeks, or until your doctor tells you it is okay. Other instructions  · Keep a medical ID card with you at all times that says you have a pacemaker. The card should include the  and model information. · Wear medical alert jewelry stating that you have a pacemaker. You can buy this at most [employer]. · Check your pulse as directed by your doctor. · Have your pacemaker checked as often as your doctor recommends. In some cases, this may be done over the phone or the Internet. Your doctor will give you instructions about how to do this. Follow-up care is a key part of your treatment and safety.  Be sure to make and go to all appointments, and call your doctor if you are having problems. It's also a good idea to know your test results and keep a list of the medicines you take. When should you call for help? Call 911 anytime you think you may need emergency care. For example, call if:  · You passed out (lost consciousness). · You have severe trouble breathing. · You have sudden chest pain and shortness of breath, or you cough up blood. · You have symptoms of a heart attack. These may include:  ¨ Chest pain or pressure, or a strange feeling in the chest.  ¨ Sweating. ¨ Shortness of breath. ¨ Nausea or vomiting. ¨ Pain, pressure, or a strange feeling in the back, neck, jaw, or upper belly or in one or both shoulders or arms. ¨ Lightheadedness or sudden weakness. ¨ A fast or irregular heartbeat. After you call 911, the  may tell you to chew 1 adult-strength or 2 to 4 low-dose aspirin. Wait for an ambulance. Do not try to drive yourself. · You have symptoms of a stroke. These may include:  ¨ Sudden numbness, tingling, weakness, or loss of movement in your face, arm, or leg, especially on only one side of your body. ¨ Sudden vision changes. ¨ Sudden trouble speaking. ¨ Sudden confusion or trouble understanding simple statements. ¨ Sudden problems with walking or balance. ¨ A sudden, severe headache that is different from past headaches. Call your doctor now or seek immediate medical care if:  · Your heartbeat feels very fast or slow, skips beats, or flutters. · You are dizzy or lightheaded, or you feel like you may faint. · You have new or increased shortness of breath. · You have pain that does not get better after you take pain medicine. · You hear an alarm or feel a vibration from your pacemaker. · You have loose stitches, or your incision comes open. · Bright red blood has soaked through the bandage over your incision. · You have signs of infection, such as:  ¨ Increased pain, swelling, warmth, or redness.   ¨ Red streaks leading from the incision. ¨ Pus draining from the incision. ¨ A fever. · You have signs of a blood clot, such as:  ¨ Pain in your arm on the same side that the pacemaker is placed, or in your calf, back of the knee, thigh, or groin. ¨ Redness and swelling in your arm on the same side that the pacemaker is placed, or in your leg or groin. Watch closely for changes in your health, and be sure to contact your doctor if:  · You have any problems with your pacemaker. DISCHARGE SUMMARY from Nurse    The following personal items are in your possession at time of discharge:    Dental Appliances: None  Visual Aid: None     Home Medications: None  Jewelry: None  Clothing: At bedside  Other Valuables: Cell Phone, Purse             PATIENT INSTRUCTIONS:    After general anesthesia or intravenous sedation, for 24 hours or while taking prescription Narcotics:  · Limit your activities  · Do not drive and operate hazardous machinery  · Do not make important personal or business decisions  · Do  not drink alcoholic beverages  · If you have not urinated within 8 hours after discharge, please contact your surgeon on call. Report the following to your surgeon:  · Excessive pain, swelling, redness or odor of or around the surgical area  · Temperature over 100.5  · Nausea and vomiting lasting longer than 4 hours or if unable to take medications  · Any signs of decreased circulation or nerve impairment to extremity: change in color, persistent  numbness, tingling, coldness or increase pain  · Any questions        What to do at Home:  Recommended activity: Activity as tolerated, No lifting, Driving, or Strenuous exercise and No driving while on analgesics. *  Please give a list of your current medications to your Primary Care Provider. *  Please update this list whenever your medications are discontinued, doses are      changed, or new medications (including over-the-counter products) are added.     *  Please carry medication information at all times in case of emergency situations. These are general instructions for a healthy lifestyle:    No smoking/ No tobacco products/ Avoid exposure to second hand smoke    Surgeon General's Warning:  Quitting smoking now greatly reduces serious risk to your health. Obesity, smoking, and sedentary lifestyle greatly increases your risk for illness    A healthy diet, regular physical exercise & weight monitoring are important for maintaining a healthy lifestyle    You may be retaining fluid if you have a history of heart failure or if you experience any of the following symptoms:  Weight gain of 3 pounds or more overnight or 5 pounds in a week, increased swelling in our hands or feet or shortness of breath while lying flat in bed. Please call your doctor as soon as you notice any of these symptoms; do not wait until your next office visit. Recognize signs and symptoms of STROKE:    F-face looks uneven    A-arms unable to move or move unevenly    S-speech slurred or non-existent    T-time-call 911 as soon as signs and symptoms begin-DO NOT go       Back to bed or wait to see if you get better-TIME IS BRAIN. Warning Signs of HEART ATTACK     Call 911 if you have these symptoms:   Chest discomfort. Most heart attacks involve discomfort in the center of the chest that lasts more than a few minutes, or that goes away and comes back. It can feel like uncomfortable pressure, squeezing, fullness, or pain.  Discomfort in other areas of the upper body. Symptoms can include pain or discomfort in one or both arms, the back, neck, jaw, or stomach.  Shortness of breath with or without chest discomfort.  Other signs may include breaking out in a cold sweat, nausea, or lightheadedness. Don't wait more than five minutes to call 911 - MINUTES MATTER! Fast action can save your life. Calling 911 is almost always the fastest way to get lifesaving treatment.  Emergency Medical Services staff can begin treatment when they arrive -- up to an hour sooner than if someone gets to the hospital by car. The discharge information has been reviewed with the patient. The patient verbalized understanding. Discharge medications reviewed with the patient and appropriate educational materials and side effects teaching were provided.

## 2017-09-01 NOTE — PROCEDURES
Procedure:  1. Implantation dual chamber Medtronic MRI compatible AICD for primary prevention and heart failure. 2.  MAC sedation by anesthesia. 3.  Left axillary venogram.    Diagnosis:  Primary cardiologist Dr. Corwin Martinez  -Dilated nonischemic cardiomyopathy at 15% diagnosed March 2017. Followup echo 7/18/17 at 30%. -Pharm nuc March 2017 without ischemia.  -Chronic class II systolic heart failure  -LifeVest in place  -h/o HTN  -QRS duration 96 msec, unlikely to benefit from biventricular pacing.  -First degree AV block.  -Coreg + lisinopril therapy > 3 months  -Life expectancy > 1 year    Procedure:  After informed consent was obtained, the patient was brought to the electrophysiology lab in a fasting and nonsedated state. The left chest was prepped and drapped in the usual sterile fashion. A left axillary venogram revealed vein patency. Local lidocaine was infiltratred below the left clavicle. A 4 cm transverse incision was created on the left chest.  Using electrocautery and blunt dissection, a pocket was made and hemostasis confirmed. Into the left axillary vein was inserted two wires and a 10 and 7 [place] sheath. A right ventricular and right atrial lead was positioned under flouroscopic guidance. Extra slack was given due to patient's body habitus. The sheaths were removed and leads were sutured in place. The pocket was washed with antibiotic solution. The generator was attached to the leads, placed in the pocket, and sutured in place with 0-silk suture. The pocket was closed with 2-0 vicryl in two deep layers and the skin was closed with 4-0 monocryl. Steristrips and dressing were applied. The patient left the lab in stable condition. No DFT testing was performed due to difficulty in adequately sedating the patient.       Impression:  -Successful implantation of dual chamber Medtronic AICD for primary prevention.  -Monitor overnight and plan to discharge tomorrow if stable.  -Wound check in the office in 7-10 days, then follow-up primary cardiologist, Dr. Gricel Sanz, in 6 weeks.     Thank you kindly for allowing me to participate in this patient's care.      -----------------------------------------------------------------------------------------------------------------------  AICD Core Measures  Beta-blocker Therapy:   [   ]  Not indicated   [   ]  Intolerant due to [   ]  Allergy/reaction  [   ]  Hypotension   [   ]  Lung disease   [ x  ]  Already prescribed  ACEI/ARB Therapy:   [   ]  Not indicated   [   ]  Intolerant due to [   ]  Allergy/reaction  [   ]  Hypotension   [   ]  Renal disease   [ x  ]  Already prescribed  Indication:  [   ]  Device already in place and changeout due to previous VT or primary prevention  [   ]  Secondary prevention with documented spontaneous or inducible cardiac arrest/VT          Primary prevention for:   [   ]  High risk for VT/VF due to long QT/HCM or other inherited familial condition   [   ]  CAD w/prior MI and inducible VT/VF during EP study   [   ]  Prior MI with class II/III heart failure and EF <= 35%   [ x  ]  Nonischemic cardiomyopathy with EF < 35% and heart failure class II/III

## 2017-09-01 NOTE — IP AVS SNAPSHOT
Jeremy Aburto 
 
 
 [address] [facility] [address] 
[phone] Patient: Ricardo Lesches MRN: [id number] NKV:[id number] Current Discharge Medication List  
  
START taking these medications Dose & Instructions Dispensing Information Comments Morning Noon Evening Bedtime  
 oxyCODONE-acetaminophen 5-325 mg per tablet Commonly known as:  PERCOCET Your last dose was:  9/2/17 @ 10:30am  
   
 Dose:  1 Tab Take 1 Tab by mouth every six (6) hours as needed. Max Daily Amount: 4 Tabs. Quantity:  30 Tab Refills:  0 CONTINUE these medications which have NOT CHANGED Dose & Instructions Dispensing Information Comments Morning Noon Evening Bedtime  
 albuterol 1.25 mg/3 mL Nebu Commonly known as:  Garon Decker Dose:  1.25 mg Take 3 mL by inhalation every four (4) hours as needed (wheezing). Quantity:  25 Each Refills:  0  
     
   
   
   
  
 aspirin 81 mg chewable tablet Dose:  81 mg Take 81 mg by mouth daily. Refills:  0  
     
  
   
   
   
  
 atorvastatin 80 mg tablet Commonly known as:  LIPITOR Dose:  80 mg Take 80 mg by mouth daily. Pt. to take at night Refills:  0  
     
   
   
  
   
  
 carvedilol 25 mg tablet Commonly known as:  Janalyn Montse Dose:  25 mg Take 25 mg by mouth two (2) times daily (with meals). Refills:  0  
     
  
   
   
  
   
  
 furosemide 40 mg tablet Commonly known as:  LASIX Dose:  40 mg Take 1 Tab by mouth daily. Quantity:  30 Tab Refills:  8  
     
  
   
   
   
  
 indapamide 2.5 mg tablet Commonly known as:  Garrison Candace Dose:  2.5 mg Take 2.5 mg by mouth daily. To take in AM. Refills:  0  
     
  
   
   
   
  
 lisinopril 10 mg tablet Commonly known as:  Markus Handing Dose:  10 mg Take 1 Tab by mouth daily. Quantity:  30 Tab Refills:  6  
     
  
   
   
   
  
 methIMAzole 10 mg tablet Commonly known as:  TAPAZOLE Take  by mouth daily. Refills:  0  
     
  
   
   
   
  
 nitroglycerin 0.4 mg SL tablet Commonly known as:  NITROSTAT Dose:  0.4 mg  
1 Tab by SubLINGual route every five (5) minutes as needed for Chest Pain. Quantity:  1 Bottle Refills:  1  
     
   
   
   
  
 spironolactone 25 mg tablet Commonly known as:  ALDACTONE Dose:  25 mg Take 1 Tab by mouth daily. Quantity:  30 Tab Refills:  0 Where to Get Your Medications Information on where to get these meds will be given to you by the nurse or doctor. ! Ask your nurse or doctor about these medications  
  oxyCODONE-acetaminophen 5-325 mg per tablet

## 2017-09-01 NOTE — ROUTINE PROCESS
[address] 35 September 1, 2017       To Whom It May Concern,    This is to certify that Ezra Vizcarra was here at [facility] with her daughter on 9/1/2017. Please feel free to contact my office if you have any questions or concerns. Thank you for your assistance in this matter.       Sincerely,  Darby Bojorquez RN

## 2017-09-02 ENCOUNTER — APPOINTMENT (OUTPATIENT)
Dept: GENERAL RADIOLOGY | Age: 47
End: 2017-09-02
Attending: INTERNAL MEDICINE
Payer: MEDICAID

## 2017-09-02 VITALS
HEART RATE: 61 BPM | OXYGEN SATURATION: 98 % | TEMPERATURE: 97.7 F | HEIGHT: 64 IN | DIASTOLIC BLOOD PRESSURE: 73 MMHG | BODY MASS INDEX: 41.01 KG/M2 | SYSTOLIC BLOOD PRESSURE: 124 MMHG | WEIGHT: 240.2 LBS | RESPIRATION RATE: 16 BRPM

## 2017-09-02 PROCEDURE — 71020 XR CHEST PA LAT: CPT

## 2017-09-02 PROCEDURE — 99218 HC RM OBSERVATION: CPT

## 2017-09-02 PROCEDURE — 74011250637 HC RX REV CODE- 250/637: Performed by: INTERNAL MEDICINE

## 2017-09-02 PROCEDURE — 74011250636 HC RX REV CODE- 250/636: Performed by: INTERNAL MEDICINE

## 2017-09-02 RX ORDER — OXYCODONE AND ACETAMINOPHEN 5; 325 MG/1; MG/1
1 TABLET ORAL
Qty: 30 TAB | Refills: 0 | Status: SHIPPED | OUTPATIENT
Start: 2017-09-02 | End: 2017-12-13 | Stop reason: ALTCHOICE

## 2017-09-02 RX ADMIN — LISINOPRIL 10 MG: 10 TABLET ORAL at 10:31

## 2017-09-02 RX ADMIN — CEFAZOLIN SODIUM 2 G: 2 SOLUTION INTRAVENOUS at 01:43

## 2017-09-02 RX ADMIN — OXYCODONE HYDROCHLORIDE AND ACETAMINOPHEN 1 TABLET: 5; 325 TABLET ORAL at 10:30

## 2017-09-02 RX ADMIN — FUROSEMIDE 40 MG: 40 TABLET ORAL at 10:31

## 2017-09-02 RX ADMIN — ATORVASTATIN CALCIUM 80 MG: 40 TABLET, FILM COATED ORAL at 10:30

## 2017-09-02 RX ADMIN — METHIMAZOLE 10 MG: 10 TABLET ORAL at 10:30

## 2017-09-02 RX ADMIN — Medication 10 ML: at 06:14

## 2017-09-02 RX ADMIN — INDAPAMIDE 2.5 MG: 2.5 TABLET, FILM COATED ORAL at 10:31

## 2017-09-02 RX ADMIN — SPIRONOLACTONE 25 MG: 25 TABLET, FILM COATED ORAL at 10:31

## 2017-09-02 RX ADMIN — ASPIRIN 81 MG 81 MG: 81 TABLET ORAL at 10:31

## 2017-09-02 RX ADMIN — CARVEDILOL 25 MG: 25 TABLET, FILM COATED ORAL at 10:31

## 2017-09-02 RX ADMIN — OXYCODONE HYDROCHLORIDE AND ACETAMINOPHEN 1 TABLET: 5; 325 TABLET ORAL at 06:14

## 2017-09-02 NOTE — ROUTINE PROCESS
Bedside and Verbal shift change report given to Anastacio Curiel RN (oncoming nurse) by Tahira Simpson RN (offgoing nurse). Report included the following information SBAR, Kardex, ED Summary, Intake/Output, MAR, Med Rec Status, I/O, and daily weight.

## 2017-09-02 NOTE — DISCHARGE SUMMARY
[facility]  -  Progress Note      Discharge Summary     Patient: Domenica Ramon MRN: [id number]  SSN: [id number]    YOB: 1970  Age: 56 y.o. Sex: female       Admit Date: 9/1/2017    Discharge Date: 9/2/2017      Admission Diagnoses: Heart failure, unspecified [I50.9]    Discharge Diagnoses:   Problem List as of 9/2/2017  Date Reviewed: 8/31/2017          Codes Class Noted - Resolved    S/P ICD (internal cardiac defibrillator) procedure ICD-10-CM: Z95.810  ICD-9-CM: V45.02  9/1/2017 - Present        AICD (automatic cardioverter/defibrillator) present ICD-10-CM: Z95.810  ICD-9-CM: V45.02  9/1/2017 - Present        LV dysfunction ICD-10-CM: I51.9  ICD-9-CM: 429.9  7/11/2017 - Present        Essential hypertension ICD-10-CM: I10  ICD-9-CM: 401.9  6/27/2017 - Present        Fluid overload ICD-10-CM: E87.70  ICD-9-CM: 276.69  3/21/2017 - Present        Acute on chronic congestive heart failure ([facility] [address] ICD-10-CM: I50.9  ICD-9-CM: 428.0  3/21/2017 - Present               Discharge Condition: Good    Hospital Course:  Patient admitted overnight following dual chamber AICD implant. No events overnight. CXR showed stable lead position without evidence of pneumothorax. Device interrogation normal this am.      Consults: None    Significant Diagnostic Studies: none    Disposition: home    Discharge Medications:   Current Discharge Medication List      CONTINUE these medications which have NOT CHANGED    Details   furosemide (LASIX) 40 mg tablet Take 1 Tab by mouth daily. Qty: 30 Tab, Refills: 8      methIMAzole (TAPAZOLE) 10 mg tablet Take  by mouth daily. carvedilol (COREG) 25 mg tablet Take 25 mg by mouth two (2) times daily (with meals). lisinopril (PRINIVIL, ZESTRIL) 10 mg tablet Take 1 Tab by mouth daily. Qty: 30 Tab, Refills: 6      spironolactone (ALDACTONE) 25 mg tablet Take 1 Tab by mouth daily.   Qty: 30 Tab, Refills: 0      atorvastatin (LIPITOR) 80 mg tablet Take 80 mg by mouth daily. Pt. to take at night      indapamide (LOZOL) 2.5 mg tablet Take 2.5 mg by mouth daily. To take in AM.      albuterol (ACCUNEB) 1.25 mg/3 mL nebu Take 3 mL by inhalation every four (4) hours as needed (wheezing). Qty: 25 Each, Refills: 0      aspirin 81 mg chewable tablet Take 81 mg by mouth daily. nitroglycerin (NITROSTAT) 0.4 mg SL tablet 1 Tab by SubLINGual route every five (5) minutes as needed for Chest Pain. Qty: 1 Bottle, Refills: 1             Activity: No heavy lifting, pushing, pulling with the implant side for 2 weeks  Diet: Cardiac Diet  Wound Care: Keep wound clean and dry for 48 hours    No orders of the defined types were placed in this encounter.       Signed By: Paul Poon MD     September 2, 2017

## 2017-09-02 NOTE — ROUTINE PROCESS
I have reviewed discharge instructions and Rx with the patient. The patient verbalized understanding. Patient collected all belongings, including medtronic pacer equipments. Transported via wheelchair to private vehicle.

## 2017-09-21 ENCOUNTER — CLINICAL SUPPORT (OUTPATIENT)
Dept: CARDIOLOGY CLINIC | Age: 47
End: 2017-09-21

## 2017-09-21 DIAGNOSIS — I50.9 ACUTE ON CHRONIC CONGESTIVE HEART FAILURE, UNSPECIFIED CONGESTIVE HEART FAILURE TYPE: Primary | ICD-10-CM

## 2017-09-21 DIAGNOSIS — Z95.810 AICD (AUTOMATIC CARDIOVERTER/DEFIBRILLATOR) PRESENT: ICD-10-CM

## 2017-09-21 NOTE — PROGRESS NOTES
Please see scanned document    Wound was free of redness, drainage and swelling. It was not warm to the touch.

## 2017-09-21 NOTE — PROGRESS NOTES
I have personally seen and evaluated the device findings. Interrogation reviewed and I agree with assessment.     Zachary Vigil

## 2017-09-22 ENCOUNTER — OFFICE VISIT (OUTPATIENT)
Dept: FAMILY MEDICINE CLINIC | Age: 47
End: 2017-09-22

## 2017-09-22 VITALS
DIASTOLIC BLOOD PRESSURE: 71 MMHG | BODY MASS INDEX: 42.17 KG/M2 | HEART RATE: 90 BPM | WEIGHT: 247 LBS | TEMPERATURE: 99.4 F | OXYGEN SATURATION: 98 % | SYSTOLIC BLOOD PRESSURE: 101 MMHG | HEIGHT: 64 IN | RESPIRATION RATE: 18 BRPM

## 2017-09-22 DIAGNOSIS — Z23 ENCOUNTER FOR IMMUNIZATION: ICD-10-CM

## 2017-09-22 DIAGNOSIS — I10 ESSENTIAL HYPERTENSION: Primary | ICD-10-CM

## 2017-09-22 DIAGNOSIS — I50.9 ACUTE ON CHRONIC CONGESTIVE HEART FAILURE, UNSPECIFIED CONGESTIVE HEART FAILURE TYPE: ICD-10-CM

## 2017-09-22 NOTE — PROGRESS NOTES
Chronic Illness Visit    Today's Date:  2017   Patient's Name: Camden Kothari   Patient's :  1970     History:     Chief Complaint   Patient presents with    New Patient     Patient would like to talk about her diet since being in CHF. Patient recently recieved a defibrillator     Leg Pain     Patient states she have been experiencing left pain 1 -2 weeks        Camden Kothari is a 56 y.o. female presenting for  Establishment of Care. Hypertension/CHF EF 25-30%/Hyperlipidemia    Recently had Defib placed  and doing well  BP today is at goal and less [id number]/90. Patients risk factors include: CHF and Obesity  Patient is not checking home BP   Reports compliance and denies side effects to medications  Daily exercise and diet are as follows: reports following a low salt diet  Weight is stable  Takes the below meds regularly with no side effects. Last LDL: 42          Past Medical History:   Diagnosis Date    Asthma     Cardiac echocardiogram 2017    LVE. EF 15% (prev 50% on study of 12/10/14). Severe diffuse hypk. Indeterminate diastolic fx.  RVE. RVSP at least 44 mmHg. Mod LAE.  BISMARK. Mild-mod MR.  Cardiac nuclear imaging test 2017    High risk. Somewhat patchy uptake. No evidence of ischemia or infarction. No RWMA. Severe LVE. EF 25%. Neg EKG on pharm stress test.    Cardiovascular LLE venous duplex 2017    Left leg:  No DVT. Pulsatile flow.     Heart failure ([address]     Hypertension      Past Surgical History:   Procedure Laterality Date    BREAST SURGERY PROCEDURE UNLISTED      redfuction    HX GYN      hysterectomy  btl    HX MYOMECTOMY       fibroid tumo removed    HX ORTHOPAEDIC  2012 ORIF left fibula     Social History     Social History    Marital status: SINGLE     Spouse name: N/A    Number of children: N/A    Years of education: N/A     Social History Main Topics    Smoking status: Former Smoker     Packs/day: 0.00     Years: 18.00 Quit date: 2/28/2017    Smokeless tobacco: Never Used    Alcohol use Yes      Comment: socially     Drug use: No    Sexual activity: Yes     Partners: Male     Birth control/ protection: Condom     Other Topics Concern    None     Social History Narrative     History reviewed. No pertinent family history. No Known Allergies    Problem List:      Patient Active Problem List   Diagnosis Code    Fluid overload E87.70    Acute on chronic congestive heart failure (HCC) I50.9    Essential hypertension I10    LV dysfunction I51.9    S/P ICD (internal cardiac defibrillator) procedure Z95.810    AICD (automatic cardioverter/defibrillator) present Z95.810       Medications:     Current Outpatient Prescriptions   Medication Sig    furosemide (LASIX) 40 mg tablet Take 1 Tab by mouth daily.  methIMAzole (TAPAZOLE) 10 mg tablet Take  by mouth daily.  carvedilol (COREG) 25 mg tablet Take 25 mg by mouth two (2) times daily (with meals).  lisinopril (PRINIVIL, ZESTRIL) 10 mg tablet Take 1 Tab by mouth daily.  spironolactone (ALDACTONE) 25 mg tablet Take 1 Tab by mouth daily.  atorvastatin (LIPITOR) 80 mg tablet Take 80 mg by mouth daily. Pt. to take at night    indapamide (LOZOL) 2.5 mg tablet Take 2.5 mg by mouth daily. To take in AM.    nitroglycerin (NITROSTAT) 0.4 mg SL tablet 1 Tab by SubLINGual route every five (5) minutes as needed for Chest Pain.  albuterol (ACCUNEB) 1.25 mg/3 mL nebu Take 3 mL by inhalation every four (4) hours as needed (wheezing).  aspirin 81 mg chewable tablet Take 81 mg by mouth daily.  oxyCODONE-acetaminophen (PERCOCET) 5-325 mg per tablet Take 1 Tab by mouth every six (6) hours as needed. Max Daily Amount: 4 Tabs. No current facility-administered medications for this visit.           Constitutional: negative for fevers, chills, sweats and fatigue  Respiratory: negative for cough, sputum or wheezing  Cardiovascular: negative for chest pain, chest pressure/discomfort, dyspnea  Gastrointestinal: negative for nausea, vomiting, diarrhea, constipation and abdominal pain  Musculoskeletal:negative except for myalgias and arthralgias    Physical Assessment:   VS:    Visit Vitals    /71 (BP 1 Location: Left arm, BP Patient Position: Sitting)    Pulse 90    Temp 99.4 °F (37.4 °C) (Oral)    Resp 18    Ht 5' 4\" (1.626 m)    Wt 247 lb (112 kg)    SpO2 98%    BMI 42.4 kg/m2       Lab Results   Component Value Date/Time    Sodium 134 09/01/2017 01:40 PM    Potassium 3.5 09/01/2017 01:40 PM    Chloride 100 09/01/2017 01:40 PM    CO2 29 09/01/2017 01:40 PM    Anion gap 5 09/01/2017 01:40 PM    Glucose 96 09/01/2017 01:40 PM    BUN 13 09/01/2017 01:40 PM    Creatinine 0.74 09/01/2017 01:40 PM    BUN/Creatinine ratio 18 09/01/2017 01:40 PM    GFR est AA >60 09/01/2017 01:40 PM    GFR est non-AA >60 09/01/2017 01:40 PM    Calcium 8.6 09/01/2017 01:40 PM       General:   Well-groomed, obese, in no distress, pleasant, alert, appropriate and conversant. Mouth:  Good dentition, oropharynx WNL without membranes, exudates, petechiae or ulcers  Neck:   Neck supple, no swelling, mass or tenderness, no thyromegaly  Cardiovasc:   RRR, no MRG. Pulses 2+ and symmetric at distal extremities. Pulmonary:   Lungs clear bilaterally. Normal respiratory effort. Abdomen:   Abdomen soft, NT, ND, NAB. Extremities:   No edema, no TTP bilateral calves. LEs warm and well-perfused. Neuro:   Alert and oriented, no focal deficits. No facial asymmetry noted. Skin:    No rashes or jaundice  MSK:   Normal ROM, 5/5 muscle strength  Psych:  No pressured speech or abnormal thought content        Assessment/Plan & Orders:       1. Essential hypertension    2. Acute on chronic congestive heart failure, unspecified congestive heart failure type ([facility] [address]    3.  Encounter for immunization        Orders Placed This Encounter    [place] IMMUNIZ ADMIN,1 SINGLE/COMB VAC/TOXOID    INFLUENZA VIRUS VACCINE QUADRIVALENT, PRESERVATIVE FREE SYRINGE ([id number])       HTN/CHF EF30% BP stable and would like patient to continue with current meds     Follow up as needed    *Plan of care reviewed with patient. Patient in agreement with plan and expresses understanding. All questions answered and patient encouraged to call or RTO if further questions or concerns.     Amauri Briscoe MD  Family Medicine  9/22/2017  2:40 PM

## 2017-09-22 NOTE — MR AVS SNAPSHOT
Visit Information Date & Time Provider Department Dept. Phone Encounter #  
 9/22/2017  3:15 PM Dianne HamiltonHardy [phone] [id number] Your Appointments 11/1/2017  2:20 PM  
[facility] Jen Hewitt DO [facility] ([address]) Appt Note: 6 week [address] [address] [address] [address] [phone] Je Winston  
  
    
 12/20/2017  3:40 PM  
[facility] with Paula Montalvo [facility] ([address]) Appt Note: 3 month carelink [place] [address] [address] [address] [phone] [facility] [address] Upcoming Health Maintenance Date Due Pneumococcal 19-64 Medium Risk (1 of 1 - PPSV23) 9/2/1990 DTaP/Tdap/Td series (1 - Tdap) 9/2/1992 PAP AKA CERVICAL CYTOLOGY 9/2/1992 INFLUENZA AGE 9 TO ADULT 8/1/2017 Allergies as of 9/22/2017  Review Complete On: 9/22/2017 By: Dianne Hamilton MD  
 No Known Allergies Current Immunizations  Never Reviewed Name Date Influenza Vaccine (Quad) PF  Incomplete Not reviewed this visit You Were Diagnosed With   
  
 Codes Comments Essential hypertension    -  Primary ICD-10-CM: I10 
ICD-9-CM: 401.9 Acute on chronic congestive heart failure, unspecified [facility]     ICD-10-CM: I50.9 ICD-9-CM: 428.0 Encounter for immunization     ICD-10-CM: A63 ICD-9-CM: V03.89 Vitals BP Pulse Temp Resp Height(growth percentile) Weight(growth percentile) 101/71 (BP 1 Location: Left arm, BP Patient Position: Sitting) 90 99.4 °F (37.4 °C) (Oral) 18 5' 4\" (1.626 m) 247 lb (112 kg) SpO2 BMI OB Status Smoking Status 98% 42.4 kg/m2 Hysterectomy Former Smoker BMI and BSA Data Body Mass Index Body Surface Area 42.4 kg/m 2 2.25 m 2 Preferred Pharmacy Pharmacy Name Phone [facility] [address]. [phone] Your Updated Medication List  
  
   
This list is accurate as of: 9/22/17  3:42 PM.  Always use your most recent med list.  
  
  
  
  
 albuterol 1.25 mg/3 mL Nebu Commonly known as:  Efren Chol Take 3 mL by inhalation every four (4) hours as needed (wheezing). aspirin 81 mg chewable tablet Take 81 mg by mouth daily. atorvastatin 80 mg tablet Commonly known as:  LIPITOR Take 80 mg by mouth daily. Pt. to take at night  
  
 carvedilol 25 mg tablet Commonly known as:  Pennye Louder Take 25 mg by mouth two (2) times daily (with meals). furosemide 40 mg tablet Commonly known as:  LASIX Take 1 Tab by mouth daily. indapamide 2.5 mg tablet Commonly known as:  Wandra Lanes Take 2.5 mg by mouth daily. To take in AM. lisinopril 10 mg tablet Commonly known as:  Jun Boehringer Take 1 Tab by mouth daily. methIMAzole 10 mg tablet Commonly known as:  TAPAZOLE Take  by mouth daily. nitroglycerin 0.4 mg SL tablet Commonly known as:  NITROSTAT  
1 Tab by SubLINGual route every five (5) minutes as needed for Chest Pain. oxyCODONE-acetaminophen 5-325 mg per tablet Commonly known as:  PERCOCET Take 1 Tab by mouth every six (6) hours as needed. Max Daily Amount: 4 Tabs. spironolactone 25 mg tablet Commonly known as:  ALDACTONE Take 1 Tab by mouth daily. We Performed the Following INFLUENZA VIRUS VAC QUAD,SPLIT,PRESV FREE SYRINGE IM [id number] CPT(R)] [place] IMMUNIZ ADMIN,1 SINGLE/COMB VAC/TOXOID [id number] CPT(R)] Introducing [facility]! Nghia Aden introduces [employer] patient portal. Now you can access parts of your medical record, email your doctor's office, and request medication refills online. 1. In your internet browser, go to [email or web address] 2. Click on the First Time User? Click Here link in the Sign In box. You will see the New Member Sign Up page. 3. Enter your [employer] Access Code exactly as it appears below. You will not need to use this code after youve completed the sign-up process. If you do not sign up before the expiration date, you must request a new code. · [employer] Access Code: [email or web address] Expires: 9/25/2017 10:05 AM 
 
4. Enter the last four digits of your Social Security Number (xxxx) and Date of Birth (mm/dd/yyyy) as indicated and click Submit. You will be taken to the next sign-up page. 5. Create a [employer] ID. This will be your [employer] login ID and cannot be changed, so think of one that is secure and easy to remember. 6. Create a [employer] password. You can change your password at any time. 7. Enter your Password Reset Question and Answer. This can be used at a later time if you forget your password. 8. Enter your e-mail address. You will receive e-mail notification when new information is available in [address] 9. Click Sign Up. You can now view and download portions of your medical record. 10. Click the Download Summary menu link to download a portable copy of your medical information. If you have questions, please visit the Frequently Asked Questions section of the [employer] website. Remember, [employer] is NOT to be used for urgent needs. For medical emergencies, dial 911. Now available from your iPhone and Android! Please provide this summary of care documentation to your next provider. Your primary care clinician is listed as [facility]. If you have any questions after today's visit, please call [phone].

## 2017-11-06 ENCOUNTER — HOSPITAL ENCOUNTER (EMERGENCY)
Age: 47
Discharge: STILL ADMITTED OR ADMINISTRATIVE | End: 2017-11-06
Attending: EMERGENCY MEDICINE
Payer: MEDICAID

## 2017-11-06 PROCEDURE — 75810000275 HC EMERGENCY DEPT VISIT NO LEVEL OF CARE

## 2017-11-06 RX ORDER — NAPROXEN 375 MG/1
375 TABLET ORAL 2 TIMES DAILY WITH MEALS
Qty: 20 TAB | Refills: 0 | Status: SHIPPED | OUTPATIENT
Start: 2017-11-06 | End: 2017-11-06

## 2017-11-06 RX ORDER — METHOCARBAMOL 750 MG/1
750 TABLET, FILM COATED ORAL 4 TIMES DAILY
Qty: 20 TAB | Refills: 0 | Status: SHIPPED | OUTPATIENT
Start: 2017-11-06 | End: 2017-11-06

## 2017-11-06 NOTE — ED PROVIDER NOTES
HPI Comments: Pt registered under wrong account        The history is provided by the patient. No  was used. Past Medical History:   Diagnosis Date    Asthma     Cardiac echocardiogram 03/22/2017    LVE. EF 15% (prev 50% on study of 12/10/14). Severe diffuse hypk. Indeterminate diastolic fx.  RVE. RVSP at least 44 mmHg. Mod LAE.  BISMARK. Mild-mod MR.  Cardiac nuclear imaging test 03/24/2017    High risk. Somewhat patchy uptake. No evidence of ischemia or infarction. No RWMA. Severe LVE. EF 25%. Neg EKG on pharm stress test.    Cardiovascular LLE venous duplex 03/06/2017    Left leg:  No DVT. Pulsatile flow.  Heart failure ([address]     Hypertension        Past Surgical History:   Procedure Laterality Date    BREAST SURGERY PROCEDURE UNLISTED      redfuction    HX GYN      hysterectomy  btl    HX MYOMECTOMY       fibroid tumo removed    HX ORTHOPAEDIC  March 2012 ORIF left fibula         No family history on file. Social History     Social History    Marital status: SINGLE     Spouse name: N/A    Number of children: N/A    Years of education: N/A     Occupational History    Not on file.      Social History Main Topics    Smoking status: Former Smoker     Packs/day: 0.00     Years: 18.00     Quit date: 2/28/2017    Smokeless tobacco: Never Used    Alcohol use Yes      Comment: socially     Drug use: No    Sexual activity: Yes     Partners: Male     Birth control/ protection: Condom     Other Topics Concern    Not on file     Social History Narrative         ALLERGIES: Percocet [oxycodone-acetaminophen]    Review of Systems       Vitals:    11/06/17 0927   BP: 130/78   Pulse: 82   Resp: 16   Temp: 98.9 °F (37.2 °C)   SpO2: 97%   Weight: 120.2 kg (265 lb)   Height: 5' 6\" (1.676 m)            Physical Exam     [facility]  ED Course       Procedures
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
## 2017-12-13 ENCOUNTER — OFFICE VISIT (OUTPATIENT)
Dept: CARDIOLOGY CLINIC | Age: 47
End: 2017-12-13

## 2017-12-13 ENCOUNTER — CLINICAL SUPPORT (OUTPATIENT)
Dept: CARDIOLOGY CLINIC | Age: 47
End: 2017-12-13

## 2017-12-13 VITALS
BODY MASS INDEX: 45.58 KG/M2 | OXYGEN SATURATION: 98 % | HEART RATE: 94 BPM | DIASTOLIC BLOOD PRESSURE: 104 MMHG | SYSTOLIC BLOOD PRESSURE: 160 MMHG | HEIGHT: 64 IN | WEIGHT: 267 LBS

## 2017-12-13 DIAGNOSIS — I42.9 CARDIOMYOPATHY, UNSPECIFIED TYPE (HCC): Primary | ICD-10-CM

## 2017-12-13 DIAGNOSIS — Z95.810 AICD (AUTOMATIC CARDIOVERTER/DEFIBRILLATOR) PRESENT: ICD-10-CM

## 2017-12-13 DIAGNOSIS — I50.20 NYHA CLASS 3 HEART FAILURE WITH REDUCED EJECTION FRACTION (HCC): ICD-10-CM

## 2017-12-13 DIAGNOSIS — E66.01 OBESITY, MORBID (HCC): ICD-10-CM

## 2017-12-13 DIAGNOSIS — I11.0 HYPERTENSIVE HEART DISEASE WITH HEART FAILURE (HCC): ICD-10-CM

## 2017-12-13 DIAGNOSIS — I50.23 ACUTE ON CHRONIC SYSTOLIC CONGESTIVE HEART FAILURE (HCC): Primary | ICD-10-CM

## 2017-12-13 NOTE — PROGRESS NOTES
HPI: I saw Rylee Wheeler in my office today in cardiovascular evaluation regarding her dilated cardiomyopathy with severe left ventricular dysfunction to discuss her recent echocardiogram.  Ms. Natalia Amaro is a pleasant 44-year-old [facility] American female with history of presentation in March 2017 to [facility] with acute on chronic systolic heart failure. She subsequently was treated and during that hospitalization was found to have an echocardiogram showing an ejection fraction of 15%. She also had a pharmacologic myocardial perfusion defect at that time which demonstrated an ejection fraction estimated at 25% without reversible or fixed defects. She was placed on Coreg, lisinopril, Aldactone, and Lasix and had a LifeVest placed on discharge from the hospital.  She has done quite well clinically, but her repeat echocardiogram completed on July 18, 2017 though better than her echo back in March 2017 still demonstrated a reduced ejection fraction in the 30% range. She comes in the office today and relates that she has been having some increased problems with shortness of breath with exertion recently and some weight gain although she did not think that it was very marked. However, on our scale she is up 21 pounds since she visited us here in the office on August 14, 2017 and we did check her AICD today and her optive all is elevated suggesting that she indeed is in some degree of heart failure. She does complain of some palpitations as well as increased peripheral edema and orthopnea with some paroxysmal nocturnal dyspnea.     Encounter Diagnoses   Name Primary?  Acute on chronic systolic congestive heart failure (HCC) Yes    Hypertensive heart disease with heart failure ([address]     AICD (automatic cardioverter/defibrillator) present     Obesity, morbid ([address]        Discussion:  This lady has significant left ventricular dysfunction and NYHA class 3 systolic heart failure and he is having more shortness of breath with poorly controlled hypertension and significant weight gain recently. I am going to increase her Lasix from 40 mg daily to 80 mg daily and I am going to get a BMP, BNP, and magnesium as a baseline. I have asked her to weigh herself daily and give us a call if her weight goes down by more than 5 pounds. I will have her see my nurse practitioner Francy Monzon in the next 7-10 days and I will see her myself in the next month. She may well need the addition of Zaroxolyn to her regimen. We did check her dual-chamber AICD today which is functioning normally with 10.3 years on the battery, but the Optival is elevated to suggest she does have a component of failure as suspected. The lady has gained a lot of weight since her last visit and has significant elevation of her blood pressure as well and I suspect that from a management point of view she would benefit from placement of a Cardiac BRANDON device to fine tune her heart failure management. PCP: Heraclio Camacho MD      Past Medical History:   Diagnosis Date    Asthma     Cardiac echocardiogram 03/22/2017    LVE. EF 15% (prev 50% on study of 12/10/14). Severe diffuse hypk. Indeterminate diastolic fx.  RVE. RVSP at least 44 mmHg. Mod LAE.  BISMARK. Mild-mod MR.  Cardiac nuclear imaging test 03/24/2017    High risk. Somewhat patchy uptake. No evidence of ischemia or infarction. No RWMA. Severe LVE. EF 25%. Neg EKG on pharm stress test.    Cardiovascular LLE venous duplex 03/06/2017    Left leg:  No DVT. Pulsatile flow.  Heart failure ([address]     Hypertension        Past Surgical History:   Procedure Laterality Date    BREAST SURGERY PROCEDURE UNLISTED      redfuction    HX GYN      hysterectomy  btl    HX MYOMECTOMY       fibroid tumo removed    HX ORTHOPAEDIC  March 2012 ORIF left fibula       Current Outpatient Prescriptions   Medication Sig    furosemide (LASIX) 40 mg tablet Take 1 Tab by mouth daily.     methIMAzole (TAPAZOLE) 10 mg tablet Take  by mouth daily.  carvedilol (COREG) 25 mg tablet Take 25 mg by mouth two (2) times daily (with meals).  lisinopril (PRINIVIL, ZESTRIL) 10 mg tablet Take 1 Tab by mouth daily.  spironolactone (ALDACTONE) 25 mg tablet Take 1 Tab by mouth daily.  atorvastatin (LIPITOR) 80 mg tablet Take 80 mg by mouth daily. Pt. to take at night    indapamide (LOZOL) 2.5 mg tablet Take 2.5 mg by mouth daily. To take in AM.    nitroglycerin (NITROSTAT) 0.4 mg SL tablet 1 Tab by SubLINGual route every five (5) minutes as needed for Chest Pain.  albuterol (ACCUNEB) 1.25 mg/3 mL nebu Take 3 mL by inhalation every four (4) hours as needed (wheezing).  aspirin 81 mg chewable tablet Take 81 mg by mouth daily. No current facility-administered medications for this visit. Allergies   Allergen Reactions    Percocet [Oxycodone-Acetaminophen] Unknown (comments)     Not allergic. Wrong entry. Social History :  Social History   Substance Use Topics    Smoking status: Former Smoker     Packs/day: 0.00     Years: 18.00     Quit date: 2/28/2017    Smokeless tobacco: Never Used    Alcohol use Yes      Comment: socially         Family History: family history is not on file. Review of Systems:   Constitutional: Positive for fever. Negative for chills, malaise/fatigue and weight loss. Respiratory: Positive for cough, shortness of breath and wheezing. Negative for hemoptysis. Cardiovascular: Positive for chest pain, palpitations, orthopnea and leg swelling. Gastrointestinal: Positive for constipation, diarrhea and vomiting. Negative for abdominal pain, blood in stool, heartburn, melena and nausea. Musculoskeletal: Positive for joint pain and myalgias. Negative for falls. Neurological: Positive for dizziness.      Physical Exam:    The patient is a cooperative, alert, well developed, well nourished 56 y.o. morbidly obese -American female who is in no acute distress at the time of the examination. Visit Vitals    BP (!) 160/104 (BP 1 Location: Left arm, BP Patient Position: Sitting)    Pulse 94    Ht 5' 4\" (1.626 m)    Wt 121.1 kg (267 lb)    SpO2 98%    BMI 45.83 kg/m2       HEENT: Conjuctiva white, mucosa moist, no pallor or cyanosis. NECK: Supple without masses, tenderness or thyromegaly. There was no jugular venous distention. Carotid are full bilaterally without bruits. CHEST: Symmetrical with good excursion. LUNGS: Clear to auscultation in all fields. HEART: The apex is not displaced. There were no lifts, thrills or heaves. There is a normal S1 and S2 without appreciable murmurs, rubs, clicks, or gallops auscultated. ABDOMEN: Soft without masses, tenderness or organomegaly. EXTREMITIES: Full peripheral pulses with trace peripheral edema. Review of Data: See PMH and Cardiology and Imaging sections for cardiac testing  Lab Results   Component Value Date/Time    Cholesterol, total 81 03/21/2017 03:24 PM    HDL Cholesterol 27 03/21/2017 03:24 PM    LDL, calculated 42 03/21/2017 03:24 PM    Triglyceride 60 03/21/2017 03:24 PM    CHOL/HDL Ratio 3.0 03/21/2017 03:24 PM       Results for orders placed or performed in visit on 12/13/17   AMB POC EKG ROUTINE W/ 12 LEADS, INTER & REP     Status: None    Narrative    Normal sinus rhythm with rate 94. Nonspecific inferolateral ST-T changes. Compared to the EKG of August 14, 2017 there was little interval change. Chandra Ceja D.O., F.A.C.C. [facility]  [facility]  [address] [place] [address]. [address]    PLEASE NOTE:  This document has been produced using voice recognition software. Unrecognized errors in transcription may be present.

## 2017-12-13 NOTE — PROGRESS NOTES
Review of Systems   Constitutional: Positive for fever. Negative for chills, malaise/fatigue and weight loss. Respiratory: Positive for cough, shortness of breath and wheezing. Negative for hemoptysis. Cardiovascular: Positive for chest pain, palpitations, orthopnea and leg swelling. Gastrointestinal: Positive for constipation, diarrhea and vomiting. Negative for abdominal pain, blood in stool, heartburn, melena and nausea. Musculoskeletal: Positive for joint pain and myalgias. Negative for falls. Neurological: Positive for dizziness.

## 2017-12-13 NOTE — PROGRESS NOTES
1. Have you been to the ER, urgent care clinic since your last visit? Hospitalized since your last visit? yes, 9-1/9-2 internal cardiac defibrillator    2. Have you seen or consulted any other health care providers outside of the [address] [place] [address] since your last visit? Include any pap smears or colon screening.  no

## 2017-12-13 NOTE — MR AVS SNAPSHOT
Visit Information Date & Time Provider Department Dept. Phone Encounter #  
 12/13/2017  9:20 AM Kristel Marcano DO [facility] [id number] Follow-up Instructions Return in about 3 months (around 3/13/2018), or if symptoms worsen or fail to improve. Upcoming Health Maintenance Date Due Pneumococcal 19-64 Medium Risk (1 of 1 - PPSV23) 9/2/1990 DTaP/Tdap/Td series (1 - Tdap) 9/2/1992 PAP AKA CERVICAL CYTOLOGY 9/2/1992 Allergies as of 12/13/2017  Review Complete On: 12/13/2017 By: Kristel Marcano DO Severity Noted Reaction Type Reactions Percocet [Oxycodone-acetaminophen]  11/06/2017    Unknown (comments) Not allergic. Wrong entry. Current Immunizations  Never Reviewed Name Date Influenza Vaccine (Quad) PF 9/22/2017 Not reviewed this visit You Were Diagnosed With   
  
 Codes Comments Acute on chronic systolic congestive heart failure ([facility]    -  Primary ICD-10-CM: J94.78 ICD-9-CM: 428.23, 428.0 Hypertensive heart disease with heart failure ([facility]     ICD-10-CM: I11.0 ICD-9-CM: 402.91, 428.9 AICD (automatic cardioverter/defibrillator) present     ICD-10-CM: Z95.810 ICD-9-CM: V45.02 Obesity, morbid ([facility]     ICD-10-CM: E66.01 
ICD-9-CM: 278.01 Vitals BP Pulse Height(growth percentile) Weight(growth percentile) SpO2 BMI  
 (!) 160/104 (BP 1 Location: Left arm, BP Patient Position: Sitting) 94 5' 4\" (1.626 m) 267 lb (121.1 kg) 98% 45.83 kg/m2 OB Status Smoking Status Hysterectomy Former Smoker Vitals History BMI and BSA Data Body Mass Index Body Surface Area 45.83 kg/m 2 2.34 m 2 Preferred Pharmacy Pharmacy Name Phone [id number] [address] [phone] Your Updated Medication List  
  
   
This list is accurate as of: 12/13/17 10:35 AM.  Always use your most recent med list.  
  
 albuterol 1.25 mg/3 mL Nebu Commonly known as:  Berneta Mauricio Take 3 mL by inhalation every four (4) hours as needed (wheezing). aspirin 81 mg chewable tablet Take 81 mg by mouth daily. atorvastatin 80 mg tablet Commonly known as:  LIPITOR Take 80 mg by mouth daily. Pt. to take at night  
  
 carvedilol 25 mg tablet Commonly known as:  Lory Yuko Take 25 mg by mouth two (2) times daily (with meals). furosemide 40 mg tablet Commonly known as:  LASIX Take 1 Tab by mouth daily. indapamide 2.5 mg tablet Commonly known as:  Thor Millet Take 2.5 mg by mouth daily. To take in AM. lisinopril 10 mg tablet Commonly known as:  Josué Economy Take 1 Tab by mouth daily. methIMAzole 10 mg tablet Commonly known as:  TAPAZOLE Take  by mouth daily. nitroglycerin 0.4 mg SL tablet Commonly known as:  NITROSTAT  
1 Tab by SubLINGual route every five (5) minutes as needed for Chest Pain. spironolactone 25 mg tablet Commonly known as:  ALDACTONE Take 1 Tab by mouth daily. We Performed the Following AMB POC EKG ROUTINE W/ 12 LEADS, INTER & REP [32090 CPT(R)] MAGNESIUM [id number] CPT(R)] METABOLIC PANEL, BASIC [57789 CPT(R)] Follow-up Instructions Return in about 3 months (around 3/13/2018), or if symptoms worsen or fail to improve. To-Do List   
 12/13/2017 Lab:  NT-PRO BNP Introducing [facility]! [facility] introduces Simmr patient portal. Now you can access parts of your medical record, email your doctor's office, and request medication refills online. 1. In your internet browser, go to [email or web address] 2. Click on the First Time User? Click Here link in the Sign In box. You will see the New Member Sign Up page. 3. Enter your Simmr Access Code exactly as it appears below. You will not need to use this code after youve completed the sign-up process.  If you do not sign up before the expiration date, you must request a new code. · The Whistle Access Code: [email or web address] Expires: 3/13/2018  9:30 AM 
 
4. Enter the last four digits of your Social Security Number (xxxx) and Date of Birth (mm/dd/yyyy) as indicated and click Submit. You will be taken to the next sign-up page. 5. Create a The Whistle ID. This will be your The Whistle login ID and cannot be changed, so think of one that is secure and easy to remember. 6. Create a [employer] Whistle password. You can change your password at any time. 7. Enter your Password Reset Question and Answer. This can be used at a later time if you forget your password. 8. Enter your e-mail address. You will receive e-mail notification when new information is available in [address] 9. Click Sign Up. You can now view and download portions of your medical record. 10. Click the Download Summary menu link to download a portable copy of your medical information. If you have questions, please visit the Frequently Asked Questions section of the The Whistle website. Remember, The Whistle is NOT to be used for urgent needs. For medical emergencies, dial 911. Now available from your iPhone and Android! Please provide this summary of care documentation to your next provider. Your primary care clinician is listed as Hannah Walton. If you have any questions after today's visit, please call [phone].

## 2017-12-13 NOTE — PROGRESS NOTES
I have personally seen and evaluated the device findings. Interrogation reviewed and I agree with assessment.     Hilda Sow

## 2017-12-28 RX ORDER — ATORVASTATIN CALCIUM 80 MG/1
80 TABLET, FILM COATED ORAL DAILY
Qty: 30 TAB | Refills: 6 | Status: SHIPPED | OUTPATIENT
Start: 2017-12-28 | End: 2019-02-11 | Stop reason: SDUPTHER

## 2017-12-28 RX ORDER — FUROSEMIDE 40 MG/1
40 TABLET ORAL DAILY
Qty: 30 TAB | Refills: 6 | Status: SHIPPED | OUTPATIENT
Start: 2017-12-28 | End: 2018-01-03 | Stop reason: DRUGHIGH

## 2017-12-28 RX ORDER — INDAPAMIDE 2.5 MG/1
2.5 TABLET, FILM COATED ORAL DAILY
Qty: 30 TAB | Refills: 6 | Status: SHIPPED | OUTPATIENT
Start: 2017-12-28 | End: 2018-10-12 | Stop reason: SDUPTHER

## 2017-12-28 RX ORDER — SPIRONOLACTONE 25 MG/1
25 TABLET ORAL DAILY
Qty: 30 TAB | Refills: 6 | Status: SHIPPED | OUTPATIENT
Start: 2017-12-28 | End: 2019-02-11 | Stop reason: SDUPTHER

## 2017-12-28 RX ORDER — LISINOPRIL 10 MG/1
10 TABLET ORAL DAILY
Qty: 30 TAB | Refills: 6 | Status: SHIPPED | OUTPATIENT
Start: 2017-12-28 | End: 2019-02-11 | Stop reason: SDUPTHER

## 2017-12-28 RX ORDER — CARVEDILOL 25 MG/1
25 TABLET ORAL 2 TIMES DAILY WITH MEALS
Qty: 60 TAB | Refills: 6 | Status: SHIPPED | OUTPATIENT
Start: 2017-12-28 | End: 2018-10-12 | Stop reason: SDUPTHER

## 2018-01-03 ENCOUNTER — OFFICE VISIT (OUTPATIENT)
Dept: CARDIOLOGY CLINIC | Age: 48
End: 2018-01-03

## 2018-01-03 ENCOUNTER — HOSPITAL ENCOUNTER (OUTPATIENT)
Dept: LAB | Age: 48
Discharge: HOME | End: 2018-01-03

## 2018-01-03 VITALS
SYSTOLIC BLOOD PRESSURE: 110 MMHG | OXYGEN SATURATION: 98 % | HEART RATE: 88 BPM | DIASTOLIC BLOOD PRESSURE: 90 MMHG | HEIGHT: 64 IN | WEIGHT: 262 LBS | BODY MASS INDEX: 44.73 KG/M2

## 2018-01-03 DIAGNOSIS — I51.9 LV DYSFUNCTION: ICD-10-CM

## 2018-01-03 DIAGNOSIS — Z95.810 AICD (AUTOMATIC CARDIOVERTER/DEFIBRILLATOR) PRESENT: ICD-10-CM

## 2018-01-03 DIAGNOSIS — I50.23 ACUTE ON CHRONIC SYSTOLIC CONGESTIVE HEART FAILURE (HCC): Primary | ICD-10-CM

## 2018-01-03 DIAGNOSIS — E66.01 OBESITY, MORBID (HCC): ICD-10-CM

## 2018-01-03 DIAGNOSIS — I10 ESSENTIAL HYPERTENSION: ICD-10-CM

## 2018-01-03 DIAGNOSIS — R06.02 SHORTNESS OF BREATH: ICD-10-CM

## 2018-01-03 PROCEDURE — 99001 SPECIMEN HANDLING PT-LAB: CPT | Performed by: INTERNAL MEDICINE

## 2018-01-03 RX ORDER — BENZONATATE 100 MG/1
100 CAPSULE ORAL
Qty: 21 CAP | Refills: 0 | Status: SHIPPED | OUTPATIENT
Start: 2018-01-03 | End: 2018-01-10

## 2018-01-03 RX ORDER — FUROSEMIDE 80 MG/1
80 TABLET ORAL DAILY
Qty: 30 TAB | Refills: 6 | Status: SHIPPED | OUTPATIENT
Start: 2018-01-03 | End: 2019-02-11 | Stop reason: SDUPTHER

## 2018-01-03 NOTE — PROGRESS NOTES
Diana Murray presents today for follow-up after her Lasix was increased to 80mg daily for complaints of dyspnea on exertion, weight gain, hypertension, and when her device was interrogated during her visit with Dr. Nicki Torres 2 weeks ago; her Optivol levels were elevated. She did not have the baseline labs done as she stated that when she went to get them done, they told her they \"could not do it yet. \"  She went this morning to have lab work done but they called her and told her to return. She is a 56year old female with history of chronic systolic heart failure, hypertension, pulmonary hypertension, chronic mild intermittent asthma, tobacco abuse, and obesity. She was hospitalized from 3/21/17 through 3/27/17 for acute on chronic systolic heart failure. She presented to the hospital after being seen at her PCP's office for complaints of shortness of breath. She was noted to be hypoxic at the office and she had reportedly gained 19 pounds. She was sent to the ER for further evaluation and treatment. Her NT pro-BNP was 1975. Cardiac enzymes were negative and were serial troponins. An echocardiogram done during her hospitalization showed an ejection fraction of 15%, severe diffuse hypokinesis, and RVSP 44 mmHg. She also had a pharmacologic nuclear stress test done which showed abnormal perfusion imaging. There is no evidence of reversible or fixed defects. Ejection fraction estimated at 25%. Prior to discharge home from the hospital, she was fitted with a LifeVest.  A repeat echo done in July 2017, showed that her EF improved but only to 30%. On 9/1/17, she underwent implantation of a dual chamber Medtronic MRI compatible AICD for primary prevention and heart failure, performed by Dr. Jennifer England. She states that she is feeling better since the lasix was increased. She reports only mild, occasional shortness of breath and some edema.   She states that her weight is down 5 pounds according to her scale at home.  She denies chest pain, tightness, heaviness, and admits to occasional \"flutters\" in her chest.  She denies shortness of breath at rest, admits to dyspnea on exertion, denies orthopnea and PND. She is able to sleep in her bed on 1-2 pillows. She denies abdominal bloating. She denies lightheadedness, dizziness, and syncope. She denies lower extremity edema and claudication. Denies nausea, vomiting, diarrhea, fever, chills. Her AICD has not discharged. PMH:  Past Medical History:   Diagnosis Date    Asthma     Cardiac echocardiogram 03/22/2017    LVE. EF 15% (prev 50% on study of 12/10/14). Severe diffuse hypk. Indeterminate diastolic fx.  RVE. RVSP at least 44 mmHg. Mod LAE.  BISMARK. Mild-mod MR.  Cardiac nuclear imaging test 03/24/2017    High risk. Somewhat patchy uptake. No evidence of ischemia or infarction. No RWMA. Severe LVE. EF 25%. Neg EKG on pharm stress test.    Cardiovascular LLE venous duplex 03/06/2017    Left leg:  No DVT. Pulsatile flow.  Heart failure (HCC)     Hypertension        PSH:  Past Surgical History:   Procedure Laterality Date    BREAST SURGERY PROCEDURE UNLISTED      redfuction    HX GYN      hysterectomy  btl    HX MYOMECTOMY       fibroid tumo removed    HX ORTHOPAEDIC  March 2012 ORIF left fibula       MEDS:  Current Outpatient Prescriptions   Medication Sig    atorvastatin (LIPITOR) 80 mg tablet Take 1 Tab by mouth daily. Pt. to take at night    carvedilol (COREG) 25 mg tablet Take 1 Tab by mouth two (2) times daily (with meals).  furosemide (LASIX) 40 mg tablet Take 1 Tab by mouth daily.  lisinopril (PRINIVIL, ZESTRIL) 10 mg tablet Take 1 Tab by mouth daily.  spironolactone (ALDACTONE) 25 mg tablet Take 1 Tab by mouth daily.  indapamide (LOZOL) 2.5 mg tablet Take 1 Tab by mouth daily. To take in AM.    methIMAzole (TAPAZOLE) 10 mg tablet Take  by mouth daily.     nitroglycerin (NITROSTAT) 0.4 mg SL tablet 1 Tab by SubLINGual route every five (5) minutes as needed for Chest Pain.  albuterol (ACCUNEB) 1.25 mg/3 mL nebu Take 3 mL by inhalation every four (4) hours as needed (wheezing).  aspirin 81 mg chewable tablet Take 81 mg by mouth daily. No current facility-administered medications for this visit. Allergies and Sensitivities:  Allergies   Allergen Reactions    Percocet [Oxycodone-Acetaminophen] Unknown (comments)     Not allergic. Wrong entry. Family History:  No family history on file. Social History:  She  reports that she quit smoking about 10 months ago. She smoked 0.00 packs per day for 18.00 years. She has never used smokeless tobacco.  She  reports that she drinks alcohol. Physical:  Visit Vitals    /90    Pulse 88    Ht 5' 4\" (1.626 m)    Wt 118.8 kg (262 lb)    SpO2 98%    BMI 44.97 kg/m2         She is down 5 pounds since her last visit      Exam:  Neck:  Supple, no JVD, no carotid bruits  CV:  Normal S1 and  S2, no murmurs, rubs, or gallops noted. Slight keloid formation at AICD site. Lungs:  Clear to ausculation throughout, no wheezes or rales  Abd:  Soft, non-tender, obese with good bowel sounds. No hepatosplenomegaly  Extremities:  Trace to 1+ edema      Data:  EKG:  Normal sinus rhythm.  Anteroseptal Q-wave, cannot exclude prior infarct.  Non-specific ST/T abnormality.       LABS:  Lab Results   Component Value Date/Time    Sodium 134 09/01/2017 01:40 PM    Potassium 3.5 09/01/2017 01:40 PM    Chloride 100 09/01/2017 01:40 PM    CO2 29 09/01/2017 01:40 PM    Glucose 96 09/01/2017 01:40 PM    BUN 13 09/01/2017 01:40 PM    Creatinine 0.74 09/01/2017 01:40 PM     Lab Results   Component Value Date/Time    Cholesterol, total 81 03/21/2017 03:24 PM    HDL Cholesterol 27 03/21/2017 03:24 PM    LDL, calculated 42 03/21/2017 03:24 PM    Triglyceride 60 03/21/2017 03:24 PM    CHOL/HDL Ratio 3.0 03/21/2017 03:24 PM     Lab Results   Component Value Date/Time    ALT (SGPT) 16 08/28/2017 02:55 PM         Impression / Plan:  1. Chronic systolic heart failure, improved  2. Hypertension, blood pressure much improved  3. Chronic, intermittent asthma  4. Severe LV dysfunction, improved. EF now 25-30% (by echo July 2017) from 15% in March 2017    Ms. Hawk Jin was seen today for follow-up after her lasix was increased to 80mg daily for decompensated heart failure. When seen by Dr. Rishi Sun a couple of weeks ago, she complained of dyspnea on exertion, weight gain, and her blood pressure was elevated. Her Opti-Vol levels were also elevated as noted during her device check. She is feeling much better. Her blood pressure is now much better controlled and down to 110/90 compared to 160/104 when seen by Dr. Rishi Sun on 12/13/17. Her breath sounds are clear and she has only trace to 1+ edema. Her AICD has not discharged. Her only complaint is of a cough that began a few days ago associated with a \"cold. \"  She will be prescribed Tessalon Perles 100mg TID as needed for cough for 7 days only. She was asked to continue her Lasix 80mg daily for now. She will have the BMP and NT pro-BNP done today (she was supposed to have this done the day she saw Dr. Rishi Sun). A new lab slip was given. She will called with any changes that may be necessary. Congestive heart failure teaching reinforced today. Advised to limit sodium intake to no more than 2000mg per day and to also watch fluid intake. Advised to weigh daily every morning and record weights. Instructed to call our office if progressive weight gain is noted over a 2 to 3 day period of time, shortness of breath increases, or if abdominal bloating, nausea, fatigue, or increased lower extremity edema is noted. She will follow-up with Dr. Rishi Sun in February as I have scheduled and PRN. She knows to call if she has any cardiac problems prior to her next scheduled appointment.       Tawana Hayes MSN, FNP-BC    Please note: Portions of this chart were created with Dragon medical speech to text program.  Unrecognized errors may be present.

## 2018-01-03 NOTE — PROGRESS NOTES
1. Have you been to the ER, urgent care clinic since your last visit? Hospitalized since your last visit? No     2. Have you seen or consulted any other health care providers outside of the [address] [place] [address] since your last visit? Include any pap smears or colon screening.  No

## 2018-01-03 NOTE — PATIENT INSTRUCTIONS
BMP and NT pro-BNP today  Continue Lasix (furosemide) 80mg once a day   Tessalon Perles 100mg - 3 times a day as needed for cough for 7 days  All other medications to remain the same  Follow-up with Dr. Sindhu Castro as scheduled and as needed, Tuesday, February 20, 2018 02:00 PM

## 2018-01-03 NOTE — MR AVS SNAPSHOT
Visit Information Date & Time Provider Department Dept. Phone Encounter #  
 1/3/2018 11:00 AM iMcha Vinson NP [facility] [address] [id number] Upcoming Health Maintenance Date Due Pneumococcal 19-64 Medium Risk (1 of 1 - PPSV23) 9/2/1990 DTaP/Tdap/Td series (1 - Tdap) 9/2/1992 PAP AKA CERVICAL CYTOLOGY 9/2/1992 Allergies as of 1/3/2018  Review Complete On: 1/3/2018 By: Micha Vinson NP Severity Noted Reaction Type Reactions Percocet [Oxycodone-acetaminophen]  11/06/2017    Unknown (comments) Not allergic. Wrong entry. Current Immunizations  Never Reviewed Name Date Influenza Vaccine (Quad) PF 9/22/2017 Not reviewed this visit You Were Diagnosed With   
  
 Codes Comments Acute on chronic systolic congestive heart failure ([facility] [address]    -  Primary ICD-10-CM: O48.24 ICD-9-CM: 428.23, 428.0 Shortness of breath     ICD-10-CM: R06.02 
ICD-9-CM: 786.05 Vitals BP Pulse Height(growth percentile) Weight(growth percentile) SpO2 BMI  
 110/90 88 5' 4\" (1.626 m) 262 lb (118.8 kg) 98% 44.97 kg/m2 OB Status Smoking Status Hysterectomy Former Smoker Vitals History BMI and BSA Data Body Mass Index Body Surface Area 44.97 kg/m 2 2.32 m 2 Preferred Pharmacy Pharmacy Name Phone [address] [place] [address]. [phone] Your Updated Medication List  
  
   
This list is accurate as of: 1/3/18 11:24 AM.  Always use your most recent med list.  
  
  
  
  
 albuterol 1.25 mg/3 mL Nebu Commonly known as:  Carloyn Lighter Take 3 mL by inhalation every four (4) hours as needed (wheezing). aspirin 81 mg chewable tablet Take 81 mg by mouth daily. atorvastatin 80 mg tablet Commonly known as:  LIPITOR Take 1 Tab by mouth daily. Pt. to take at night  
  
 carvedilol 25 mg tablet Commonly known as:  Chantel Hartman Take 1 Tab by mouth two (2) times daily (with meals). furosemide 40 mg tablet Commonly known as:  LASIX Take 1 Tab by mouth daily. indapamide 2.5 mg tablet Commonly known as:  Hsieh Saas Take 1 Tab by mouth daily. To take in AM. lisinopril 10 mg tablet Commonly known as:  Terry Rosemarie Take 1 Tab by mouth daily. methIMAzole 10 mg tablet Commonly known as:  TAPAZOLE Take  by mouth daily. nitroglycerin 0.4 mg SL tablet Commonly known as:  NITROSTAT  
1 Tab by SubLINGual route every five (5) minutes as needed for Chest Pain. spironolactone 25 mg tablet Commonly known as:  ALDACTONE Take 1 Tab by mouth daily. We Performed the Following AMB POC EKG ROUTINE W/ 12 LEADS, INTER & REP [28638 CPT(R)] Patient Instructions BMP and NT pro-BNP today Continue Lasix (furosemide) 80mg once a day Tessalon Perles 100mg - 3 times a day as needed for cough for 7 days All other medications to remain the same Follow-up with Dr. Reba Goltz as scheduled and as needed, Tuesday, February 20, 2018 02:00 PM 
 
 
  
Introducing [facility] & HE[facility] SERVICES! [employer] introduces [employer] patient portal. Now you can access parts of your medical record, email your doctor's office, and request medication refills online. 1. In your internet browser, go to [email or web address] 2. Click on the First Time User? Click Here link in the Sign In box. You will see the New Member Sign Up page. 3. Enter your [employer] Access Code exactly as it appears below. You will not need to use this code after youve completed the sign-up process. If you do not sign up before the expiration date, you must request a new code. · [employer] Access Code: [email or web address] Expires: 3/13/2018  9:30 AM 
 
4. Enter the last four digits of your Social Security Number (xxxx) and Date of Birth (mm/dd/yyyy) as indicated and click Submit.  You will be taken to the next sign-up page. 5. Create a Visible Path ID. This will be your [employer] Path login ID and cannot be changed, so think of one that is secure and easy to remember. 6. Create a Vis[employer] Path password. You can change your password at any time. 7. Enter your Password Reset Question and Answer. This can be used at a later time if you forget your password. 8. Enter your e-mail address. You will receive e-mail notification when new information is available in [address] 9. Click Sign Up. You can now view and download portions of your medical record. 10. Click the Download Summary menu link to download a portable copy of your medical information. If you have questions, please visit the Frequently Asked Questions section of the [employer] website. Remember, [employer] is NOT to be used for urgent needs. For medical emergencies, dial 911. Now available from your iPhone and Android! Please provide this summary of care documentation to your next provider. Your primary care clinician is listed as Erlinda Monteiro. If you have any questions after today's visit, please call [phone].

## 2018-01-04 LAB
BUN SERPL-MCNC: 12 MG/DL (ref 6–24)
BUN/CREAT SERPL: 17 (ref 9–23)
CALCIUM SERPL-MCNC: 9.4 MG/DL (ref 8.7–10.2)
CHLORIDE SERPL-SCNC: 97 MMOL/L (ref 96–106)
CO2 SERPL-SCNC: 25 MMOL/L (ref 18–29)
CREAT SERPL-MCNC: 0.72 MG/DL (ref 0.57–1)
GLUCOSE SERPL-MCNC: 101 MG/DL (ref 65–99)
MAGNESIUM SERPL-MCNC: 2.1 MG/DL (ref 1.6–2.3)
NT-PROBNP SERPL-MCNC: 78 PG/ML (ref 0–249)
POTASSIUM SERPL-SCNC: 4.1 MMOL/L (ref 3.5–5.2)
SODIUM SERPL-SCNC: 138 MMOL/L (ref 134–144)

## 2018-01-05 NOTE — PROGRESS NOTES
I increase this lady's Lasix when I saw her back on December 13, 2017 because of clinical signs of some degree of heart failure. This I believe was done in follow-up by Paola Moreno just to see how she was doing and it would appear that everything looks fine with her BNP actually being on the low side, so I would make any changes at this time although the validity should watch her weight and if it continues to drop by more than 3 pounds she needs to let us know and we would cut back on Lasix at that time.  ES

## 2018-10-11 ENCOUNTER — CLINICAL SUPPORT (OUTPATIENT)
Dept: CARDIOLOGY CLINIC | Age: 48
End: 2018-10-11

## 2018-10-11 DIAGNOSIS — I42.9 CARDIOMYOPATHY, UNSPECIFIED TYPE (HCC): Primary | ICD-10-CM

## 2018-10-11 DIAGNOSIS — Z95.810 AICD (AUTOMATIC CARDIOVERTER/DEFIBRILLATOR) PRESENT: ICD-10-CM

## 2018-10-12 ENCOUNTER — HOSPITAL ENCOUNTER (OUTPATIENT)
Dept: LAB | Age: 48
Discharge: HOME | End: 2018-10-12
Payer: SELF-PAY

## 2018-10-12 ENCOUNTER — OFFICE VISIT (OUTPATIENT)
Dept: CARDIOLOGY CLINIC | Age: 48
End: 2018-10-12

## 2018-10-12 VITALS
SYSTOLIC BLOOD PRESSURE: 120 MMHG | HEART RATE: 90 BPM | HEIGHT: 64 IN | DIASTOLIC BLOOD PRESSURE: 78 MMHG | WEIGHT: 248 LBS | BODY MASS INDEX: 42.34 KG/M2 | OXYGEN SATURATION: 97 %

## 2018-10-12 DIAGNOSIS — I51.9 LV DYSFUNCTION: Primary | ICD-10-CM

## 2018-10-12 DIAGNOSIS — I10 ESSENTIAL HYPERTENSION: ICD-10-CM

## 2018-10-12 DIAGNOSIS — Z95.810 AICD (AUTOMATIC CARDIOVERTER/DEFIBRILLATOR) PRESENT: ICD-10-CM

## 2018-10-12 DIAGNOSIS — I51.9 LV DYSFUNCTION: ICD-10-CM

## 2018-10-12 LAB
ANION GAP SERPL CALC-SCNC: 8 MMOL/L (ref 3–18)
BNP SERPL-MCNC: 28 PG/ML (ref 0–450)
BUN SERPL-MCNC: 16 MG/DL (ref 7–18)
BUN/CREAT SERPL: 22 (ref 12–20)
CALCIUM SERPL-MCNC: 9.3 MG/DL (ref 8.5–10.1)
CHLORIDE SERPL-SCNC: 101 MMOL/L (ref 100–108)
CO2 SERPL-SCNC: 28 MMOL/L (ref 21–32)
CREAT SERPL-MCNC: 0.74 MG/DL (ref 0.6–1.3)
GLUCOSE SERPL-MCNC: 114 MG/DL (ref 74–99)
POTASSIUM SERPL-SCNC: 4.1 MMOL/L (ref 3.5–5.5)
SODIUM SERPL-SCNC: 137 MMOL/L (ref 136–145)
T4 SERPL-MCNC: 13.4 UG/DL (ref 4.7–13.3)
TSH SERPL DL<=0.05 MIU/L-ACNC: <0.01 UIU/ML (ref 0.36–3.74)

## 2018-10-12 PROCEDURE — 83880 ASSAY OF NATRIURETIC PEPTIDE: CPT | Performed by: INTERNAL MEDICINE

## 2018-10-12 PROCEDURE — 84443 ASSAY THYROID STIM HORMONE: CPT | Performed by: INTERNAL MEDICINE

## 2018-10-12 PROCEDURE — 80048 BASIC METABOLIC PNL TOTAL CA: CPT | Performed by: INTERNAL MEDICINE

## 2018-10-12 PROCEDURE — 36415 COLL VENOUS BLD VENIPUNCTURE: CPT | Performed by: INTERNAL MEDICINE

## 2018-10-12 PROCEDURE — 84436 ASSAY OF TOTAL THYROXINE: CPT | Performed by: INTERNAL MEDICINE

## 2018-10-12 RX ORDER — CARVEDILOL 25 MG/1
25 TABLET ORAL 2 TIMES DAILY WITH MEALS
Qty: 60 TAB | Refills: 6 | Status: SHIPPED | OUTPATIENT
Start: 2018-10-12 | End: 2020-09-29

## 2018-10-12 RX ORDER — INDAPAMIDE 2.5 MG/1
2.5 TABLET, FILM COATED ORAL DAILY
Qty: 30 TAB | Refills: 6 | Status: SHIPPED | OUTPATIENT
Start: 2018-10-12 | End: 2019-11-12 | Stop reason: SDUPTHER

## 2018-10-12 NOTE — PROGRESS NOTES
I have personally seen and evaluated the device findings. Interrogation reviewed and I agree with assessment.     Shane Vidal

## 2018-10-12 NOTE — PROGRESS NOTES
Amandabret Hankinsalexa presents today for   Chief Complaint   Patient presents with    Hypertension     follow up    CHF       Amanda Bajwa preferred language for health care discussion is english/other. Is someone accompanying this pt? no    Is the patient using any DME equipment during OV? no    Depression Screening:  PHQ over the last two weeks 10/12/2018   Little interest or pleasure in doing things Not at all   Feeling down, depressed, irritable, or hopeless Not at all   Total Score PHQ 2 0       Learning Assessment:  Learning Assessment 10/12/2018   PRIMARY LEARNER Patient   HIGHEST LEVEL OF EDUCATION - PRIMARY LEARNER  GRADUATED HIGH SCHOOL OR GED   BARRIERS PRIMARY LEARNER NONE   CO-LEARNER CAREGIVER No   PRIMARY LANGUAGE ENGLISH   LEARNER PREFERENCE PRIMARY READING   ANSWERED BY Patient   RELATIONSHIP SELF       Abuse Screening:  Abuse Screening Questionnaire 10/12/2018   Do you ever feel afraid of your partner? N   Are you in a relationship with someone who physically or mentally threatens you? N   Is it safe for you to go home? Y       Fall Risk  No flowsheet data found. Pt currently taking Anticoagulant therapy? Aspirin 81 mg    Coordination of Care:  1. Have you been to the ER, urgent care clinic since your last visit? Hospitalized since your last visit? no    2. Have you seen or consulted any other health care providers outside of the [address] [place] [address] since your last visit? Include any pap smears or colon screening.  no

## 2018-10-12 NOTE — MR AVS SNAPSHOT
[address] [address] [address] [address] [phone] Patient: Shani Rai MRN: [id number] PVU:[id number] Visit Information Date & Time Provider Department Dept. Phone Encounter #  
 10/12/2018  8:20 AM Sherry Driscoll DO [facility] [address] [id number] Upcoming Health Maintenance Date Due Pneumococcal 19-64 Medium Risk (1 of 1 - PPSV23) 9/2/1990 DTaP/Tdap/Td series (1 - Tdap) 9/2/1992 PAP AKA CERVICAL CYTOLOGY 9/2/1992 Influenza Age 5 to Adult 8/1/2018 Allergies as of 10/12/2018  Review Complete On: 10/12/2018 By: Sherry Driscoll DO Severity Noted Reaction Type Reactions Percocet [Oxycodone-acetaminophen]  11/06/2017    Unknown (comments) Not allergic. Wrong entry. Current Immunizations  Never Reviewed Name Date Influenza Vaccine (Quad) PF 9/22/2017 Not reviewed this visit You Were Diagnosed With   
  
 Codes Comments LV dysfunction    -  Primary ICD-10-CM: I51.9 ICD-9-CM: 429.9 Essential hypertension     ICD-10-CM: I10 
ICD-9-CM: 401.9 AICD (automatic cardioverter/defibrillator) present     ICD-10-CM: Z95.810 ICD-9-CM: V45.02 Vitals BP Pulse Height(growth percentile) Weight(growth percentile) SpO2 BMI  
 120/78 90 5' 4\" (1.626 m) 248 lb (112.5 kg) 97% 42.57 kg/m2 OB Status Smoking Status Hysterectomy Former Smoker Vitals History BMI and BSA Data Body Mass Index Body Surface Area 42.57 kg/m 2 2.25 m 2 Preferred Pharmacy Pharmacy Name Phone [address] [place] [address]. [phone] Your Updated Medication List  
  
   
This list is accurate as of 10/12/18  9:21 AM.  Always use your most recent med list.  
  
  
  
  
 albuterol 1.25 mg/3 mL Nebu Commonly known as:  Scottie Schaeffer  
 Take 3 mL by inhalation every four (4) hours as needed (wheezing). aspirin 81 mg chewable tablet Take 81 mg by mouth daily. atorvastatin 80 mg tablet Commonly known as:  LIPITOR Take 1 Tab by mouth daily. Pt. to take at night  
  
 carvedilol 25 mg tablet Commonly known as:  Colleen Climes Take 1 Tab by mouth two (2) times daily (with meals). furosemide 80 mg tablet Commonly known as:  LASIX Take 1 Tab by mouth daily. indapamide 2.5 mg tablet Commonly known as:  Mechelle Cotton Take 1 Tab by mouth daily. To take in AM. lisinopril 10 mg tablet Commonly known as:  Vonna Tri Take 1 Tab by mouth daily. methIMAzole 10 mg tablet Commonly known as:  TAPAZOLE Take  by mouth daily. nitroglycerin 0.4 mg SL tablet Commonly known as:  NITROSTAT  
1 Tab by SubLINGual route every five (5) minutes as needed for Chest Pain. spironolactone 25 mg tablet Commonly known as:  ALDACTONE Take 1 Tab by mouth daily. We Performed the Following AMB POC EKG ROUTINE W/ 12 LEADS, INTER & REP [02114 CPT(R)] To-Do List   
 10/12/2018 Echocardiography:  ECHO ADULT COMPLETE   
  
 10/12/2018 Lab:  METABOLIC PANEL, BASIC   
  
 10/12/2018 Lab:  NT-PRO BNP   
  
 10/12/2018 Lab:  T4 (THYROXINE)   
  
 10/12/2018 Lab:  TSH 3RD GENERATION Introducing [facility] & HEALTH SERVICES! [facility] introduces [employer] patient portal. Now you can access parts of your medical record, email your doctor's office, and request medication refills online. 1. In your internet browser, go to [email or web address] 2. Click on the First Time User? Click Here link in the Sign In box. You will see the New Member Sign Up page. 3. Enter your [employer] Access Code exactly as it appears below. You will not need to use this code after youve completed the sign-up process. If you do not sign up before the expiration date, you must request a new code. · [employer] Access Code: [email or web address] Expires: 1/10/2019  9:20 AM 
 
4. Enter the last four digits of your Social Security Number (xxxx) and Date of Birth (mm/dd/yyyy) as indicated and click Submit. You will be taken to the next sign-up page. 5. Create a [employer] ID. This will be your [employer] login ID and cannot be changed, so think of one that is secure and easy to remember. 6. Create a [employer] password. You can change your password at any time. 7. Enter your Password Reset Question and Answer. This can be used at a later time if you forget your password. 8. Enter your e-mail address. You will receive e-mail notification when new information is available in [address] 9. Click Sign Up. You can now view and download portions of your medical record. 10. Click the Download Summary menu link to download a portable copy of your medical information. If you have questions, please visit the Frequently Asked Questions section of the [employer] website. Remember, [employer] is NOT to be used for urgent needs. For medical emergencies, dial 911. Now available from your iPhone and Android! Please provide this summary of care documentation to your next provider. Your primary care clinician is listed as Arma Dubin. If you have any questions after today's visit, please call [phone].

## 2018-10-12 NOTE — PROGRESS NOTES
Review of Systems Constitutional: Positive for fever. Negative for chills, malaise/fatigue and weight loss. Respiratory: Positive for cough, shortness of breath and wheezing. Negative for hemoptysis. Cardiovascular: Positive for chest pain, palpitations, orthopnea and leg swelling. Gastrointestinal: Positive for diarrhea, heartburn and nausea. Negative for abdominal pain, blood in stool, constipation, melena and vomiting. Musculoskeletal: Positive for joint pain and myalgias. Negative for falls. Neurological: Positive for dizziness.

## 2018-10-12 NOTE — PROGRESS NOTES
HPI: I saw Fidelina Lujan in my office today in cardiovascular evaluation regarding her dilated cardiomyopathy with severe left ventricular dysfunction to discuss her recent echocardiogram.  Ms. Harpreet Starr is a pleasant 28-year-old [facility] American female with history of presentation in March 2017 to [facility] with acute on chronic systolic heart failure. She subsequently was treated and during that hospitalization was found to have an echocardiogram showing an ejection fraction of 15%.  She also had a pharmacologic myocardial perfusion defect at that time which demonstrated an ejection fraction estimated at 25% without reversible or fixed defects.  She was placed on Coreg, lisinopril, Aldactone, and Lasix and had a LifeVest placed on discharge from the hospital. Marybel Bell has done quite well clinically, but her repeat echocardiogram completed on July 18, 2017 though better than her echo back in March 2017 still demonstrated a reduced ejection fraction in the 30% range.      She comes in today and relates she is doing fairly well but she has been more short of breath for the last 3-4 days and we did check her device yesterday and found that she had an 8 hour and 51 minute run of atrial fibrillation back in 9/19/18 and her OptiVol is quite high today suggesting volume overload. She does admit to some peripheral edema though she has none currently and she does sleep on a few pillows at night. She has had some chest pain but these appear to be noncardiac. Encounter Diagnoses   Name Primary?  LV dysfunction Yes    Essential hypertension     AICD (automatic cardioverter/defibrillator) present        Discussion: This lady unfortunately has no insurance and we are going to try to get her connected with the [facility] so we can do some laboratory work and get some blood work on her.  She has had some history of hypothyroidism for which she has been on Tapazole but has not been on this recently because she ran out. I will give her a prescription for that but would like to get a T4, TSH, BNP and BMP so that we can adjust her medications moving forward. She does not appear to be in overt heart failure but her OptiVol is high and I will probably increase her Lasix following the results of these tests. I would also like to get an echocardiogram on her and ultimately I would like to switch her to [facility] from her current Prinivil but we may need to get her connected with the [facility] before we can get many of these things completed. I am going to have her follow up with my nurse practitioner in a month and with me in two months and in the meantime, hopefully we can get her set up with adequate coverage so that these tests can be completed. PCP: Vani Menchaca MD      Past Medical History:   Diagnosis Date    Asthma     Cardiac echocardiogram 03/22/2017    LVE. EF 15% (prev 50% on study of 12/10/14). Severe diffuse hypk. Indeterminate diastolic fx.  RVE. RVSP at least 44 mmHg. Mod LAE.  BISMARK. Mild-mod MR.  Cardiac nuclear imaging test 03/24/2017    High risk. Somewhat patchy uptake. No evidence of ischemia or infarction. No RWMA. Severe LVE. EF 25%. Neg EKG on pharm stress test.    Cardiovascular LLE venous duplex 03/06/2017    Left leg:  No DVT. Pulsatile flow.  Heart failure ([address]     Hypertension        Past Surgical History:   Procedure Laterality Date    BREAST SURGERY PROCEDURE UNLISTED      redfuction    HX GYN      hysterectomy  btl    HX MYOMECTOMY       fibroid tumo removed    HX ORTHOPAEDIC  March 2012 ORIF left fibula       Current Outpatient Prescriptions   Medication Sig    furosemide (LASIX) 80 mg tablet Take 1 Tab by mouth daily.  atorvastatin (LIPITOR) 80 mg tablet Take 1 Tab by mouth daily. Pt. to take at night    carvedilol (COREG) 25 mg tablet Take 1 Tab by mouth two (2) times daily (with meals).     lisinopril (PRINIVIL, ZESTRIL) 10 mg tablet Take 1 Tab by mouth daily.  spironolactone (ALDACTONE) 25 mg tablet Take 1 Tab by mouth daily.  indapamide (LOZOL) 2.5 mg tablet Take 1 Tab by mouth daily. To take in AM.    methIMAzole (TAPAZOLE) 10 mg tablet Take  by mouth daily.  nitroglycerin (NITROSTAT) 0.4 mg SL tablet 1 Tab by SubLINGual route every five (5) minutes as needed for Chest Pain.  albuterol (ACCUNEB) 1.25 mg/3 mL nebu Take 3 mL by inhalation every four (4) hours as needed (wheezing).  aspirin 81 mg chewable tablet Take 81 mg by mouth daily. No current facility-administered medications for this visit. Allergies   Allergen Reactions    Percocet [Oxycodone-Acetaminophen] Unknown (comments)     Not allergic. Wrong entry. Social History :  Social History   Substance Use Topics    Smoking status: Former Smoker     Packs/day: 0.00     Years: 18.00     Quit date: 2/28/2017    Smokeless tobacco: Never Used    Alcohol use Yes      Comment: socially         Family History: family history is not on file. Review of Systems:    Constitutional: Positive for fever. Negative for chills, malaise/fatigue and weight loss. Respiratory: Positive for cough, shortness of breath and wheezing. Negative for hemoptysis. Cardiovascular: Positive for chest pain, palpitations, orthopnea and leg swelling. Gastrointestinal: Positive for diarrhea, heartburn and nausea. Negative for abdominal pain, blood in stool, constipation, melena and vomiting. Musculoskeletal: Positive for joint pain and myalgias. Negative for falls. Neurological: Positive for dizziness. Physical Exam:    The patient is a cooperative, alert, well developed, well nourished 53 y.o. morbidly obese -American female who is in no acute distress at the time of the examination.   Visit Vitals    /78    Pulse 90    Ht 5' 4\" (1.626 m)    Wt 112.5 kg (248 lb)    SpO2 97%    BMI 42.57 kg/m2       HEENT: Conjuctiva white, mucosa moist, no pallor or cyanosis. NECK: Supple without masses, tenderness or thyromegaly. There was no jugular venous distention. Carotid are full bilaterally without bruits. CHEST: Symmetrical with good excursion. LUNGS: Clear to auscultation in all fields. HEART: The apex is not displaced. There were no lifts, thrills or heaves. There is a normal S1 and S2 without appreciable murmurs, rubs, clicks, or gallops auscultated. ABDOMEN: Soft without masses, tenderness or organomegaly. EXTREMITIES: Full peripheral pulses with trace peripheral edema. Review of Data: See PMH and Cardiology and Imaging sections for cardiac testing  Lab Results   Component Value Date/Time    Cholesterol, total 81 03/21/2017 03:24 PM    HDL Cholesterol 27 (L) 03/21/2017 03:24 PM    LDL, calculated 42 03/21/2017 03:24 PM    Triglyceride 60 03/21/2017 03:24 PM    CHOL/HDL Ratio 3.0 03/21/2017 03:24 PM       Results for orders placed or performed in visit on 10/12/18   AMB POC EKG ROUTINE W/ 12 LEADS, INTER & REP     Status: None    Narrative    Normal sinus rhythm, rate 90. Poor R wave progression across the precordium. ST-T flattening with some early T wave inversion inferolaterally. This is similar to the EKG of Er[place] 3, 2018. Lissette Delacruz D.O., F.A.C.C. [facility]  [facility] [place]  [address] [place] [address]. [address]    PLEASE NOTE:  This document has been produced using voice recognition software. Unrecognized errors in transcription may be present.

## 2018-10-15 NOTE — PROGRESS NOTES
This lady's kidney function was fine and her BNP was low so there is no signs of heart failure, but her TSH is less than 0.01 so she does appear to be hyperthyroid. Her T4 is borderline elevated but her free T3 is probably what we should be checking along with a TSH. Please just check with her because I think she was not taking her Tapazole which is what she needs to take quiet down her thyroid. Please be sure she has that prescription and she is taking it and then we can do a TSH and a free T3 on her in 3 or 4 weeks. Please let the patient know.  ES

## 2018-10-18 ENCOUNTER — TELEPHONE (OUTPATIENT)
Dept: CARDIOLOGY CLINIC | Age: 48
End: 2018-10-18

## 2018-10-18 DIAGNOSIS — I50.23 ACUTE ON CHRONIC SYSTOLIC CONGESTIVE HEART FAILURE (HCC): Primary | ICD-10-CM

## 2018-10-18 NOTE — TELEPHONE ENCOUNTER
----- Message from Lucía Castellanos DO sent at 10/15/2018  8:32 AM EDT -----  This lady's kidney function was fine and her BNP was low so there is no signs of heart failure, but her TSH is less than 0.01 so she does appear to be hyperthyroid. Her T4 is borderline elevated but her free T3 is probably what we should be checking along with a TSH. Please just check with her because I think she was not taking her Tapazole which is what she needs to take quiet down her thyroid. Please be sure she has that prescription and she is taking it and then we can do a TSH and a free T3 on her in 3 or 4 weeks. Please let the patient know.  ES

## 2018-12-12 ENCOUNTER — OFFICE VISIT (OUTPATIENT)
Dept: CARDIOLOGY CLINIC | Age: 48
End: 2018-12-12

## 2018-12-12 ENCOUNTER — CLINICAL SUPPORT (OUTPATIENT)
Dept: CARDIOLOGY CLINIC | Age: 48
End: 2018-12-12

## 2018-12-12 VITALS
WEIGHT: 235 LBS | SYSTOLIC BLOOD PRESSURE: 102 MMHG | HEART RATE: 90 BPM | OXYGEN SATURATION: 97 % | HEIGHT: 64 IN | DIASTOLIC BLOOD PRESSURE: 78 MMHG | BODY MASS INDEX: 40.12 KG/M2

## 2018-12-12 DIAGNOSIS — Z95.810 AICD (AUTOMATIC CARDIOVERTER/DEFIBRILLATOR) PRESENT: ICD-10-CM

## 2018-12-12 DIAGNOSIS — I50.23 ACUTE ON CHRONIC SYSTOLIC CONGESTIVE HEART FAILURE (HCC): Primary | ICD-10-CM

## 2018-12-12 DIAGNOSIS — R79.89 ABNORMAL TSH: ICD-10-CM

## 2018-12-12 DIAGNOSIS — R53.83 FATIGUE, UNSPECIFIED TYPE: ICD-10-CM

## 2018-12-12 DIAGNOSIS — I50.22 CHRONIC SYSTOLIC CONGESTIVE HEART FAILURE (HCC): ICD-10-CM

## 2018-12-12 DIAGNOSIS — I10 ESSENTIAL HYPERTENSION: Primary | ICD-10-CM

## 2018-12-12 DIAGNOSIS — E05.90 HYPERTHYROIDISM: ICD-10-CM

## 2018-12-12 DIAGNOSIS — I51.9 LV DYSFUNCTION: ICD-10-CM

## 2018-12-12 NOTE — PROGRESS NOTES
HPI: I saw Aki Restrepo in my office today in cardiovascular evaluation regarding her dilated cardiomyopathy with severe left ventricular dysfunction to discuss her recent echocardiogram.  Ms. Rachel Valente is a pleasant 72-year-old [facility] American female with history of presentation in March 2017 to [facility] with acute on chronic systolic heart failure. She subsequently was treated and during that hospitalization was found to have an echocardiogram showing an ejection fraction of 15%.  She also had a pharmacologic myocardial perfusion defect at that time which demonstrated an ejection fraction estimated at 25% without reversible or fixed defects.  She was placed on Coreg, lisinopril, Aldactone, and Lasix and had a LifeVest placed on discharge from the hospital. Christy Bonilla has done quite well clinically, but her repeat echocardiogram completed on July 18, 2017 though better than her echo back in March 2017 still demonstrated a reduced ejection fraction in the 30% range.      She subsequently had a dual-chamber AICD placed on September 1, 2017 and has done reasonably well since that time, although she comes in today and relates that she really has not been feeling well for a few months. She is actually tried to get disability, but has been unable to qualify for disability has had to go back to work which means that she does not qualify for the [facility] anymore. She tells me that she has been so short of breath and fatigued recently that is really hard for her to work and most recently she had to skip work because she was so exhausted. I should note that in reviewing her laboratory tests from back in October 12, 2018 her free T4 was quite high at over 13 and her TSH low <0.01 suggesting that her hyperthyroidism for which she is on Tapazole is not being adequately controlled.     Encounter Diagnoses   Name Primary?     Fatigue, profound in the setting of below     Chronic systolic congestive heart failure ([address]     LV dysfunction with EF of 30-35% an echo of July 18, 2017.  Hyperthyroidism     Abnormal TSH     AICD (automatic cardioverter/defibrillator) present     Essential hypertension Yes       Discussion: This lady is having profound fatigue related to her hyperthyroidism being inadequately controlled in the setting of known significant left ventricular dysfunction. I am going to repeat her T4 TSH and then discuss her case with endocrinology and see if we can get on adequate Tapazole therapy. I do believe that this lady should be considered for disability because I think it is going to be very hard for her to maintain a full-time job with her significant cardiovascular issues as described. I would like to repeat an echocardiogram on her at some point but she has no insurance to get that taken care of at this point time. Certainly if her ejection fraction remains less than 40% I would consider discontinuing her Prinivil and starting her on Entresto which I think could significantly improve her course over time, but first of all we need to get her thyroid function under control. I will get the above tests and increase her Tapazole and do what I can to help her get disability which I think she needs to have, but for now I am going to have her stay out of work for 3 days to give her some rest and hopefully we can get her started on the higher dose of Tapazole within a day or 2. PCP: Jeannette Estrada MD      Past Medical History:   Diagnosis Date    Asthma     Cardiac echocardiogram 03/22/2017    LVE. EF 15% (prev 50% on study of 12/10/14). Severe diffuse hypk. Indeterminate diastolic fx.  RVE. RVSP at least 44 mmHg. Mod LAE.  BISMARK. Mild-mod MR.  Cardiac nuclear imaging test 03/24/2017    High risk. Somewhat patchy uptake. No evidence of ischemia or infarction. No RWMA. Severe LVE. EF 25%.   Neg EKG on pharm stress test.    Cardiovascular LLE venous duplex 03/06/2017 Left leg:  No DVT. Pulsatile flow.  Heart failure ([address]     Hypertension        Past Surgical History:   Procedure Laterality Date    BREAST SURGERY PROCEDURE UNLISTED      redfuction    HX GYN      hysterectomy  btl    HX MYOMECTOMY       fibroid tumo removed    HX ORTHOPAEDIC  2012 ORIF left fibula     Current Outpatient Medications   Medication Sig    carvedilol (COREG) 25 mg tablet Take 1 Tab by mouth two (2) times daily (with meals).  indapamide (LOZOL) 2.5 mg tablet Take 1 Tab by mouth daily. To take in AM.    furosemide (LASIX) 80 mg tablet Take 1 Tab by mouth daily.  atorvastatin (LIPITOR) 80 mg tablet Take 1 Tab by mouth daily. Pt. to take at night    lisinopril (PRINIVIL, ZESTRIL) 10 mg tablet Take 1 Tab by mouth daily.  spironolactone (ALDACTONE) 25 mg tablet Take 1 Tab by mouth daily.  methIMAzole (TAPAZOLE) 10 mg tablet Take  by mouth daily.  nitroglycerin (NITROSTAT) 0.4 mg SL tablet 1 Tab by SubLINGual route every five (5) minutes as needed for Chest Pain.  albuterol (ACCUNEB) 1.25 mg/3 mL nebu Take 3 mL by inhalation every four (4) hours as needed (wheezing).  aspirin 81 mg chewable tablet Take 81 mg by mouth daily. No current facility-administered medications for this visit. Allergies   Allergen Reactions    Percocet [Oxycodone-Acetaminophen] Unknown (comments)     Not allergic. Wrong entry. Social History :  Social History     Tobacco Use    Smoking status: Former Smoker     Packs/day: 0.00     Years: 18.00     Pack years: 0.00     Last attempt to quit: 2017     Years since quittin.7    Smokeless tobacco: Never Used   Substance Use Topics    Alcohol use: Yes     Comment: socially         Family History: family history is not on file. Review of Systems:    Constitutional: Positive for fever. Negative for chills, malaise/fatigue and weight loss. Respiratory: Positive for cough, shortness of breath and wheezing. Negative for hemoptysis. Cardiovascular: Positive for chest pain, palpitations, orthopnea and leg swelling. Gastrointestinal: Positive for diarrhea, heartburn and nausea. Negative for abdominal pain, blood in stool, constipation, melena and vomiting. Musculoskeletal: Positive for joint pain and myalgias. Negative for falls. Neurological: Positive for dizziness. Physical Exam:    The patient is a cooperative, alert, well developed, well nourished 53 y.o. morbidly obese -American female who is in no acute distress at the time of the examination. Visit Vitals  /78   Pulse 90   Ht 5' 4\" (1.626 m)   Wt 106.6 kg (235 lb)   SpO2 97%   BMI 40.34 kg/m²       HEENT: Conjuctiva white, mucosa moist, no pallor or cyanosis. NECK: Supple without masses or tenderness. She does have a thyroid goiter. There was no jugular venous distention. Carotid are full bilaterally without bruits. CHEST: Symmetrical with good excursion. LUNGS: Clear to auscultation in all fields. HEART: The apex is not displaced. There were no lifts, thrills or heaves. There is a normal S1 and S2 without appreciable murmurs, rubs, clicks, or gallops auscultated. ABDOMEN: Soft without masses, tenderness or organomegaly. EXTREMITIES: Full peripheral pulses without peripheral edema. Review of Data: See PMH and Cardiology and Imaging sections for cardiac testing  Lab Results   Component Value Date/Time    Cholesterol, total 81 03/21/2017 03:24 PM    HDL Cholesterol 27 (L) 03/21/2017 03:24 PM    LDL, calculated 42 03/21/2017 03:24 PM    Triglyceride 60 03/21/2017 03:24 PM    CHOL/HDL Ratio 3.0 03/21/2017 03:24 PM       Results for orders placed or performed in visit on 12/12/18   AMB POC EKG ROUTINE W/ 12 LEADS, INTER & REP     Status: None    Narrative    Normal sinus rhythm rate 90. Poor R wave progression anteriorly. Diffuse nonspecific inferolateral ST-T changes.   Compared to the EKG of October 12, 2018 it was little interval change. Logan Hoskins D.O., SE. [facility]  [facility]  [address]    PLEASE NOTE:  This document has been produced using voice recognition software. Unrecognized errors in transcription may be present.

## 2018-12-12 NOTE — PATIENT INSTRUCTIONS
Aortic Valve Stenosis: Care Instructions  Your Care Instructions    Having aortic valve stenosis means that the valve between your heart and the large blood vessel that carries blood to the body (aorta) has narrowed. That forces the heart to pump harder to get enough blood through the valve. As stenosis gets worse, the valve gets narrower. This can cause symptoms. Symptoms include chest pain, dizziness, fainting, or shortness of breath. If you have mild or moderate stenosis and don't have symptoms, you may not need treatment now. Your doctor will check your heart regularly. You may need treatment if the stenosis gets worse. With severe stenosis, you may need to have the valve replaced. Follow-up care is a key part of your treatment and safety. Be sure to make and go to all appointments, and call your doctor if you are having problems. It's also a good idea to know your test results and keep a list of the medicines you take. How can you care for yourself at home? · Be safe with medicines. Take your medicines exactly as prescribed. Call your doctor if you think you are having a problem with your medicine. You will get more details on the specific medicines your doctor prescribes. · Plan your meals so that you are eating heart-healthy foods. ? Eat a variety of foods daily. Fresh fruits and vegetables and whole grains are good choices. ? Limit your fat intake, especially saturated and trans fat. ? Limit salt (sodium). ? Increase fiber in your diet. ? Limit alcohol. · Be active. Ask your doctor what type and level of exercise is safe for you. Walking is a good choice. Your doctor may suggest that you join a cardiac rehabilitation program so that you can have help increasing your physical activity safely. · Do not smoke. Smoking can make aortic valve stenosis worse. If you need help quitting, talk to your doctor about stop-smoking programs and medicines.  These can increase your chances of quitting for good.  · Stay at a healthy weight. Lose weight if you need to. · Avoid colds and flu. Get a pneumococcal vaccine shot. If you have had one before, ask your doctor if you need another dose. Get a flu vaccine every year. · Take care of your teeth and gums. Get regular dental checkups. Good dental health is important because bacteria can spread from infected teeth and gums to the heart valves. When should you call for help? Call 911 anytime you think you may need emergency care. For example, call if:    · You passed out (lost consciousness).     · You have symptoms of a heart attack. These may include:  ? Chest pain or pressure, or a strange feeling in the chest.  ? Sweating. ? Shortness of breath. ? Nausea or vomiting. ? Pain, pressure, or a strange feeling in the back, neck, jaw, or upper belly or in one or both shoulders or arms. ? Lightheadedness or sudden weakness. ? A fast or irregular heartbeat.    After you call 911, the  may tell you to chew 1 adult-strength or 2 to 4 low-dose aspirin. Wait for an ambulance. Do not try to drive yourself.   Call your doctor now or seek immediate medical care if:    · You develop new symptoms of aortic valve stenosis, such as chest pain, dizziness, or shortness of breath.     · You have new or increased shortness of breath.     · You are dizzy or lightheaded, or you feel like you may faint.     · You have sudden weight gain, such as more than 2 to 3 pounds in a day or 5 pounds in a week. (Your doctor may suggest a different range of weight gain.)     · You have increased swelling in your legs, ankles, or feet.    Watch closely for changes in your health, and be sure to contact your doctor if:    · You have trouble making healthy lifestyle changes.     · You want more information about healthy lifestyle changes. Where can you learn more? Go to http://star-jean.info/.   Enter G620 in the search box to learn more about \"Aortic Valve Stenosis: Care Instructions. \"  Current as of: December 6, 2017  Content Version: 11.8  © 9151-6145 [employer]wise, [employer]. Care instructions adapted under license by [employer] (which disclaims liability or warranty for this information). If you have questions about a medical condition or this instruction, always ask your healthcare professional. [place] [address] any warranty or liability for your use of this information.

## 2018-12-12 NOTE — LETTER
NOTIFICATION OF RETURN TO WORK  
 
12/12/2018 10:57 AM 
 
Ms. Sg Lyn 
[facility] [id number] Clarissa Edouard To Whom It May Concern: 
 
Sg Lyn was under the care of [address] [place] [address]. She will be unable to return to work until Monday December 17, 2018. If there are questions or concerns please have the patient contact our office.  
 
Sincerely, 
 
 
Italia Vo, DO

## 2018-12-26 RX ORDER — METHIMAZOLE 10 MG/1
10 TABLET ORAL DAILY
Qty: 30 TAB | Refills: 0 | Status: SHIPPED | OUTPATIENT
Start: 2018-12-26 | End: 2019-09-17

## 2018-12-26 NOTE — PROGRESS NOTES
Looks like fairly long episode of A.fib at 13 hours, not on [address]. I will defer to Dr. Pairsa Shankar if [address] indicated. Davey Sloan      I have personally seen and evaluated the device findings. Interrogation reviewed and I agree with assessment.     Gil Likes

## 2018-12-26 NOTE — PROGRESS NOTES
In view of 13 hours of atrial fibrillation this lady should probably be on an [address]. We really will probably need to have her come into the office to discuss this. See if we can get her an appointment sometime in the next few weeks. She could be added on at the end of the day just to discuss this possibility. If she cannot get into the office then I could possibly discuss it with her on the telephone, but I think the big problem is going to be getting her the medication. I do not know whether she is still going to the [facility] clinic. I would expect that they may be able to get that medication for her.  ES

## 2019-01-07 ENCOUNTER — TELEPHONE (OUTPATIENT)
Dept: CARDIOLOGY CLINIC | Age: 49
End: 2019-01-07

## 2019-01-07 NOTE — TELEPHONE ENCOUNTER
Patient called requesting a letter giving her clearance to return to work. She states she feels much better since restarting her thyroid medications and she feels that she can effectively perform her work duties. Discussed with Dr. Salena Harding. Patient must be seen in office before clearance determination can be made.  Verbal order and read back per Nesha Joseph DO    Appointment made with Landon Goldman NP on 1/17/19

## 2019-01-17 ENCOUNTER — OFFICE VISIT (OUTPATIENT)
Dept: CARDIOLOGY CLINIC | Age: 49
End: 2019-01-17

## 2019-01-17 VITALS
SYSTOLIC BLOOD PRESSURE: 102 MMHG | HEART RATE: 81 BPM | HEIGHT: 64 IN | BODY MASS INDEX: 39.95 KG/M2 | OXYGEN SATURATION: 99 % | DIASTOLIC BLOOD PRESSURE: 88 MMHG | WEIGHT: 234 LBS

## 2019-01-17 DIAGNOSIS — Z95.810 AICD (AUTOMATIC CARDIOVERTER/DEFIBRILLATOR) PRESENT: Primary | ICD-10-CM

## 2019-01-17 DIAGNOSIS — I50.23 ACUTE ON CHRONIC SYSTOLIC CONGESTIVE HEART FAILURE (HCC): ICD-10-CM

## 2019-01-17 NOTE — PROGRESS NOTES
Boubacar Duong presents today for   Chief Complaint   Patient presents with    Follow-up     Evaluation for clearance letter to return to work/Possible anticoag therapy        Boubacar Duong preferred language for health care discussion is english/other. Is someone accompanying this pt? No    Is the patient using any DME equipment during OV? No    Depression Screening:  PHQ over the last two weeks 12/12/2018   Little interest or pleasure in doing things Several days   Feeling down, depressed, irritable, or hopeless Several days   Total Score PHQ 2 2       Learning Assessment:  Learning Assessment 10/12/2018   PRIMARY LEARNER Patient   HIGHEST LEVEL OF EDUCATION - PRIMARY LEARNER  GRADUATED HIGH SCHOOL OR GED   BARRIERS PRIMARY LEARNER NONE   CO-LEARNER CAREGIVER No   PRIMARY LANGUAGE ENGLISH   LEARNER PREFERENCE PRIMARY READING   ANSWERED BY Patient   RELATIONSHIP SELF       Abuse Screening:  Abuse Screening Questionnaire 10/12/2018   Do you ever feel afraid of your partner? N   Are you in a relationship with someone who physically or mentally threatens you? N   Is it safe for you to go home? Y       Fall Risk  No flowsheet data found. Pt currently taking Antiplatelet therapy? ASA    Coordination of Care:  1. Have you been to the ER, urgent care clinic since your last visit? Hospitalized since your last visit? No     2. Have you seen or consulted any other health care providers outside of the [address] [place] [address] since your last visit? Include any pap smears or colon screening.  No

## 2019-01-17 NOTE — LETTER
NOTIFICATION RETURN TO WORK / SCHOOL 
 
1/17/2019 11:48 AM 
 
Ms. Evgeny Silveira 
[facility] [address] To Whom It May Concern: 
 
Evgeny Silveira is currently under the care of [address] [place] [address]. She will return to work on: January 28, 2019. If there are questions or concerns please have the patient contact our office.  
 
 
 
Sincerely, 
 
 
Angelia Andrade NP

## 2019-01-17 NOTE — PROGRESS NOTES
Lalitha Barrett presents today for follow-up. She was last seen by Dr. Dorisann Curling on 12/12/18 and during that visit, she complained of fatigue and shortness of breath. Thyroid studies done in the past showed that she was likely hyperthyroid and there was concern regarding her not taking her Tapazole. Device interrogation done that day showed a long episode of atrial fibrillation lasting about 13 hours and oral anticoagulation was recommended. There was some concern about whether or not she would be compliant with this. He also requested an echo be done to re-evaluate her LV dysfunction but it has not been scheduled because she states that she did not have any insurance at that time. She reports that she was just approved for Medicaid. She called the office on 1/7/19, to report that she was feeling much better after she restarted her thyroid medications. She wants clearance to return to work but Dr. Dorisann Curling wanted her re-evaluated in the office prior to clearance being given. He also wanted us to discuss initiation of anticoagulation therapy in view    She is a 53year old female with history of chronic systolic heart failure, LV dysfunction, hypertension, pulmonary hypertension, chronic mild intermittent asthma, tobacco abuse, and obesity. She was hospitalized from 3/21/17 through 3/27/17 for acute on chronic systolic heart failure. She presented to the hospital after being seen at her PCP's office for complaints of shortness of breath. She was noted to be hypoxic at the office and she had reportedly gained 19 pounds. She was sent to the ER for further evaluation and treatment. Her NT pro-BNP was 1975. Cardiac enzymes were negative and were serial troponins. An echocardiogram done during her hospitalization showed an ejection fraction of 15%, severe diffuse hypokinesis, and RVSP 44 mmHg. She also had a pharmacologic nuclear stress test done which showed abnormal perfusion imaging.   There is no evidence of reversible or fixed defects. Ejection fraction estimated at 25%. Prior to discharge home from the hospital, she was fitted with a LifeVest.  A repeat echo done in July 2017, showed that her EF improved but only to 30%. On 9/1/17, she underwent implantation of a dual chamber [employer] MRI compatible AICD for primary prevention and heart failure, performed by Dr. Pebbles Davis. She was supposed to have another echo done in Oct. 2018, but it was not done. She is feeling better. She denies shortness of breath at rest, admits to dyspnea on exertion, denies orthopnea and PND. She is able to sleep in her bed on 1-2 pillows. She denies abdominal bloating. She denies lightheadedness, dizziness, and syncope. She denies lower extremity edema and claudication. Denies nausea, vomiting, diarrhea, fever, chills. Her AICD has not discharged. PMH:  Past Medical History:   Diagnosis Date    Asthma     Cardiac echocardiogram 03/22/2017    LVE. EF 15% (prev 50% on study of 12/10/14). Severe diffuse hypk. Indeterminate diastolic fx.  RVE. RVSP at least 44 mmHg. Mod LAE.  BISMARK. Mild-mod MR.  Cardiac nuclear imaging test 03/24/2017    High risk. Somewhat patchy uptake. No evidence of ischemia or infarction. No RWMA. Severe LVE. EF 25%. Neg EKG on pharm stress test.    Cardiovascular LLE venous duplex 03/06/2017    Left leg:  No DVT. Pulsatile flow.  Heart failure (HCC)     Hypertension        PSH:  Past Surgical History:   Procedure Laterality Date    BREAST SURGERY PROCEDURE UNLISTED      redfuction    HX GYN      hysterectomy  btl    HX MYOMECTOMY       fibroid tumo removed    HX ORTHOPAEDIC  March 2012 ORIF left fibula       MEDS:  Current Outpatient Medications   Medication Sig    methIMAzole (TAPAZOLE) 10 mg tablet Take 1 Tab by mouth daily.  carvedilol (COREG) 25 mg tablet Take 1 Tab by mouth two (2) times daily (with meals).     indapamide (LOZOL) 2.5 mg tablet Take 1 Tab by mouth daily. To take in AM.    furosemide (LASIX) 80 mg tablet Take 1 Tab by mouth daily.  atorvastatin (LIPITOR) 80 mg tablet Take 1 Tab by mouth daily. Pt. to take at night    lisinopril (PRINIVIL, ZESTRIL) 10 mg tablet Take 1 Tab by mouth daily.  spironolactone (ALDACTONE) 25 mg tablet Take 1 Tab by mouth daily.  nitroglycerin (NITROSTAT) 0.4 mg SL tablet 1 Tab by SubLINGual route every five (5) minutes as needed for Chest Pain.  albuterol (ACCUNEB) 1.25 mg/3 mL nebu Take 3 mL by inhalation every four (4) hours as needed (wheezing).  aspirin 81 mg chewable tablet Take 81 mg by mouth daily. No current facility-administered medications for this visit. Allergies and Sensitivities:  Allergies   Allergen Reactions    Percocet [Oxycodone-Acetaminophen] Unknown (comments)     Not allergic. Wrong entry. Family History:  History reviewed. No pertinent family history. Social History:  She  reports that she quit smoking about 22 months ago. She smoked 0.00 packs per day for 18.00 years. she has never used smokeless tobacco.  She  reports that she drinks alcohol. Physical:  Visit Vitals  /88   Pulse 81   Ht 5' 4\" (1.626 m)   Wt 106.1 kg (234 lb)   SpO2 99%   BMI 40.17 kg/m²           She is down 1 pound since her last visit      Exam:  Neck:  Supple, no JVD, no carotid bruits  CV:  Normal S1 and  S2, no murmurs, rubs, or gallops noted. Slight keloid formation at AICD site. Lungs:  Clear to ausculation throughout, no wheezes or rales  Abd:  Soft, non-tender, obese with good bowel sounds.   No hepatosplenomegaly  Extremities:  No edema      Data:  EKG:    Read by Antonina Thompson,  - Normal sinus rhythm, rate 81.  Old anterior wall MI.  T-wave inversions inferolaterally.  Possible inferolateral ischemia      LABS:  Lab Results   Component Value Date/Time    Sodium 137 10/12/2018 09:40 AM    Potassium 4.1 10/12/2018 09:40 AM    Chloride 101 10/12/2018 09:40 AM    CO2 28 10/12/2018 09:40 AM    Glucose 114 (H) 10/12/2018 09:40 AM    BUN 16 10/12/2018 09:40 AM    Creatinine 0.74 10/12/2018 09:40 AM     Lab Results   Component Value Date/Time    Cholesterol, total 81 03/21/2017 03:24 PM    HDL Cholesterol 27 (L) 03/21/2017 03:24 PM    LDL, calculated 42 03/21/2017 03:24 PM    Triglyceride 60 03/21/2017 03:24 PM    CHOL/HDL Ratio 3.0 03/21/2017 03:24 PM     Lab Results   Component Value Date/Time    ALT (SGPT) 16 08/28/2017 02:55 PM         Impression / Plan:  1. Chronic systolic heart failure, appears compensated  2. Essential hypertension, blood pressure controlled  3. Chronic, intermittent asthma  4. Severe LV dysfunction, improved. EF now 25-30% (by echo July 2017) from 15% in March 2017  5. Paroxysmal atrial fibrillation, long episode noted during device interrogation in Dec. 2018 and anticoagulation was recommended.  was seen today for follow-up and re-evaluation as she would like clearance to return to work and to also discuss initiation of anticoagulation therapy in view of a long episode of atrial fibrillation noted on device interrogation in December 2018. Her EKG shows sinus rhythm today. Her blood pressure is controlled. She is feeling better since restarting her thyroid medication. Anticoagulation with Coumadin and the newer novel anticoagulants discussed at length with her. Although Coumadin is less expensive, she does not think she can come in for INR checks and adhere to dietary modifications. She would prefer a once a day medication versus a twice a day for ease of administration. She will be started on Xarelto 20mg once a day. Hopefully, with her newly approved Medicaid, cost will not be a hindrance to starting this medication. Her most recent BMP results are noted above. She has a new patient appointment with the NP at her new primary care physician's office on 1/25/19. They likely will request labs at that time.       The echo that was requested in December 2018 will now be scheduled as she states that she has Medicaid. Teaching done today re:  Atrial fibrillation and anticoagulation. Congestive heart failure teaching reinforced today. Advised to limit sodium intake to no more than 2000mg per day and to also watch fluid intake. Advised to weigh daily every morning and record weights. Instructed to call our office if progressive weight gain is noted over a 2 to 3 day period of time, shortness of breath increases, or if abdominal bloating, nausea, fatigue, or increased lower extremity edema is noted. She will follow-up with Dr. Beronica Garrison as scheduled and PRN. She knows to call if she has any cardiac problems prior to her next scheduled appointment. Corina Camacho MSN, FNP-BC    Please note:  Portions of this chart were created with Dragon medical speech to text program.  Unrecognized errors may be present.

## 2019-01-17 NOTE — PATIENT INSTRUCTIONS
Begin Xarelto 20mg once a day  All other medications to remain the same  Echocardiogram to be scheduled ;  Dx:  LV dysfunction  Okay to return to work on 1/28/19  Follow-up with Arian Reed as scheduled and as needed

## 2019-01-25 ENCOUNTER — OFFICE VISIT (OUTPATIENT)
Dept: INTERNAL MEDICINE CLINIC | Age: 49
End: 2019-01-25

## 2019-01-25 VITALS
WEIGHT: 228 LBS | HEART RATE: 90 BPM | SYSTOLIC BLOOD PRESSURE: 100 MMHG | OXYGEN SATURATION: 98 % | HEIGHT: 64 IN | DIASTOLIC BLOOD PRESSURE: 70 MMHG | RESPIRATION RATE: 16 BRPM | TEMPERATURE: 98.7 F | BODY MASS INDEX: 38.93 KG/M2

## 2019-01-25 DIAGNOSIS — I10 ESSENTIAL HYPERTENSION: ICD-10-CM

## 2019-01-25 DIAGNOSIS — I10 ESSENTIAL HYPERTENSION: Primary | ICD-10-CM

## 2019-01-25 DIAGNOSIS — E66.01 OBESITY, MORBID (HCC): ICD-10-CM

## 2019-01-25 DIAGNOSIS — Z00.00 ROUTINE GENERAL MEDICAL EXAMINATION AT A HEALTH CARE FACILITY: ICD-10-CM

## 2019-01-25 DIAGNOSIS — Z87.898 HISTORY OF PREDIABETES: ICD-10-CM

## 2019-01-25 DIAGNOSIS — R11.0 NAUSEA: ICD-10-CM

## 2019-01-25 DIAGNOSIS — E05.90 HYPERTHYROIDISM: ICD-10-CM

## 2019-01-25 DIAGNOSIS — Z12.39 BREAST CANCER SCREENING: ICD-10-CM

## 2019-01-25 DIAGNOSIS — M25.552 LEFT HIP PAIN: ICD-10-CM

## 2019-01-25 DIAGNOSIS — M70.62 TROCHANTERIC BURSITIS OF LEFT HIP: ICD-10-CM

## 2019-01-25 LAB — HBA1C MFR BLD HPLC: 5.7 %

## 2019-01-25 RX ORDER — ONDANSETRON 4 MG/1
4 TABLET, ORALLY DISINTEGRATING ORAL
Qty: 30 TAB | Refills: 0 | Status: SHIPPED | OUTPATIENT
Start: 2019-01-25 | End: 2020-05-13

## 2019-01-25 NOTE — PROGRESS NOTES
Mckayla Curry is a 53 y.o.  female and presents with    Chief Complaint   Patient presents with    Establish Care       Subjective:  HPI   Ms. Renita Harrell presents today to establish care. One of her daughters did come back to the examination room at the end of visit and plan of care was given to her as well. Ms. Renita Harrell has a history of dilated cardiomyopathy, chronic systolic CHF with LV dysfunction, AICD placed 9/2017, Hypertension, pulmonary hypertension, atrial fibrillation, chronic mild intermittent asthma, hyperthyroidism, DVT 2017, and obesity. She is a former smoker quit 2017. She seems to have a history of medication noncompliance possibly related to lack of insurance. Ms. Renita Harrell is single, lives with her daughter- she reports supportive. She works as a CNA. She socially drinks and is a former smoker quit 2/2017. She is with hx of dilated cardiomyopathy, chronic systolic CHF with LV dysfunction (NYHA class 3), AICD placed 9/1/2017, Hypertension,and pulmonary hypertension. She was admitted to the hospital 3/21/17 thru 3/27/17 with acute on chronic CHF (19 lb weight gain, proBNP 1975, negative cardiac enzymes and serial troponins, ECHO EF 15%, nuclear stress test abnormal, ECHO showed EF 25% on discharge and was sent home with a Life Vest). ECHO 15%> 25%> July 2017 showed EF 30%. AICD placed 9/1/17 with Dr. Yuriy Browne. She is currently under the care with Dr. Corwin Martinez. EKG 1/18/19 NSR, old anterior wall MI, t-wave inversions inferolaterally. Her AICD was interrogated 12/12/18 and showed a 13 hour episode of atrial fibrillation per NP Pag[place]unan note, she was started on Xarelto 20 mg daily. She currently takes and reports compliance with Xarelto 20 mg, Coreg 25 mg BID, Indapamide 2.5 mg daily, Lasix 80 mg daily, Lipitor 80 mg daily, Lisinopril 10 mg daily, Aldactone 25 mg daily, ASA 81 mg daily, and has Nitrostat for PRN use (has not had to use).     She has a history of chronic intermittent asthma. She reports Asthma is controlled. She has not need the nebulizer or inhaler. She reports today with minimal night time cough and denies sob and wheezing. She reports a history of hyperthyroidism. She is taking Methimazole 10 mg daily. She is not current with Endocrinology. US thyroid 2015 showed enlarged heterogeneous right lobe of the thyroid, focal sold nodule within the lower pole measuring 1.7 cm, uncertain for malignancy, CT chest 3/2017 showed enlarged multinodular goiter, 10/2018 T4 13.4, TSH <0.01    She is with breast reduction and hysterectomy with oophorectomy 2009. She denies hx of hormone replacement. She is using condoms, prefers males. She is with a history of ORIF in 2012 with stefanie placement due to fibula fracture. She reports today with complaints starting on Monday 1/21/19 with nausea and vomiting. Just started the Tapazole about 2-3 weeks ago. She reports last time started on this medication with n/v. Additional Concerns: none     ROS   Review of Systems   Constitutional: Positive for chills and malaise/fatigue. Negative for fever. Subjective fever, did not check   HENT: Positive for congestion. Negative for sinus pain and sore throat. Respiratory: Positive for cough. Negative for hemoptysis, sputum production, shortness of breath and wheezing. Cough at night time only and limited/intermittent   Cardiovascular: Negative. Gastrointestinal: Positive for nausea. Genitourinary: Negative. Musculoskeletal: Positive for myalgias. Broke ankle 2011 and rods placed to left leg and since then with stiffness   Skin: Negative. Neurological: Positive for headaches. Negative for dizziness, tingling, tremors, sensory change and speech change. Endo/Heme/Allergies: Positive for environmental allergies. Negative for polydipsia. Does not bruise/bleed easily. Psychiatric/Behavioral: Negative.         Allergies   Allergen Reactions    Percocet [Oxycodone-Acetaminophen] Unknown (comments)     Not allergic. Wrong entry. Current Outpatient Medications   Medication Sig Dispense Refill    ondansetron (ZOFRAN ODT) 4 mg disintegrating tablet Take 1 Tab by mouth every eight (8) hours as needed for Nausea. 30 Tab 0    rivaroxaban (XARELTO) 20 mg tab tablet Take 1 Tab by mouth daily. 30 Tab 11    methIMAzole (TAPAZOLE) 10 mg tablet Take 1 Tab by mouth daily. 30 Tab 0    carvedilol (COREG) 25 mg tablet Take 1 Tab by mouth two (2) times daily (with meals). 60 Tab 6    indapamide (LOZOL) 2.5 mg tablet Take 1 Tab by mouth daily. To take in AM. 30 Tab 6    furosemide (LASIX) 80 mg tablet Take 1 Tab by mouth daily. 30 Tab 6    atorvastatin (LIPITOR) 80 mg tablet Take 1 Tab by mouth daily. Pt. to take at night 30 Tab 6    lisinopril (PRINIVIL, ZESTRIL) 10 mg tablet Take 1 Tab by mouth daily. 30 Tab 6    spironolactone (ALDACTONE) 25 mg tablet Take 1 Tab by mouth daily. 30 Tab 6    nitroglycerin (NITROSTAT) 0.4 mg SL tablet 1 Tab by SubLINGual route every five (5) minutes as needed for Chest Pain. 1 Bottle 1    albuterol (ACCUNEB) 1.25 mg/3 mL nebu Take 3 mL by inhalation every four (4) hours as needed (wheezing). 25 Each 0    aspirin 81 mg chewable tablet Take 81 mg by mouth daily.            Social History     Socioeconomic History    Marital status: SINGLE     Spouse name: Not on file    Number of children: Not on file    Years of education: Not on file    Highest education level: Not on file   Social Needs    Financial resource strain: Not on file    Food insecurity - worry: Not on file    Food insecurity - inability: Not on file    Transportation needs - medical: Not on file   Adeyoh needs - non-medical: Not on file   Occupational History    Not on file   Tobacco Use    Smoking status: Former Smoker     Packs/day: 0.00     Years: 18.00     Pack years: 0.00     Last attempt to quit: 2017     Years since quittin.9    Smokeless tobacco: Never Used   Substance and Sexual Activity    Alcohol use: Yes     Comment: socially     Drug use: No    Sexual activity: Yes     Partners: Male     Birth control/protection: Condom   Other Topics Concern    Not on file   Social History Narrative    Not on file       Past Medical History:   Diagnosis Date    Asthma     Cardiac echocardiogram 03/22/2017    LVE. EF 15% (prev 50% on study of 12/10/14). Severe diffuse hypk. Indeterminate diastolic fx.  RVE. RVSP at least 44 mmHg. Mod LAE.  BISMARK. Mild-mod MR.  Cardiac nuclear imaging test 03/24/2017    High risk. Somewhat patchy uptake. No evidence of ischemia or infarction. No RWMA. Severe LVE. EF 25%. Neg EKG on pharm stress test.    Cardiovascular LLE venous duplex 03/06/2017    Left leg:  No DVT. Pulsatile flow.     Congestive heart failure (HCC)     Heart failure (HCC)     Hypercholesterolemia     Hypertension     Hyperthyroidism        Past Surgical History:   Procedure Laterality Date    BREAST SURGERY PROCEDURE UNLISTED      redfuction    HX GYN      hysterectomy  btl    HX MYOMECTOMY       fibroid tumo removed    HX ORTHOPAEDIC  March 2012 ORIF left fibula       Family History   Problem Relation Age of Onset    Hypertension Mother     Heart Disease Maternal Grandmother         CHF    Hypertension Maternal Grandmother     Diabetes Maternal Grandmother     No Known Problems Father     No Known Problems Sister     No Known Problems Brother     No Known Problems Brother     No Known Problems Brother     No Known Problems Sister     No Known Problems Sister        Objective:  Vitals:    01/25/19 1104   BP: 100/70   Pulse: 90   Resp: 16   Temp: 98.7 °F (37.1 °C)   TempSrc: Oral   SpO2: 98%   Weight: 228 lb (103.4 kg)   Height: 5' 4\" (1.626 m)   PainSc:   0 - No pain       LABS   Results for orders placed or performed in visit on 01/25/19   AMB POC HEMOGLOBIN A1C   Result Value Ref Range    Hemoglobin A1c (POC) 5.7 %       TESTS  US THYROID/PARATHYROID/SOFT TISS    Unspecified: Percocet [Oxycodone-acetaminophen]   Exam Information     Status Exam Begun  Exam Ended    Final [99] 1/05/2015 08:00 1/05/2015 08:20   Result Information     Status: Final result (Exam End: 1/5/2015 08:20) Provider Status: Open   Study Result     Thyroid/Soft Tissue Neck Ultrasound     CPT CODE: 64956     HISTORY: Hypothyroidism. , Thyrotoxicosis without mention of goiter or other  cause, without mention of thyrotoxic crisis or storm.     COMPARISON: None.     FINDINGS:     Selected images from thyroid ultrasound obtained. The right lobe of the thyroid  measures 4.2 x 2.5 x 1.5 cm. It is enlarged with a heterogeneous echo pattern. Focal sharply circumscribed heterogeneous hypoechoic nodule within the lower  pole measures 1.7 x 1.7 x 1.4 cm. There is surrounding color flow. The left lobe of the thyroid measures 4.6 x 1.5 x 1.3 cm. The left lobe is  normal in size, configuration, and echogenicity. Sharply circumscribed  hypoechoic nodule within the lower pole measures 0.9 x 0.9 x 0.7 cm. The isthmus measures 0.4 cm.     IMPRESSION:     Enlarged heterogeneous right lobe of the thyroid. Focal heterogeneous sharply circumscribed solid nodule within the lower pole  measures 1.7 cm. Unknown malignant potential.              CT Results (most recent):  Results from Hospital Encounter encounter on 03/15/17   CTA CHEST W WO CONT    Narrative CTA chest- pulmonary embolus protocol     CPT code: 67724     Indications: Bilateral leg swelling, chest pain; dyspnea; onset yesterday.     Technique: Utilizing pulmonary embolus protocol, thin section axial images were  obtained from the thoracic inlet into the upper abdomen after the uneventful  administration of IV contrast. Coronal and sagittal maximum intensity projection  (MIP) post-processed images were generated to better define pulmonary artery  anatomy and enhance sensitivity for detection of pulmonary emboli. Contrast  used: 100 cc Isovue 370. CT scans at this facility are performed using dose optimization technique as  appropriate with performed exam, to include automated exposure control,  adjustment of mA and/or kV according to patient's size (including appropriate  matching for site-specific examinations), or use of iterative reconstruction  technique. Comparison: February 2017. Findings:    Adequate quality opacification. Minimal heterogeneity of the lungs suggesting some air trapping. Minimal  bandlike atelectasis/scarring inferiorly. No pneumonia. No pneumothorax. Scant  residual right base pleural fluid, much improved. Resolved left base pleural  effusion. No central endobronchial lesions. The pulmonary arteries are well enhanced and no pulmonary emboli are identified. Contrast refluxes into the hepatic veins. Mild hilar adenopathy right more than left as before. Cardiomegaly as before. The aorta enhances normally without evidence of aneurysm or dissection. The visualized portions of the upper abdominal organs are normal.    Enlarged multinodular thyroid gland with morphology as before. There is body wall edema. Multilevel thoracic spondylosis but no lytic or blastic foci of concern. Impression IMPRESSION[de-identified]    1.  Negative for acute PE. No distinct alternative acute findings within the  chest.  2. Minimal residual right base pleural effusion, improved. 3. Cardiomegaly with evidence of right-sided cardiac dysfunction as before. Thank you for this referral.         PE  Physical Exam   Constitutional: She is oriented to person, place, and time. She appears well-developed and well-nourished. No distress. HENT:   Head: Normocephalic and atraumatic. Right Ear: External ear normal.   Left Ear: External ear normal.   Nose: Nose normal.   Mouth/Throat: Oropharynx is clear and moist. No oropharyngeal exudate.    Eyes: Conjunctivae and EOM are normal. Pupils are equal, round, and reactive to light. Right eye exhibits no discharge. Left eye exhibits no discharge. Neck: Normal range of motion. Cardiovascular: Normal rate, regular rhythm, normal heart sounds and intact distal pulses. Pulmonary/Chest: Effort normal and breath sounds normal. No respiratory distress. She has no wheezes. She has no rales. She exhibits no tenderness. Abdominal: Soft. Bowel sounds are normal. She exhibits no distension. There is no hepatosplenomegaly. There is tenderness in the epigastric area. There is no CVA tenderness. Truncal obesity   Musculoskeletal: Normal range of motion. She exhibits tenderness. She exhibits no edema or deformity. Reproducible pain to the lateral aspect of the left thigh with pressure placed to site, limited ROM due to pain, decreased strength noted compared to right leg when resistance placed against flexion of the hip, negative Straight leg raise bilaterally   Lymphadenopathy:     She has no cervical adenopathy. Neurological: She is alert and oriented to person, place, and time. She has normal strength and normal reflexes. No cranial nerve deficit. Coordination and gait normal.   Diabetic foot exam:     Left Foot:   Visual Exam: normal    Pulse DP: 2+ (normal)   Filament test: normal sensation    Vibratory sensation: normal      Right Foot:   Visual Exam: normal    Pulse DP: 2+ (normal)   Filament test: normal sensation    Vibratory sensation: normal      Did not test cranial nerve 1 or 2. Skin: Skin is warm and dry. She is not diaphoretic. Psychiatric: She has a normal mood and affect. Her behavior is normal. Judgment and thought content normal.   Vitals reviewed. Assessment/Plan:    1. Establish care- CPE today, labs ordered. Referral to Ophthalmology. Mammogram ordered. Follow up in 1 month, she was instructed to call us if does not hear from Endocrinology in a weeks time. 2. Prediabetes- A1C in office today 5.7.  Discussed diet and exercise management- this is difficult right now due to left hip pain. Foot examination performed. 3. Hyperthyroidism- Labs and US ordered. STAT referral Endocrinology. Currently on Methimazole. US 2015 and CT chest 3/2017 with enlarged multinodular goiter. 4. Systolic CHF, HTN, Pulm HTN, A fib, dilated cardiomyopathy, AICD- continue with Dr. Sony Rogers as recommended. Reiterated s/s to watch for with CHF and report immediately. DASH diet handout provided. Obesity management recommended. 5. Obesity- DASH diet handout, referral to nutritionist in future. 6. Asthma- seems controlled currently. 7. Nausea and vomiting- I feel this is related to Methimazole or hyperthyroidism itself. Benefit outweighs risk, continue medication. Zofran PRN prescribed. 8. Left hip pain- x 2 months but reports with past history of similar pain. This pain is reproducible to the lateral hip without low back or sciatica, sounds like a trochanteric bursitis, conservative therapy, limit trigger, referral to PT, Canelo, Suma fallon or Tiger balm and tylenol recommended. Possible ortho referral in future, she is agreeable that we need to focus on the thyroid currently. Lab review: orders written for new lab studies as appropriate; see orders    Today's Visit:   Diagnoses and all orders for this visit:    1. Essential hypertension  -     CBC WITH AUTOMATED DIFF; Future  -     METABOLIC PANEL, COMPREHENSIVE; Future  -     LIPID PANEL; Future  -     T4, FREE; Future  -     TSH 3RD GENERATION; Future  -     URINALYSIS W/ RFLX MICROSCOPIC; Future  -     T3 TOTAL; Future  -     REFERRAL TO OPHTHALMOLOGY    2. Obesity, morbid ([address]  -     CBC WITH AUTOMATED DIFF; Future  -     METABOLIC PANEL, COMPREHENSIVE; Future  -     LIPID PANEL; Future  -     T4, FREE; Future  -     TSH 3RD GENERATION; Future  -     URINALYSIS W/ RFLX MICROSCOPIC; Future  -     T3 TOTAL; Future    3.  Hyperthyroidism  Assessment & Plan:          Orders:  -     T4, FREE; Future  -     TSH 3RD GENERATION; Future  -     T3 TOTAL; Future  -     REFERRAL TO ENDOCRINOLOGY  -     REFERRAL TO OPHTHALMOLOGY  -     THYROID PEROXIDASE (TPO) AB; Future  -     US THYROID/PARATHYROID/SOFT TISS; Future    4. Routine general medical examination at a health care facility  -     CBC WITH AUTOMATED DIFF; Future  -     METABOLIC PANEL, COMPREHENSIVE; Future  -     LIPID PANEL; Future  -     T4, FREE; Future  -     TSH 3RD GENERATION; Future  -     URINALYSIS W/ RFLX MICROSCOPIC; Future  -     T3 TOTAL; Future    5. Nausea  -     ondansetron (ZOFRAN ODT) 4 mg disintegrating tablet; Take 1 Tab by mouth every eight (8) hours as needed for Nausea. -     AMB POC HEMOGLOBIN A1C    6. History of prediabetes  -     AMB POC HEMOGLOBIN A1C  -     REFERRAL TO OPHTHALMOLOGY    7. Left hip pain  -     REFERRAL TO PHYSICAL THERAPY  -     XR HIP LT W OR WO PELV 2-3 VWS; Future    8. Trochanteric bursitis of left hip  -     REFERRAL TO PHYSICAL THERAPY    9. Breast cancer screening  -     [facility] MAMMO BI SCREENING INCL CAD; Future    Health Maintenance: Influenza one week ago. Tdap and Pneumococcal 23 on follow ups. I have discussed the diagnosis with the patient and the intended plan as seen in the above orders. The patient has received an after-visit summary and questions were answered concerning future plans. I have discussed medication side effects and warnings with the patient as well. I have reviewed the plan of care with the patient, accepted their input and they are in agreement with the treatment goals. Follow-up Disposition:  Return in about 1 month (around 2/25/2019) for labs, referrals, obesity. More than 1/2 of this 60 minute visit was spent in counseling and coordination of care, as described above.     ARTURO Johansen  Internist of [address], [address] [place] [address].  Phone: [phone]  Fax: [phone]

## 2019-01-25 NOTE — PROGRESS NOTES
ROOM # 12    Chrystal Brooks presents today for   Chief Complaint   Patient presents with   [address] preferred language for health care discussion is english/other. Depression Screening:  PHQ over the last two weeks 1/25/2019 12/12/2018 10/12/2018   Little interest or pleasure in doing things Not at all Several days Not at all   Feeling down, depressed, irritable, or hopeless Not at all Several days Not at all   Total Score PHQ 2 0 2 0       Learning Assessment:  Learning Assessment 10/12/2018 9/22/2017 8/14/2017   PRIMARY LEARNER Patient Patient Patient   HIGHEST LEVEL OF EDUCATION - PRIMARY LEARNER  GRADUATED HIGH SCHOOL OR GED GRADUATED HIGH SCHOOL OR GED -   BARRIERS PRIMARY LEARNER NONE NONE -   CO-LEARNER CAREGIVER No No -   PRIMARY LANGUAGE ENGLISH ENGLISH ENGLISH   LEARNER PREFERENCE PRIMARY READING LISTENING DEMONSTRATION   ANSWERED BY Patient patient patient   RELATIONSHIP SELF SELF SELF       Abuse Screening:  Abuse Screening Questionnaire 10/12/2018   Do you ever feel afraid of your partner? N   Are you in a relationship with someone who physically or mentally threatens you? N   Is it safe for you to go home? Y       Fall Risk  No flowsheet data found. Visit Vitals  /70 (BP 1 Location: Left arm, BP Patient Position: Sitting)   Pulse 90   Temp 98.7 °F (37.1 °C) (Oral)   Resp 16   Ht 5' 4\" (1.626 m)   Wt 228 lb (103.4 kg)   SpO2 98%   BMI 39.14 kg/m²       Health Maintenance reviewed and discussed per provider. Yes    Chrystal Brooks is due for   Health Maintenance Due   Topic Date Due    Pneumococcal 19-64 Medium Risk (1 of 1 - PPSV23) 09/02/1990    DTaP/Tdap/Td series (1 - Tdap) 09/02/1992    PAP AKA CERVICAL CYTOLOGY  09/02/1992    Influenza Age 9 to Adult  08/01/2018     Please order/place referral if appropriate. Advance Directive:  1. Do you have an advance directive in place? Patient Reply: NO    2.  If not, would you like material regarding how to put one in place? Patient Reply: No    Coordination of Care:  1. Have you been to the ER, urgent care clinic since your last visit? Hospitalized since your last visit?  No

## 2019-01-25 NOTE — LETTER
NOTIFICATION RETURN TO WORK / SCHOOL 
 
1/25/2019 12:07 PM 
 
Ms. Elise Roa 
[facility] [id number] To Whom It May Concern: 
 
Elise Roa is currently under the care of Kathleen Montgomery. She will return to work/school on: 2/4/2019 If there are questions or concerns please have the patient contact our office. Sincerely, Lor Cowan NP

## 2019-01-26 ENCOUNTER — HOSPITAL ENCOUNTER (OUTPATIENT)
Dept: GENERAL RADIOLOGY | Age: 49
Discharge: HOME | End: 2019-01-26
Payer: MEDICAID

## 2019-01-26 ENCOUNTER — HOSPITAL ENCOUNTER (OUTPATIENT)
Dept: LAB | Age: 49
Discharge: HOME | End: 2019-01-26

## 2019-01-26 DIAGNOSIS — M25.552 LEFT HIP PAIN: ICD-10-CM

## 2019-01-26 LAB — XX-LABCORP SPECIMEN COL,LCBCF: NORMAL

## 2019-01-26 PROCEDURE — 99001 SPECIMEN HANDLING PT-LAB: CPT

## 2019-01-26 PROCEDURE — 73502 X-RAY EXAM HIP UNI 2-3 VIEWS: CPT

## 2019-01-27 LAB
ALBUMIN SERPL-MCNC: 4.1 G/DL (ref 3.5–5.5)
ALBUMIN/GLOB SERPL: 1.1 {RATIO} (ref 1.2–2.2)
ALP SERPL-CCNC: 116 IU/L (ref 39–117)
ALT SERPL-CCNC: 12 IU/L (ref 0–32)
APPEARANCE UR: CLEAR
AST SERPL-CCNC: 12 IU/L (ref 0–40)
BACTERIA #/AREA URNS HPF: ABNORMAL /[HPF]
BASOPHILS # BLD AUTO: 0 X10E3/UL (ref 0–0.2)
BASOPHILS NFR BLD AUTO: 0 %
BILIRUB SERPL-MCNC: 0.6 MG/DL (ref 0–1.2)
BILIRUB UR QL STRIP: NEGATIVE
BUN SERPL-MCNC: 26 MG/DL (ref 6–24)
BUN/CREAT SERPL: 22 (ref 9–23)
CALCIUM SERPL-MCNC: 9.7 MG/DL (ref 8.7–10.2)
CASTS URNS MICRO: ABNORMAL
CASTS URNS QL MICRO: PRESENT /LPF
CHLORIDE SERPL-SCNC: 94 MMOL/L (ref 96–106)
CHOLEST SERPL-MCNC: 114 MG/DL (ref 100–199)
CO2 SERPL-SCNC: 26 MMOL/L (ref 20–29)
COLOR UR: YELLOW
CREAT SERPL-MCNC: 1.18 MG/DL (ref 0.57–1)
CRYSTALS URNS MICRO: ABNORMAL
EOSINOPHIL # BLD AUTO: 0.6 X10E3/UL (ref 0–0.4)
EOSINOPHIL NFR BLD AUTO: 6 %
EPI CELLS #/AREA URNS HPF: ABNORMAL /HPF
ERYTHROCYTE [DISTWIDTH] IN BLOOD BY AUTOMATED COUNT: 13.2 % (ref 12.3–15.4)
GLOBULIN SER CALC-MCNC: 3.7 G/DL (ref 1.5–4.5)
GLUCOSE SERPL-MCNC: 121 MG/DL (ref 65–99)
GLUCOSE UR QL: NEGATIVE
HCT VFR BLD AUTO: 36 % (ref 34–46.6)
HDLC SERPL-MCNC: 33 MG/DL
HGB BLD-MCNC: 12 G/DL (ref 11.1–15.9)
HGB UR QL STRIP: NEGATIVE
IMM GRANULOCYTES # BLD AUTO: 0 X10E3/UL (ref 0–0.1)
IMM GRANULOCYTES NFR BLD AUTO: 0 %
INTERPRETATION, 910389: NORMAL
INTERPRETATION: NORMAL
KETONES UR QL STRIP: NEGATIVE
LDLC SERPL CALC-MCNC: 62 MG/DL (ref 0–99)
LEUKOCYTE ESTERASE UR QL STRIP: ABNORMAL
LYMPHOCYTES # BLD AUTO: 3.7 X10E3/UL (ref 0.7–3.1)
LYMPHOCYTES NFR BLD AUTO: 40 %
MCH RBC QN AUTO: 30.1 PG (ref 26.6–33)
MCHC RBC AUTO-ENTMCNC: 33.3 G/DL (ref 31.5–35.7)
MCV RBC AUTO: 90 FL (ref 79–97)
MICRO URNS: ABNORMAL
MONOCYTES # BLD AUTO: 0.5 X10E3/UL (ref 0.1–0.9)
MONOCYTES NFR BLD AUTO: 5 %
MUCOUS THREADS URNS QL MICRO: PRESENT
NEUTROPHILS # BLD AUTO: 4.4 X10E3/UL (ref 1.4–7)
NEUTROPHILS NFR BLD AUTO: 49 %
NITRITE UR QL STRIP: POSITIVE
PDF IMAGE, 910387: NORMAL
PH UR STRIP: 5 [PH] (ref 5–7.5)
PLATELET # BLD AUTO: 345 X10E3/UL (ref 150–379)
POTASSIUM SERPL-SCNC: 3.1 MMOL/L (ref 3.5–5.2)
PROT SERPL-MCNC: 7.8 G/DL (ref 6–8.5)
PROT UR QL STRIP: NEGATIVE
RBC # BLD AUTO: 3.99 X10E6/UL (ref 3.77–5.28)
RBC #/AREA URNS HPF: ABNORMAL /HPF
SODIUM SERPL-SCNC: 137 MMOL/L (ref 134–144)
SP GR UR: 1.01 (ref 1–1.03)
SPECIMEN STATUS REPORT, ROLRST: NORMAL
T3 SERPL-MCNC: 183 NG/DL (ref 71–180)
T4 FREE SERPL-MCNC: 1.7 NG/DL (ref 0.82–1.77)
THYROPEROXIDASE AB SERPL-ACNC: 20 IU/ML (ref 0–34)
TRIGL SERPL-MCNC: 94 MG/DL (ref 0–149)
TSH SERPL DL<=0.005 MIU/L-ACNC: <0.006 UIU/ML (ref 0.45–4.5)
UNIDENT CRYS URNS QL MICRO: PRESENT
UROBILINOGEN UR STRIP-MCNC: 0.2 MG/DL (ref 0.2–1)
VLDLC SERPL CALC-MCNC: 19 MG/DL (ref 5–40)
WBC # BLD AUTO: 9.2 X10E3/UL (ref 3.4–10.8)
WBC #/AREA URNS HPF: ABNORMAL /HPF

## 2019-01-28 ENCOUNTER — TELEPHONE (OUTPATIENT)
Dept: INTERNAL MEDICINE CLINIC | Age: 49
End: 2019-01-28

## 2019-01-28 DIAGNOSIS — E87.6 HYPOKALEMIA: ICD-10-CM

## 2019-01-28 DIAGNOSIS — M16.12 OSTEOARTHRITIS OF LEFT HIP, UNSPECIFIED OSTEOARTHRITIS TYPE: ICD-10-CM

## 2019-01-28 DIAGNOSIS — M87.052 AVASCULAR NECROSIS OF BONE OF HIP, LEFT (HCC): Primary | ICD-10-CM

## 2019-01-28 DIAGNOSIS — N30.00 ACUTE CYSTITIS WITHOUT HEMATURIA: Primary | ICD-10-CM

## 2019-01-28 RX ORDER — NITROFURANTOIN 25; 75 MG/1; MG/1
100 CAPSULE ORAL 2 TIMES DAILY
Qty: 10 CAP | Refills: 0 | Status: SHIPPED | OUTPATIENT
Start: 2019-01-28 | End: 2019-02-02

## 2019-01-28 NOTE — PROGRESS NOTES
Sending her to orthopedics for advanced arthritis to the left hip with possible avascular necrosis- sent this referral as stat

## 2019-01-28 NOTE — TELEPHONE ENCOUNTER
I left a message about her labs showing low potassium at 3.1 most likely related to increase in Lasix therapy. I am prescribing Potassium 20 meq daily start today! Also nitrites, casts, crystals, and bacteria positive in urine. Will start on Macrobid x 5 days. Repeat urine in two weeks. Creatinine 0.72>1.8, GFR 63- need to ask when she began taking Lasix at 80meq, may need to contact cardiology to inquire on medication change. Thyroid Ab negative, TSH <0.006, T3 183-elevated, T4 normal 1.70, she is on Tapazone- stat referral sent to endocrinology, I may need to adjust dose if not seen by endocrinology soon.      LDL 62, HDL 33, T chol 114

## 2019-01-29 ENCOUNTER — TELEPHONE (OUTPATIENT)
Dept: INTERNAL MEDICINE CLINIC | Age: 49
End: 2019-01-29

## 2019-01-29 DIAGNOSIS — R21 RASH: ICD-10-CM

## 2019-01-29 DIAGNOSIS — E87.6 HYPOKALEMIA: Primary | ICD-10-CM

## 2019-01-29 RX ORDER — POTASSIUM CHLORIDE 20 MEQ/1
20 TABLET, EXTENDED RELEASE ORAL DAILY
Qty: 30 TAB | Refills: 1 | Status: SHIPPED | OUTPATIENT
Start: 2019-01-29 | End: 2021-05-27 | Stop reason: SDUPTHER

## 2019-01-29 NOTE — TELEPHONE ENCOUNTER
Called to provide the patient the information to OL[facility] so she can get herself scheduled due to [facility] has placed a [address]alee [phone] [id number]  Phone: [phone] referral.  Jose Fierro  [address] [place] [address]  Phone: [phone]  Patient understood and didn't have any additional questions and stated she will give them a call today.

## 2019-01-29 NOTE — TELEPHONE ENCOUNTER
Called and spoke to Kaleb Bernard at [facility] to see about the Urgent referral to [place]. Kaleb Bernard was able to get the patient squeezed in for tomorrow 01/30/2019 at 10:30am with Dr. Aby Jones at: [address] [facility] [address]  [id number]. Called and notified the patient to arrive at least 15 minutes early to check in. Patient understood and didn't have any additional questions.

## 2019-01-30 ENCOUNTER — OFFICE VISIT (OUTPATIENT)
Dept: ORTHOPEDIC SURGERY | Facility: CLINIC | Age: 49
End: 2019-01-30

## 2019-01-30 ENCOUNTER — TELEPHONE (OUTPATIENT)
Dept: CARDIOLOGY CLINIC | Age: 49
End: 2019-01-30

## 2019-01-30 VITALS
BODY MASS INDEX: 39.13 KG/M2 | TEMPERATURE: 97.3 F | HEART RATE: 73 BPM | WEIGHT: 229.2 LBS | HEIGHT: 64 IN | DIASTOLIC BLOOD PRESSURE: 70 MMHG | SYSTOLIC BLOOD PRESSURE: 112 MMHG

## 2019-01-30 DIAGNOSIS — I10 ESSENTIAL HYPERTENSION: Primary | ICD-10-CM

## 2019-01-30 DIAGNOSIS — G89.29 CHRONIC BILATERAL LOW BACK PAIN, WITH SCIATICA PRESENCE UNSPECIFIED: ICD-10-CM

## 2019-01-30 DIAGNOSIS — M16.12 PRIMARY OSTEOARTHRITIS OF LEFT HIP: Primary | ICD-10-CM

## 2019-01-30 DIAGNOSIS — R29.898 LEFT LEG WEAKNESS: ICD-10-CM

## 2019-01-30 DIAGNOSIS — Z01.818 PRE-OP TESTING: ICD-10-CM

## 2019-01-30 DIAGNOSIS — M54.5 CHRONIC BILATERAL LOW BACK PAIN, WITH SCIATICA PRESENCE UNSPECIFIED: ICD-10-CM

## 2019-01-30 RX ORDER — METHYLPREDNISOLONE 4 MG/1
TABLET ORAL
Qty: 1 DOSE PACK | Refills: 0 | Status: SHIPPED | OUTPATIENT
Start: 2019-01-30 | End: 2019-05-08 | Stop reason: ALTCHOICE

## 2019-01-30 RX ORDER — HYDROCODONE BITARTRATE AND ACETAMINOPHEN 5; 325 MG/1; MG/1
1 TABLET ORAL
Qty: 21 TAB | Refills: 0 | Status: SHIPPED | OUTPATIENT
Start: 2019-01-30 | End: 2020-05-13

## 2019-01-30 NOTE — TELEPHONE ENCOUNTER
Patient returned called verified name and , new orders and recommendations have been fully explained to the patient, who indicates understanding.

## 2019-01-30 NOTE — TELEPHONE ENCOUNTER
nickolas called to state she will need orthopedic surgery but it has not been scheduled yet until cardiology clears her  due to her heart    Will forward to Dr Doria Boast  Patient last seen in dec and amelia

## 2019-01-30 NOTE — TELEPHONE ENCOUNTER
Patient called in stating her PCP said it was urgent she get in contact with our office in reference to her lab work that was done on 1/26/19. Reviewed lab work with NP Neeru Briseno. Verbal order and read back per Yancy Lyn NP  Continue heart medications, labs are not concerning at this time, repeat lab work in 1 month. Lm on  for patient to give office a call to inform of repeat blood work.

## 2019-01-30 NOTE — PROGRESS NOTES
Patient: Bijal Small                MRN: [id number]       SSN: [id number]  YOB: 1970        AGE: 53 y.o. SEX: female  Body mass index is 39.34 kg/m². PCP: Deya Pandey NP  01/30/19    HISTORY:  I had the pleasure of reviewing Brennen Castano. Brennen Castano is fairly miserable. She was in the emergency department. It turns out she has end-staged arthritis involving the left hip with avascular necrosis, likely, which is chronic without fracture, and she also has severe, left-sided radiculopathy and weakness involving the ankle, ankle dorsiflexors, and EHL. She has a lot of numbness and tingling, and she reports radiculopathy going all the way down the left leg. She has an AICD in and had previous congestive heart failure about 18 months ago. She has had no recent hospital admissions. She is apparently borderline diabetic as well. PHYSICAL EXAMINATION:  On examination today, she is walking poorly, and we are going to have her use a cane for the right hand. The low back is very tender on the left side. Straight leg raise is positive. She has decreased sensation to L4-5. Tib/ant and EHL are 4+/5 to -5/5 on the left and 5/5 on the right. Straight leg raise is positive. The calf is nontender. Sudhakar's sign is negative. The left hip only has a jog of rotation, which reproduces her groin discomfort. RADIOGRAPHS:  X-rays from the hospital confirm end-staged arthritis of the left hip likely with some AVN. PLAN:  Clinically, she has a severe radiculopathy and left sided weakness. We need an urgent MRI of the lumbar spine, a Medrol Dosepak, and some pain medication. Additionally, she is heading eventually towards a hip replacement. Depending on how bad her back looks and the weakness, she may end up with back surgery prior to hip surgery.   I would be happy to replace her hip with the caveat that she is higher risk and, of course, would have to have permission from  [facility]ina with regards to her heart problems. Therefore, we will see her back in a couple of weeks time after she has had the MRI of the lumbar spine. She is given [facility] referral, pain medication, a Medrol Dosepak, and will need eventual left hip replacement and possibly back surgery prior due to the weakness. REVIEW OF SYSTEMS:      CON: negative for weight loss, fever  EYE: negative for double vision  ENT: negative for hoarseness  RS:   negative for Tb  GI:    negative for blood in stool  :  negative for blood in urine  Other systems reviewed and noted below. Past Medical History:   Diagnosis Date    Asthma     Cardiac echocardiogram 03/22/2017    LVE. EF 15% (prev 50% on study of 12/10/14). Severe diffuse hypk. Indeterminate diastolic fx.  RVE. RVSP at least 44 mmHg. Mod LAE.  BISMARK. Mild-mod MR.  Cardiac nuclear imaging test 03/24/2017    High risk. Somewhat patchy uptake. No evidence of ischemia or infarction. No RWMA. Severe LVE. EF 25%. Neg EKG on pharm stress test.    Cardiovascular LLE venous duplex 03/06/2017    Left leg:  No DVT. Pulsatile flow.  Congestive heart failure (HCC)     Heart failure (HCC)     Hypercholesterolemia     Hypertension     Hyperthyroidism        Family History   Problem Relation Age of Onset    Hypertension Mother     Heart Disease Maternal Grandmother         CHF    Hypertension Maternal Grandmother     Diabetes Maternal Grandmother     No Known Problems Father     No Known Problems Sister     No Known Problems Brother     No Known Problems Brother     No Known Problems Brother     No Known Problems Sister     No Known Problems Sister        Current Outpatient Medications   Medication Sig Dispense Refill    potassium chloride (K-DUR, KLOR-CON) 20 mEq tablet Take 1 Tab by mouth daily. 30 Tab 1    nitrofurantoin, macrocrystal-monohydrate, (MACROBID) 100 mg capsule Take 1 Cap by mouth two (2) times a day for 5 days. 10 Cap 0    ondansetron (ZOFRAN ODT) 4 mg disintegrating tablet Take 1 Tab by mouth every eight (8) hours as needed for Nausea. 30 Tab 0    rivaroxaban (XARELTO) 20 mg tab tablet Take 1 Tab by mouth daily. 30 Tab 11    methIMAzole (TAPAZOLE) 10 mg tablet Take 1 Tab by mouth daily. 30 Tab 0    carvedilol (COREG) 25 mg tablet Take 1 Tab by mouth two (2) times daily (with meals). 60 Tab 6    indapamide (LOZOL) 2.5 mg tablet Take 1 Tab by mouth daily. To take in AM. 30 Tab 6    furosemide (LASIX) 80 mg tablet Take 1 Tab by mouth daily. 30 Tab 6    atorvastatin (LIPITOR) 80 mg tablet Take 1 Tab by mouth daily. Pt. to take at night 30 Tab 6    lisinopril (PRINIVIL, ZESTRIL) 10 mg tablet Take 1 Tab by mouth daily. 30 Tab 6    spironolactone (ALDACTONE) 25 mg tablet Take 1 Tab by mouth daily. 30 Tab 6    nitroglycerin (NITROSTAT) 0.4 mg SL tablet 1 Tab by SubLINGual route every five (5) minutes as needed for Chest Pain. 1 Bottle 1    albuterol (ACCUNEB) 1.25 mg/3 mL nebu Take 3 mL by inhalation every four (4) hours as needed (wheezing). 25 Each 0    aspirin 81 mg chewable tablet Take 81 mg by mouth daily. Allergies   Allergen Reactions    Percocet [Oxycodone-Acetaminophen] Unknown (comments)     Not allergic. Wrong entry.        Past Surgical History:   Procedure Laterality Date    BREAST SURGERY PROCEDURE UNLISTED      redfuction    HX GYN      hysterectomy  btl    HX MYOMECTOMY       fibroid tumo removed    HX ORTHOPAEDIC  March 2012 ORIF left fibula       Social History     Socioeconomic History    Marital status: SINGLE     Spouse name: Not on file    Number of children: Not on file    Years of education: Not on file    Highest education level: Not on file   Social Needs    Financial resource strain: Not on file    Food insecurity - worry: Not on file    Food insecurity - inability: Not on file    Transportation needs - medical: Not on file   [employer] needs - non-medical: Not on file   Occupational History    Not on file   Tobacco Use    Smoking status: Former Smoker     Packs/day: 0.00     Years: 18.00     Pack years: 0.00     Last attempt to quit: 2017     Years since quittin.9    Smokeless tobacco: Never Used   Substance and Sexual Activity    Alcohol use: Yes     Comment: socially     Drug use: No    Sexual activity: Yes     Partners: Male     Birth control/protection: Condom   Other Topics Concern    Not on file   Social History Narrative    Not on file       Visit Vitals  /70 (BP 1 Location: Left arm, BP Patient Position: Sitting)   Pulse 73   Temp 97.3 °F (36.3 °C)   Ht 5' 4\" (1.626 m)   Wt 229 lb 3.2 oz (104 kg)   LMP  (LMP Unknown)   BMI 39.34 kg/m²         PHYSICAL EXAMINATION:  GENERAL: Alert and oriented x3, in no acute distress, well-developed, well-nourished, afebrile. HEART: No JVD. EYES: No scleral icterus   NECK: No significant lymphadenopathy   LUNGS: No respiratory compromise or indrawing  ABDOMEN: Soft, non-tender, non-distended. Electronically signed by:  Stacey Tarango MD

## 2019-02-01 NOTE — TELEPHONE ENCOUNTER
I ordered an echo a few months ago that hasn't been done. Need to check to see if this is a cost deal or she just didn't do it.  We also need to recheck her TSH and free T3. ES

## 2019-02-05 ENCOUNTER — TELEPHONE (OUTPATIENT)
Dept: ORTHOPEDIC SURGERY | Facility: CLINIC | Age: 49
End: 2019-02-05

## 2019-02-05 NOTE — TELEPHONE ENCOUNTER
Echo postponed due to insurance not covering, needs to be seen. Need to make an office appt to be seen so insurance will authorize testing     Left a message.

## 2019-02-05 NOTE — TELEPHONE ENCOUNTER
Patient states she needs a note to be out of work due to her L hip pain.     Last appointment 1/30/19

## 2019-02-06 ENCOUNTER — OFFICE VISIT (OUTPATIENT)
Dept: CARDIOLOGY CLINIC | Age: 49
End: 2019-02-06

## 2019-02-06 VITALS
BODY MASS INDEX: 39.44 KG/M2 | HEIGHT: 64 IN | SYSTOLIC BLOOD PRESSURE: 102 MMHG | OXYGEN SATURATION: 98 % | WEIGHT: 231 LBS | DIASTOLIC BLOOD PRESSURE: 58 MMHG

## 2019-02-06 DIAGNOSIS — I51.9 LV DYSFUNCTION: ICD-10-CM

## 2019-02-06 DIAGNOSIS — R79.89 ABNORMAL TSH: ICD-10-CM

## 2019-02-06 DIAGNOSIS — I50.23 ACUTE ON CHRONIC SYSTOLIC CONGESTIVE HEART FAILURE (HCC): Primary | ICD-10-CM

## 2019-02-06 DIAGNOSIS — R06.02 SHORTNESS OF BREATH: ICD-10-CM

## 2019-02-06 DIAGNOSIS — I42.9 CARDIOMYOPATHY, UNSPECIFIED TYPE (HCC): ICD-10-CM

## 2019-02-06 RX ORDER — METHIMAZOLE 10 MG/1
TABLET ORAL
Qty: 30 TAB | Refills: 0 | OUTPATIENT
Start: 2019-02-06

## 2019-02-06 NOTE — LETTER
2/6/2019 Ms. Ricci Gomes 
[facility] [id number] To whom this may concern, My patient, Ricci Gomes was evaluated today in my office for her current cardiac health conditions. It is my professional recommendation that she not return to work until further medical treatment is provided for her Dilated Cardiomyopathy and uncontrolled Thyroidism, and both conditions are stabilized. Should you have any further questions, please have the patient contact our office anytime.    
 
 
Sincerely, 
 
 
Mauro Reyes, DO

## 2019-02-06 NOTE — TELEPHONE ENCOUNTER
Attempted to contact pt at her home number. Message left for pt to inform her that letter is available to be picked up at the [facility] street office.

## 2019-02-06 NOTE — PROGRESS NOTES
HPI:  I saw Jory Kavya in my office today in cardiovascular evaluation regarding her dilated cardiomyopathy with severe left ventricular dysfunction to discuss her recent echocardiogram. Ms. Adriana Portillo is a pleasant 81-year-old  female with history of presentation in March 2017 to [facility] with acute on chronic systolic heart failure. She subsequently was treated and during that hospitalization was found to have an echocardiogram showing an ejection fraction of 15%.  She also had a pharmacologic myocardial perfusion defect at that time which demonstrated an ejection fraction estimated at 25% without reversible or fixed defects.  She was placed on Coreg, lisinopril, Aldactone, and Lasix and had a LifeVest placed on discharge from the hospital. Bob Altman has done quite well clinically, but her repeat echocardiogram completed on July 18, 2017 though better than her echo back in March 2017 still demonstrated a reduced ejection fraction in the 30% range.       She subsequently had a dual-chamber AICD placed on September 1, 2017 and has done reasonably well since that time, although she comes in today and relates that she really has not been feeling well for a few months. She is actually tried to get disability, but has been unable to qualify for disability has had to go back to work which means that she does not qualify for the [facility] anymore. She tells me that she has been so short of breath and fatigued recently that she ultimately had to go on leave from work and she has been trying to qualify for disability but has been unable to do so. I should note that in reviewing her laboratory tests from back in October 12, 2018 her TSH low <0.01 suggesting that her hyperthyroidism for which she is on Tapazole is not being adequately controlled. She did not have a free T3 at that time.     She comes in today and apparently has been having a lot of pain in her left hip and has seen Dr. Neo Cates who relates that she needs to have a total left hip replacement which is the reason for her visit today. .  She relates that at rest she is comfortable and not short of breath but gets short of breath with any kind of exertion and her most recent laboratory work including thyroid function tests showed a TSH of less than 0.006 with normal free T4 but a free T3 was not done. She does have 2 pillow orthopnea at night and occasional PND when she sits up but she is not having any peripheral edema and she has recently had her Lasix increased from 40 to 80 mg daily and she has had some dizziness recently with lower blood pressures so it is possible that she could be over diuresed. Encounter Diagnoses   Name Primary?  Shortness of breath on exertion     Abnormal TSH     Acute on chronic systolic congestive heart failure (HCC) in the past without overt heart failure currently Yes    Cardiomyopathy, unspecified type ([address]     LV dysfunction with EF of 30-35% an echo of July 18, 2017. Discussion: This lady's TSH is quite low suggesting that she is hyperthyroid and does need her Tapazole adjusted. Her primary care team is attempting to get her an appointment with endocrinology and I think we need to get her evaluated and treated by endocrinology to get her thyroid function stabilized since her cardiac problems will not improve until her thyroid situation is corrected. He is somewhat dizzy and her blood pressure is somewhat on the low side currently and I suspect she may actually be over diuresed currently so I am going to get a BMP and BNP and will make further recommendations on her diuretic management once have had a chance to review these tests.     I am going to leave her on the same medication for now with plans on seeing her back in a couple of months once her thyroid situation has improved and then I would plan to repeat an echocardiogram to reassess her overall LV function prior to her planned left total hip surgery.     PCP: Kristopher Thomas NP      Past Medical History:   Diagnosis Date    Asthma     Cardiac echocardiogram 03/22/2017    LVE. EF 15% (prev 50% on study of 12/10/14). Severe diffuse hypk. Indeterminate diastolic fx.  RVE. RVSP at least 44 mmHg. Mod LAE.  BISMARK. Mild-mod MR.  Cardiac nuclear imaging test 03/24/2017    High risk. Somewhat patchy uptake. No evidence of ischemia or infarction. No RWMA. Severe LVE. EF 25%. Neg EKG on pharm stress test.    Cardiovascular LLE venous duplex 03/06/2017    Left leg:  No DVT. Pulsatile flow.  Congestive heart failure (HCC)     Heart failure ([address]     Hypercholesterolemia     Hypertension     Hyperthyroidism        Past Surgical History:   Procedure Laterality Date    BREAST SURGERY PROCEDURE UNLISTED      redfuction    HX GYN      hysterectomy  btl    HX MYOMECTOMY       fibroid tumo removed    HX ORTHOPAEDIC  March 2012 ORIF left fibula     Current Outpatient Medications   Medication Sig    methylPREDNISolone (MEDROL DOSEPACK) 4 mg tablet Per dose pack instructions    HYDROcodone-acetaminophen (NORCO) 5-325 mg per tablet Take 1 Tab by mouth every eight (8) hours as needed for Pain. Max Daily Amount: 3 Tabs.  potassium chloride (K-DUR, KLOR-CON) 20 mEq tablet Take 1 Tab by mouth daily.  ondansetron (ZOFRAN ODT) 4 mg disintegrating tablet Take 1 Tab by mouth every eight (8) hours as needed for Nausea.  rivaroxaban (XARELTO) 20 mg tab tablet Take 1 Tab by mouth daily.  methIMAzole (TAPAZOLE) 10 mg tablet Take 1 Tab by mouth daily.  carvedilol (COREG) 25 mg tablet Take 1 Tab by mouth two (2) times daily (with meals).  indapamide (LOZOL) 2.5 mg tablet Take 1 Tab by mouth daily. To take in AM.    furosemide (LASIX) 80 mg tablet Take 1 Tab by mouth daily.  atorvastatin (LIPITOR) 80 mg tablet Take 1 Tab by mouth daily.  Pt. to take at night    lisinopril (PRINIVIL, ZESTRIL) 10 mg tablet Take 1 Tab by mouth daily.  spironolactone (ALDACTONE) 25 mg tablet Take 1 Tab by mouth daily.  nitroglycerin (NITROSTAT) 0.4 mg SL tablet 1 Tab by SubLINGual route every five (5) minutes as needed for Chest Pain.  albuterol (ACCUNEB) 1.25 mg/3 mL nebu Take 3 mL by inhalation every four (4) hours as needed (wheezing).  aspirin 81 mg chewable tablet Take 81 mg by mouth daily. No current facility-administered medications for this visit. Allergies   Allergen Reactions    Percocet [Oxycodone-Acetaminophen] Unknown (comments)     Not allergic. Wrong entry. Social History :  Social History     Tobacco Use    Smoking status: Former Smoker     Packs/day: 0.00     Years: 18.00     Pack years: 0.00     Last attempt to quit: 2017     Years since quittin.9    Smokeless tobacco: Never Used   Substance Use Topics    Alcohol use: Yes     Comment: socially         Family History: family history includes Diabetes in her maternal grandmother; Heart Disease in her maternal grandmother; Hypertension in her maternal grandmother and mother; No Known Problems in her brother, brother, brother, father, sister, sister, and sister. Review of Systems:    Constitutional: Negative. Respiratory: Positive for cough. Negative for hemoptysis, shortness of breath and wheezing. Cardiovascular: Positive for orthopnea. Negative for chest pain, palpitations and leg swelling. Gastrointestinal: Positive for constipation. Negative for abdominal pain, blood in stool, diarrhea, heartburn, melena, nausea and vomiting. Musculoskeletal: Positive for joint pain and myalgias. Negative for falls. Neurological: Negative for dizziness. Physical Exam:    The patient is a cooperative, alert, well developed, well nourished 53 y.o. morbidly obese -American female who is in no acute distress at the time of the examination.   Visit Vitals  /58   Ht 5' 4\" (1.626 m)   Wt 104.8 kg (231 lb)   SpO2 98% BMI 39.65 kg/m²       HEENT: Conjuctiva white, mucosa moist, no pallor or cyanosis. NECK: Supple without masses or tenderness. She does have a thyroid goiter. There was no jugular venous distention. Carotid are full bilaterally without bruits. CHEST: Symmetrical with good excursion. LUNGS: Clear to auscultation in all fields. HEART: The apex is not displaced. There were no lifts, thrills or heaves. There is a normal S1 and S2 without appreciable murmurs, rubs, clicks, or gallops auscultated. ABDOMEN: Soft without masses, tenderness or organomegaly. EXTREMITIES: Full peripheral pulses without peripheral edema. Review of Data: See PMH and Cardiology and Imaging sections for cardiac testing  Lab Results   Component Value Date/Time    Cholesterol, total 114 01/26/2019 09:09 AM    HDL Cholesterol 33 (L) 01/26/2019 09:09 AM    LDL, calculated 62 01/26/2019 09:09 AM    Triglyceride 94 01/26/2019 09:09 AM    CHOL/HDL Ratio 3.0 03/21/2017 03:24 PM       Results for orders placed or performed in visit on 02/06/19   AMB POC EKG ROUTINE W/ 12 LEADS, INTER & REP     Status: None    Narrative    Normal sinus rhythm rate 74. There is diffuse ST-T flattening both anteriorly and in the inferior leads with some mild T wave inversion in inferior leads but these changes are nonspecific and similar to the EKG of January 17, 2019. Zeinab Torres D.O., F.A.C.C. [facility]  [facility]  [address]    PLEASE NOTE:  This document has been produced using voice recognition software. Unrecognized errors in transcription may be present.

## 2019-02-11 ENCOUNTER — TELEPHONE (OUTPATIENT)
Dept: CARDIOLOGY CLINIC | Age: 49
End: 2019-02-11

## 2019-02-11 RX ORDER — ATORVASTATIN CALCIUM 80 MG/1
80 TABLET, FILM COATED ORAL DAILY
Qty: 30 TAB | Refills: 6 | Status: SHIPPED | OUTPATIENT
Start: 2019-02-11 | End: 2020-02-25

## 2019-02-11 RX ORDER — LISINOPRIL 10 MG/1
10 TABLET ORAL DAILY
Qty: 30 TAB | Refills: 6 | Status: SHIPPED | OUTPATIENT
Start: 2019-02-11 | End: 2019-05-08

## 2019-02-11 RX ORDER — SPIRONOLACTONE 25 MG/1
25 TABLET ORAL DAILY
Qty: 30 TAB | Refills: 6 | Status: SHIPPED | OUTPATIENT
Start: 2019-02-11 | End: 2020-02-25

## 2019-02-11 RX ORDER — FUROSEMIDE 80 MG/1
80 TABLET ORAL DAILY
Qty: 30 TAB | Refills: 6 | Status: SHIPPED | OUTPATIENT
Start: 2019-02-11 | End: 2020-02-25

## 2019-02-11 NOTE — TELEPHONE ENCOUNTER
Pt called stating her PCP informed her that she has Graves disease.  This was diagnosed by her endocrinologist.

## 2019-02-11 NOTE — TELEPHONE ENCOUNTER
Pharmacy called to verify if patient should continue on K+ with the lasix and aldactone. Explained that patient's K+ was low at this time she is to remain on all rx as prescribed per Verbal order and read back per Carrie Javier, DO    We will have patient recheck bmp and inform. Patient states she can recheck tmw 02/12/19. This has been fully explained to the patient, who indicates understanding.

## 2019-02-19 NOTE — TELEPHONE ENCOUNTER
Disha Carter, DO   You 6 days ago      I noted the TSH being low prompting us to let the family physician know. Juni Cowartluchos any event they are treating her hyperthyroidism and until she is considered euthyroid she is not going to be a candidate for her orthopedic surgery.  Please let the patient know. ES (Routing comment)          Patient aware.

## 2019-02-22 DIAGNOSIS — M16.12 PRIMARY OSTEOARTHRITIS OF LEFT HIP: ICD-10-CM

## 2019-02-22 DIAGNOSIS — G89.29 CHRONIC BILATERAL LOW BACK PAIN, WITH SCIATICA PRESENCE UNSPECIFIED: ICD-10-CM

## 2019-02-22 DIAGNOSIS — M54.5 CHRONIC BILATERAL LOW BACK PAIN, WITH SCIATICA PRESENCE UNSPECIFIED: ICD-10-CM

## 2019-02-22 RX ORDER — HYDROCODONE BITARTRATE AND ACETAMINOPHEN 5; 325 MG/1; MG/1
1 TABLET ORAL
Qty: 21 TAB | Refills: 0 | OUTPATIENT
Start: 2019-02-22

## 2019-02-22 NOTE — TELEPHONE ENCOUNTER
Patient called requesting a refill of the prescription HYDROcodone-acetaminophen (NORCO) 5-325 mg per tablet. Please advise patient when completed at [phone].

## 2019-02-22 NOTE — TELEPHONE ENCOUNTER
& Print    Context: [address]    Last Visit: 1/30/19  Next Appt: None Listed  Previous Refill Encounter: 1/30-21+0    Requested Prescriptions     Pending Prescriptions Disp Refills    HYDROcodone-acetaminophen (NORCO) 5-325 mg per tablet 21 Tab 0     Sig: Take 1 Tab by mouth every eight (8) hours as needed for Pain. Max Daily Amount: 3 Tabs.

## 2019-02-23 ENCOUNTER — HOSPITAL ENCOUNTER (OUTPATIENT)
Dept: ULTRASOUND IMAGING | Age: 49
Discharge: HOME | End: 2019-02-23
Attending: NURSE PRACTITIONER
Payer: MEDICAID

## 2019-02-23 ENCOUNTER — HOSPITAL ENCOUNTER (OUTPATIENT)
Dept: LAB | Age: 49
Discharge: HOME | End: 2019-02-23

## 2019-02-23 ENCOUNTER — HOSPITAL ENCOUNTER (OUTPATIENT)
Dept: MAMMOGRAPHY | Age: 49
Discharge: HOME | End: 2019-02-23
Attending: NURSE PRACTITIONER
Payer: MEDICAID

## 2019-02-23 DIAGNOSIS — E05.90 HYPERTHYROIDISM: ICD-10-CM

## 2019-02-23 DIAGNOSIS — Z12.39 BREAST CANCER SCREENING: ICD-10-CM

## 2019-02-23 LAB — XX-LABCORP SPECIMEN COL,LCBCF: NORMAL

## 2019-02-23 PROCEDURE — 76536 US EXAM OF HEAD AND NECK: CPT

## 2019-02-23 PROCEDURE — 77067 SCR MAMMO BI INCL CAD: CPT

## 2019-02-23 PROCEDURE — 99001 SPECIMEN HANDLING PT-LAB: CPT

## 2019-02-24 DIAGNOSIS — E05.90 HYPERTHYROIDISM: ICD-10-CM

## 2019-02-24 DIAGNOSIS — R93.89 ABNORMAL ULTRASOUND OF THYROID GLAND: Primary | ICD-10-CM

## 2019-02-24 LAB
ALBUMIN SERPL-MCNC: 4 G/DL (ref 3.5–5.5)
ALBUMIN/GLOB SERPL: 1.2 {RATIO} (ref 1.2–2.2)
ALP SERPL-CCNC: 124 IU/L (ref 39–117)
ALT SERPL-CCNC: 12 IU/L (ref 0–32)
AST SERPL-CCNC: 11 IU/L (ref 0–40)
BILIRUB SERPL-MCNC: 0.5 MG/DL (ref 0–1.2)
BUN SERPL-MCNC: 10 MG/DL (ref 6–24)
BUN/CREAT SERPL: 13 (ref 9–23)
CALCIUM SERPL-MCNC: 9.5 MG/DL (ref 8.7–10.2)
CHLORIDE SERPL-SCNC: 98 MMOL/L (ref 96–106)
CO2 SERPL-SCNC: 23 MMOL/L (ref 20–29)
CREAT SERPL-MCNC: 0.77 MG/DL (ref 0.57–1)
GLOBULIN SER CALC-MCNC: 3.3 G/DL (ref 1.5–4.5)
GLUCOSE SERPL-MCNC: 112 MG/DL (ref 65–99)
POTASSIUM SERPL-SCNC: 4 MMOL/L (ref 3.5–5.2)
PROT SERPL-MCNC: 7.3 G/DL (ref 6–8.5)
SODIUM SERPL-SCNC: 138 MMOL/L (ref 134–144)

## 2019-02-25 ENCOUNTER — TELEPHONE (OUTPATIENT)
Dept: INTERNAL MEDICINE CLINIC | Age: 49
End: 2019-02-25

## 2019-02-25 NOTE — TELEPHONE ENCOUNTER
----- Message from Ashlie Worrell NP sent at 2/24/2019  9:39 PM EST -----  The ultrasound of the thyroid is showing increase in size of some of the nodules compared to the 2015 scan. I am referring to Dr. Riaz Montana for further review and possible biopsy.

## 2019-02-25 NOTE — TELEPHONE ENCOUNTER
Closed referral for Dr. Katalina Michel so patient wouldn't be scheduled and requested office notes from Dr. Janie Dunham. We can re-open the referral with Dr. Katalina Michel if needed.

## 2019-02-25 NOTE — TELEPHONE ENCOUNTER
I do not see where I have received records but if Dr. Essie Garcia is already planning a biopsy and she has a plan of care then we can cancel our referral to Dr. Alek Be.

## 2019-02-25 NOTE — TELEPHONE ENCOUNTER
Called and spoke with patient and gave message below. She verbalized understanding. Patient stated that she saw Endo Dr. Hugh Cosby and she wanted to know if SL had received the records from that appointment. Patient stated that Dr. Hugh Cosby had already spoken with her about a possible biopsy. She wanted to know if SL was aware of this. Patient stated she will see whoever she needs to but basically wanted to make sure everyone was on the same page. Please advise.

## 2019-02-25 NOTE — PROGRESS NOTES
The ultrasound of the thyroid is showing increase in size of some of the nodules compared to the 2015 scan. I am referring to Dr. Cirilo Colindres for further review and possible biopsy.

## 2019-02-26 ENCOUNTER — TELEPHONE (OUTPATIENT)
Dept: INTERNAL MEDICINE CLINIC | Age: 49
End: 2019-02-26

## 2019-02-26 NOTE — TELEPHONE ENCOUNTER
Chief Complaint   Patient presents with    Results     Mammogram done 02-23-19 per NP Baldev Ness     02-26-19 left a voice message to return my call.

## 2019-02-26 NOTE — TELEPHONE ENCOUNTER
----- Message from Cristina Aranda NP sent at 2/25/2019  6:57 PM EST -----  Mammogram- No evidence of malignancy.   Recommend continued screening with annual mammography.

## 2019-03-04 ENCOUNTER — APPOINTMENT (OUTPATIENT)
Dept: INTERNAL MEDICINE CLINIC | Age: 49
End: 2019-03-04

## 2019-03-04 PROBLEM — I27.20 PULMONARY HTN (HCC): Status: ACTIVE | Noted: 2019-03-04

## 2019-03-27 ENCOUNTER — TELEPHONE (OUTPATIENT)
Dept: ORTHOPEDIC SURGERY | Age: 49
End: 2019-03-27

## 2019-03-27 NOTE — TELEPHONE ENCOUNTER
Pt called asking if Dr. Jose D Leavitt nurse can talk to her. Pt is complaining her right hip is in severely in pain can't hardly walk. She also states that Dr. Wilmer Santana suggest for [facility] of the hip. Please advise pt at [phone].

## 2019-03-28 ENCOUNTER — OFFICE VISIT (OUTPATIENT)
Dept: CARDIOLOGY CLINIC | Age: 49
End: 2019-03-28

## 2019-03-28 VITALS
HEIGHT: 64 IN | SYSTOLIC BLOOD PRESSURE: 90 MMHG | WEIGHT: 239 LBS | HEART RATE: 111 BPM | DIASTOLIC BLOOD PRESSURE: 60 MMHG | OXYGEN SATURATION: 99 % | BODY MASS INDEX: 40.8 KG/M2

## 2019-03-28 DIAGNOSIS — R00.0 SINUS TACHYCARDIA: ICD-10-CM

## 2019-03-28 DIAGNOSIS — I50.23 ACUTE ON CHRONIC SYSTOLIC CONGESTIVE HEART FAILURE (HCC): Primary | ICD-10-CM

## 2019-03-28 DIAGNOSIS — R06.02 SHORTNESS OF BREATH: ICD-10-CM

## 2019-03-28 DIAGNOSIS — Z95.810 AICD (AUTOMATIC CARDIOVERTER/DEFIBRILLATOR) PRESENT: ICD-10-CM

## 2019-03-28 DIAGNOSIS — E05.90 HYPERTHYROIDISM: ICD-10-CM

## 2019-03-28 DIAGNOSIS — M54.50 CHRONIC BILATERAL LOW BACK PAIN WITHOUT SCIATICA: ICD-10-CM

## 2019-03-28 DIAGNOSIS — I42.9 CARDIOMYOPATHY, UNSPECIFIED TYPE (HCC): ICD-10-CM

## 2019-03-28 DIAGNOSIS — M25.552 LEFT HIP PAIN: Primary | ICD-10-CM

## 2019-03-28 DIAGNOSIS — G89.29 CHRONIC BILATERAL LOW BACK PAIN WITHOUT SCIATICA: ICD-10-CM

## 2019-03-28 RX ORDER — HYDROCODONE BITARTRATE AND ACETAMINOPHEN 5; 325 MG/1; MG/1
1 TABLET ORAL
Qty: 28 TAB | Refills: 0 | Status: SHIPPED | OUTPATIENT
Start: 2019-03-28 | End: 2019-03-28 | Stop reason: SDUPTHER

## 2019-03-28 NOTE — TELEPHONE ENCOUNTER
Patient notified that Rx is ready at the [facility] location. Patient verbalized understanding.

## 2019-03-28 NOTE — TELEPHONE ENCOUNTER
Patient contacted and she stated that she had a hard time sleeping last night due to the pain and she needs something to help her with that. She states she has an appt today with her cardiologist to be cleared for her surgery. She would like something to help her until surgery.

## 2019-05-07 ENCOUNTER — HOSPITAL ENCOUNTER (OUTPATIENT)
Dept: LAB | Age: 49
Discharge: HOME | End: 2019-05-07

## 2019-05-07 LAB
XX-LABCORP SPECIMEN COL,LCBCF: NORMAL
XX-LABCORP SPECIMEN COL,LCBCF: NORMAL

## 2019-05-07 PROCEDURE — 99001 SPECIMEN HANDLING PT-LAB: CPT

## 2019-05-08 ENCOUNTER — OFFICE VISIT (OUTPATIENT)
Dept: CARDIOLOGY CLINIC | Age: 49
End: 2019-05-08

## 2019-05-08 VITALS
WEIGHT: 242 LBS | SYSTOLIC BLOOD PRESSURE: 118 MMHG | HEIGHT: 64 IN | OXYGEN SATURATION: 98 % | BODY MASS INDEX: 41.32 KG/M2 | HEART RATE: 92 BPM | DIASTOLIC BLOOD PRESSURE: 86 MMHG

## 2019-05-08 DIAGNOSIS — I11.0 HYPERTENSIVE HEART DISEASE WITH HEART FAILURE (HCC): ICD-10-CM

## 2019-05-08 DIAGNOSIS — Z95.810 AICD (AUTOMATIC CARDIOVERTER/DEFIBRILLATOR) PRESENT: ICD-10-CM

## 2019-05-08 DIAGNOSIS — I42.0 DILATED CARDIOMYOPATHY (HCC): ICD-10-CM

## 2019-05-08 DIAGNOSIS — I50.23 ACUTE ON CHRONIC SYSTOLIC CONGESTIVE HEART FAILURE (HCC): ICD-10-CM

## 2019-05-08 DIAGNOSIS — R06.02 SHORTNESS OF BREATH: ICD-10-CM

## 2019-05-08 DIAGNOSIS — E05.90 HYPERTHYROIDISM: ICD-10-CM

## 2019-05-08 DIAGNOSIS — R00.2 PALPITATION: Primary | ICD-10-CM

## 2019-05-08 LAB
ALBUMIN SERPL-MCNC: 4.4 G/DL (ref 3.5–5.5)
ALP SERPL-CCNC: 108 IU/L (ref 39–117)
ALT SERPL-CCNC: 8 IU/L (ref 0–32)
AST SERPL-CCNC: 11 IU/L (ref 0–40)
BASOPHILS # BLD AUTO: 0 X10E3/UL (ref 0–0.2)
BASOPHILS NFR BLD AUTO: 0 %
BILIRUB DIRECT SERPL-MCNC: 0.15 MG/DL (ref 0–0.4)
BILIRUB SERPL-MCNC: 0.6 MG/DL (ref 0–1.2)
EOSINOPHIL # BLD AUTO: 0.1 X10E3/UL (ref 0–0.4)
EOSINOPHIL NFR BLD AUTO: 1 %
ERYTHROCYTE [DISTWIDTH] IN BLOOD BY AUTOMATED COUNT: 14 % (ref 12.3–15.4)
HCT VFR BLD AUTO: 37.6 % (ref 34–46.6)
HGB BLD-MCNC: 12.6 G/DL (ref 11.1–15.9)
IMM GRANULOCYTES # BLD AUTO: 0 X10E3/UL (ref 0–0.1)
IMM GRANULOCYTES NFR BLD AUTO: 0 %
LYMPHOCYTES # BLD AUTO: 4.2 X10E3/UL (ref 0.7–3.1)
LYMPHOCYTES NFR BLD AUTO: 38 %
MCH RBC QN AUTO: 31.4 PG (ref 26.6–33)
MCHC RBC AUTO-ENTMCNC: 33.5 G/DL (ref 31.5–35.7)
MCV RBC AUTO: 94 FL (ref 79–97)
MONOCYTES # BLD AUTO: 0.5 X10E3/UL (ref 0.1–0.9)
MONOCYTES NFR BLD AUTO: 4 %
NEUTROPHILS # BLD AUTO: 6.4 X10E3/UL (ref 1.4–7)
NEUTROPHILS NFR BLD AUTO: 57 %
PLATELET # BLD AUTO: 347 X10E3/UL (ref 150–379)
PROT SERPL-MCNC: 7.4 G/DL (ref 6–8.5)
RBC # BLD AUTO: 4.01 X10E6/UL (ref 3.77–5.28)
SPECIMEN STATUS REPORT, ROLRST: NORMAL
T3FREE SERPL-MCNC: 3.5 PG/ML (ref 2–4.4)
T4 FREE SERPL-MCNC: 1.11 NG/DL (ref 0.82–1.77)
TSH SERPL DL<=0.005 MIU/L-ACNC: <0.006 UIU/ML (ref 0.45–4.5)
WBC # BLD AUTO: 11.3 X10E3/UL (ref 3.4–10.8)

## 2019-05-08 NOTE — PROGRESS NOTES
Per your last office note: This lady is having a mild sinus tachycardia and has borderline hypotension with blood pressure of 90/60. She is feeling poorly because of her hyperthyroidism is not controlled. Her Tapazole is been increased from 10 mg a day to 10 mg twice a day but I do not think that this is an adequate dose. I am going to get a TSH now and were going to try to get in touch with the endocrinologist who saw her recently who is named patient cannot remember and hopefully get him to increase her Tapazole further and to more aggressively get her Graves' disease and medical remission.

## 2019-05-08 NOTE — PROGRESS NOTES
HPI: I saw Lovina Essex in my office today in cardiovascular evaluation regarding her dilated cardiomyopathy with severe left ventricular dysfunction to discuss her recent echocardiogram. Ms. Leonor Corrales is a pleasant 27-year-old  female with history of presentation in March 2017 to [facility] with acute on chronic systolic heart failure. She subsequently was treated and during that hospitalization was found to have an echocardiogram showing an ejection fraction of 15%.  She also had a pharmacologic myocardial perfusion defect at that time which demonstrated an ejection fraction estimated at 25% without reversible or fixed defects.  She was placed on Coreg, lisinopril, Aldactone, and Lasix and had a LifeVest placed on discharge from the hospital. Petar Sears has done quite well clinically, but her repeat echocardiogram completed on July 18, 2017 though better than her echo back in March 2017 still demonstrated a reduced ejection fraction in the 30% range.       She subsequently had a dual-chamber AICD placed on September 1, 2017 and has done reasonably well since that time, although she comes in today and relates that she really has not been feeling well for a few months.       She is actually tried to get disability, but has been unable to qualify for disability has had to go back to work which means that she does not qualify for the [facility] anymore. Petar Sears tells me that she has been so short of breath and fatigued recently that she ultimately had to go on leave from work and she has been trying to qualify for disability but has been unable to do so. I should note that in reviewing her laboratory tests from back in October 12, 2018 her TSH low <0.01 suggesting that her hyperthyroidism for which she is on Tapazole was being inadequately controlled.  Stephanie increased her Tapazole to twice a day and her most recent thyroid function tests have been within normal range apparently.   She is having some increase in her thyroid size it is going to be getting a thyroid biopsy tomorrow through Dr. Laila Nicholas office. She comes in today relates that she is breathing reasonably comfortably at this juncture, although she did wake up short of breath just the other night and when we checked her AICD today her OptiVol was somewhat increased. Encounter Diagnoses   Name Primary?  Shortness of breath on exertion     Acute on chronic systolic congestive heart failure (HCC) in the past without overt heart failure currently     Dilated cardiomyopathy ([address]     Palpitation Yes    AICD (automatic cardioverter/defibrillator) present     Hypertensive heart disease with heart failure ([address]     Hyperthyroidism inadequately controlled medically        Discussion: This lady still appears to have a component of heart failure and we know she has significant left ventricular dysfunction so I am going to discontinue lisinopril for 48 hours and start her on Entresto 24/26 twice daily with plans on checking a BMP and a BNP in a couple of weeks. I am going to have her come back to see my nurse practitioner in about a month for possible increase in [facility] and I will see her myself in a couple of months. Hopefully we can get her on an optimal dose of Entresto and we can get her heart failure better controlled without pushing diuretics further. We did check her dual-chamber AICD today and she was a paced 1.6% time and V paced less than 0.1% of the time she had 25 short PSVT episodes the longest of which was 2 minutes in the last few weeks. She has 9 years remaining on the battery. Her blood pressure is adequately controlled today and her EKG is stable so I am simply get a plan to see her in follow-up as indicated above. PCP: Larry Alvarado NP      Past Medical History:   Diagnosis Date    Asthma     Cardiac echocardiogram 03/22/2017    LVE. EF 15% (prev 50% on study of 12/10/14). Severe diffuse hypk. Indeterminate diastolic fx.  RVE. RVSP at least 44 mmHg. Mod LAE.  BISMARK. Mild-mod MR.  Cardiac nuclear imaging test 03/24/2017    High risk. Somewhat patchy uptake. No evidence of ischemia or infarction. No RWMA. Severe LVE. EF 25%. Neg EKG on pharm stress test.    Cardiovascular LLE venous duplex 03/06/2017    Left leg:  No DVT. Pulsatile flow.  Congestive heart failure (HCC)     Heart failure ([address]     Hypercholesterolemia     Hypertension     Hyperthyroidism        Past Surgical History:   Procedure Laterality Date    BREAST SURGERY PROCEDURE UNLISTED      redfuction    HX GYN      hysterectomy  btl    HX MYOMECTOMY       fibroid tumo removed    HX ORTHOPAEDIC  March 2012 ORIF left fibula       Current Outpatient Medications   Medication Sig    atorvastatin (LIPITOR) 80 mg tablet Take 1 Tab by mouth daily. Pt. to take at night    furosemide (LASIX) 80 mg tablet Take 1 Tab by mouth daily.  potassium chloride (K-DUR, KLOR-CON) 20 mEq tablet Take 1 Tab by mouth daily.  ondansetron (ZOFRAN ODT) 4 mg disintegrating tablet Take 1 Tab by mouth every eight (8) hours as needed for Nausea.  rivaroxaban (XARELTO) 20 mg tab tablet Take 1 Tab by mouth daily.  methIMAzole (TAPAZOLE) 10 mg tablet Take 1 Tab by mouth daily.  carvedilol (COREG) 25 mg tablet Take 1 Tab by mouth two (2) times daily (with meals).  indapamide (LOZOL) 2.5 mg tablet Take 1 Tab by mouth daily. To take in AM.    nitroglycerin (NITROSTAT) 0.4 mg SL tablet 1 Tab by SubLINGual route every five (5) minutes as needed for Chest Pain.  albuterol (ACCUNEB) 1.25 mg/3 mL nebu Take 3 mL by inhalation every four (4) hours as needed (wheezing).  aspirin 81 mg chewable tablet Take 81 mg by mouth daily.  spironolactone (ALDACTONE) 25 mg tablet Take 1 Tab by mouth daily.  HYDROcodone-acetaminophen (NORCO) 5-325 mg per tablet Take 1 Tab by mouth every eight (8) hours as needed for Pain. Max Daily Amount: 3 Tabs. No current facility-administered medications for this visit. Allergies   Allergen Reactions    Percocet [Oxycodone-Acetaminophen] Unknown (comments)     Not allergic. Wrong entry. Social History :  Social History     Tobacco Use    Smoking status: Former Smoker     Packs/day: 0.00     Years: 18.00     Pack years: 0.00     Last attempt to quit: 2017     Years since quittin.1    Smokeless tobacco: Never Used   Substance Use Topics    Alcohol use: Yes     Comment: socially         Family History: family history includes Diabetes in her maternal grandmother; Heart Disease in her maternal grandmother; Hypertension in her maternal grandmother and mother; No Known Problems in her brother, brother, brother, father, sister, sister, and sister. Review of Systems:    Review of Systems   Constitutional: Negative for chills, fever, malaise/fatigue and weight loss. Respiratory: Positive for cough, shortness of breath and wheezing. Negative for hemoptysis. Cardiovascular: Positive for palpitations and orthopnea. Negative for chest pain and leg swelling. Gastrointestinal: Positive for abdominal pain, constipation, diarrhea, nausea and vomiting. Negative for blood in stool, heartburn and melena. Musculoskeletal: Positive for joint pain and myalgias. Negative for falls. Neurological: Negative for dizziness. Physical Exam:    The patient is a cooperative, alert, well developed, well nourished 53 y.o. morbidly obese -American female who is in no acute distress at the time of the examination. Visit Vitals  /86   Pulse 92   Ht 5' 4\" (1.626 m)   Wt 109.8 kg (242 lb)   SpO2 98%   BMI 41.54 kg/m²       HEENT: Conjuctiva white, mucosa moist, no pallor or cyanosis. NECK: Supple without masses or tenderness. She does have a thyroid goiter. There was no jugular venous distention. Carotid are full bilaterally without bruits.   CHEST: Symmetrical with good excursion. LUNGS: Clear to auscultation in all fields. HEART: The apex is not displaced. There were no lifts, thrills or heaves. There is a normal S1 and S2 without appreciable murmurs, rubs, clicks, or gallops auscultated. ABDOMEN: Soft without masses, tenderness or organomegaly. EXTREMITIES: Full peripheral pulses without peripheral edema. Review of Data: See PMH and Cardiology and Imaging sections for cardiac testing  Lab Results   Component Value Date/Time    Cholesterol, total 114 01/26/2019 09:09 AM    HDL Cholesterol 33 (L) 01/26/2019 09:09 AM    LDL, calculated 62 01/26/2019 09:09 AM    Triglyceride 94 01/26/2019 09:09 AM    CHOL/HDL Ratio 3.0 03/21/2017 03:24 PM       Results for orders placed or performed in visit on 05/08/19   AMB POC EKG ROUTINE W/ 12 LEADS, INTER & REP     Status: None    Narrative    Normal sinus rhythm rate 92. There is poor R wave progression anteriorly which can be a normal variant and some nonspecific anterolateral ST-T changes, but this tracing is otherwise within normal limits and similar to the EKG of March 28, 2019           La Blackman D.O., F.A.C.C. [facility]  [facility] [place]  [facility] [address]    PLEASE NOTE:  This document has been produced using voice recognition software. Unrecognized errors in transcription may be present.

## 2019-05-10 ENCOUNTER — DOCUMENTATION ONLY (OUTPATIENT)
Dept: CARDIOLOGY CLINIC | Age: 49
End: 2019-05-10

## 2019-07-11 ENCOUNTER — TELEPHONE (OUTPATIENT)
Dept: ORTHOPEDIC SURGERY | Facility: CLINIC | Age: 49
End: 2019-07-11

## 2019-07-11 ENCOUNTER — CLINICAL SUPPORT (OUTPATIENT)
Dept: CARDIOLOGY CLINIC | Age: 49
End: 2019-07-11

## 2019-07-11 DIAGNOSIS — I42.0 DILATED CARDIOMYOPATHY (HCC): Primary | ICD-10-CM

## 2019-07-11 DIAGNOSIS — Z95.810 AICD (AUTOMATIC CARDIOVERTER/DEFIBRILLATOR) PRESENT: ICD-10-CM

## 2019-07-11 NOTE — TELEPHONE ENCOUNTER
Patient called in requesting a refill on her pain medication. She states that she did not know the name of it, it was for her hips. Patient would like to  at the [facility], she can be reached at [phone].

## 2019-07-12 ENCOUNTER — HOSPITAL ENCOUNTER (OUTPATIENT)
Dept: LAB | Age: 49
Discharge: HOME | End: 2019-07-12

## 2019-07-12 LAB
XX-LABCORP SPECIMEN COL,LCBCF: NORMAL
XX-LABCORP SPECIMEN COL,LCBCF: NORMAL

## 2019-07-12 PROCEDURE — 99001 SPECIMEN HANDLING PT-LAB: CPT

## 2019-07-13 LAB
ALBUMIN SERPL-MCNC: 4.4 G/DL (ref 3.5–5.5)
ALP SERPL-CCNC: 111 IU/L (ref 39–117)
ALT SERPL-CCNC: 9 IU/L (ref 0–32)
AST SERPL-CCNC: 10 IU/L (ref 0–40)
BASOPHILS # BLD AUTO: 0 X10E3/UL (ref 0–0.2)
BASOPHILS NFR BLD AUTO: 0 %
BILIRUB DIRECT SERPL-MCNC: 0.21 MG/DL (ref 0–0.4)
BILIRUB SERPL-MCNC: 0.9 MG/DL (ref 0–1.2)
BUN SERPL-MCNC: 15 MG/DL (ref 6–24)
BUN/CREAT SERPL: 16 (ref 9–23)
CALCIUM SERPL-MCNC: 9.4 MG/DL (ref 8.7–10.2)
CHLORIDE SERPL-SCNC: 96 MMOL/L (ref 96–106)
CO2 SERPL-SCNC: 22 MMOL/L (ref 20–29)
CREAT SERPL-MCNC: 0.91 MG/DL (ref 0.57–1)
EOSINOPHIL # BLD AUTO: 0.1 X10E3/UL (ref 0–0.4)
EOSINOPHIL NFR BLD AUTO: 1 %
ERYTHROCYTE [DISTWIDTH] IN BLOOD BY AUTOMATED COUNT: 13.6 % (ref 12.3–15.4)
GLUCOSE SERPL-MCNC: 124 MG/DL (ref 65–99)
HCT VFR BLD AUTO: 40.2 % (ref 34–46.6)
HGB BLD-MCNC: 13.5 G/DL (ref 11.1–15.9)
IMM GRANULOCYTES # BLD AUTO: 0 X10E3/UL (ref 0–0.1)
IMM GRANULOCYTES NFR BLD AUTO: 0 %
LYMPHOCYTES # BLD AUTO: 2.2 X10E3/UL (ref 0.7–3.1)
LYMPHOCYTES NFR BLD AUTO: 21 %
MCH RBC QN AUTO: 31.8 PG (ref 26.6–33)
MCHC RBC AUTO-ENTMCNC: 33.6 G/DL (ref 31.5–35.7)
MCV RBC AUTO: 95 FL (ref 79–97)
MONOCYTES # BLD AUTO: 0.9 X10E3/UL (ref 0.1–0.9)
MONOCYTES NFR BLD AUTO: 8 %
NEUTROPHILS # BLD AUTO: 7.3 X10E3/UL (ref 1.4–7)
NEUTROPHILS NFR BLD AUTO: 70 %
NT-PROBNP SERPL-MCNC: 61 PG/ML (ref 0–249)
PLATELET # BLD AUTO: 300 X10E3/UL (ref 150–450)
POTASSIUM SERPL-SCNC: 3.8 MMOL/L (ref 3.5–5.2)
PROT SERPL-MCNC: 7.7 G/DL (ref 6–8.5)
RBC # BLD AUTO: 4.25 X10E6/UL (ref 3.77–5.28)
SODIUM SERPL-SCNC: 136 MMOL/L (ref 134–144)
T3FREE SERPL-MCNC: 3.9 PG/ML (ref 2–4.4)
T4 FREE SERPL-MCNC: 1.32 NG/DL (ref 0.82–1.77)
TSH SERPL DL<=0.005 MIU/L-ACNC: <0.006 UIU/ML (ref 0.45–4.5)
WBC # BLD AUTO: 10.5 X10E3/UL (ref 3.4–10.8)

## 2019-07-14 NOTE — PROGRESS NOTES
This lady has a very low TSH suggesting suboptimally treated hyperthyroidism. She will need a free T3 and will need to discuss her case with Endocrinology. NT pro-BNP is very low, so no signs of congestive heart failure  Please remind me to review her case with Dr. Elizabeth Allison tomorrow.  ES

## 2019-07-15 NOTE — PROGRESS NOTES
I have personally seen and evaluated the device findings. Interrogation reviewed and I agree with assessment.     Zachariah Sawant

## 2019-09-17 ENCOUNTER — OFFICE VISIT (OUTPATIENT)
Dept: CARDIOLOGY CLINIC | Age: 49
End: 2019-09-17

## 2019-09-17 VITALS
SYSTOLIC BLOOD PRESSURE: 90 MMHG | HEIGHT: 64 IN | OXYGEN SATURATION: 97 % | HEART RATE: 96 BPM | WEIGHT: 253 LBS | BODY MASS INDEX: 43.19 KG/M2 | DIASTOLIC BLOOD PRESSURE: 64 MMHG

## 2019-09-17 DIAGNOSIS — Z95.810 AICD (AUTOMATIC CARDIOVERTER/DEFIBRILLATOR) PRESENT: ICD-10-CM

## 2019-09-17 DIAGNOSIS — I42.0 DILATED CARDIOMYOPATHY (HCC): ICD-10-CM

## 2019-09-17 DIAGNOSIS — R07.9 CHEST PAIN, UNSPECIFIED TYPE: Primary | ICD-10-CM

## 2019-09-17 DIAGNOSIS — I10 ESSENTIAL HYPERTENSION: ICD-10-CM

## 2019-09-17 DIAGNOSIS — I50.22 CHRONIC SYSTOLIC CONGESTIVE HEART FAILURE (HCC): ICD-10-CM

## 2019-09-17 RX ORDER — METHIMAZOLE 10 MG/1
10 TABLET ORAL 2 TIMES DAILY
Qty: 1 TAB | Refills: 0
Start: 2019-09-17 | End: 2022-04-06

## 2019-09-17 NOTE — PROGRESS NOTES
Yovanny Feng presents today for follow-up. She was last seen by Dr. Arnita Primrose on 5/8/19 and during that visit, she was started on Entresto 24/26 BID. He ordered labs and her NT proBNP was very low and therefore she was not in congestive heart failure. Her TSH however was very low and Dr. Susie Haji recommended that she follow-up with endocrinology. She states that since that visit, she has undergone a thyroid biopsy which she reports was negative and she is now taking her Tapazole 10 mg twice a day. She is a 51year old female with history of chronic systolic heart failure, LV dysfunction, hypertension, pulmonary hypertension, chronic mild intermittent asthma, tobacco abuse, and obesity. She was hospitalized from 3/21/17 through 3/27/17 for acute on chronic systolic heart failure. She presented to the hospital after being seen at her PCP's office for complaints of shortness of breath. She was noted to be hypoxic at the office and she had reportedly gained 19 pounds. She was sent to the ER for further evaluation and treatment. Her NT pro-BNP was 1975. Cardiac enzymes were negative and were serial troponins. An echocardiogram done during her hospitalization showed an ejection fraction of 15%, severe diffuse hypokinesis, and RVSP 44 mmHg. She also had a pharmacologic nuclear stress test done which showed abnormal perfusion imaging. There is no evidence of reversible or fixed defects. Ejection fraction estimated at 25%. Prior to discharge home from the hospital, she was fitted with a LifeVest.  A repeat echo done in July 2017, showed that her EF improved but only to 30%. On 9/1/17, she underwent implantation of a dual chamber [employer] MRI compatible AICD for primary prevention and heart failure, performed by Dr. Nilda Ibarra. She was supposed to have another echo done in Oct. 2018, but it was not done. She is feeling well and has tolerated the addition of Entresto to her medication regimen.   She is upset today as one of her good friends  and her viewing is today. She denies shortness of breath at rest, denies dyspnea on exertion, denies orthopnea and PND. She is able to sleep in her bed on 1-2 pillows. She denies abdominal bloating. She denies lightheadedness, dizziness, and syncope. She denies lower extremity edema and claudication. Denies nausea, vomiting, diarrhea, fever, chills. Her AICD has not discharged. PMH:  Past Medical History:   Diagnosis Date    Asthma     Cardiac echocardiogram 2017    LVE. EF 15% (prev 50% on study of 12/10/14). Severe diffuse hypk. Indeterminate diastolic fx.  RVE. RVSP at least 44 mmHg. Mod LAE.  BISMARK. Mild-mod MR.  Cardiac nuclear imaging test 2017    High risk. Somewhat patchy uptake. No evidence of ischemia or infarction. No RWMA. Severe LVE. EF 25%. Neg EKG on pharm stress test.    Cardiovascular LLE venous duplex 2017    Left leg:  No DVT. Pulsatile flow.  Congestive heart failure (HCC)     Heart failure ([address]     Hypercholesterolemia     Hypertension     Hyperthyroidism        PSH:  Past Surgical History:   Procedure Laterality Date    BREAST SURGERY PROCEDURE UNLISTED      redfuction    HX GYN      hysterectomy  btl    HX MYOMECTOMY       fibroid tumo removed    HX ORTHOPAEDIC  2012 ORIF left fibula       MEDS:  Current Outpatient Medications   Medication Sig    methIMAzole (TAPAZOLE) 10 mg tablet Take 1 Tab by mouth two (2) times a day.  rivaroxaban (XARELTO) 20 mg tab tablet Take 1 Tab by mouth daily.  sacubitril-valsartan (ENTRESTO) 24 mg/26 mg tablet Take 1 Tab by mouth two (2) times a day.  atorvastatin (LIPITOR) 80 mg tablet Take 1 Tab by mouth daily. Pt. to take at night    furosemide (LASIX) 80 mg tablet Take 1 Tab by mouth daily.  spironolactone (ALDACTONE) 25 mg tablet Take 1 Tab by mouth daily.     HYDROcodone-acetaminophen (NORCO) 5-325 mg per tablet Take 1 Tab by mouth every eight (8) hours as needed for Pain. Max Daily Amount: 3 Tabs.  potassium chloride (K-DUR, KLOR-CON) 20 mEq tablet Take 1 Tab by mouth daily.  ondansetron (ZOFRAN ODT) 4 mg disintegrating tablet Take 1 Tab by mouth every eight (8) hours as needed for Nausea.  carvedilol (COREG) 25 mg tablet Take 1 Tab by mouth two (2) times daily (with meals).  indapamide (LOZOL) 2.5 mg tablet Take 1 Tab by mouth daily. To take in AM.    nitroglycerin (NITROSTAT) 0.4 mg SL tablet 1 Tab by SubLINGual route every five (5) minutes as needed for Chest Pain.  albuterol (ACCUNEB) 1.25 mg/3 mL nebu Take 3 mL by inhalation every four (4) hours as needed (wheezing).  aspirin 81 mg chewable tablet Take 81 mg by mouth daily. No current facility-administered medications for this visit. Allergies and Sensitivities:  Allergies   Allergen Reactions    Percocet [Oxycodone-Acetaminophen] Unknown (comments)     Not allergic. Wrong entry. Family History:  Family History   Problem Relation Age of Onset    Hypertension Mother     Heart Disease Maternal Grandmother         CHF    Hypertension Maternal Grandmother     Diabetes Maternal Grandmother     No Known Problems Father     No Known Problems Sister     No Known Problems Brother     No Known Problems Brother     No Known Problems Brother     No Known Problems Sister     No Known Problems Sister        Social History:  She  reports that she quit smoking about 2 years ago. She smoked 0.00 packs per day for 18.00 years. She has never used smokeless tobacco.  She  reports that she drinks alcohol. Physical:  Visit Vitals  BP 90/64   Pulse 96   Ht 5' 4\" (1.626 m)   Wt 114.8 kg (253 lb)   SpO2 97%   BMI 43.43 kg/m²       Her weight is up 11 pounds since her last visit      Exam:  Neck:  Supple, no JVD, no carotid bruits  CV:  Normal S1 and  S2, no murmurs, rubs, or gallops noted. Slight keloid formation at AICD site.   Lungs:  Clear to ausculation throughout, no wheezes or rales  Abd:  Soft, non-tender, obese with good bowel sounds. No hepatosplenomegaly  Extremities:  No edema      Data:  EKG:   Read by Misty Salas MD - Sinus rhythm.  Left axis.  Right atrial enlargement. .  Poor R-wave progression, may be secondary to pulmonary disease, consider old anterior infarct.  Nonspecific T-abnormality.  Low voltage with rightward P-axis and rotation, possible pulmonary disease. LABS:  Lab Results   Component Value Date/Time    Sodium 136 07/12/2019 12:00 AM    Potassium 3.8 07/12/2019 12:00 AM    Chloride 96 07/12/2019 12:00 AM    CO2 22 07/12/2019 12:00 AM    Glucose 124 (H) 07/12/2019 12:00 AM    BUN 15 07/12/2019 12:00 AM    Creatinine 0.91 07/12/2019 12:00 AM     Lab Results   Component Value Date/Time    Cholesterol, total 114 01/26/2019 09:09 AM    HDL Cholesterol 33 (L) 01/26/2019 09:09 AM    LDL, calculated 62 01/26/2019 09:09 AM    Triglyceride 94 01/26/2019 09:09 AM    CHOL/HDL Ratio 3.0 03/21/2017 03:24 PM     Lab Results   Component Value Date/Time    ALT (SGPT) 9 07/12/2019 12:00 AM         Impression / Plan:  1. Chronic systolic heart failure, appears compensated  2. Essential hypertension, blood pressure low but asymptomatic  3. Chronic, intermittent asthma  4. Severe LV dysfunction, improved. EF now 25-30% (by echo July 2017) from 15% in March 2017  5. Paroxysmal atrial fibrillation, long episode noted during device interrogation in Dec. 2018. Currently maintaining sinus rhythm and now anticoagulated with Eliquis.  was seen today for follow-up after being started on Entresto 24/26 twice daily. She has tolerated the medication well and offers no cardiac complaints. Overall, she states that she is feeling better since being placed on the medication. Her blood pressure is low today at 90/64 but she is asymptomatic.   She denies lightheaded and dizziness, breath sounds are clear, and she does not exhibit any signs of volume overload. She states that she is compliant with her medications. Her weight is up 11 pounds. She was advised to try and not gain anymore weight and if possible, lose weight. Her EKG shows sinus rhythm today. Because her blood pressure is low, her Entresto cannot be titrated at this time. She states that she has undergone a thyroid biopsy and that it was negative. Her Tapazole was increased to 10 mg twice a day and she states that she is scheduled for follow-up with endocrinology in December. Congestive heart failure teaching reinforced today. Advised to limit sodium intake to no more than 2000mg per day and to also watch fluid intake. Advised to weigh daily every morning and record weights. Instructed to call our office if progressive weight gain is noted over a 2 to 3 day period of time, shortness of breath increases, or if abdominal bloating, nausea, fatigue, or increased lower extremity edema is noted. She will follow-up with Dr. Dennis Vázquez as scheduled and PRN. She knows to call if she has any cardiac problems prior to her next scheduled appointment. Radha Woodward MSN, FNP-BC    Please note:  Portions of this chart were created with Dragon medical speech to text program.  Unrecognized errors may be present.

## 2019-11-04 ENCOUNTER — HOSPITAL ENCOUNTER (EMERGENCY)
Age: 49
Discharge: HOME | End: 2019-11-04
Attending: EMERGENCY MEDICINE
Payer: MEDICAID

## 2019-11-04 VITALS
HEART RATE: 100 BPM | TEMPERATURE: 98.6 F | OXYGEN SATURATION: 97 % | HEIGHT: 64 IN | BODY MASS INDEX: 40.63 KG/M2 | WEIGHT: 238 LBS | DIASTOLIC BLOOD PRESSURE: 88 MMHG | RESPIRATION RATE: 18 BRPM | SYSTOLIC BLOOD PRESSURE: 121 MMHG

## 2019-11-04 DIAGNOSIS — H00.011 HORDEOLUM EXTERNUM OF RIGHT UPPER EYELID: Primary | ICD-10-CM

## 2019-11-04 PROCEDURE — 99282 EMERGENCY DEPT VISIT SF MDM: CPT

## 2019-11-04 RX ORDER — ERYTHROMYCIN 5 MG/G
OINTMENT OPHTHALMIC
Qty: 1 TUBE | Refills: 0 | OUTPATIENT
Start: 2019-11-04 | End: 2020-01-14

## 2019-11-04 RX ORDER — CEPHALEXIN 500 MG/1
500 CAPSULE ORAL 3 TIMES DAILY
Qty: 21 CAP | Refills: 0 | Status: SHIPPED | OUTPATIENT
Start: 2019-11-04 | End: 2019-11-11

## 2019-11-04 NOTE — ED TRIAGE NOTES
Patient c/o swelling to right eye since this morning on awakening. States eye began hurting on Friday. States itching and watery drainage on Saturday.  Denies injury

## 2019-11-04 NOTE — ED PROVIDER NOTES
3:45 PM Jesse Almeida is a 51 y.o. female with a history of CHF, HTN, asthma who presents to ED with right eye pain and swelling. Mild pain 3 days ago and mild itching and watering 2 days ago. Woke up with swelling today and crusted closed. There is no ocular pain. Only discomfort to eyelid and eye itching. No glasses or contacts but told she needs glasses     No other complaints, associated symptoms or modifying factors at this time. PCP: Odalis Villareal NP             Past Medical History:   Diagnosis Date    Asthma     Cardiac echocardiogram 03/22/2017    LVE. EF 15% (prev 50% on study of 12/10/14). Severe diffuse hypk. Indeterminate diastolic fx.  RVE. RVSP at least 44 mmHg. Mod LAE.  BISMARK. Mild-mod MR.  Cardiac nuclear imaging test 03/24/2017    High risk. Somewhat patchy uptake. No evidence of ischemia or infarction. No RWMA. Severe LVE. EF 25%. Neg EKG on pharm stress test.    Cardiovascular LLE venous duplex 03/06/2017    Left leg:  No DVT. Pulsatile flow.     Congestive heart failure (HCC)     Heart failure (HCC)     Hypercholesterolemia     Hypertension     Hyperthyroidism        Past Surgical History:   Procedure Laterality Date    BREAST SURGERY PROCEDURE UNLISTED      redfuction    HX GYN      hysterectomy  btl    HX MYOMECTOMY       fibroid tumo removed    HX ORTHOPAEDIC  March 2012 ORIF left fibula         Family History:   Problem Relation Age of Onset    Hypertension Mother     Heart Disease Maternal Grandmother         CHF    Hypertension Maternal Grandmother     Diabetes Maternal Grandmother     No Known Problems Father     No Known Problems Sister     No Known Problems Brother     No Known Problems Brother     No Known Problems Brother     No Known Problems Sister     No Known Problems Sister        Social History     Socioeconomic History    Marital status: SINGLE     Spouse name: Not on file    Number of children: Not on file    Years of education: Not on file    Highest education level: Not on file   Occupational History    Not on file   Social Needs    Financial resource strain: Not on file    Food insecurity:     Worry: Not on file     Inability: Not on file    Transportation needs:     Medical: Not on file     Non-medical: Not on file   Tobacco Use    Smoking status: Former Smoker     Packs/day: 0.00     Years: 18.00     Pack years: 0.00     Last attempt to quit: 2017     Years since quittin.6    Smokeless tobacco: Never Used   Substance and Sexual Activity    Alcohol use: Yes     Comment: socially     Drug use: No    Sexual activity: Yes     Partners: Male     Birth control/protection: Condom   Lifestyle    Physical activity:     Days per week: Not on file     Minutes per session: Not on file    Stress: Not on file   Relationships    Social connections:     Talks on phone: Not on file     Gets together: Not on file     Attends Voodoo service: Not on file     Active member of club or organization: Not on file     Attends meetings of clubs or organizations: Not on file     Relationship status: Not on file    Intimate partner violence:     Fear of current or ex partner: Not on file     Emotionally abused: Not on file     Physically abused: Not on file     Forced sexual activity: Not on file   Other Topics Concern    Not on file   Social History Narrative    Not on file         ALLERGIES: Patient has no active allergies. Review of Systems   Constitutional: Negative. Negative for fever. HENT: Negative for ear pain and sore throat. Eyes: Positive for pain, discharge and itching. Negative for photophobia and visual disturbance. Respiratory: Negative for cough. Gastrointestinal: Negative for abdominal pain, diarrhea, nausea and vomiting. Musculoskeletal: Negative for arthralgias and myalgias. Skin: Negative for rash. Allergic/Immunologic: Negative. Neurological: Negative. Hematological: Negative. Psychiatric/Behavioral: Negative. Vitals:    11/04/19 1547   BP: 121/88   Pulse: 100   Resp: 18   Temp: 98.6 °F (37 °C)   SpO2: 97%   Weight: 108 kg (238 lb)   Height: 5' 4\" (1.626 m)            Physical Exam   Constitutional: She is oriented to person, place, and time. She appears well-developed and well-nourished. No distress. HENT:   Head: Normocephalic and atraumatic. Nose: Nose normal.   Mouth/Throat: Oropharynx is clear and moist. No oropharyngeal exudate. Eyes: Pupils are equal, round, and reactive to light. Conjunctivae and EOM are normal. Right eye exhibits discharge (clear) and hordeolum. Neck: Normal range of motion. Neck supple. Cardiovascular: Normal rate, regular rhythm, normal heart sounds and intact distal pulses. Pulmonary/Chest: Effort normal and breath sounds normal. No respiratory distress. Abdominal: Soft. Bowel sounds are normal. She exhibits no distension. There is no tenderness. Musculoskeletal: Normal range of motion. She exhibits no edema. Neurological: She is alert and oriented to person, place, and time. Skin: Skin is warm and dry. No rash noted. She is not diaphoretic. Psychiatric: She has a normal mood and affect. Her behavior is normal. Judgment and thought content normal.   Nursing note and vitals reviewed. MDM  Number of Diagnoses or Management Options  Hordeolum externum of right upper eyelid:   Diagnosis management comments: Visual Acuity without corrective lenses:  Both 20/50, Right 20/50, Left 20/50    Patient with stye to the eye with associated upper and lower swellin. No visual changes. No FB sensation. No trauma/direct injury. No obvious periorbital cellulitis. Appropriate for outpatient ABX therapy. Recommend warm compresses. PCP f/u OR eye and return to ED for acute changes. ICD-10-CM ICD-9-CM    1.  Hordeolum externum of right upper eyelid H00.011 373.11      Signed By: Souleymane Hamilton NP     November 4, 2019

## 2019-11-04 NOTE — DISCHARGE INSTRUCTIONS
Patient Education        Styes and Chalazia: Care Instructions  Your Care Instructions    Styes and chalazia (say \"vhd-[id number]-[id number]\") are both conditions that can cause swelling of the eyelid. A stye is an infection in the root of an eyelash. The infection causes a tender red lump on the edge of the eyelid. The infection can spread until the whole eyelid becomes red and inflamed. Styes usually break open, and a tiny amount of pus drains. They usually clear up on their own in about a week, but they sometimes need treatment with antibiotics. A chalazion is a lump or cyst in the eyelid (chalazion is singular; chalazia is plural). It is caused by swelling and inflammation of deep oil glands inside the eyelid. Chalazia are usually not infected. They can take a few months to heal.  If a chalazion becomes more swollen and painful or does not go away, you may need to have it drained by your doctor. Follow-up care is a key part of your treatment and safety. Be sure to make and go to all appointments, and call your doctor if you are having problems. It's also a good idea to know your test results and keep a list of the medicines you take. How can you care for yourself at home? · Do not rub your eyes. Do not squeeze or try to open a stye or chalazion. · To help a stye or chalazion heal faster:  ? Put a warm, moist compress on your eye for 5 to 10 minutes, 3 to 6 times a day. Heat often brings a stye to a point where it drains on its own. Keep in mind that warm compresses will often increase swelling a little at first.  ? Do not use hot water or heat a wet cloth in a microwave oven. The compress may get too hot and can burn the eyelid. · Always wash your hands before and after you use a compress or touch your eyes. · If the doctor gave you antibiotic drops or ointment, use the medicine exactly as directed. Use the medicine for as long as instructed, even if your eye starts to feel better.   · To put in eyedrops or ointment:  ? Tilt your head back, and pull your lower eyelid down with one finger. ? Drop or squirt the medicine inside the lower lid. ? Close your eye for 30 to 60 seconds to let the drops or ointment move around. ? Do not touch the ointment or dropper tip to your eyelashes or any other surface. · Do not wear eye makeup or contact lenses until the stye or chalazion heals. · Do not share towels, pillows, or washcloths while you have a stye. When should you call for help? Call your doctor now or seek immediate medical care if:    · You have pain in your eye.     · You have a change in vision or loss of vision.     · Redness and swelling get much worse.    Watch closely for changes in your health, and be sure to contact your doctor if:    · Your stye does not get better in 1 week.     · Your chalazion does not start to get better after several weeks. Where can you learn more? Go to http://star-jean.info/. Enter H485 in the search box to learn more about \"Styes and Chalazia: Care Instructions. \"  Current as of: May 5, 2019  Content Version: 12.2  © 3542-2601 [employer]. Care instructions adapted under license by [employer] (which disclaims liability or warranty for this information). If you have questions about a medical condition or this instruction, always ask your healthcare professional. [place] [address] any warranty or liability for your use of this information.

## 2019-11-12 RX ORDER — INDAPAMIDE 2.5 MG/1
TABLET, FILM COATED ORAL
Qty: 30 TAB | Refills: 6 | Status: SHIPPED | OUTPATIENT
Start: 2019-11-12 | End: 2020-11-24

## 2020-01-04 NOTE — PROGRESS NOTES
Leticia Carroll presents today for a 3 month check-up. She is overdue for follow-up with Dr. Gary Crawford and was supposed to see him but missed he appointment and she was rescheduled with me. She is a 51year old female with history of chronic systolic heart failure, LV dysfunction, hypertension, pulmonary hypertension, chronic mild intermittent asthma, tobacco abuse, and obesity. She was hospitalized from 3/21/17 through 3/27/17 for acute on chronic systolic heart failure. She presented to the hospital after being seen at her PCP's office for complaints of shortness of breath. She was noted to be hypoxic at the office and she had reportedly gained 19 pounds. She was sent to the ER for further evaluation and treatment. Her NT pro-BNP was 1975. Cardiac enzymes were negative and were serial troponins. An echocardiogram done during her hospitalization showed an ejection fraction of 15%, severe diffuse hypokinesis, and RVSP 44 mmHg. She also had a pharmacologic nuclear stress test done which showed abnormal perfusion imaging. There is no evidence of reversible or fixed defects. Ejection fraction estimated at 25%. Prior to discharge home from the hospital, she was fitted with a LifeVest.  A repeat echo done in July 2017, showed that her EF improved but only to 30%. On 9/1/17, she underwent implantation of a dual chamber Med[employer]onic MRI compatible AICD for primary prevention and heart failure, performed by Dr. Francisco Miller. She was started on Entresto in May 2019. Cardiac-wise, she has been feeling well. She states that she has has an upper respiratory issue for the past 2 to 3 weeks and nothing she has taken over the counter has helped relieve symptoms. She complains of a persistent cough, fever, chills. She denies shortness of breath at rest, denies dyspnea on exertion, denies orthopnea and PND. She is able to sleep in her bed on 1-2 pillows. She denies abdominal bloating.   She denies lightheadedness, dizziness, and syncope. She denies lower extremity edema and claudication. Denies nausea, vomiting, diarrhea. Her AICD has not discharged. She states that she is taking her medications although she is not sure is she has taken any Entresto lately. PMH:  Past Medical History:   Diagnosis Date    Asthma     Cardiac echocardiogram 03/22/2017    LVE. EF 15% (prev 50% on study of 12/10/14). Severe diffuse hypk. Indeterminate diastolic fx.  RVE. RVSP at least 44 mmHg. Mod LAE.  BISMARK. Mild-mod MR.  Cardiac nuclear imaging test 03/24/2017    High risk. Somewhat patchy uptake. No evidence of ischemia or infarction. No RWMA. Severe LVE. EF 25%. Neg EKG on pharm stress test.    Cardiovascular LLE venous duplex 03/06/2017    Left leg:  No DVT. Pulsatile flow.  Congestive heart failure ([address]     Heart failure ([address]     Hypercholesterolemia     Hypertension     Hyperthyroidism        PSH:  Past Surgical History:   Procedure Laterality Date    BREAST SURGERY PROCEDURE UNLISTED      redfuction    HX GYN      hysterectomy  btl    HX MYOMECTOMY       fibroid tumo removed    HX ORTHOPAEDIC  March 2012 ORIF left fibula       MEDS:  Current Outpatient Medications   Medication Sig    sacubitril-valsartan (ENTRESTO) 24 mg/26 mg tablet Take 1 Tab by mouth two (2) times a day.  indapamide (LOZOL) 2.5 mg tablet TAKE 1 TABLET BY MOUTH ONCE DAILY IN THE MORNING    erythromycin (ILOTYCIN) ophthalmic ointment Apply small ribbon right the inside of right lower eyelid and massage up. Apply 4-6 times per day while awake.  methIMAzole (TAPAZOLE) 10 mg tablet Take 1 Tab by mouth two (2) times a day.  rivaroxaban (XARELTO) 20 mg tab tablet Take 1 Tab by mouth daily.  atorvastatin (LIPITOR) 80 mg tablet Take 1 Tab by mouth daily. Pt. to take at night    furosemide (LASIX) 80 mg tablet Take 1 Tab by mouth daily.  spironolactone (ALDACTONE) 25 mg tablet Take 1 Tab by mouth daily.     HYDROcodone-acetaminophen (NORCO) 5-325 mg per tablet Take 1 Tab by mouth every eight (8) hours as needed for Pain. Max Daily Amount: 3 Tabs.  potassium chloride (K-DUR, KLOR-CON) 20 mEq tablet Take 1 Tab by mouth daily.  ondansetron (ZOFRAN ODT) 4 mg disintegrating tablet Take 1 Tab by mouth every eight (8) hours as needed for Nausea.  carvedilol (COREG) 25 mg tablet Take 1 Tab by mouth two (2) times daily (with meals).  albuterol (ACCUNEB) 1.25 mg/3 mL nebu Take 3 mL by inhalation every four (4) hours as needed (wheezing).  aspirin 81 mg chewable tablet Take 81 mg by mouth daily.  nitroglycerin (NITROSTAT) 0.4 mg SL tablet 1 Tab by SubLINGual route every five (5) minutes as needed for Chest Pain. No current facility-administered medications for this visit. Allergies and Sensitivities:  No Active Allergies    Family History:  Family History   Problem Relation Age of Onset    Hypertension Mother     Heart Disease Maternal Grandmother         CHF    Hypertension Maternal Grandmother     Diabetes Maternal Grandmother     No Known Problems Father     No Known Problems Sister     No Known Problems Brother     No Known Problems Brother     No Known Problems Brother     No Known Problems Sister     No Known Problems Sister        Social History:  She  reports that she quit smoking about 2 years ago. She smoked 0.00 packs per day for 18.00 years. She has never used smokeless tobacco.  She  reports current alcohol use. Physical:  Visit Vitals  /74 (BP 1 Location: Right arm, BP Patient Position: Sitting)   Pulse 99   Resp 18   Ht 5' 4\" (1.626 m)   Wt 119.7 kg (264 lb)   SpO2 98%   BMI 45.32 kg/m²         Her weight is up 11 pounds since her last visit (believes it is due to dietary indiscretion)      Exam:  Neck:  Supple, no JVD, no carotid bruits  CV:  Normal S1 and  S2, no murmurs, rubs, or gallops noted. Slight keloid formation at AICD site.   Lungs:  Clear to ausculation throughout, no wheezes or rales  Abd:  Soft, non-tender, obese with good bowel sounds. No hepatosplenomegaly  Extremities:  No edema      Data:  EKG:   Read by Edgar Alvarez MD.  Sinus rhythm.  Low voltage in precordial leads. Can not rule out anterior infarct, age undetermined      LABS:  Lab Results   Component Value Date/Time    Sodium 136 07/12/2019 12:00 AM    Potassium 3.8 07/12/2019 12:00 AM    Chloride 96 07/12/2019 12:00 AM    CO2 22 07/12/2019 12:00 AM    Glucose 124 (H) 07/12/2019 12:00 AM    BUN 15 07/12/2019 12:00 AM    Creatinine 0.91 07/12/2019 12:00 AM     Lab Results   Component Value Date/Time    Cholesterol, total 114 01/26/2019 09:09 AM    HDL Cholesterol 33 (L) 01/26/2019 09:09 AM    LDL, calculated 62 01/26/2019 09:09 AM    Triglyceride 94 01/26/2019 09:09 AM    CHOL/HDL Ratio 3.0 03/21/2017 03:24 PM     Lab Results   Component Value Date/Time    ALT (SGPT) 9 07/12/2019 12:00 AM         Impression / Plan:  1. Chronic systolic heart failure, appears compensated  2. Essential hypertension, blood pressure well controlled  3. Chronic, intermittent asthma  4. Severe LV dysfunction, improved. EF now 25-30% (by echo July 2017) from 15% in March 2017  5. Paroxysmal atrial fibrillation, long episode noted during device interrogation in Dec. 2018. Currently maintaining sinus rhythm and now anticoagulated with Eliquis.  was seen today for a check-up. She is overdue for a check-up with her cardiologist and she missed her scheduled appointment with him so she was scheduled to see me. Cardiac-wise, she offers no complaints. She denies chest pain, palpitations, orthopnea, PND, and lower extremity edema. Her weight is up 11 pounds but she believes this is due to dietary indiscretion over the holidays as she has not experienced any of her usual CHF symptoms.     She complains of having a persistent cough and upper respiratory symptoms that have been occurring for the past 2-3 weeks. She states that nothing she has taken over the counter has helped her symptoms. She admits to fever and chills. She states that she went to her PCP's office but was not able to get an appointment until later this month. She states that she will either go to the ER or an acute care facility like [facility] for evaluation and treatment. She needs to be tested for influenza. No changes were made to her medications today. I asked that she confirm her medication list as she was not sure about all of the medications on the list. She will call us with any changes. Congestive heart failure teaching reinforced today. Advised to limit sodium intake to no more than 2000mg per day and to also watch fluid intake. Advised to weigh daily every morning and record weights. Instructed to call our office if progressive weight gain is noted over a 2 to 3 day period of time, shortness of breath increases, or if abdominal bloating, nausea, fatigue, or increased lower extremity edema is noted. She will follow-up with Dr. Skyler Wallis as scheduled and PRN. She knows to call if she has any cardiac problems prior to her next scheduled appointment. Aliyah Mariano MSN, FNP-BC    Please note:  Portions of this chart were created with Dragon medical speech to text program.  Unrecognized errors may be present.

## 2020-01-09 ENCOUNTER — OFFICE VISIT (OUTPATIENT)
Dept: ORTHOPEDIC SURGERY | Facility: CLINIC | Age: 50
End: 2020-01-09

## 2020-01-09 ENCOUNTER — OFFICE VISIT (OUTPATIENT)
Dept: CARDIOLOGY CLINIC | Age: 50
End: 2020-01-09

## 2020-01-09 VITALS
BODY MASS INDEX: 45.07 KG/M2 | WEIGHT: 264 LBS | HEART RATE: 99 BPM | SYSTOLIC BLOOD PRESSURE: 122 MMHG | DIASTOLIC BLOOD PRESSURE: 74 MMHG | OXYGEN SATURATION: 98 % | HEIGHT: 64 IN | RESPIRATION RATE: 18 BRPM

## 2020-01-09 VITALS
HEIGHT: 64 IN | SYSTOLIC BLOOD PRESSURE: 126 MMHG | WEIGHT: 263 LBS | TEMPERATURE: 97 F | OXYGEN SATURATION: 99 % | RESPIRATION RATE: 16 BRPM | BODY MASS INDEX: 44.9 KG/M2 | HEART RATE: 92 BPM | DIASTOLIC BLOOD PRESSURE: 83 MMHG

## 2020-01-09 DIAGNOSIS — I42.0 DILATED CARDIOMYOPATHY (HCC): Primary | ICD-10-CM

## 2020-01-09 DIAGNOSIS — I50.22 CHRONIC SYSTOLIC CONGESTIVE HEART FAILURE (HCC): ICD-10-CM

## 2020-01-09 DIAGNOSIS — M54.16 LUMBAR RADICULOPATHY: ICD-10-CM

## 2020-01-09 DIAGNOSIS — I10 ESSENTIAL HYPERTENSION: ICD-10-CM

## 2020-01-09 DIAGNOSIS — M16.12 PRIMARY OSTEOARTHRITIS OF LEFT HIP: Primary | ICD-10-CM

## 2020-01-09 NOTE — PROGRESS NOTES
Margarito Smith is a 51 y.o. female presents in office for    Chief Complaint   Patient presents with    Cardiomyopathy     7 month f/u        Visit Vitals  /74 (BP 1 Location: Right arm, BP Patient Position: Sitting)   Pulse 99   Resp 18   Ht 5' 4\" (1.626 m)   Wt 119.7 kg (264 lb)   SpO2 98%   BMI 45.32 kg/m²         Health Maintenance Due   Topic Date Due    Pneumococcal 0-64 years (1 of 1 - PPSV23) 09[birth year]02/1977    DTaP/Tdap/Td series (1 - Tdap) 09/02/1982    Influenza Age 5 to Adult  08/01/2019             1. Have you been to the ER, urgent care clinic since your last visit? Hospitalized since your last visit? No    2. Have you seen or consulted any other health care providers outside of the [address] [place] [address] since your last visit? Include any pap smears or colon screening. No    3 most recent PHQ Screens 1/9/2020   Little interest or pleasure in doing things Not at all   Feeling down, depressed, irritable, or hopeless Not at all   Total Score PHQ 2 0       Abuse Screening Questionnaire 1/9/2020   Do you ever feel afraid of your partner? N   Are you in a relationship with someone who physically or mentally threatens you? N   Is it safe for you to go home? Y       Fall Risk Assessment, last 12 mths 1/9/2020   Able to walk? Yes   Fall in past 12 months?  No       Learning Assessment 5/8/2019   PRIMARY LEARNER Patient   HIGHEST LEVEL OF EDUCATION - PRIMARY LEARNER  -   BARRIERS PRIMARY LEARNER -   CO-LEARNER CAREGIVER -   PRIMARY LANGUAGE ENGLISH   LEARNER PREFERENCE PRIMARY DEMONSTRATION   ANSWERED BY patient   RELATIONSHIP SELF

## 2020-01-09 NOTE — PATIENT INSTRUCTIONS
Lipid panel and CMP to be done , please fast for 12 hours  Follow-up with Dr. Sherri Barclay as scheduled and as needed  Please confirm medication list that is attached to this after visit summary and call us if any changes need to be made

## 2020-01-09 NOTE — PROGRESS NOTES
1. Have you been to the ER, urgent care clinic since your last visit? Hospitalized since your last visit? NO    2. Have you seen or consulted any other health care providers outside of the [address] [place] [address] since your last visit? Include any pap smears or colon screening.   No

## 2020-01-09 NOTE — PROGRESS NOTES
Patient: Madelin Bradley                MRN: [id number]       SSN: [id number]  YOB: 1970        AGE: 51 y.o. SEX: female  Body mass index is 45.14 kg/m². PCP: Marvel Wallace NP  01/09/20    SUBJECTIVE:  I had the pleasure of meeting Ms Fercho Parker has been about a year, with complaints of numbness and tingling going all the way down the left leg as well as left groin discomfort. We had previously diagnosed her as end-stage arthritis of the left hip with likely some avascular necrosis. She has an AICD, so she cannot have an MRI. We had ordered a CT with IV contrast, but it did not occur. We would happy to do this for her. [facility]. She describes the pain as burning dysesthesia going all the way down the left leg and groin pain that his moderate to moderately severe. Difficulty putting shoes and socks on, getting in and out of a chair, and pain at night. REVIEW OF SYSTEMS:  A 12-point review of systems was positive for easy bruising. She is on blood thinners. No shortness of breath or chest pain. She currently has not had any congestive heart failure for quite some time. PHYSICAL EXAMINATION:  She walks with an antalgic gait. She is not using her cane today, and I would recommend the cane for her. The left hip is quite stiff. There is only a jog of internal rotation, which reproduces her groin discomfort. At rest she is in no acute distress. The right hip rotates well. She has decreased sensation in L4-L5 on the left side. Straight-leg raise is just equivocal.  Tibialis anterior and EHL seem to be 5/5. Sudhakar's sign is negative. No evidence for infection or DVT. RADIOGRAPHS:  Repeat x-rays confirm end-stage arthritis, left hip on AP pelvis AP and lateral of the hip. The lumbar spine reveals multiple type 1 spinal listhesis, and multiple-level degenerative disk disease.       PLAN:  I would recommend a CT without any contrast for the back.  [facility]. The hospital is quite strict on weight, so her BMI will have to be under 40. She is at 39 currently. I am going to keep an eye on her. I would like to see her back in about 8 weeks' time. I will get her to the [facility] in the meantime. We will re-weigh her and when her body mass index is under 40 and Cardiology is okay, we can proceed with left hip replacement. CC: Esau Gitelman, FNP        REVIEW OF SYSTEMS:      CON: negative for weight loss, fever  EYE: negative for double vision  ENT: negative for hoarseness  RS:   negative for Tb  GI:    negative for blood in stool  :  negative for blood in urine  Other systems reviewed and noted below. Past Medical History:   Diagnosis Date    Asthma     Cardiac echocardiogram 03/22/2017    LVE. EF 15% (prev 50% on study of 12/10/14). Severe diffuse hypk. Indeterminate diastolic fx.  RVE. RVSP at least 44 mmHg. Mod LAE.  BISMARK. Mild-mod MR.  Cardiac nuclear imaging test 03/24/2017    High risk. Somewhat patchy uptake. No evidence of ischemia or infarction. No RWMA. Severe LVE. EF 25%. Neg EKG on pharm stress test.    Cardiovascular LLE venous duplex 03/06/2017    Left leg:  No DVT. Pulsatile flow.     Congestive heart failure (HCC)     Heart failure (HCC)     Hypercholesterolemia     Hypertension     Hyperthyroidism        Family History   Problem Relation Age of Onset    Hypertension Mother     Heart Disease Maternal Grandmother         CHF    Hypertension Maternal Grandmother     Diabetes Maternal Grandmother     No Known Problems Father     No Known Problems Sister     No Known Problems Brother     No Known Problems Brother     No Known Problems Brother     No Known Problems Sister     No Known Problems Sister        Current Outpatient Medications   Medication Sig Dispense Refill    indapamide (LOZOL) 2.5 mg tablet TAKE 1 TABLET BY MOUTH ONCE DAILY IN THE MORNING 30 Tab 6    erythromycin (ILOTYCIN) ophthalmic ointment Apply small ribbon right the inside of right lower eyelid and massage up. Apply 4-6 times per day while awake. 1 Tube 0    methIMAzole (TAPAZOLE) 10 mg tablet Take 1 Tab by mouth two (2) times a day. 1 Tab 0    rivaroxaban (XARELTO) 20 mg tab tablet Take 1 Tab by mouth daily. 30 Tab 11    sacubitril-valsartan (ENTRESTO) 24 mg/26 mg tablet Take 1 Tab by mouth two (2) times a day. 60 Tab 3    atorvastatin (LIPITOR) 80 mg tablet Take 1 Tab by mouth daily. Pt. to take at night 30 Tab 6    furosemide (LASIX) 80 mg tablet Take 1 Tab by mouth daily. 30 Tab 6    spironolactone (ALDACTONE) 25 mg tablet Take 1 Tab by mouth daily. 30 Tab 6    HYDROcodone-acetaminophen (NORCO) 5-325 mg per tablet Take 1 Tab by mouth every eight (8) hours as needed for Pain. Max Daily Amount: 3 Tabs. 21 Tab 0    potassium chloride (K-DUR, KLOR-CON) 20 mEq tablet Take 1 Tab by mouth daily. 30 Tab 1    ondansetron (ZOFRAN ODT) 4 mg disintegrating tablet Take 1 Tab by mouth every eight (8) hours as needed for Nausea. 30 Tab 0    carvedilol (COREG) 25 mg tablet Take 1 Tab by mouth two (2) times daily (with meals). 60 Tab 6    nitroglycerin (NITROSTAT) 0.4 mg SL tablet 1 Tab by SubLINGual route every five (5) minutes as needed for Chest Pain. 1 Bottle 1    albuterol (ACCUNEB) 1.25 mg/3 mL nebu Take 3 mL by inhalation every four (4) hours as needed (wheezing). 25 Each 0    aspirin 81 mg chewable tablet Take 81 mg by mouth daily.            No Active Allergies    Past Surgical History:   Procedure Laterality Date    BREAST SURGERY PROCEDURE UNLISTED      redfuction    HX GYN      hysterectomy  btl    HX MYOMECTOMY       fibroid tumo removed    HX ORTHOPAEDIC  March 2012 ORIF left fibula       Social History     Socioeconomic History    Marital status: SINGLE     Spouse name: Not on file    Number of children: Not on file    Years of education: Not on file    Highest education level: Not on file   Occupational History    Not on file   Social Needs    Financial resource strain: Not on file    Food insecurity:     Worry: Not on file     Inability: Not on file    Transportation needs:     Medical: Not on file     Non-medical: Not on file   Tobacco Use    Smoking status: Former Smoker     Packs/day: 0.00     Years: 18.00     Pack years: 0.00     Last attempt to quit: 2017     Years since quittin.8    Smokeless tobacco: Never Used   Substance and Sexual Activity    Alcohol use: Yes     Comment: socially     Drug use: No    Sexual activity: Yes     Partners: Male     Birth control/protection: Condom   Lifestyle    Physical activity:     Days per week: Not on file     Minutes per session: Not on file    Stress: Not on file   Relationships    Social connections:     Talks on phone: Not on file     Gets together: Not on file     Attends Orthodoxy service: Not on file     Active member of club or organization: Not on file     Attends meetings of clubs or organizations: Not on file     Relationship status: Not on file    Intimate partner violence:     Fear of current or ex partner: Not on file     Emotionally abused: Not on file     Physically abused: Not on file     Forced sexual activity: Not on file   Other Topics Concern    Not on file   Social History Narrative    Not on file       Visit Vitals  /83 (BP 1 Location: Left arm, BP Patient Position: Sitting)   Pulse 92   Temp 97 °F (36.1 °C) (Oral)   Resp 16   Ht 5' 4\" (1.626 m)   Wt 263 lb (119.3 kg)   LMP  (LMP Unknown) Comment:    SpO2 99%   BMI 45.14 kg/m²         PHYSICAL EXAMINATION:  GENERAL: Alert and oriented x3, in no acute distress, well-developed, well-nourished, afebrile. HEART: No JVD. EYES: No scleral icterus   NECK: No significant lymphadenopathy   LUNGS: No respiratory compromise or indrawing  ABDOMEN: Soft, non-tender, non-distended. Electronically signed by:  Jen Hernández MD

## 2020-01-14 ENCOUNTER — APPOINTMENT (OUTPATIENT)
Dept: GENERAL RADIOLOGY | Age: 50
End: 2020-01-14
Attending: PHYSICIAN ASSISTANT
Payer: MEDICAID

## 2020-01-14 ENCOUNTER — HOSPITAL ENCOUNTER (EMERGENCY)
Age: 50
Discharge: HOME | End: 2020-01-14
Attending: EMERGENCY MEDICINE
Payer: MEDICAID

## 2020-01-14 VITALS
DIASTOLIC BLOOD PRESSURE: 113 MMHG | BODY MASS INDEX: 44.39 KG/M2 | OXYGEN SATURATION: 99 % | WEIGHT: 260 LBS | TEMPERATURE: 97.8 F | HEART RATE: 90 BPM | RESPIRATION RATE: 14 BRPM | SYSTOLIC BLOOD PRESSURE: 153 MMHG | HEIGHT: 64 IN

## 2020-01-14 DIAGNOSIS — R03.0 ELEVATED BLOOD PRESSURE READING: ICD-10-CM

## 2020-01-14 DIAGNOSIS — J06.9 ACUTE UPPER RESPIRATORY INFECTION: Primary | ICD-10-CM

## 2020-01-14 PROCEDURE — 71046 X-RAY EXAM CHEST 2 VIEWS: CPT

## 2020-01-14 PROCEDURE — 99281 EMR DPT VST MAYX REQ PHY/QHP: CPT

## 2020-01-14 RX ORDER — BENZONATATE 100 MG/1
200 CAPSULE ORAL
Qty: 21 CAP | Refills: 0 | Status: SHIPPED | OUTPATIENT
Start: 2020-01-14 | End: 2020-01-21

## 2020-01-14 NOTE — ED PROVIDER NOTES
EMERGENCY DEPARTMENT HISTORY AND PHYSICAL EXAM    11:48 AM      Date: 1/14/2020  Patient Name: Magdalena Gallegos    History of Presenting Illness     Chief Complaint   Patient presents with    Flu Like Symptoms       History Provided By: Patient    Additional History (Context): Magdalena Gallegos is a 51 y.o. female with hx of CHF, HLD, HTN, asthma who presents with complaint of sore throat, rhinorrhea, and productive cough of yellow mucus for 2 to 3 days. Patient denies fever or chills, chest pain, shortness of breath, wheezing, orthopnea, leg edema. Notes grandchildren are sick as well. Did not receive a flu shot this year. Has not taken any medication for the symptoms prior to arrival.    PCP: Krista House NP    Current Outpatient Medications   Medication Sig Dispense Refill    benzonatate (TESSALON PERLES) 100 mg capsule Take 2 Caps by mouth three (3) times daily as needed for Cough for up to 7 days. 21 Cap 0    sacubitril-valsartan (ENTRESTO) 24 mg/26 mg tablet Take 1 Tab by mouth two (2) times a day. 60 Tab 6    indapamide (LOZOL) 2.5 mg tablet TAKE 1 TABLET BY MOUTH ONCE DAILY IN THE MORNING 30 Tab 6    methIMAzole (TAPAZOLE) 10 mg tablet Take 1 Tab by mouth two (2) times a day. 1 Tab 0    rivaroxaban (XARELTO) 20 mg tab tablet Take 1 Tab by mouth daily. 30 Tab 11    atorvastatin (LIPITOR) 80 mg tablet Take 1 Tab by mouth daily. Pt. to take at night 30 Tab 6    furosemide (LASIX) 80 mg tablet Take 1 Tab by mouth daily. 30 Tab 6    spironolactone (ALDACTONE) 25 mg tablet Take 1 Tab by mouth daily. 30 Tab 6    HYDROcodone-acetaminophen (NORCO) 5-325 mg per tablet Take 1 Tab by mouth every eight (8) hours as needed for Pain. Max Daily Amount: 3 Tabs. 21 Tab 0    potassium chloride (K-DUR, KLOR-CON) 20 mEq tablet Take 1 Tab by mouth daily. 30 Tab 1    ondansetron (ZOFRAN ODT) 4 mg disintegrating tablet Take 1 Tab by mouth every eight (8) hours as needed for Nausea.  30 Tab 0    carvedilol (COREG) 25 mg tablet Take 1 Tab by mouth two (2) times daily (with meals). 60 Tab 6    nitroglycerin (NITROSTAT) 0.4 mg SL tablet 1 Tab by SubLINGual route every five (5) minutes as needed for Chest Pain. 1 Bottle 1    albuterol (ACCUNEB) 1.25 mg/3 mL nebu Take 3 mL by inhalation every four (4) hours as needed (wheezing). 25 Each 0    aspirin 81 mg chewable tablet Take 81 mg by mouth daily. Past History     Past Medical History:  Past Medical History:   Diagnosis Date    Asthma     Cardiac echocardiogram 03/22/2017    LVE. EF 15% (prev 50% on study of 12/10/14). Severe diffuse hypk. Indeterminate diastolic fx.  RVE. RVSP at least 44 mmHg. Mod LAE.  BISMARK. Mild-mod MR.  Cardiac nuclear imaging test 03/24/2017    High risk. Somewhat patchy uptake. No evidence of ischemia or infarction. No RWMA. Severe LVE. EF 25%. Neg EKG on pharm stress test.    Cardiovascular LLE venous duplex 03/06/2017    Left leg:  No DVT. Pulsatile flow.     Congestive heart failure (HCC)     Heart failure (HCC)     Hypercholesterolemia     Hypertension     Hyperthyroidism        Past Surgical History:  Past Surgical History:   Procedure Laterality Date    BREAST SURGERY PROCEDURE UNLISTED      redfuction    HX GYN      hysterectomy  btl    HX MYOMECTOMY       fibroid tumo removed    HX ORTHOPAEDIC  March 2012 ORIF left fibula       Family History:  Family History   Problem Relation Age of Onset    Hypertension Mother     Heart Disease Maternal Grandmother         CHF    Hypertension Maternal Grandmother     Diabetes Maternal Grandmother     No Known Problems Father     No Known Problems Sister     No Known Problems Brother     No Known Problems Brother     No Known Problems Brother     No Known Problems Sister     No Known Problems Sister        Social History:  Social History     Tobacco Use    Smoking status: Former Smoker     Packs/day: 0.00     Years: 18.00     Pack years: 0.00     Last attempt to quit: 2017     Years since quittin.8    Smokeless tobacco: Never Used   Substance Use Topics    Alcohol use: Yes     Comment: socially     Drug use: No       Allergies:  No Known Allergies      Review of Systems       Review of Systems   Constitutional: Negative for chills and fever. HENT: Positive for rhinorrhea and sore throat. Respiratory: Positive for cough. Negative for shortness of breath. Cardiovascular: Negative for chest pain. Gastrointestinal: Negative for abdominal pain, nausea and vomiting. Skin: Negative for rash. Neurological: Negative for weakness. All other systems reviewed and are negative. Physical Exam     Visit Vitals  BP (!) 153/113 (BP 1 Location: Left arm, BP Patient Position: At rest)   Pulse 90   Temp 97.8 °F (36.6 °C)   Resp 14   Ht 5' 4\" (1.626 m)   Wt 117.9 kg (260 lb)   LMP  (LMP Unknown) Comment:    SpO2 99%   BMI 44.63 kg/m²         Physical Exam  Vitals signs and nursing note reviewed. Constitutional:       General: She is not in acute distress. Appearance: Normal appearance. She is well-developed. She is not ill-appearing, toxic-appearing or diaphoretic. Comments: Non-toxic appearing    HENT:      Head: Normocephalic. Right Ear: Tympanic membrane and ear canal normal.      Left Ear: Tympanic membrane and ear canal normal.      Nose: Nose normal. No congestion or rhinorrhea. Right Sinus: No maxillary sinus tenderness or frontal sinus tenderness. Left Sinus: No maxillary sinus tenderness or frontal sinus tenderness. Mouth/Throat:      Mouth: Mucous membranes are moist.      Pharynx: Uvula midline. No oropharyngeal exudate or posterior oropharyngeal erythema. Eyes:      General:         Right eye: No discharge. Left eye: No discharge. Neck:      Musculoskeletal: Normal range of motion and neck supple. No neck rigidity. Cardiovascular:      Rate and Rhythm: Normal rate and regular rhythm.       Heart sounds: Normal heart sounds. No murmur. No friction rub. No gallop. Pulmonary:      Effort: Pulmonary effort is normal. No respiratory distress. Breath sounds: Normal breath sounds. No stridor. No wheezing or rales. Abdominal:      General: There is no distension. Palpations: Abdomen is soft. Tenderness: There is no tenderness. Lymphadenopathy:      Cervical: No cervical adenopathy. Skin:     General: Skin is warm. Findings: No rash. Neurological:      Mental Status: She is alert. Diagnostic Study Results     Labs -  No results found for this or any previous visit (from the past 12 hour(s)). Radiologic Studies -   XR CHEST PA LAT   Final Result   Impression:      No radiographic evidence of acute cardiopulmonary process. Medical Decision Making   I am the first provider for this patient. I reviewed the vital signs, available nursing notes, past medical history, past surgical history, family history and social history. Vital Signs-Reviewed the patient's vital signs. Records Reviewed: Nursing Notes and Old Medical Records (Time of Review: 11:48 AM)    ED Course: Progress Notes, Reevaluation, and Consults:  1:00 PM  Reviewed results with patient. Discussed need for close outpatient follow-up this week for reassessment. Discussed strict return precautions, including fever, vomiting, shortness of breath, or any other medical concerns. One or more blood pressure readings were noted elevated during the patient's presentation in the emergency department today. This abnormal reading has been cited in the patients' diagnosis, and they have been encouraged to follow up with their primary care physician, or referred to a consultant for further evaluation and treatment. Provider Notes (Medical Decision Making): 76-year-old female who presents to the ED due to sore throat, rhinorrhea, and productive cough. Afebrile, nontoxic-appearing, looks well.   No evidence of otitis media/externa, strep pharyngitis, pneumonia. Lungs clear to auscultation bilaterally. No chest pain, dyspnea. Stable for discharge with symptomatic management and close outpatient follow-up for further assessment      Diagnosis     Clinical Impression:   1. Acute upper respiratory infection    2. Elevated blood pressure reading        Disposition: home     Follow-up Information     Follow up With Specialties Details Why [facility]    [facility] EMERGENCY DEPT Emergency Medicine  If symptoms worsen [address] [facility] [address], NP Internal Medicine In 2 days  [address] [facility]  [phone]             Patient's Medications   Start Taking    BENZONATATE (TESSALON PERLES) 100 MG CAPSULE    Take 2 Caps by mouth three (3) times daily as needed for Cough for up to 7 days. Continue Taking    ALBUTEROL (ACCUNEB) 1.25 MG/3 ML NEBU    Take 3 mL by inhalation every four (4) hours as needed (wheezing). ASPIRIN 81 MG CHEWABLE TABLET    Take 81 mg by mouth daily. ATORVASTATIN (LIPITOR) 80 MG TABLET    Take 1 Tab by mouth daily. Pt. to take at night    CARVEDILOL (COREG) 25 MG TABLET    Take 1 Tab by mouth two (2) times daily (with meals). FUROSEMIDE (LASIX) 80 MG TABLET    Take 1 Tab by mouth daily. HYDROCODONE-ACETAMINOPHEN (NORCO) 5-325 MG PER TABLET    Take 1 Tab by mouth every eight (8) hours as needed for Pain. Max Daily Amount: 3 Tabs. INDAPAMIDE (LOZOL) 2.5 MG TABLET    TAKE 1 TABLET BY MOUTH ONCE DAILY IN THE MORNING    METHIMAZOLE (TAPAZOLE) 10 MG TABLET    Take 1 Tab by mouth two (2) times a day. NITROGLYCERIN (NITROSTAT) 0.4 MG SL TABLET    1 Tab by SubLINGual route every five (5) minutes as needed for Chest Pain. ONDANSETRON (ZOFRAN ODT) 4 MG DISINTEGRATING TABLET    Take 1 Tab by mouth every eight (8) hours as needed for Nausea.     POTASSIUM CHLORIDE (K-DUR, KLOR-CON) 20 MEQ TABLET    Take 1 Tab by mouth daily. RIVAROXABAN (XARELTO) 20 MG TAB TABLET    Take 1 Tab by mouth daily. SACUBITRIL-VALSARTAN (ENTRESTO) 24 MG/26 MG TABLET    Take 1 Tab by mouth two (2) times a day. SPIRONOLACTONE (ALDACTONE) 25 MG TABLET    Take 1 Tab by mouth daily. These Medications have changed    No medications on file   Stop Taking    ERYTHROMYCIN (ILOTYCIN) OPHTHALMIC OINTMENT    Apply small ribbon right the inside of right lower eyelid and massage up. Apply 4-6 times per day while awake. Dictation disclaimer:  Please note that this dictation was completed with [employer] the [employer] voice recognition software. Quite often unanticipated grammatical, syntax, homophones, and other interpretive errors are inadvertently transcribed by the computer software. Please disregard these errors. Please excuse any errors that have escaped final proofreading.

## 2020-01-14 NOTE — ED TRIAGE NOTES
The patient presents for evaluation of a productive cough, chest congestion, and a sore throat x four days.

## 2020-01-14 NOTE — LETTER
[address] [place] [address] [address] [facility] EMERGENCY DEPT 
[address] [facility] [id number] [phone] Work/School Note Date: 1/14/2020 To Whom It May concern: 
 
Silvestre Hand was seen and treated today in the emergency room by the following provider(s): 
Attending Provider: Mynor Hewitt DO Physician Assistant: NARCISO Duarte. Silvestre Hand may return to work on 1/16/20.  
 
Sincerely, 
 
 
 
 
NARCISO Garcia

## 2020-01-21 ENCOUNTER — HOSPITAL ENCOUNTER (OUTPATIENT)
Dept: CT IMAGING | Age: 50
Discharge: HOME | End: 2020-01-21
Attending: ORTHOPAEDIC SURGERY
Payer: MEDICAID

## 2020-01-21 DIAGNOSIS — M54.16 LUMBAR RADICULOPATHY: ICD-10-CM

## 2020-01-21 PROCEDURE — 72131 CT LUMBAR SPINE W/O DYE: CPT

## 2020-02-20 ENCOUNTER — OFFICE VISIT (OUTPATIENT)
Dept: ORTHOPEDIC SURGERY | Facility: CLINIC | Age: 50
End: 2020-02-20

## 2020-02-20 VITALS
BODY MASS INDEX: 45.75 KG/M2 | HEART RATE: 89 BPM | HEIGHT: 64 IN | DIASTOLIC BLOOD PRESSURE: 91 MMHG | SYSTOLIC BLOOD PRESSURE: 134 MMHG | WEIGHT: 268 LBS

## 2020-02-20 DIAGNOSIS — M70.62 TROCHANTERIC BURSITIS, LEFT HIP: Primary | ICD-10-CM

## 2020-02-20 DIAGNOSIS — M16.12 PRIMARY OSTEOARTHRITIS OF LEFT HIP: ICD-10-CM

## 2020-02-20 DIAGNOSIS — M54.16 LUMBAR RADICULOPATHY: ICD-10-CM

## 2020-02-20 RX ORDER — BETAMETHASONE SODIUM PHOSPHATE AND BETAMETHASONE ACETATE 3; 3 MG/ML; MG/ML
6 INJECTION, SUSPENSION INTRA-ARTICULAR; INTRALESIONAL; INTRAMUSCULAR; SOFT TISSUE ONCE
Qty: 1 ML | Refills: 0
Start: 2020-02-20 | End: 2020-02-20

## 2020-02-20 RX ORDER — DICLOFENAC SODIUM 10 MG/G
4 GEL TOPICAL 4 TIMES DAILY
Qty: 100 G | Refills: 5 | Status: SHIPPED | OUTPATIENT
Start: 2020-02-20 | End: 2021-08-02

## 2020-02-20 NOTE — PROGRESS NOTES
Patient: Bella Valverde               MRN: [id number]   SSN: [id number]  YOB: 1970       AGE: 51 y.o. y.o. SEX: female  Body mass index is Body mass index is 46 kg/m². PCP: No primary care provider on file. 02/20/20      HISTORY OF PRESENT ILLNESS:  Ms. Olivia Heimlich is a 51year old female that presents to the office today with numbness and tingling that radiates down her left leg. She has soreness in the lower back, groin, and CT of the lumbar spine shows degenerative disk disease, most significant at L4-S1, and focal disc protrusion at L4-5. She has a pacemaker. PHYSICAL EXAMINATION:  On examination today, both hips rotate well. She is tender over the left greater trochanter, and has significant lumbar pain. She has significant radiculopathy down her left leg, all the way to her foot. There is no foot drop. RADIOGRAPHS:  [facility] of the left hip joint space, and DDD in the lumbar spine. IMPRESSION:  My impression is that she has three major problems causing pain in her hip region. She has trochanteric bursitis of her left hip, primary osteoarthritis of left hip, and significant lumbar pain. The CT of her lumbar spine reveals that she has spinal stenosis, and I have sent her to Dr. Amina Sepulveda at the [address] [place] [address] for further evaluation. For her bursitis, I gave her a cortisone injection in office today, as well as Voltaren gel. I will see her back in the office for follow up in 3 months. It has been a pleasure to share in her care. PLAN:  After appropriate time out and with informed written consent, 6 mg of a Celestone preparation injected into the left hip under aseptic conditions. REVIEW OF SYSTEMS  Review of Systems   Musculoskeletal: Positive for joint pain. Neurological: Positive for tingling. All other systems reviewed and are negative.       MEDICAL HISTORY  Past Medical History:   Diagnosis Date    Asthma     Cardiac echocardiogram 03/22/2017    LVE. EF 15% (prev 50% on study of 12/10/14). Severe diffuse hypk. Indeterminate diastolic fx.  RVE. RVSP at least 44 mmHg. Mod LAE.  BISMARK. Mild-mod MR.  Cardiac nuclear imaging test 03/24/2017    High risk. Somewhat patchy uptake. No evidence of ischemia or infarction. No RWMA. Severe LVE. EF 25%. Neg EKG on pharm stress test.    Cardiovascular LLE venous duplex 03/06/2017    Left leg:  No DVT. Pulsatile flow.  Congestive heart failure ([address]     Heart failure ([address]     Hypercholesterolemia     Hypertension     Hyperthyroidism        SURGICAL HISTORY  Past Surgical History:   Procedure Laterality Date    BREAST SURGERY PROCEDURE UNLISTED      redfuction    HX GYN      hysterectomy  btl    HX MYOMECTOMY       fibroid tumo removed    HX ORTHOPAEDIC  March 2012 ORIF left fibula       CURRENT MEDICATIONS  Current Outpatient Medications   Medication Sig Dispense Refill    sacubitril-valsartan (ENTRESTO) 24 mg/26 mg tablet Take 1 Tab by mouth two (2) times a day. 60 Tab 6    indapamide (LOZOL) 2.5 mg tablet TAKE 1 TABLET BY MOUTH ONCE DAILY IN THE MORNING 30 Tab 6    methIMAzole (TAPAZOLE) 10 mg tablet Take 1 Tab by mouth two (2) times a day. 1 Tab 0    rivaroxaban (XARELTO) 20 mg tab tablet Take 1 Tab by mouth daily. 30 Tab 11    atorvastatin (LIPITOR) 80 mg tablet Take 1 Tab by mouth daily. Pt. to take at night 30 Tab 6    furosemide (LASIX) 80 mg tablet Take 1 Tab by mouth daily. 30 Tab 6    spironolactone (ALDACTONE) 25 mg tablet Take 1 Tab by mouth daily. 30 Tab 6    potassium chloride (K-DUR, KLOR-CON) 20 mEq tablet Take 1 Tab by mouth daily. 30 Tab 1    carvedilol (COREG) 25 mg tablet Take 1 Tab by mouth two (2) times daily (with meals). 60 Tab 6    nitroglycerin (NITROSTAT) 0.4 mg SL tablet 1 Tab by SubLINGual route every five (5) minutes as needed for Chest Pain.  1 Bottle 1    albuterol (ACCUNEB) 1.25 mg/3 mL nebu Take 3 mL by inhalation every four (4) hours as needed (wheezing). 25 Each 0    aspirin 81 mg chewable tablet Take 81 mg by mouth daily.  HYDROcodone-acetaminophen (NORCO) 5-325 mg per tablet Take 1 Tab by mouth every eight (8) hours as needed for Pain. Max Daily Amount: 3 Tabs. 21 Tab 0    ondansetron (ZOFRAN ODT) 4 mg disintegrating tablet Take 1 Tab by mouth every eight (8) hours as needed for Nausea. 30 Tab 0       ALLERGIES  No Known Allergies    FAMILHY HISTORY  [id number]      SOCIAL HISTORY  Social History     Socioeconomic History    Marital status: SINGLE     Spouse name: Not on file    Number of children: Not on file    Years of education: Not on file    Highest education level: Not on file   Tobacco Use    Smoking status: Current Some Day Smoker     Packs/day: 0.00     Years: 18.00     Pack years: 0.00     Last attempt to quit: 2017     Years since quittin.9    Smokeless tobacco: Never Used   Substance and Sexual Activity    Alcohol use: Yes     Comment: socially     Drug use: No    Sexual activity: Yes     Partners: Male     Birth control/protection: Condom       VISIT VITALS  Visit Vitals  BP (!) 134/91   Pulse 89   Ht 5' 4\" (1.626 m)   Wt 268 lb (121.6 kg)   LMP  (LMP Unknown) Comment:    BMI 46.00 kg/m²       Pain Assessment  2020   Location of Pain Hip   Location Modifiers Left   Severity of Pain 8   Quality of Pain Sharp; Aching   Duration of Pain -   Frequency of Pain Intermittent   Aggravating Factors Stairs; Walking;Standing   Aggravating Factors Comment -   Limiting Behavior Some   Relieving Factors Rest   Result of Injury -   Type of Injury -   Type of Injury Comment -         Written by Venice Mckeon as dictated by Jess Dawkins MD.

## 2020-02-25 RX ORDER — FUROSEMIDE 80 MG/1
TABLET ORAL
Qty: 30 TAB | Refills: 0 | Status: SHIPPED | OUTPATIENT
Start: 2020-02-25 | End: 2020-04-08

## 2020-02-25 RX ORDER — SPIRONOLACTONE 25 MG/1
TABLET ORAL
Qty: 30 TAB | Refills: 0 | Status: SHIPPED | OUTPATIENT
Start: 2020-02-25 | End: 2020-04-08

## 2020-02-25 RX ORDER — ATORVASTATIN CALCIUM 80 MG/1
TABLET, FILM COATED ORAL
Qty: 30 TAB | Refills: 0 | Status: SHIPPED | OUTPATIENT
Start: 2020-02-25 | End: 2020-04-08

## 2020-03-18 ENCOUNTER — OFFICE VISIT (OUTPATIENT)
Dept: ORTHOPEDIC SURGERY | Age: 50
End: 2020-03-18

## 2020-03-18 VITALS
WEIGHT: 261 LBS | TEMPERATURE: 98.1 F | BODY MASS INDEX: 44.56 KG/M2 | HEIGHT: 64 IN | OXYGEN SATURATION: 100 % | DIASTOLIC BLOOD PRESSURE: 79 MMHG | SYSTOLIC BLOOD PRESSURE: 118 MMHG | RESPIRATION RATE: 16 BRPM | HEART RATE: 90 BPM

## 2020-03-18 DIAGNOSIS — M48.062 SPINAL STENOSIS OF LUMBAR REGION WITH NEUROGENIC CLAUDICATION: Primary | ICD-10-CM

## 2020-03-18 RX ORDER — LIDOCAINE HYDROCHLORIDE 20 MG/ML
0.5 INJECTION, SOLUTION EPIDURAL; INFILTRATION; INTRACAUDAL; PERINEURAL ONCE
Qty: 0.5 ML | Refills: 0
Start: 2020-03-18 | End: 2020-03-18

## 2020-03-18 RX ORDER — DULOXETIN HYDROCHLORIDE 30 MG/1
CAPSULE, DELAYED RELEASE ORAL
Qty: 60 CAP | Refills: 2 | Status: SHIPPED | OUTPATIENT
Start: 2020-03-18 | End: 2020-05-13 | Stop reason: SDUPTHER

## 2020-03-18 RX ORDER — BETAMETHASONE SODIUM PHOSPHATE AND BETAMETHASONE ACETATE 3; 3 MG/ML; MG/ML
12 INJECTION, SUSPENSION INTRA-ARTICULAR; INTRALESIONAL; INTRAMUSCULAR; SOFT TISSUE ONCE
Qty: 2 ML | Refills: 0
Start: 2020-03-18 | End: 2020-03-18

## 2020-03-18 RX ORDER — BUPIVACAINE HYDROCHLORIDE 2.5 MG/ML
0.5 INJECTION, SOLUTION INFILTRATION; PERINEURAL ONCE
Qty: 0.5 ML | Refills: 0
Start: 2020-03-18 | End: 2020-03-18

## 2020-03-18 NOTE — PROGRESS NOTES
Verbal order entered per Dr. Gayla Cardona as documented on blue sheet:1. 0.5 ml of marcaine, 0.5 ml of lidocaine, and 2 ml of celestone to be given in a left iliolumbar trigger point injection. 2. cymbalta 30 mg. Take one cap by mouth daily with dinner for 1 week, then increase to taking 2 caps by daily with dinner. #60 caps with 2 refills.

## 2020-03-18 NOTE — PROGRESS NOTES
[address]  [address], [address]  [facility], [address]  Phone: [phone]  Fax: [phone]        Ab Ulloa  : 1970  PCP: No primary care provider on file. NEW PATIENT      ASSESSMENT AND PLAN     Diagnoses and all orders for this visit:    1. Spinal stenosis of lumbar region with neurogenic claudication  -     bupivacaine (Marcaine) 0.25 % (2.5 mg/mL) soln injection; 0.5 mL by IntraMUSCular route once for 1 dose. -     INJECTION, BUPIVICAINE HYDRO  -     LIDOCAINE INJECTION  -     lidocaine, PF, (XYLOCAINE) 20 mg/mL (2 %) injection; 0.5 mL by Other route once for 1 dose. -     betamethasone (Celestone Soluspan) 6 mg/mL injection; 2 mL by IntraMUSCular route once for 1 dose.  -     BETAMETHASONE ACETATE & SODIUM PHOSPHATE INJECTION 3 MG EA.  -     KY INJECT TRIGGER POINT, 1 OR 2  -     DULoxetine (CYMBALTA) 30 mg capsule; Take one capsule by mouth daily with dinner for 1 week, then increase to taking two capsules by mouth daily with dinner. 1. Advised to stay active as tolerated. 2. Discussed life style modification, PT, medication, spinal injection, and surgery as treatment options   3. No PT at this time due to underlying medical issues, do not wish to expose her to populous areas. 4. Trial of Cymbalta  5. L iliolumbar TPI  6. Advised pt all non-essential surgeries and spinal injections have been postponed due to COVID 19.   7. Given information on lumbar stenosis, TPI    F/U 2 months      CHIEF COMPLAINT  Bridget Aburto is seen today in consultation at the request of Dr. Ellis Benitez for complaints of low back pain. HISTORY OF PRESENT ILLNESS  Bridget Aburto is a 51 y.o. female. Today pt c/o low back pain of 1 year duration. Pt denies any specific incident or injury that caused their pain. Pt had a L hip bursa injection last month by Dr. Ellis Benitez. Pt reports no benefit with the injection.  She states her pain continues to worsen every day as it has over the past year since it started. She denies trying antineuritics. Pt affirms having a walker and cane at home for bad days. Admits her LLE gives out. Admits tingling in her L foot wakes her at night. Affirms losing weight due to diet and exercise changes. Location of pain: low back  Does pain radiate into extremities: LLE to foot, needles in foot at night  Does patient have weakness: LLE gives out    Pt denies saddle paresthesias. Notes urinary frequency and urgency. Medications pt is on: Xarelto. Lasix. Denies persistent fevers, chills, neurogenic bowel or bladder symptoms. Pt denies any recent fevers, chills, antibiotics, recent cortisone injections, or infections. Treatments patient has tried:  Physical therapy:No  Doing HEP: Unknown  Non-opioid medications: Yes Unable to take NSAIDs. Failed Voltaren gel - insurance. Spinal injections: No  Spinal surgery- No.   Last L CT 1/2020: mod stenosis L4-5     reviewed. PMHx of CHF, defibrillator (NOT MRI compatible), HTN, asthma, Graves disease, fibroids, fx of L fibula, OA L hip. Last worked 2019, on disability. ED 1/14/20 for acute URI, elevated blood pressure. Pain Assessment  3/18/2020   Location of Pain Back   Location Modifiers (No Data)   Severity of Pain 8   Quality of Pain Sharp; Throbbing   Duration of Pain Persistent   Frequency of Pain Constant   Aggravating Factors Standing;Walking   Aggravating Factors Comment -   Limiting Behavior Yes   Relieving Factors Rest   Result of Injury No   Type of Injury -   Type of Injury Comment -         CT Results (most recent):  Results from Hospital Encounter encounter on 01/21/20   CT SPINE LUMB WO CONT    Narrative EXAM: CT SPINE LUMB WO CONT    INDICATION: Low back pain, radiculopathy. COMPARISON: None. TECHNIQUE:   Unenhanced multislice helical CT of the lumbar spine was performed  in the axial plane. Coronal and sagittal reconstructions were obtained.   CT  dose reduction was achieved through use of a standardized protocol tailored for  this examination and automatic exposure control for dose modulation. FINDINGS:    Alignment is normal.    T12-L1: Mild disc space narrowing. The spinal canal and neural foramina are  widely patent. L1-L2: Mild disc space narrowing. The spinal canal and neural foramina are  widely patent. L2-L3: Mild disc space narrowing. The spinal canal and neural foramina are  widely patent. L3-L4: Mild disc space narrowing. The spinal canal and neural foramina are  widely patent. L4-L5: Mild to moderate disc space narrowing. There is a focal central disc  protrusion with encroachment at the right and likely impingement at left exiting  nerve roots. There is mild ligamentum flavum hypertrophy which also narrows the  spinal canal with moderate stenosis. L5-S1: Moderate disc space narrowing. Central disc protrusion with mild canal  stenosis. SI joint degenerative changes. 1.7 cm right water density adrenal nodule. Impression IMPRESSION:   1. Mild to moderate degenerative disc disease, most significant from L4-S1. Focal disc protrusion at L4-5 with moderate canal stenosis and likely  impingement left exiting nerve root. 2.  SI joint degenerative changes. 3.  Right adrenal adenoma. PAST MEDICAL HISTORY   Past Medical History:   Diagnosis Date    AICD (automatic cardioverter/defibrillator) present 09/2018    Asthma     Cardiac echocardiogram 03/22/2017    LVE. EF 15% (prev 50% on study of 12/10/14). Severe diffuse hypk. Indeterminate diastolic fx.  RVE. RVSP at least 44 mmHg. Mod LAE.  BISMARK. Mild-mod MR.  Cardiac nuclear imaging test 03/24/2017    High risk. Somewhat patchy uptake. No evidence of ischemia or infarction. No RWMA. Severe LVE. EF 25%. Neg EKG on pharm stress test.    Cardiovascular LLE venous duplex 03/06/2017    Left leg:  No DVT. Pulsatile flow.     Congestive heart failure ([address]  Graves disease     Heart failure ([facility]     Hypercholesterolemia     Hypertension     Hyperthyroidism        Past Surgical History:   Procedure Laterality Date    BREAST SURGERY PROCEDURE UNLISTED      redfuction    HX GYN      hysterectomy  btl    HX MYOMECTOMY       fibroid tumo removed    HX ORTHOPAEDIC  March 2012 ORIF left fibula       MEDICATIONS      Current Outpatient Medications   Medication Sig Dispense Refill    DULoxetine (CYMBALTA) 30 mg capsule Take one capsule by mouth daily with dinner for 1 week, then increase to taking two capsules by mouth daily with dinner. 60 Cap 2    atorvastatin (LIPITOR) 80 mg tablet TAKE 1 TABLET BY MOUTH ONCE DAILY AT NIGHT 30 Tab 0    spironolactone (ALDACTONE) 25 mg tablet TAKE 1 TABLET BY MOUTH ONCE DAILY 30 Tab 0    furosemide (LASIX) 80 mg tablet TAKE 1 TABLET BY MOUTH ONCE DAILY 30 Tab 0    sacubitril-valsartan (ENTRESTO) 24 mg/26 mg tablet Take 1 Tab by mouth two (2) times a day. 60 Tab 6    indapamide (LOZOL) 2.5 mg tablet TAKE 1 TABLET BY MOUTH ONCE DAILY IN THE MORNING 30 Tab 6    methIMAzole (TAPAZOLE) 10 mg tablet Take 1 Tab by mouth two (2) times a day. 1 Tab 0    rivaroxaban (XARELTO) 20 mg tab tablet Take 1 Tab by mouth daily. 30 Tab 11    HYDROcodone-acetaminophen (NORCO) 5-325 mg per tablet Take 1 Tab by mouth every eight (8) hours as needed for Pain. Max Daily Amount: 3 Tabs. 21 Tab 0    potassium chloride (K-DUR, KLOR-CON) 20 mEq tablet Take 1 Tab by mouth daily. 30 Tab 1    ondansetron (ZOFRAN ODT) 4 mg disintegrating tablet Take 1 Tab by mouth every eight (8) hours as needed for Nausea. 30 Tab 0    carvedilol (COREG) 25 mg tablet Take 1 Tab by mouth two (2) times daily (with meals). 60 Tab 6    nitroglycerin (NITROSTAT) 0.4 mg SL tablet 1 Tab by SubLINGual route every five (5) minutes as needed for Chest Pain. 1 Bottle 1    aspirin 81 mg chewable tablet Take 81 mg by mouth daily.         diclofenac (VOLTAREN) 1 % gel Apply 4 g to affected area four (4) times daily. 100 g 5    albuterol (ACCUNEB) 1.25 mg/3 mL nebu Take 3 mL by inhalation every four (4) hours as needed (wheezing).  25 Each 0       ALLERGIES  No Known Allergies       SOCIAL HISTORY    Social History     Socioeconomic History    Marital status: SINGLE     Spouse name: Not on file    Number of children: Not on file    Years of education: Not on file    Highest education level: Not on file   Occupational History    Not on file   Social Needs    Financial resource strain: Not on file    Food insecurity     Worry: Not on file     Inability: Not on file    Transportation needs     Medical: Not on file     Non-medical: Not on file   Tobacco Use    Smoking status: Current Some Day Smoker     Packs/day: 0.00     Years: 18.00     Pack years: 0.00     Last attempt to quit: 2/28/2017     Years since quitting: 3.0    Smokeless tobacco: Never Used   Substance and Sexual Activity    Alcohol use: Yes     Comment: socially     Drug use: No    Sexual activity: Yes     Partners: Male     Birth control/protection: Condom   Lifestyle    Physical activity     Days per week: Not on file     Minutes per session: Not on file    Stress: Not on file   Relationships    Social connections     Talks on phone: Not on file     Gets together: Not on file     Attends Amish service: Not on file     Active member of club or organization: Not on file     Attends meetings of clubs or organizations: Not on file     Relationship status: Not on file    Intimate partner violence     Fear of current or ex partner: Not on file     Emotionally abused: Not on file     Physically abused: Not on file     Forced sexual activity: Not on file   Other Topics Concern    Not on file   Social History Narrative    Not on file     Socioeconomic History    Marital status: SINGLE     Spouse name: Not on file    Number of children: Not on file    Years of education: Not on file    Highest education level: Not on file   Occupational History    Not on file   Social Needs    Financial resource strain: Not on file    Food insecurity     Worry: Not on file     Inability: Not on file    Transportation needs     Medical: Not on file     Non-medical: Not on file   Tobacco Use    Smoking status: Current Some Day Smoker     Packs/day: 0.00     Years: 18.00     Pack years: 0.00     Last attempt to quit: 2/28/2017     Years since quitting: 3.0    Smokeless tobacco: Never Used   Substance and Sexual Activity    Alcohol use: Yes     Comment: socially     Drug use: No    Sexual activity: Yes     Partners: Male     Birth control/protection: Condom   Lifestyle    Physical activity     Days per week: Not on file     Minutes per session: Not on file    Stress: Not on file   Relationships    Social connections     Talks on phone: Not on file     Gets together: Not on file     Attends Hindu service: Not on file     Active member of club or organization: Not on file     Attends meetings of clubs or organizations: Not on file     Relationship status: Not on file    Intimate partner violence     Fear of current or ex partner: Not on file     Emotionally abused: Not on file     Physically abused: Not on file     Forced sexual activity: Not on file   Other Topics Concern    Not on file   Social History Narrative    Not on file      Problem Relation Age of Onset    Hypertension Mother     Heart Disease Maternal Grandmother         CHF    Hypertension Maternal Grandmother     Diabetes Maternal Grandmother     No Known Problems Father     No Known Problems Sister     No Known Problems Brother     No Known Problems Brother     No Known Problems Brother     No Known Problems Sister     No Known Problems Sister          REVIEW OF SYSTEMS  Review of Systems   Constitutional: Positive for weight loss. Negative for chills and fever. Respiratory: Negative for shortness of breath.     Cardiovascular: Negative for chest pain.   Gastrointestinal: Negative for constipation. Negative for fecal incontinence   Genitourinary: Positive for frequency and urgency. Negative for dysuria. Negative for urinary incontinence   Musculoskeletal:        Per HPI   Skin: Negative for rash. Neurological: Positive for tingling and focal weakness. Negative for dizziness, tremors and headaches. Endo/Heme/Allergies: Does not bruise/bleed easily. Psychiatric/Behavioral: The patient has insomnia. PHYSICAL EXAMINATION  Visit Vitals  /79 (BP 1 Location: Left arm, BP Patient Position: Sitting)   Pulse 90   Temp 98.1 °F (36.7 °C) (Oral)   Resp 16   Ht 5' 4\" (1.626 m)   Wt 261 lb (118.4 kg)   LMP  (LMP Unknown) Comment: 2009   SpO2 100%   BMI 44.80 kg/m²          Accompanied by self. Constitutional:  Well developed, well nourished, in no acute distress. Psychiatric: Affect and mood are appropriate. Integumentary: No rashes or abrasions noted on exposed areas. Cardiovascular/Peripheral Vascular: No peripheral edema is noted BLE. SPINE/MUSCULOSKELETAL EXAM      Lumbar spine:  No rash, ecchymosis, or gross obliquity. No fasciculations. No focal atrophy is noted. Tenderness to palpation L L4-5, L sciatic notch. SI joints non-tender. Trochanters non tender. MOTOR:       Hip Flex  Quads Hamstrings Ankle DF EHL Ankle PF   Right +4/5 +4/5 +4/5 +4/5 +4/5 +4/5   Left +4/5 +4/5 +4/5 4/5 +4/5 +4/5     Straight Leg raise negative. Ambulation without assistive device. FWB. FF.    [facility]  OFFICE PROCEDURE PROGRESS NOTE      PROCEDURE: In the office today after informed consent using aseptic technique, the patient was injected with a total of 2 cc of Celestone, 0.5 cc each of Lidocaine and Marcaine into her left iliolumbar trigger point.      Chart reviewed for the following:   I, Dr. Prince Jack, have reviewed the History, Physical and updated the Allergic reactions for Lucía Albert Elder Munoz. Local measures (ice/heat) and medications have not alleviated the symptoms. TIME OUT performed immediately prior to start of procedure:   I, Dr. Ankur Coulter, have performed the following reviews on Tien Trent prior to the start of the procedure:            * Patient was identified by name and date of birth   * Agreement on procedure being performed was verified  * Risks and Benefits explained to the patient  * Procedure site verified and marked as necessary  * Patient was positioned for comfort  * Consent was signed and verified     Time: 11:16 AM     Date of procedure: 3/20/2020    Procedure performed by:  Carlos Alberto Solomon MD    Provider assisted by: None    Patient assisted by: Self    How tolerated by patient: Pt tolerated the procedure well with no complications. Written by Clary Genao, as dictated by Ankur Coulter MD.    I, Dr. Ankur Coulter MD, confirm that all documentation is accurate. Ms. Edwin Almaguer may have a reminder for a \"due or due soon\" health maintenance. I have asked that she contact her primary care provider for follow-up on this health maintenance.

## 2020-03-18 NOTE — PATIENT INSTRUCTIONS
Lumbar Spinal Stenosis: Care Instructions  Your Care Instructions    Stenosis in the spine is a narrowing of the canal that is around the spinal cord and nerve roots in your back. It can happen as part of aging. Sometimes bone and other tissue grow into this canal and press on the nerves that branch out from the spinal cord. This can cause pain, numbness, and weakness. When it happens in the lower part of your back, it is called lumbar spinal stenosis. It can cause problems in the legs, feet, and rear end (buttocks). You may be able to get relief from the symptoms of spinal stenosis by taking pain medicine. Your doctor may suggest physical therapy and exercises to keep your spine strong and flexible. Some people try steroid shots to reduce swelling. If pain and numbness in your legs are still so bad that you cannot do your normal activities, you may need surgery. Follow-up care is a key part of your treatment and safety. Be sure to make and go to all appointments, and call your doctor if you are having problems. It's also a good idea to know your test results and keep a list of the medicines you take. How can you care for yourself at home? · Take an over-the-counter pain medicine. Nonsteroidal anti-inflammatory drugs (NSAIDs) such as ibuprofen or naproxen seem to work best. But if you can't take NSAIDs, you can try acetaminophen. Be safe with medicines. Read and follow all instructions on the label. · Do not take two or more pain medicines at the same time unless the doctor told you to. Many pain medicines have acetaminophen, which is Tylenol. Too much acetaminophen (Tylenol) can be harmful. · Stay at a healthy weight. Being overweight puts extra strain on your spine. · Change positions often when you sit or stand. This can ease pain. It may also reduce pressure on the spinal cord and its nerves. · Avoid doing things that make your symptoms worse.  Walking downhill and standing for a long time may cause pain.  · Stretch and strengthen your back muscles as your doctor or physical therapist recommends. If your doctor says it is okay to do them, these exercises may help. ? Lie on your back with your knees bent. Gently pull one bent knee to your chest. Put that foot back on the floor, and then pull the other knee to your chest.  ? Do pelvic tilts. Lie on your back with your knees bent. Tighten your stomach muscles. Pull your belly button (navel) in and up toward your ribs. You should feel like your back is pressing to the floor and your hips and pelvis are slightly lifting off the floor. Hold for 6 seconds while breathing smoothly. ? Stand with your back flat against a wall. Slowly slide down until your knees are slightly bent. Hold for 10 seconds, then slide back up the wall. · Remove or change anything in your house that may cause you to fall. Keep walkways clear of clutter, electrical cords, and throw rugs. When should you call for help? Call 911 anytime you think you may need emergency care. For example, call if:    · You are unable to move a leg at all.   [facility] your doctor now or seek immediate medical care if:    · You have new or worse symptoms in your legs, belly, or buttocks. Symptoms may include:  ? Numbness or tingling. ? Weakness. ? Pain.     · You lose bladder or bowel control.    Watch closely for changes in your health, and be sure to contact your doctor if:    · You have a fever, lose weight, or don't feel well.     · You are not getting better as expected. Where can you learn more? Go to http://star-jean.info/  Enter X327 in the search box to learn more about \"Lumbar Spinal Stenosis: Care Instructions. \"  Current as of: June 26, 2019Content Version: 12.4  © 1805-3002 Healthwise, Incorporated. Care instructions adapted under license by [employer] (which disclaims liability or warranty for this information).  If you have questions about a medical condition or this instruction, always ask your healthcare professional. [place] [address] any warranty or liability for your use of this information. Learning About Trigger Point Injections  What are trigger point injections? A trigger point is a painful knot in a tight band of muscle. A trigger point often causes pain to be felt in other areas, too. For example, a trigger point in the neck or upper back can cause pain in the head. Trigger point injections are shots of medicine into these knots to help relieve the pain. The medicines are usually local anesthetics like lidocaine. Trigger point injections are often part of plan that includes other treatments, such as muscle stretching and strengthening. How is a trigger point injection done? Your doctor first locates a trigger point by pressing around the painful area. This may cause your muscle to hurt or twitch. This tells the doctor that he or she has found the spot to do the injection. The area is cleaned. Your doctor then injects the medicine into the trigger point. He or she may inject the medicine in more than one direction within the trigger point. The doctor may change direction without removing the needle. If you have more than one trigger point in the muscle, your doctor may repeat the process. Your doctor may stretch the area to help the muscle relax. He or she may also show you how to move and stretch the muscle yourself. The procedure takes about 10 to 30 minutes. How long it takes depends on how many trigger points are treated. But an injection itself takes only a few moments. What can you expect after a trigger point injection? The area may feel a bit numb for a few hours. It may also feel sore. Other problems from trigger point injections are rare. There is a chance of skin infection at the injection site.  And if injections are done in the chest area, there is a small risk of puncturing the outer lining of the lung (pneumothorax). Trigger point injections may reduce some or all of your pain. But the pain can come back after the medicine wears off. If your pain comes back, your doctor may suggest more shots or other treatment for longer-lasting relief. Follow your doctor's instructions carefully. And tell your doctor about any new or unusual symptoms, such as chest pain or shortness of breath. Follow-up care is a key part of your treatment and safety. Be sure to make and go to all appointments, and call your doctor if you are having problems. It's also a good idea to know your test results and keep a list of the medicines you take. Where can you learn more? Go to http://star-jean.info/  Enter V425 in the search box to learn more about \"Learning About Trigger Point Injections. \"  Current as of: June 26, 2019Content Version: 12.4  © 9127-5823 Healthwise, Incorporated. Care instructions adapted under license by [employer] (which disclaims liability or warranty for this information). If you have questions about a medical condition or this instruction, always ask your healthcare professional. [address] any warranty or liability for your use of this information.

## 2020-04-06 ENCOUNTER — VIRTUAL VISIT (OUTPATIENT)
Dept: CARDIOLOGY CLINIC | Age: 50
End: 2020-04-06

## 2020-04-06 DIAGNOSIS — I50.22 CHRONIC SYSTOLIC CONGESTIVE HEART FAILURE (HCC): ICD-10-CM

## 2020-04-06 DIAGNOSIS — E66.01 MORBID OBESITY (HCC): ICD-10-CM

## 2020-04-06 DIAGNOSIS — E78.00 HYPERCHOLESTEROLEMIA: ICD-10-CM

## 2020-04-06 DIAGNOSIS — I11.0 HYPERTENSIVE HEART DISEASE WITH HEART FAILURE (HCC): ICD-10-CM

## 2020-04-06 DIAGNOSIS — I42.0 DILATED CARDIOMYOPATHY (HCC): Primary | ICD-10-CM

## 2020-04-06 DIAGNOSIS — E05.90 HYPERTHYROIDISM: ICD-10-CM

## 2020-04-06 DIAGNOSIS — Z95.810 AICD (AUTOMATIC CARDIOVERTER/DEFIBRILLATOR) PRESENT: ICD-10-CM

## 2020-04-06 NOTE — PROGRESS NOTES
Montrell Downs is a 51 y.o. female evaluated via telephone on 4/6/2020. Consent:  She and/or health care decision maker is aware that that she may receive a bill for this telephone service, depending on her insurance coverage, and has provided verbal consent to proceed: Yes    HPI: I saw Nguyen Faria today in a virtual video cardiovascular evaluation regarding her dilated cardiomyopathy with severe left ventricular dysfunction. This actually had to be completed as a telephone encounter since the virtual video portion of the encounter could not be completed due to technical difficulties. Ms. Izabela Martino is a pleasant 27-year-old [facility] American female with history of presentation in March 2017 to [facility] with acute on chronic systolic heart failure. She subsequently was treated and during that hospitalization was found to have an echocardiogram showing an ejection fraction of 15%.  She also had a pharmacologic myocardial perfusion defect at that time which demonstrated an ejection fraction estimated at 25% without reversible or fixed defects.  She was placed on Coreg, lisinopril, Aldactone, and Lasix and had a LifeVest placed on discharge from the hospital. Linden Marinelli has done quite well clinically, but her repeat echocardiogram completed on July 18, 2017 though better than her echo back in March 2017 still demonstrated a reduced ejection fraction in the 30% range.       She subsequently had a dual-chamber AICD placed on September 1, 2017 and has done reasonably well since that time, although she comes in today and relates that she really has not been feeling well for a few months.       She relates that she has been doing reasonably well recently.   She did have some sciatica in her left leg for which she had an injection by orthopedics about 3 weeks ago which is helped a great deal.  She had gained some weight when she saw my nurse practitioner back on January 9, 2020, but has lost 11 pounds with adjustment of her medications and is feeling fine currently. She relates that although she still sleeps on 3 pillows she is not having any episodes of paroxysmal nocturnal dyspnea and he has no peripheral edema at all at this time. She is only getting short of breath when she walks for long distances. Encounter Diagnoses   Name Primary?  Dilated cardiomyopathy (HCC) Yes    Chronic systolic congestive heart failure ([facility] [address]     Hypertensive heart disease with heart failure (HCC)     AICD (automatic cardioverter/defibrillator) present     Hyperthyroidism inadequately controlled medically     Morbid obesity ([facility] [address]        Discussion: This patient appears to be doing about as well as I could expect and I really have no recommendations for change currently. I would like to get some baseline lab work including a BMP, magnesium and BNP which we are going to do in the next month or so after the Covid 19 restrictions have been lifted at which time we will increase her Entresto to 49/51 mg twice daily and then I will plan to repeat her BMP a couple of weeks after that and plan to see her again within 3 months from now at which time we will attempt to further increase her Entresto to 97/103 mg twice daily. Her latest lipid profile which I have a copy of was completed on January 29, 2019 and needs to be repeated again in the near future but was excellent at that time with a total cholesterol 114, HDL of 33, VLDL of 19, LDL of 62, and triglycerides of 94. He does have an AICD which we will be following via home monitoring will be a separate report on this in the near future. I will see her again in 3 months as indicated above for Entresto adjustment. I affirm this is a Patient Initiated Episode with an Established Patient who has not had a related appointment within my department in the past 7 days or scheduled within the next 24 hours.     Total Time: minutes: 21-30 minutes    Note: not billable if this call serves to triage the patient into an appointment for the relevant concern      Yuliet Saucedo, DO

## 2020-04-06 NOTE — PATIENT INSTRUCTIONS
We will plan to get a BMP, magnesium, and BNP lipid profile on her in the next month after the Covid 19 home isolation restrictions have been discontinued. Once we have these levels we will plan to increase her Entresto to 51/49 mg twice daily and probably decrease her Lasix to 40 mg daily at that time. We will also plan to have her come back and see me in about 3 months at which time we will probably increase her Entresto to the highest dosage which is 97/103 mg twice daily.

## 2020-05-13 ENCOUNTER — VIRTUAL VISIT (OUTPATIENT)
Dept: ORTHOPEDIC SURGERY | Age: 50
End: 2020-05-13

## 2020-05-13 ENCOUNTER — HOME HEALTH ADMISSION (OUTPATIENT)
Dept: HOME HEALTH SERVICES | Facility: HOME HEALTH | Age: 50
End: 2020-05-13
Payer: MEDICAID

## 2020-05-13 DIAGNOSIS — M48.062 SPINAL STENOSIS OF LUMBAR REGION WITH NEUROGENIC CLAUDICATION: Primary | ICD-10-CM

## 2020-05-13 RX ORDER — DULOXETIN HYDROCHLORIDE 60 MG/1
CAPSULE, DELAYED RELEASE ORAL
Qty: 90 CAP | Refills: 0 | Status: SHIPPED | OUTPATIENT
Start: 2020-05-13 | End: 2020-07-09 | Stop reason: SDUPTHER

## 2020-05-13 RX ORDER — BACLOFEN 10 MG/1
10 TABLET ORAL
Qty: 30 TAB | Refills: 1 | Status: SHIPPED | OUTPATIENT
Start: 2020-05-13 | End: 2020-07-09 | Stop reason: SDUPTHER

## 2020-05-13 NOTE — PROGRESS NOTES
Cori Patel is a 51 y.o. female who was seen by synchronous (real-time) audio-video technology on 5/13/2020 through a [employer] platform. Assessment & Plan:   Diagnoses and all orders for this visit:    1. Spinal stenosis of lumbar region with neurogenic claudication  -     DULoxetine (CYMBALTA) 60 mg capsule; One capsule every day w/dinner.  -     baclofen (LIORESAL) 10 mg tablet; Take 1 Tab by mouth nightly as needed for Muscle Spasm(s) or Pain.  -     REFERRAL TO HOME HEALTH        1. 51 y.o. female stable with her stenosis. She needs to work on core strengthening and safe ambulation. 2. Continue Cymbalta 60mg qdinner  3. Referral for home therapy  4. No indications for surgery or spinal injections at this time. 5. Continue stretching and heating to treat cramps  6. Trial of Baclofen PRN  7. Advised to call Dr. Abraham Smith concerning Voltaren gel  8. COVID 19 precautions: handwashing, staying at home, physical distancing. Do get some fresh air and sunshine. Follow-up and Dispositions    · Return in about 2 months (around 7/13/2020) for in office per Dr. To [place]. Subjective:       Cori Patel is seen today for Back Pain (virtual visit)      Pain Assessment  5/13/2020   Location of Pain Back   Location Modifiers -   Severity of Pain 0   Quality of Pain -   Duration of Pain -   Frequency of Pain Intermittent   Aggravating Factors (No Data)   Aggravating Factors Comment laying on either side   Limiting Behavior Some   Relieving Factors Rest;Heat   Result of Injury -   Type of Injury -   Type of Injury Comment -        Pt was last evaluated in the office 3/18/20 for lumbar stenosis. She was given a trial of Cymbalta and a L iliolumbar TPI at that time. She indicates 1-2 week benefit with TPI. Denies side effects. Pt reports benefit with Cymbalta 60mg qdinner, admits to slight nausea. Denies SI, HI, depression. On Xarelto and Lasix. She indicates she has a short walking tolerance.  Notes relief with sitting. Denies weakness in BLE. She notes tingling and cramping in LLE at night and stiffness with prolonged laying. Denies fever, cough, SOB, or CP. Pt denies recent ED visits or hospitalizations. Denies trying Baclofen before. Treatments patient has tried:  Physical therapy:No  Doing HEP: Some  Non-opioid medications: Yes Unable to take NSAIDs. Failed Voltaren gel - insurance. Spinal injections: No  Spinal surgery- No.   Last L CT 1/2020: mod stenosis L4-5, SI joint deg changes      reviewed. PMHx of CHF, defibrillator (NOT MRI compatible), HTN, asthma, Graves disease, fibroids, fx of L fibula, OA L hip. Last worked 2019, on disability. Denies hx of shingles. Controlled Substance Monitoring:    No flowsheet data found. Prior to Admission medications    Medication Sig Start Date End Date Taking? Authorizing Provider   DULoxetine (CYMBALTA) 60 mg capsule One capsule every day w/dinner. 5/13/20  Yes Damian Leal MD   baclofen (LIORESAL) 10 mg tablet Take 1 Tab by mouth nightly as needed for Muscle Spasm(s) or Pain. 5/13/20  Yes Damian Leal MD   atorvastatin (LIPITOR) 80 mg tablet TAKE 1 TABLET BY MOUTH ONCE DAILY AT NIGHT 4/8/20  Yes Paco Fitzgerald NP   furosemide (LASIX) 80 mg tablet Take 1 tablet by mouth once daily 4/8/20  Yes Paco Fitzgerald NP   spironolactone (ALDACTONE) 25 mg tablet Take 1 tablet by mouth once daily 4/8/20  Yes Vic CARRASCO, TEE   diclofenac (VOLTAREN) 1 % gel Apply 4 g to affected area four (4) times daily. 2/20/20  Yes Odell Pedraza MD   sacubitril-valsartan (ENTRESTO) 24 mg/26 mg tablet Take 1 Tab by mouth two (2) times a day. 1/9/20  Yes Paco Fitzgerald NP   indapamide (LOZOL) 2.5 mg tablet TAKE 1 TABLET BY MOUTH ONCE DAILY IN THE MORNING 11/12/19  Yes Jesus Hwang DO   methIMAzole (TAPAZOLE) 10 mg tablet Take 1 Tab by mouth two (2) times a day.  9/17/19  Yes Vic Bertrand P, NP   rivaroxaban (XARELTO) 20 mg tab tablet Take 1 Tab by mouth daily. 7/11/19  Yes Ed Morales P, NP   potassium chloride (K-DUR, KLOR-CON) 20 mEq tablet Take 1 Tab by mouth daily. 1/29/19  Yes Kevin Herrera M, NP   carvedilol (COREG) 25 mg tablet Take 1 Tab by mouth two (2) times daily (with meals). 10/12/18  Yes José Hollins, DO   nitroglycerin (NITROSTAT) 0.4 mg SL tablet 1 Tab by SubLINGual route every five (5) minutes as needed for Chest Pain. 3/27/17  Yes Kaia Hogan MD   albuterol (ACCUNEB) 1.25 mg/3 mL nebu Take 3 mL by inhalation every four (4) hours as needed (wheezing). 12/4/16  Yes NARCISO Vann   aspirin 81 mg chewable tablet Take 81 mg by mouth daily. Yes Other, MD Manish     No Known Allergies    Past Medical History:   Diagnosis Date    AICD (automatic cardioverter/defibrillator) present 09/2018    Asthma     Cardiac echocardiogram 03/22/2017    LVE. EF 15% (prev 50% on study of 12/10/14). Severe diffuse hypk. Indeterminate diastolic fx.  RVE. RVSP at least 44 mmHg. Mod LAE.  BISMARK. Mild-mod MR.  Cardiac nuclear imaging test 03/24/2017    High risk. Somewhat patchy uptake. No evidence of ischemia or infarction. No RWMA. Severe LVE. EF 25%. Neg EKG on pharm stress test.    Cardiovascular LLE venous duplex 03/06/2017    Left leg:  No DVT. Pulsatile flow.  Congestive heart failure (HCC)     Graves disease     Heart failure (HCC)     Hypercholesterolemia     Hypertension     Hyperthyroidism      Past Surgical History:   Procedure Laterality Date    BREAST SURGERY PROCEDURE UNLISTED      redfuction    HX GYN      hysterectomy  btl    HX MYOMECTOMY       fibroid tumo removed    HX ORTHOPAEDIC  March 2012 ORIF left fibula        Review of Systems   Constitutional: Negative for fever. Respiratory: Negative for cough and shortness of breath. Musculoskeletal: Positive for back pain and joint pain. Neurological: Positive for tingling. Negative for focal weakness. GENERAL :  Well developed, no acute distress  HENT  :  Atraumatic, normocephalic   SKIN:   No rash on visible areas. No abrasions. RESPIRATORY:  Non-labored breathing. No accessory respiratory muscle use. NEURO:  No tremor noted. Facial muscles symmetric. PSYCHIATRIC:  Normal affect. Conversant with normal thought process  MUSCULOSKELETAL: Ambulation without assistive device, FWB, no foot drop, no limp. Due to this being a TeleHealth evaluation, many elements of the physical examination are unable to be assessed. We discussed the expected course, resolution and complications of the diagnosis(es) in detail. Medication risks, benefits, interactions, and alternatives were discussed as indicated. I advised her to contact the office if her condition worsens, changes or fails to improve as anticipated. She expressed understanding with the diagnosis(es) and plan. Pursuant to the emergency declaration under the [address] [place] [address], [facility] waiver authority and the [employer] Act, this Virtual  Visit was conducted, with patient's consent, to reduce the patient's risk of exposure to COVID-19 and provide continuity of care for an established patient. Services were provided through a video synchronous discussion virtually to substitute for in-person clinic visit. Dragon voice recognition software was used in the creation of this note. Unintended transcription, spelling, and grammar errors may be present. This document has been electronically signed but not proofread for these specific errors. Consent:  She and/or her healthcare decision maker is aware that this patient-initiated Telehealth encounter is a billable service, with coverage as determined by her insurance carrier.  She is aware that she may receive a bill and has provided verbal consent to proceed: Yes    I was in the office while conducting this encounter. Patient was at home. Visit start time 9:29 AM, end time 9:39 AM.    Written by Rory Kumar, as dictated by Estelle Alvarez MD.    I, Dr. Estelle Alvarez MD, confirm that all documentation is accurate.

## 2020-05-13 NOTE — PROGRESS NOTES
Verbal order entered per Dr. Herrera Ask as documented on blue sheet:Referral to [address] for home PT    Anthony Aguayo presents today for   Chief Complaint   Patient presents with    Back Pain     virtual visit       Is someone accompanying this pt? Virtual Visit    Is the patient using any DME equipment during OV? NA    Depression Screening:  3 most recent PHQ Screens 2/20/2020   Little interest or pleasure in doing things Not at all   Feeling down, depressed, irritable, or hopeless Not at all   Total Score PHQ 2 0       Learning Assessment:  Learning Assessment 5/8/2019   PRIMARY LEARNER Patient   HIGHEST LEVEL OF EDUCATION - PRIMARY LEARNER  -   BARRIERS PRIMARY LEARNER -   CO-LEARNER CAREGIVER -   PRIMARY LANGUAGE ENGLISH   LEARNER PREFERENCE PRIMARY DEMONSTRATION   ANSWERED BY patient   RELATIONSHIP SELF       Abuse Screening:  Abuse Screening Questionnaire 1/9/2020   Do you ever feel afraid of your partner? N   Are you in a relationship with someone who physically or mentally threatens you? N   Is it safe for you to go home? Y       Fall Risk  Fall Risk Assessment, last 12 mths 1/9/2020   Able to walk? Yes   Fall in past 12 months? No         Coordination of Care:  1. Have you been to the ER, urgent care clinic since your last visit? NO  Hospitalized since your last visit? NO    2. Have you seen or consulted any other health care providers outside of the [address] [place] [address] since your last visit? NO Include any pap smears or colon screening.  NO    Last  Checked 5/13/2020

## 2020-05-15 ENCOUNTER — HOME CARE VISIT (OUTPATIENT)
Dept: HOME HEALTH SERVICES | Facility: HOME HEALTH | Age: 50
End: 2020-05-15

## 2020-05-18 ENCOUNTER — HOME CARE VISIT (OUTPATIENT)
Dept: SCHEDULING | Facility: HOME HEALTH | Age: 50
End: 2020-05-18
Payer: MEDICAID

## 2020-05-18 VITALS — DIASTOLIC BLOOD PRESSURE: 80 MMHG | SYSTOLIC BLOOD PRESSURE: 130 MMHG | HEART RATE: 107 BPM | OXYGEN SATURATION: 95 %

## 2020-05-18 PROCEDURE — 400013 HH SOC

## 2020-05-18 PROCEDURE — G0151 HHCP-SERV OF PT,EA 15 MIN: HCPCS

## 2020-05-19 ENCOUNTER — HOME CARE VISIT (OUTPATIENT)
Dept: HOME HEALTH SERVICES | Facility: HOME HEALTH | Age: 50
End: 2020-05-19
Payer: MEDICAID

## 2020-05-21 ENCOUNTER — HOME CARE VISIT (OUTPATIENT)
Dept: SCHEDULING | Facility: HOME HEALTH | Age: 50
End: 2020-05-21
Payer: MEDICAID

## 2020-05-26 ENCOUNTER — HOME CARE VISIT (OUTPATIENT)
Dept: SCHEDULING | Facility: HOME HEALTH | Age: 50
End: 2020-05-26
Payer: MEDICAID

## 2020-05-26 PROCEDURE — G0157 HHC PT ASSISTANT EA 15: HCPCS

## 2020-05-28 ENCOUNTER — HOME CARE VISIT (OUTPATIENT)
Dept: SCHEDULING | Facility: HOME HEALTH | Age: 50
End: 2020-05-28
Payer: MEDICAID

## 2020-05-28 VITALS
TEMPERATURE: 97.7 F | SYSTOLIC BLOOD PRESSURE: 118 MMHG | RESPIRATION RATE: 18 BRPM | OXYGEN SATURATION: 98 % | HEART RATE: 78 BPM | DIASTOLIC BLOOD PRESSURE: 78 MMHG

## 2020-05-28 PROCEDURE — G0157 HHC PT ASSISTANT EA 15: HCPCS

## 2020-06-01 VITALS
SYSTOLIC BLOOD PRESSURE: 132 MMHG | OXYGEN SATURATION: 96 % | DIASTOLIC BLOOD PRESSURE: 80 MMHG | HEART RATE: 93 BPM | RESPIRATION RATE: 18 BRPM | TEMPERATURE: 98.4 F

## 2020-06-02 ENCOUNTER — HOME CARE VISIT (OUTPATIENT)
Dept: SCHEDULING | Facility: HOME HEALTH | Age: 50
End: 2020-06-02
Payer: MEDICAID

## 2020-06-02 PROCEDURE — G0157 HHC PT ASSISTANT EA 15: HCPCS

## 2020-06-04 ENCOUNTER — CLINICAL SUPPORT (OUTPATIENT)
Dept: CARDIOLOGY CLINIC | Age: 50
End: 2020-06-04

## 2020-06-04 DIAGNOSIS — Z95.810 AICD (AUTOMATIC CARDIOVERTER/DEFIBRILLATOR) PRESENT: ICD-10-CM

## 2020-06-04 DIAGNOSIS — I42.0 DILATED CARDIOMYOPATHY (HCC): Primary | ICD-10-CM

## 2020-06-05 ENCOUNTER — HOME CARE VISIT (OUTPATIENT)
Dept: SCHEDULING | Facility: HOME HEALTH | Age: 50
End: 2020-06-05
Payer: MEDICAID

## 2020-06-05 PROCEDURE — G0157 HHC PT ASSISTANT EA 15: HCPCS

## 2020-06-05 NOTE — PROGRESS NOTES
I have personally seen and evaluated the device findings. Interrogation reviewed and I agree with assessment.     Jeffery Orta

## 2020-06-08 ENCOUNTER — HOME CARE VISIT (OUTPATIENT)
Dept: SCHEDULING | Facility: HOME HEALTH | Age: 50
End: 2020-06-08
Payer: MEDICAID

## 2020-06-10 ENCOUNTER — HOME CARE VISIT (OUTPATIENT)
Dept: HOME HEALTH SERVICES | Facility: HOME HEALTH | Age: 50
End: 2020-06-10
Payer: MEDICAID

## 2020-06-10 VITALS
SYSTOLIC BLOOD PRESSURE: 112 MMHG | TEMPERATURE: 98 F | RESPIRATION RATE: 18 BRPM | RESPIRATION RATE: 18 BRPM | DIASTOLIC BLOOD PRESSURE: 74 MMHG | TEMPERATURE: 97.8 F | HEART RATE: 98 BPM | DIASTOLIC BLOOD PRESSURE: 76 MMHG | OXYGEN SATURATION: 98 % | OXYGEN SATURATION: 98 % | SYSTOLIC BLOOD PRESSURE: 114 MMHG | HEART RATE: 98 BPM

## 2020-06-11 ENCOUNTER — HOME CARE VISIT (OUTPATIENT)
Dept: SCHEDULING | Facility: HOME HEALTH | Age: 50
End: 2020-06-11
Payer: MEDICAID

## 2020-06-11 VITALS
OXYGEN SATURATION: 96 % | HEART RATE: 92 BPM | TEMPERATURE: 98 F | DIASTOLIC BLOOD PRESSURE: 80 MMHG | SYSTOLIC BLOOD PRESSURE: 140 MMHG

## 2020-06-11 PROCEDURE — G0151 HHCP-SERV OF PT,EA 15 MIN: HCPCS

## 2020-07-09 ENCOUNTER — OFFICE VISIT (OUTPATIENT)
Dept: ORTHOPEDIC SURGERY | Age: 50
End: 2020-07-09

## 2020-07-09 VITALS
BODY MASS INDEX: 44.05 KG/M2 | RESPIRATION RATE: 17 BRPM | TEMPERATURE: 97.5 F | DIASTOLIC BLOOD PRESSURE: 88 MMHG | SYSTOLIC BLOOD PRESSURE: 145 MMHG | HEIGHT: 64 IN | WEIGHT: 258 LBS | HEART RATE: 98 BPM

## 2020-07-09 DIAGNOSIS — M54.16 LEFT LUMBAR RADICULOPATHY: Primary | ICD-10-CM

## 2020-07-09 DIAGNOSIS — M48.062 SPINAL STENOSIS OF LUMBAR REGION WITH NEUROGENIC CLAUDICATION: ICD-10-CM

## 2020-07-09 RX ORDER — BETAMETHASONE SODIUM PHOSPHATE AND BETAMETHASONE ACETATE 3; 3 MG/ML; MG/ML
12 INJECTION, SUSPENSION INTRA-ARTICULAR; INTRALESIONAL; INTRAMUSCULAR; SOFT TISSUE ONCE
Qty: 2 ML | Refills: 0
Start: 2020-07-09 | End: 2020-07-09

## 2020-07-09 RX ORDER — BUPIVACAINE HYDROCHLORIDE 2.5 MG/ML
0.5 INJECTION, SOLUTION INFILTRATION; PERINEURAL ONCE
Qty: 0.5 ML | Refills: 0
Start: 2020-07-09 | End: 2020-07-09

## 2020-07-09 RX ORDER — BACLOFEN 10 MG/1
5-10 TABLET ORAL
Qty: 90 TAB | Refills: 1 | Status: SHIPPED | OUTPATIENT
Start: 2020-07-09 | End: 2021-05-27 | Stop reason: SDUPTHER

## 2020-07-09 RX ORDER — DULOXETIN HYDROCHLORIDE 60 MG/1
60 CAPSULE, DELAYED RELEASE ORAL DAILY
Qty: 90 CAP | Refills: 1 | Status: SHIPPED | OUTPATIENT
Start: 2020-07-09 | End: 2021-08-02

## 2020-07-09 RX ORDER — LIDOCAINE HYDROCHLORIDE 20 MG/ML
0.5 INJECTION, SOLUTION EPIDURAL; INFILTRATION; INTRACAUDAL; PERINEURAL ONCE
Qty: 0.5 ML | Refills: 0
Start: 2020-07-09 | End: 2020-07-09

## 2020-07-09 NOTE — PROGRESS NOTES
[address]  [address], [address]  [place]  Phone: [phone]  Fax: [phone]        Karen Antonio  : 1970  PCP: No primary care provider on file. PROGRESS NOTE      ASSESSMENT AND PLAN    Diagnoses and all orders for this visit:    1. Left lumbar radiculopathy  -     bupivacaine (Marcaine) 0.25 % (2.5 mg/mL) soln injection; 0.5 mL by IntraMUSCular route once for 1 dose. -     INJECTION, BUPIVICAINE HYDRO  -     LIDOCAINE INJECTION  -     lidocaine, PF, (XYLOCAINE) 20 mg/mL (2 %) injection; 0.5 mL by Other route once for 1 dose. -     betamethasone (Celestone Soluspan) 6 mg/mL injection; 2 mL by IntraMUSCular route once for 1 dose.  -     BETAMETHASONE ACETATE & SODIUM PHOSPHATE INJECTION 3 MG EA.  -     IN INJECT TRIGGER POINT, 1 OR 2    2. Spinal stenosis of lumbar region with neurogenic claudication  -     baclofen (LIORESAL) 10 mg tablet; Take 0.5-1 Tabs by mouth three (3) times daily as needed for Muscle Spasm(s) or Pain.  -     DULoxetine (CYMBALTA) 60 mg capsule; Take 1 Cap by mouth daily. One capsule every day w/dinner. 1. 51 y.o. female w/progressive LLE radiculopathy superimposed on symptomatic stenosis  2. Advised to continue HEP  3. Increase Baclofen to TID. Advised to break in half to avoid grogginess. 4. Discussed avoiding steroids at this time to prevent possible diminished immune response to COVID 19.  5. L iliolumbar TPI  6. Given information on TPI    Follow-up and Dispositions    · Return for With Dr. Sylwia Charles for surgical eval.         HISTORY OF PRESENT ILLNESS  Julissa Sánchez is a 51 y.o. female. Pt was last evaluated virtually 2020 for lumbar stenosis. Trial of Baclofen    She notes that she has been using the Baclofen with benefit, noting that she carries it with her everywhere. Pt states that she had a recent moment of stiffness and soreness that lasted for 2 days.  Pt describes that the pain radiates down her back to her LLE, stating that it has been giving her problems. She describes this pain as progressive. Pt denies hx of SI, HI, depression. Denies having DM. Pain Assessment  7/9/2020   Location of Pain Back; Hip   Location Modifiers -   Severity of Pain 8   Quality of Pain Sharp   Quality of Pain Comment stabbing   Duration of Pain -   Frequency of Pain Intermittent   Aggravating Factors Standing;Walking;Bending;Straightening   Aggravating Factors Comment pulling   Limiting Behavior Some   Relieving Factors Nothing   Result of Injury -   Type of Injury -   Type of Injury Comment -       Does pain radiate into extremities: LLE to foot  Does patient have numbness/tingling: LLE  Does patient have weakness: LLE  Pt denies saddle paresthesias. Medications pt is on: Benefit with Cymbalta 60mg qdinner, slight nausea. Baclofen 10 mg QHS PRN with benefit. On Xarelto and Lasix. Denies persistent fevers, chills, weight changes, neurogenic bowel or bladder symptoms.      Treatments patient has tried:  Physical therapy:No  Doing HEP: Some  Non-opioid medications: Yes Unable to take NSAIDs. Failed Voltaren gel - insurance. Spinal injections: No  Spinal surgery- No.   Last L CT 1/2020: mod stenosis L4-5, SI joint deg changes      reviewed. PMHx of CHF, defibrillator (NOT MRI compatible), HTN, asthma, Graves disease, fibroids, fx of L fibula, OA L hip. Last worked 2019, on disability. Denies hx of shingles. PAST MEDICAL HISTORY   Past Medical History:   Diagnosis Date    AICD (automatic cardioverter/defibrillator) present 09/2018    Asthma     Cardiac echocardiogram 03/22/2017    LVE. EF 15% (prev 50% on study of 12/10/14). Severe diffuse hypk. Indeterminate diastolic fx.  RVE. RVSP at least 44 mmHg. Mod LAE.  BISMARK. Mild-mod MR.  Cardiac nuclear imaging test 03/24/2017    High risk. Somewhat patchy uptake. No evidence of ischemia or infarction. No RWMA. Severe LVE. EF 25%.   Neg EKG on pharm stress test.    Cardiovascular LLE venous duplex 03/06/2017    Left leg:  No DVT. Pulsatile flow.  Congestive heart failure (HCC)     Graves disease     Heart failure (HCC)     Hypercholesterolemia     Hypertension     Hyperthyroidism        Past Surgical History:   Procedure Laterality Date    BREAST SURGERY PROCEDURE UNLISTED      redfuction    HX GYN      hysterectomy  btl    HX MYOMECTOMY       fibroid tumo removed    HX ORTHOPAEDIC  March 2012 ORIF left fibula   . MEDICATIONS      Current Outpatient Medications   Medication Sig Dispense Refill    baclofen (LIORESAL) 10 mg tablet Take 0.5-1 Tabs by mouth three (3) times daily as needed for Muscle Spasm(s) or Pain. 90 Tab 1    DULoxetine (CYMBALTA) 60 mg capsule Take 1 Cap by mouth daily. One capsule every day w/dinner. 90 Cap 1    atorvastatin (LIPITOR) 80 mg tablet TAKE 1 TABLET BY MOUTH ONCE DAILY AT NIGHT 30 Tab 5    furosemide (LASIX) 80 mg tablet Take 1 tablet by mouth once daily 30 Tab 6    spironolactone (ALDACTONE) 25 mg tablet Take 1 tablet by mouth once daily 30 Tab 6    diclofenac (VOLTAREN) 1 % gel Apply 4 g to affected area four (4) times daily. 100 g 5    sacubitril-valsartan (ENTRESTO) 24 mg/26 mg tablet Take 1 Tab by mouth two (2) times a day. 60 Tab 6    indapamide (LOZOL) 2.5 mg tablet TAKE 1 TABLET BY MOUTH ONCE DAILY IN THE MORNING 30 Tab 6    methIMAzole (TAPAZOLE) 10 mg tablet Take 1 Tab by mouth two (2) times a day. 1 Tab 0    rivaroxaban (XARELTO) 20 mg tab tablet Take 1 Tab by mouth daily. 30 Tab 11    potassium chloride (K-DUR, KLOR-CON) 20 mEq tablet Take 1 Tab by mouth daily. 30 Tab 1    carvedilol (COREG) 25 mg tablet Take 1 Tab by mouth two (2) times daily (with meals). 60 Tab 6    nitroglycerin (NITROSTAT) 0.4 mg SL tablet 1 Tab by SubLINGual route every five (5) minutes as needed for Chest Pain.  1 Bottle 1    albuterol (ACCUNEB) 1.25 mg/3 mL nebu Take 3 mL by inhalation every four (4) hours as needed (wheezing). 25 Each 0    aspirin 81 mg chewable tablet Take 81 mg by mouth daily. Controlled Substance Monitoring:    No flowsheet data found.      ALLERGIES  No Known Allergies       SOCIAL HISTORY    Social History     Socioeconomic History    Marital status: SINGLE     Spouse name: Not on file    Number of children: Not on file    Years of education: Not on file    Highest education level: Not on file   Occupational History    Not on file   Social Needs    Financial resource strain: Not on file    Food insecurity     Worry: Not on file     Inability: Not on file    Transportation needs     Medical: Not on file     Non-medical: Not on file   Tobacco Use    Smoking status: Current Some Day Smoker     Packs/day: 0.00     Years: 18.00     Pack years: 0.00     Last attempt to quit: 2/28/2017     Years since quitting: 3.3    Smokeless tobacco: Never Used   Substance and Sexual Activity    Alcohol use: Yes     Comment: socially     Drug use: No    Sexual activity: Yes     Partners: Male     Birth control/protection: Condom   Lifestyle    Physical activity     Days per week: Not on file     Minutes per session: Not on file    Stress: Not on file   Relationships    Social connections     Talks on phone: Not on file     Gets together: Not on file     Attends Advent service: Not on file     Active member of club or organization: Not on file     Attends meetings of clubs or organizations: Not on file     Relationship status: Not on file    Intimate partner violence     Fear of current or ex partner: Not on file     Emotionally abused: Not on file     Physically abused: Not on file     Forced sexual activity: Not on file   Other Topics Concern    Not on file   Social History Narrative    Not on file       FAMILY HISTORY    Family History   Problem Relation Age of Onset    Hypertension Mother     Heart Disease Maternal Grandmother         CHF    Hypertension Maternal Grandmother     Diabetes Maternal Grandmother     No Known Problems Father     No Known Problems Sister     No Known Problems Brother     No Known Problems Brother     No Known Problems Brother     No Known Problems Sister     No Known Problems Sister        REVIEW OF SYSTEMS  Review of Systems   Constitutional: Negative for chills, fever and weight loss. HENT: Negative for hearing loss. Eyes: Negative for blurred vision. Respiratory: Negative for shortness of breath. Cardiovascular: Negative for chest pain and leg swelling. Gastrointestinal: Negative for constipation, heartburn and nausea. Negative for fecal incontinence. Genitourinary: Negative for dysuria, frequency, hematuria and urgency. Negative for urinary incontinence. Musculoskeletal: Positive for back pain. Per HPI. Skin: Negative for rash. Neurological: Positive for tingling, sensory change and focal weakness. Negative for dizziness, tremors, speech change, seizures and headaches. Endo/Heme/Allergies: Does not bruise/bleed easily. Psychiatric/Behavioral: Negative for depression, memory loss and substance abuse. The patient is not nervous/anxious and does not have insomnia. PHYSICAL EXAMINATION  Visit Vitals  /88 (BP 1 Location: Left arm, BP Patient Position: Sitting)   Pulse 98   Temp 97.5 °F (36.4 °C) (Oral)   Resp 17   Ht 5' 4\" (1.626 m)   Wt 258 lb (117 kg)   LMP  (LMP Unknown) Comment: 2009   BMI 44.29 kg/m²         Accompanied by self. Constitutional:  Well developed, well nourished, in no acute distress. Psychiatric: Affect and mood are appropriate. Integumentary: No rashes or abrasions noted on exposed areas. Cardiovascular/Peripheral Vascular: No peripheral edema is noted BLE. SPINE/MUSCULOSKELETAL EXAM    Lumbar spine:  No rash, ecchymosis, or gross obliquity. No fasciculations. No focal atrophy is noted. Tenderness to palpation L sciatic notch. SI joints non-tender.  Trochanters non tender. MOTOR:       Hip Flex  Quads Hamstrings Ankle DF EHL Ankle PF   Right +4/5 +4/5 +4/5 +4/5 +4/5 +4/5   Left +4/5 +4/5 +4/5 4/5 +4/5 +4/5       Straight Leg raise positive on L at 30 degrees. Ambulation without assistive device. Dragging LLE. [facility]  OFFICE PROCEDURE PROGRESS NOTE      PROCEDURE: In the office today after informed consent using aseptic technique, the patient was injected with a total of 2 cc of Celestone, 0.5 cc each of Lidocaine and Marcaine into her left iliolumbar trigger point. Chart reviewed for the following:   I, Dr. Minna Zapata, have reviewed the History, Physical and updated the Allergic reactions for [place]. Local measures (ice/heat) and medications have not alleviated the symptoms. TIME OUT performed immediately prior to start of procedure:   IDr. Minna, have performed the following reviews on [place] prior to the start of the procedure:      Patient denies any recent fevers, chills, antibiotics, recent cortisone injections, or infections. * Patient was identified by name and date of birth   * Agreement on procedure being performed was verified  * Risks and Benefits explained to the patient  * Procedure site verified and marked as necessary  * Patient was positioned for comfort  * Consent was signed and verified     Time: 12:32 PM    Date of procedure: 7/11/2020    Procedure performed by:  Meggan Albarran MD    Provider assisted by: None    Patient assisted by: Self    How tolerated by patient: Pt tolerated the procedure well with no complications. Written by Nanda Mccarty, as dictated by Minna Zapata MD.    IDr. Minna MD, confirm that all documentation is accurate. Ms. Opal Palomo may have a reminder for a \"due or due soon\" health maintenance. I have asked that she contact her primary care provider for follow-up on this health maintenance.

## 2020-07-09 NOTE — PATIENT INSTRUCTIONS
Learning About Trigger Point Injections  What are trigger point injections? A trigger point is a painful knot in a tight band of muscle. A trigger point often causes pain to be felt in other areas, too. For example, a trigger point in the neck or upper back can cause pain in the head. Trigger point injections are shots of medicine into these knots to help relieve the pain. The medicines are usually local anesthetics like lidocaine. Trigger point injections are often part of plan that includes other treatments, such as muscle stretching and strengthening. How is a trigger point injection done? Your doctor first locates a trigger point by pressing around the painful area. This may cause your muscle to hurt or twitch. This tells the doctor that he or she has found the spot to do the injection. The area is cleaned. Your doctor then injects the medicine into the trigger point. He or she may inject the medicine in more than one direction within the trigger point. The doctor may change direction without removing the needle. If you have more than one trigger point in the muscle, your doctor may repeat the process. Your doctor may stretch the area to help the muscle relax. He or she may also show you how to move and stretch the muscle yourself. The procedure takes about 10 to 30 minutes. How long it takes depends on how many trigger points are treated. But an injection itself takes only a few moments. What can you expect after a trigger point injection? The area may feel a bit numb for a few hours. It may also feel sore. Other problems from trigger point injections are rare. There is a chance of skin infection at the injection site. And if injections are done in the chest area, there is a small risk of puncturing the outer lining of the lung (pneumothorax). Trigger point injections may reduce some or all of your pain. But the pain can come back after the medicine wears off.  If your pain comes back, your doctor may suggest more shots or other treatment for longer-lasting relief. Follow your doctor's instructions carefully. And tell your doctor about any new or unusual symptoms, such as chest pain or shortness of breath. Follow-up care is a key part of your treatment and safety. Be sure to make and go to all appointments, and call your doctor if you are having problems. It's also a good idea to know your test results and keep a list of the medicines you take. Where can you learn more? Go to http://star-jean.info/  Enter V425 in the search box to learn more about \"Learning About Trigger Point Injections. \"  Current as of: March 2, 2020               Content Version: 12.5  © 8706-5698 [employer]. Care instructions adapted under license by [employer] (which disclaims liability or warranty for this information). If you have questions about a medical condition or this instruction, always ask your healthcare professional. [address] any warranty or liability for your use of this information.

## 2020-07-09 NOTE — PROGRESS NOTES
Patient denies any symptoms, reactions, or allergies that would exclude them from receiving a Left Iliolumbar TPI injection today given by Dr. [place] Nine. 2mL Celestone, 0.5mL Marcaine, 0.5mL Lidocaine. Risks and adverse reactions were discussed, consent obtained, and the VIS was given to them. All questions were addressed. There were no reactions observed. [place] presents today for   Chief Complaint   Patient presents with    Back Pain     F/U    Hip Pain       Is someone accompanying this pt? NO    Is the patient using any DME equipment during OV? NO    Depression Screening:  3 most recent PHQ Screens 7/9/2020   Little interest or pleasure in doing things Not at all   Feeling down, depressed, irritable, or hopeless Not at all   Total Score PHQ 2 0       Learning Assessment:  Learning Assessment 5/8/2019   PRIMARY LEARNER Patient   HIGHEST LEVEL OF EDUCATION - PRIMARY LEARNER  -   BARRIERS PRIMARY LEARNER -   CO-LEARNER CAREGIVER -   PRIMARY LANGUAGE ENGLISH   LEARNER PREFERENCE PRIMARY DEMONSTRATION   ANSWERED BY patient   RELATIONSHIP SELF       Abuse Screening:  Abuse Screening Questionnaire 1/9/2020   Do you ever feel afraid of your partner? N   Are you in a relationship with someone who physically or mentally threatens you? N   Is it safe for you to go home? Y       Fall Risk  Fall Risk Assessment, last 12 mths 1/9/2020   Able to walk? Yes   Fall in past 12 months? No       Coordination of Care:  1. Have you been to the ER, urgent care clinic since your last visit? NO  Hospitalized since your last visit? NO    2. Have you seen or consulted any other health care providers outside of the [address] [place] [address] since your last visit? NO Include any pap smears or colon screening.  NO    Last  Checked 7/9/2020

## 2020-07-17 ENCOUNTER — OFFICE VISIT (OUTPATIENT)
Dept: ORTHOPEDIC SURGERY | Age: 50
End: 2020-07-17

## 2020-07-17 VITALS
WEIGHT: 254 LBS | TEMPERATURE: 97.4 F | DIASTOLIC BLOOD PRESSURE: 88 MMHG | SYSTOLIC BLOOD PRESSURE: 133 MMHG | HEART RATE: 76 BPM | RESPIRATION RATE: 17 BRPM | BODY MASS INDEX: 43.6 KG/M2

## 2020-07-17 DIAGNOSIS — M48.062 SPINAL STENOSIS OF LUMBAR REGION WITH NEUROGENIC CLAUDICATION: Primary | ICD-10-CM

## 2020-07-17 DIAGNOSIS — M54.16 LEFT LUMBAR RADICULOPATHY: ICD-10-CM

## 2020-07-17 DIAGNOSIS — R20.2 LEFT LEG PARESTHESIAS: ICD-10-CM

## 2020-07-17 NOTE — PROGRESS NOTES
[address]  [address], [address]  [place]  Phone: [phone]  Fax: [phone]  INITIAL CONSULTATION  Patient: Little Mcallister                MRN: [id number]       SSN: [id number]  YOB: 1970        AGE: 51 y.o. SEX: female  Body mass index is 43.6 kg/m². PCP: No primary care provider on file.  07/17/20    Chief Complaint   Patient presents with    Back Pain     SC         HISTORY OF PRESENT ILLNESS, RADIOGRAPHS, and PLAN:       Tabathaaung Stern is seen today at request of Dr. Maryjo Sebastian and Dr. Praneeth Tristan Ms. Opal Palomo is a pleasant 28-year-old female with severe medical problems he has severe congestive heart failure coronary artery disease Graves' disease she has a defibrillator in place she is had progressive severe pain in her left lower extremity and back for the past 2 years. She gets pain in her back buttock and diffusely circumferentially around her leg. She had severe pain in her groin she gets pain on range of motion of her left hip which in essence has no range of motion. She has negative straight leg raise she has good strength her EHL tib ant. She has had a CAT scan of her lumbar spine which demonstrates possible disc protrusions on the left at 3 4 on the right at 4 5 I do not see any instability significant arthritis or nerve root compression on CAT scan with the CAT scan in a morbidly obese patient BMI of 44 it may not be accurate. Patient has seen Dr. 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 I believe assessment I believe the patient's primary pain is 1 of end-stage arthritis in her left hip that is most subjective pain in the most objective disabling issue that she has treatment which will be a left total hip replacement.   The patient may have been some degenerative disease and a disc protrusion on the left but she is not having a radicular pain pattern at global in her leg though there may be some radicular elements to her description of her pain. At this the patient is on blood thinners. To get her a myelogram and CAT scan as certain morbidity. At this point I will obtain an EMG of her left lower extremity if the left lower extremity EMG demonstrates a defined radiculopathy a CT myelogram may be in order if however it does not demonstrate any specific nerve or abnormality I would say that it is highly unlikely there is any surgical anomaly significant her back and I would recommend purely going ahead with new hip if able. Even if this is a disc herniation and protrusion was found in the back that was on the left and could cause her pain given her medical issues 1 could take issue whether or not she could tolerate even a small spinal procedure. We will see her back following her EMG. This dictation was created utilizing voice recognition software. Errors may be present. Past Medical History:   Diagnosis Date    AICD (automatic cardioverter/defibrillator) present 09/2018    Asthma     Cardiac echocardiogram 03/22/2017    LVE. EF 15% (prev 50% on study of 12/10/14). Severe diffuse hypk. Indeterminate diastolic fx.  RVE. RVSP at least 44 mmHg. Mod LAE.  BISMARK. Mild-mod MR.  Cardiac nuclear imaging test 03/24/2017    High risk. Somewhat patchy uptake. No evidence of ischemia or infarction. No RWMA. Severe LVE. EF 25%. Neg EKG on pharm stress test.    Cardiovascular LLE venous duplex 03/06/2017    Left leg:  No DVT. Pulsatile flow.     Congestive heart failure (HCC)     Graves disease     Heart failure ([facility] [address]     Hypercholesterolemia     Hypertension     Hyperthyroidism        Family History   Problem Relation Age of Onset    Hypertension Mother     Heart Disease Maternal Grandmother         CHF    Hypertension Maternal Grandmother     Diabetes Maternal Grandmother     No Known Problems Father     No Known Problems Sister     No Known Problems Brother     No Known Problems Brother     No Known Problems Brother     No Known Problems Sister     No Known Problems Sister        Current Outpatient Medications   Medication Sig Dispense Refill    baclofen (LIORESAL) 10 mg tablet Take 0.5-1 Tabs by mouth three (3) times daily as needed for Muscle Spasm(s) or Pain. 90 Tab 1    DULoxetine (CYMBALTA) 60 mg capsule Take 1 Cap by mouth daily. One capsule every day w/dinner. 90 Cap 1    atorvastatin (LIPITOR) 80 mg tablet TAKE 1 TABLET BY MOUTH ONCE DAILY AT NIGHT 30 Tab 5    furosemide (LASIX) 80 mg tablet Take 1 tablet by mouth once daily 30 Tab 6    spironolactone (ALDACTONE) 25 mg tablet Take 1 tablet by mouth once daily 30 Tab 6    diclofenac (VOLTAREN) 1 % gel Apply 4 g to affected area four (4) times daily. 100 g 5    sacubitril-valsartan (ENTRESTO) 24 mg/26 mg tablet Take 1 Tab by mouth two (2) times a day. 60 Tab 6    indapamide (LOZOL) 2.5 mg tablet TAKE 1 TABLET BY MOUTH ONCE DAILY IN THE MORNING 30 Tab 6    methIMAzole (TAPAZOLE) 10 mg tablet Take 1 Tab by mouth two (2) times a day. 1 Tab 0    rivaroxaban (XARELTO) 20 mg tab tablet Take 1 Tab by mouth daily. 30 Tab 11    potassium chloride (K-DUR, KLOR-CON) 20 mEq tablet Take 1 Tab by mouth daily. 30 Tab 1    carvedilol (COREG) 25 mg tablet Take 1 Tab by mouth two (2) times daily (with meals). 60 Tab 6    nitroglycerin (NITROSTAT) 0.4 mg SL tablet 1 Tab by SubLINGual route every five (5) minutes as needed for Chest Pain. 1 Bottle 1    albuterol (ACCUNEB) 1.25 mg/3 mL nebu Take 3 mL by inhalation every four (4) hours as needed (wheezing). 25 Each 0    aspirin 81 mg chewable tablet Take 81 mg by mouth daily.            No Known Allergies    Past Surgical History:   Procedure Laterality Date    BREAST SURGERY PROCEDURE UNLISTED      redfuction    HX GYN      hysterectomy  btl    HX MYOMECTOMY       fibroid tumo removed    HX ORTHOPAEDIC  March 2012 ORIF left fibula       Past Medical History:   Diagnosis Date  AICD (automatic cardioverter/defibrillator) present 09/2018    Asthma     Cardiac echocardiogram 03/22/2017    LVE. EF 15% (prev 50% on study of 12/10/14). Severe diffuse hypk. Indeterminate diastolic fx.  RVE. RVSP at least 44 mmHg. Mod LAE.  BISMARK. Mild-mod MR.  Cardiac nuclear imaging test 03/24/2017    High risk. Somewhat patchy uptake. No evidence of ischemia or infarction. No RWMA. Severe LVE. EF 25%. Neg EKG on pharm stress test.    Cardiovascular LLE venous duplex 03/06/2017    Left leg:  No DVT. Pulsatile flow.     Congestive heart failure (HCC)     Graves disease     [facility] failure ([facility] [address]     Hypercholesterolemia     Hypertension     Hyperthyroidism        Social History     Socioeconomic History    Marital status: SINGLE     Spouse name: Not on file    Number of children: Not on file    Years of education: Not on file    Highest education level: Not on file   Occupational History    Not on file   Social Needs    Financial resource strain: Not on file    Food insecurity     Worry: Not on file     Inability: Not on file    Transportation needs     Medical: Not on file     Non-medical: Not on file   Tobacco Use    Smoking status: Current Some Day Smoker     Packs/day: 0.00     Years: 18.00     Pack years: 0.00     Last attempt to quit: 2/28/2017     Years since quitting: 3.3    Smokeless tobacco: Never Used   Substance and Sexual Activity    Alcohol use: Yes     Comment: socially     Drug use: No    Sexual activity: Yes     Partners: Male     Birth control/protection: Condom   Lifestyle    Physical activity     Days per week: Not on file     Minutes per session: Not on file    Stress: Not on file   Relationships    Social connections     Talks on phone: Not on file     Gets together: Not on file     Attends [employer] service: Not on file     Active member of club or organization: Not on file     Attends meetings of clubs or organizations: Not on file     Relationship status: Not on file    Intimate partner violence     Fear of current or ex partner: Not on file     Emotionally abused: Not on file     Physically abused: Not on file     Forced sexual activity: Not on file   Other Topics Concern    Not on file   Social History Narrative    Not on file           REVIEW OF SYSTEMS:   CONSTITUTIONAL SYMPTOMS:  Negative. EYES:  Negative. EARS, NOSE, THROAT AND MOUTH:  Negative. CARDIOVASCULAR:  Negative. RESPIRATORY:  Negative. GENITOURINARY: Per HPI. GASTROINTESTINAL:  Per HPI. INTEGUMENTARY (SKIN AND/OR BREAST):  Negative. MUSCULOSKELETAL: Per HPI.   ENDOCRINE/RHEUMATOLOGIC:  Negative. NEUROLOGICAL:  Per HPI. HEMATOLOGIC/LYMPHATIC:  Negative. ALLERGIC/IMMUNOLOGIC:  Negative. PSYCHIATRIC:  Negative. PHYSICAL EXAMINATION:   Visit Vitals  /88 (BP 1 Location: Left arm, BP Patient Position: Sitting)   Pulse 76   Temp 97.4 °F (36.3 °C) (Oral)   Resp 17   Wt 254 lb (115.2 kg)   LMP  (LMP Unknown) Comment: 2009   BMI 43.60 kg/m²    PAIN SCALE: 7/10    CONSTITUTIONAL: The patient is in some apparent distress and is alert and oriented x 3. HEENT: Normocephalic. Hearing grossly intact. NECK: Supple and symmetric. no tenderness, or masses were felt. RESPIRATORY: No labored breathing. CARDIOVASCULAR: The carotid pulses were normal. Peripheral pulses were 2+. CHEST: Normal AP diameter and normal contour without any kyphoscoliosis. LYMPHATIC: No lymphadenopathy was appreciated in the neck, axillae or groin. SKIN:  Negative for scars, rashes, lesions, or ulcers on the right upper, right lower, left upper, left lower and trunk. NEUROLOGICAL: Alert and oriented x 3. Ambulation without assistive device. FWB. EXTREMITIES:  See musculoskeletal.  MUSCULOSKELETAL:   Head and Neck: Negative for misalignment, asymmetry, crepitation, defects, tenderness masses or effusions.  Left Upper Extremity: Inspection, percussion and palpation performed.   Cavazoss sign is negative.  Right Upper Extremity: Inspection, percussion and palpation performed. Cavazoss sign is negative.  Spine, Ribs and Pelvis: Low back pain radiating to LLE. Inspection, percussion and palpation performed. Negative for misalignment, asymmetry, crepitation, defects, tenderness masses or effusions.  Left Lower Extremity: Tingling sensations at plantar foot. Inspection, percussion and palpation performed. Negative straight leg raise.  Right Lower Extremity: Inspection, percussion and palpation performed. Negative straight leg raise. SPINE EXAM:     Cervical spine: Neck is midline. Normal muscle tone. No focal atrophy is noted. Lumbar spine: No rash, ecchymosis, or gross obliquity. No focal atrophy is noted. ASSESSMENT    ICD-10-CM ICD-9-CM    1. Spinal stenosis of lumbar region with neurogenic claudication  M48.062 724.03    2. Left lumbar radiculopathy  M54.16 724.4    3. Left leg paresthesias  R20.2 782.0 EMG ONE EXTREMITY LOWER LT       Written by Kylah Mcnamara, as dictated by Vanessa Roman MD.    I, Dr. Vanessa Roman MD, confirm that all documentation is accurate.

## 2020-08-12 ENCOUNTER — OFFICE VISIT (OUTPATIENT)
Dept: ORTHOPEDIC SURGERY | Age: 50
End: 2020-08-12

## 2020-08-12 VITALS
HEART RATE: 98 BPM | OXYGEN SATURATION: 100 % | RESPIRATION RATE: 18 BRPM | DIASTOLIC BLOOD PRESSURE: 76 MMHG | HEIGHT: 65 IN | SYSTOLIC BLOOD PRESSURE: 115 MMHG | TEMPERATURE: 97.7 F | WEIGHT: 259.4 LBS | BODY MASS INDEX: 43.22 KG/M2

## 2020-08-12 DIAGNOSIS — R20.2 LEFT LEG PARESTHESIAS: Primary | ICD-10-CM

## 2020-08-12 NOTE — PROGRESS NOTES
[address]  [address], [address]  [place] [address]  Phone: [phone]  Fax: [phone]        Macie Garcia  : 1970  PCP: No primary care provider on file. 2020    ELECTROMYOGRAPHY AND NERVE CONDUCTION STUDIES    Gail Yusuf was referred by Dr. Perlie Krabbe for electrodiagnostic evaluation of LLE paraesthesia. NCV & EMG Findings:  All nerve conduction studies (as indicated in the following tables) were within normal limits. All examined muscles (as indicated in the following table) showed no evidence of electrical instability. INTERPRETATION    This was a normal nerve conduction and EMG study showing there to be no signs of neuropathy, myopathy, or radiculopathy in the nerves and muscles tested. CLINICAL INTERPRETATION  Her electrodiagnostic findings do not appear to explain her leg symptoms. HISTORY OF PRESENT ILLNESS  Gail Yusuf is a 51 y.o. female. Pt presents today for LLE EMG evaluation of LLE pain and paraesthesia. She also c/o left groin pain. PAST MEDICAL HISTORY   Past Medical History:   Diagnosis Date    AICD (automatic cardioverter/defibrillator) present 2018    Asthma     Cardiac echocardiogram 2017    LVE. EF 15% (prev 50% on study of 12/10/14). Severe diffuse hypk. Indeterminate diastolic fx.  RVE. RVSP at least 44 mmHg. Mod LAE.  BISMARK. Mild-mod MR.  Cardiac nuclear imaging test 2017    High risk. Somewhat patchy uptake. No evidence of ischemia or infarction. No RWMA. Severe LVE. EF 25%. Neg EKG on pharm stress test.    Cardiovascular LLE venous duplex 2017    Left leg:  No DVT. Pulsatile flow.     Congestive heart failure (HCC)     Graves disease     Heart failure (HCC)     Hypercholesterolemia     Hypertension     Hyperthyroidism        Past Surgical History:   Procedure Laterality Date    BREAST SURGERY PROCEDURE UNLISTED      redfuction    HX GYN      hysterectomy  btl    HX MYOMECTOMY       fibroid tumo removed    HX ORTHOPAEDIC  March 2012 ORIF left fibula   . MEDICATIONS    Current Outpatient Medications   Medication Sig Dispense Refill    Xarelto 20 mg tab tablet Take 1 tablet by mouth once daily 30 Tab 11    baclofen (LIORESAL) 10 mg tablet Take 0.5-1 Tabs by mouth three (3) times daily as needed for Muscle Spasm(s) or Pain. 90 Tab 1    DULoxetine (CYMBALTA) 60 mg capsule Take 1 Cap by mouth daily. One capsule every day w/dinner. 90 Cap 1    atorvastatin (LIPITOR) 80 mg tablet TAKE 1 TABLET BY MOUTH ONCE DAILY AT NIGHT 30 Tab 5    furosemide (LASIX) 80 mg tablet Take 1 tablet by mouth once daily 30 Tab 6    spironolactone (ALDACTONE) 25 mg tablet Take 1 tablet by mouth once daily 30 Tab 6    diclofenac (VOLTAREN) 1 % gel Apply 4 g to affected area four (4) times daily. 100 g 5    sacubitril-valsartan (ENTRESTO) 24 mg/26 mg tablet Take 1 Tab by mouth two (2) times a day. 60 Tab 6    indapamide (LOZOL) 2.5 mg tablet TAKE 1 TABLET BY MOUTH ONCE DAILY IN THE MORNING 30 Tab 6    methIMAzole (TAPAZOLE) 10 mg tablet Take 1 Tab by mouth two (2) times a day. 1 Tab 0    potassium chloride (K-DUR, KLOR-CON) 20 mEq tablet Take 1 Tab by mouth daily. 30 Tab 1    carvedilol (COREG) 25 mg tablet Take 1 Tab by mouth two (2) times daily (with meals). 60 Tab 6    nitroglycerin (NITROSTAT) 0.4 mg SL tablet 1 Tab by SubLINGual route every five (5) minutes as needed for Chest Pain. 1 Bottle 1    albuterol (ACCUNEB) 1.25 mg/3 mL nebu Take 3 mL by inhalation every four (4) hours as needed (wheezing). 25 Each 0    aspirin 81 mg chewable tablet Take 81 mg by mouth daily.             ALLERGIES  No Known Allergies       SOCIAL HISTORY    Social History     Socioeconomic History    Marital status: SINGLE     Spouse name: Not on file    Number of children: Not on file    Years of education: Not on file    Highest education level: Not on file   Tobacco Use    Smoking status: Current Some Day Smoker     Packs/day: 0.00     Years: 18.00     Pack years: 0.00     Last attempt to quit: 2/28/2017     Years since quitting: 3.4    Smokeless tobacco: Never Used   Substance and Sexual Activity    Alcohol use: Yes     Comment: socially     Drug use: No    Sexual activity: Yes     Partners: Male     Birth control/protection: Condom       FAMILY HISTORY  Family History   Problem Relation Age of Onset    Hypertension Mother     Heart Disease Maternal Grandmother         CHF    Hypertension Maternal Grandmother     Diabetes Maternal Grandmother     No Known Problems Father     No Known Problems Sister     No Known Problems Brother     No Known Problems Brother     No Known Problems Brother     No Known Problems Sister     No Known Problems Sister          PHYSICAL EXAMINATION  Visit Vitals  /76   Pulse 98   Temp 97.7 °F (36.5 °C) (Oral)   Resp 18   Ht 5' 5\" (1.651 m)   Wt 259 lb 6.4 oz (117.7 kg)   LMP  (LMP Unknown) Comment: 2009   SpO2 100%   BMI 43.17 kg/m²       Pain Assessment  7/17/2020   Location of Pain Back; Foot   Location Modifiers Right;Left   Severity of Pain 7   Quality of Pain Aching   Quality of Pain Comment numbness tingling   Duration of Pain -   Frequency of Pain -   Aggravating Factors Walking;Standing   Aggravating Factors Comment -   Limiting Behavior Some   Relieving Factors (No Data)   Relieving Factors Comment the injections help for a couple weeks   Result of Injury -   Type of Injury -   Type of Injury Comment -           Constitutional:  Well developed, well nourished, in no acute distress. Psychiatric: Affect and mood are appropriate. Integumentary: No rashes or abrasions noted on exposed areas. SPINE/MUSCULOSKELETAL EXAM    On brief examination: None.       NCV & EMG Findings:  Nerve Conduction Studies  Anti Sensory Summary Table     Stim Site NR Peak (ms) Norm Peak (ms) O-P Amp (µV) Norm O-P Amp Site1 Site2 Delta-P (ms) Dist (cm) Steven (m/s) Norm Steven (m/s)   Left Sup Fibular Anti Sensory (Ant Lat Mall)   14 cm    3.8 <4.4 9.3 >5.0 14 cm Ant Lat Mall 3.8 14.0 37 >32   Left Sural Anti Sensory (Lat Mall)   Calf    3.3 <4.0 8.9 >5.0 Calf Lat Mall 3.3 14.0 42 >35     Motor Summary Table     Stim Site NR Onset (ms) Norm Onset (ms) O-P Amp (mV) Norm O-P Amp Site1 Site2 Delta-0 (ms) Dist (cm) Steven (m/s) Norm Steven (m/s)   Left Fibular Motor (Ext Dig Brev)   Ankle    3.9 <6.1 5.2 >2.5 B Fib Ankle 6.1 30.5 50 >38   B Fib    10.0  5.3  Poplt B Fib 0.9 6.0 67 >40   Poplt    10.9  5.1          Left Tibial Motor (Abd Philip Brev)   Ankle    5.9 <6.1 4.4 >3.0 Knee Ankle 7.9 38.5 49 >35   Knee    13.8  3.8            EMG     Side Muscle Nerve Root Ins Act Fibs Psw Amp Dur Poly Recrt Int Kingsley Betsy Comment   Left VastusMed Femoral L2-4 Nml Nml Nml Nml Nml 0 Nml Nml    Left AntTibialis Dp Br Fibular L4-5 Nml Nml Nml Nml Nml 0 Nml Nml    Left Gastroc Tibial S1-2 Nml Nml Nml Nml Nml 0 Nml Nml    Left ExtHallLong Dp Br Fibular L5, S1 Nml Nml Nml Nml Nml 0 Nml Nml    Left PostTibialis Tibial L5, S1 Nml Nml Nml Nml Nml 0 Nml Nml    Left Lumbo Parasp Up Rami L1-2 Nml Nml Nml         Left Lumbo Parasp Mid Rami L3-4 Nml Nml Nml         Left Lumbo Parasp Low Rami L5-S1 Nml Nml Nml             Nerve Conduction Studies  Anti Sensory Left/Right Comparison     Stim Site L Lat (ms) R Lat (ms) L-R Lat (ms) L Amp (µV) R Amp (µV) L-R Amp (%) Site1 Site2 L Steven (m/s) R Steven (m/s) L-R Steven (m/s)   Sup Fibular Anti Sensory (Ant Lat Mall)   14 cm 3.8   9.3   14 cm Ant Lat Mall 37     Sural Anti Sensory (Lat Mall)   Calf 3.3   8.9   Calf Lat Mall 42       Motor Left/Right Comparison     Stim Site L Lat (ms) R Lat (ms) L-R Lat (ms) L Amp (mV) R Amp (mV) L-R Amp (%) Site1 Site2 L Steven (m/s) R Steven (m/s) L-R Steven (m/s)   Fibular Motor (Ext Dig Brev)   Ankle 3.9   5.2   B Fib Ankle 50     B Fib 10.0   5.3   Poplt B Fib 67     Poplt 10.9   5.1          Tibial Motor (Abd Philip Brev)   Ankle 5.9   4.4   Knee Ankle 49     Knee 13.8   3.8                Waveforms:                    [facility]  OFFICE PROCEDURE PROGRESS NOTE        Chart reviewed for the following:   I, Romayne Colace, have reviewed the History, Physical and updated the Allergic reactions for [address] performed immediately prior to start of procedure:   I, Romayne Colace, have performed the following reviews on Jens Tillman prior to the start of the procedure:            * Patient was identified by name and date of birth   * Agreement on procedure being performed was verified  * Risks and Benefits explained to the patient  * Procedure site verified and marked as necessary  * Patient was positioned for comfort  * Consent was signed and verified     Time: 3:22 PM    Date of procedure: 8/12/2020    Procedure performed by:  Kyle Cruz MD    Provider accompanied by: Mauricioibaung. Patient accompanied by: Self.     How tolerated by patient: tolerated the procedure well with no complications    Post Procedural Pain Scale: 0 - No Hurt    Comments: none    Written by Aime Dumont, [address] as dictated by Romayne Colace, MD

## 2020-08-18 ENCOUNTER — OFFICE VISIT (OUTPATIENT)
Dept: ORTHOPEDIC SURGERY | Age: 50
End: 2020-08-18

## 2020-08-18 VITALS
WEIGHT: 258.2 LBS | TEMPERATURE: 97.3 F | HEART RATE: 99 BPM | RESPIRATION RATE: 16 BRPM | DIASTOLIC BLOOD PRESSURE: 77 MMHG | SYSTOLIC BLOOD PRESSURE: 120 MMHG | BODY MASS INDEX: 42.97 KG/M2

## 2020-08-18 DIAGNOSIS — M48.062 SPINAL STENOSIS OF LUMBAR REGION WITH NEUROGENIC CLAUDICATION: Primary | ICD-10-CM

## 2020-08-18 DIAGNOSIS — M54.16 LEFT LUMBAR RADICULOPATHY: ICD-10-CM

## 2020-08-18 NOTE — PROGRESS NOTES
[address]  [address], [address]  [facility], [address] [place] [address] Street  Phone: [phone]  Fax: [phone]  PROGRESS NOTE  Patient: Héctor Redmond                MRN: [id number]       SSN: [id number]  YOB: 1970        AGE: 51 y.o. SEX: female  Body mass index is 42.97 kg/m². PCP: No primary care provider on file. 08/18/20    Chief Complaint   Patient presents with    Back Pain     EMG u    Leg Pain       HISTORY OF PRESENT ILLNESS, RADIOGRAPHS, and PLAN:     Ms. Don Franklin returns today. Her pain is dramatically better she rates it is a 0 she is uncertain of what caused the improvement. Her EMGs came back normal.  At this point I have no evidence that she is suffering from any neurologic or back pathology. Her main issue appears to be her arthritic hip. I have recommended she follow-up with Dr. Ministerio Augustine as needed with regards to her back I do not see any issue currently be happy to reevaluate her should something change. This dictation was created utilizing voice recognition software. Errors may be present. Past Medical History:   Diagnosis Date    AICD (automatic cardioverter/defibrillator) present 09/2018    Asthma     Cardiac echocardiogram 03/22/2017    LVE. EF 15% (prev 50% on study of 12/10/14). Severe diffuse hypk. Indeterminate diastolic fx.  RVE. RVSP at least 44 mmHg. Mod LAE.  BISMARK. Mild-mod MR.  Cardiac nuclear imaging test 03/24/2017    High risk. Somewhat patchy uptake. No evidence of ischemia or infarction. No RWMA. Severe LVE. EF 25%. Neg EKG on pharm stress test.    Cardiovascular LLE venous duplex 03/06/2017    Left leg:  No DVT. Pulsatile flow.     Congestive heart failure (HCC)     Graves disease     Heart failure (HCC)     Hypercholesterolemia     Hypertension     Hyperthyroidism        Family History   Problem Relation Age of Onset    Hypertension Mother     Heart Disease Maternal Grandmother CHF    Hypertension Maternal Grandmother     Diabetes Maternal Grandmother     No Known Problems Father     No Known Problems Sister     No Known Problems Brother     No Known Problems Brother     No Known Problems Brother     No Known Problems Sister     No Known Problems Sister        Current Outpatient Medications   Medication Sig Dispense Refill    Xarelto 20 mg tab tablet Take 1 tablet by mouth once daily 30 Tab 11    baclofen (LIORESAL) 10 mg tablet Take 0.5-1 Tabs by mouth three (3) times daily as needed for Muscle Spasm(s) or Pain. 90 Tab 1    DULoxetine (CYMBALTA) 60 mg capsule Take 1 Cap by mouth daily. One capsule every day w/dinner. 90 Cap 1    atorvastatin (LIPITOR) 80 mg tablet TAKE 1 TABLET BY MOUTH ONCE DAILY AT NIGHT 30 Tab 5    furosemide (LASIX) 80 mg tablet Take 1 tablet by mouth once daily 30 Tab 6    spironolactone (ALDACTONE) 25 mg tablet Take 1 tablet by mouth once daily 30 Tab 6    diclofenac (VOLTAREN) 1 % gel Apply 4 g to affected area four (4) times daily. 100 g 5    sacubitril-valsartan (ENTRESTO) 24 mg/26 mg tablet Take 1 Tab by mouth two (2) times a day. 60 Tab 6    indapamide (LOZOL) 2.5 mg tablet TAKE 1 TABLET BY MOUTH ONCE DAILY IN THE MORNING 30 Tab 6    methIMAzole (TAPAZOLE) 10 mg tablet Take 1 Tab by mouth two (2) times a day. 1 Tab 0    potassium chloride (K-DUR, KLOR-CON) 20 mEq tablet Take 1 Tab by mouth daily. 30 Tab 1    carvedilol (COREG) 25 mg tablet Take 1 Tab by mouth two (2) times daily (with meals). 60 Tab 6    nitroglycerin (NITROSTAT) 0.4 mg SL tablet 1 Tab by SubLINGual route every five (5) minutes as needed for Chest Pain. 1 Bottle 1    albuterol (ACCUNEB) 1.25 mg/3 mL nebu Take 3 mL by inhalation every four (4) hours as needed (wheezing). 25 Each 0    aspirin 81 mg chewable tablet Take 81 mg by mouth daily.            No Known Allergies    Past Surgical History:   Procedure Laterality Date    BREAST SURGERY PROCEDURE UNLISTED redfuction    HX GYN      hysterectomy  btl    HX MYOMECTOMY       fibroid tumo removed    HX ORTHOPAEDIC  March 2012 ORIF left fibula       Past Medical History:   Diagnosis Date    AICD (automatic cardioverter/defibrillator) present 09/2018    Asthma     Cardiac echocardiogram 03/22/2017    LVE. EF 15% (prev 50% on study of 12/10/14). Severe diffuse hypk. Indeterminate diastolic fx.  RVE. RVSP at least 44 mmHg. Mod LAE.  BISMARK. Mild-mod MR.  Cardiac nuclear imaging test 03/24/2017    High risk. Somewhat patchy uptake. No evidence of ischemia or infarction. No RWMA. Severe LVE. EF 25%. Neg EKG on pharm stress test.    Cardiovascular LLE venous duplex 03/06/2017    Left leg:  No DVT. Pulsatile flow.     Congestive heart failure (HCC)     Graves disease     Heart failure ([facility] [address]     Hypercholesterolemia     Hypertension     Hyperthyroidism        Social History     Socioeconomic History    Marital status: SINGLE     Spouse name: Not on file    Number of children: Not on file    Years of education: Not on file    Highest education level: Not on file   Occupational History    Not on file   Social Needs    Financial resource strain: Not on file    Food insecurity     Worry: Not on file     Inability: Not on file    Transportation needs     Medical: Not on file     Non-medical: Not on file   Tobacco Use    Smoking status: Current Some Day Smoker     Packs/day: 0.00     Years: 18.00     Pack years: 0.00     Last attempt to quit: 2/28/2017     Years since quitting: 3.4    Smokeless tobacco: Never Used   Substance and Sexual Activity    Alcohol use: Yes     Comment: socially     Drug use: No    Sexual activity: Yes     Partners: Male     Birth control/protection: Condom   Lifestyle    Physical activity     Days per week: Not on file     Minutes per session: Not on file    Stress: Not on file   Relationships    Social connections     Talks on phone: Not on file     Gets together: Not on file     Attends [employer]ism service: Not on file     Active member of club or organization: Not on file     Attends meetings of clubs or organizations: Not on file     Relationship status: Not on file    Intimate partner violence     Fear of current or ex partner: Not on file     Emotionally abused: Not on file     Physically abused: Not on file     Forced sexual activity: Not on file   Other Topics Concern    Not on file   Social History Narrative    Not on file         REVIEW OF SYSTEMS:   CONSTITUTIONAL SYMPTOMS:  Negative. EYES:  Negative. EARS, NOSE, THROAT AND MOUTH:  Negative. CARDIOVASCULAR:  Negative. RESPIRATORY:  Negative. GENITOURINARY: Per HPI. GASTROINTESTINAL:  Per HPI. INTEGUMENTARY (SKIN AND/OR BREAST):  Negative. MUSCULOSKELETAL: Per HPI.   ENDOCRINE/RHEUMATOLOGIC:  Negative. NEUROLOGICAL:  Per HPI. HEMATOLOGIC/LYMPHATIC:  Negative. ALLERGIC/IMMUNOLOGIC:  Negative. PSYCHIATRIC:  Negative. PHYSICAL EXAMINATION:   Visit Vitals  /77 (BP 1 Location: Right arm, BP Patient Position: Sitting)   Pulse 99   Temp 97.3 °F (36.3 °C) (Temporal)   Resp 16   Wt 258 lb 3.2 oz (117.1 kg)   LMP  (LMP Unknown) Comment: 2009   BMI 42.97 kg/m²    PAIN SCALE: 0 - No pain/10    CONSTITUTIONAL: The patient is in no apparent distress and is alert and oriented x 3. HEENT: Normocephalic. Hearing grossly intact. NECK: Supple and symmetric. no tenderness, or masses were felt. RESPIRATORY: No labored breathing. CARDIOVASCULAR: The carotid pulses were normal. Peripheral pulses were 2+. CHEST: Normal AP diameter and normal contour without any kyphoscoliosis. LYMPHATIC: No lymphadenopathy was appreciated in the neck, axillae or groin. SKIN: Negative for scars, rashes, lesions, or ulcers on the right upper, right lower, left upper, left lower and trunk. NEUROLOGICAL: Alert and oriented x 3. Ambulation without assistive device. FWB.   EXTREMITIES: See musculoskeletal.  MUSCULOSKELETAL:   Head and Neck: Negative for misalignment, asymmetry, crepitation, defects, tenderness masses or effusions.  Left Upper Extremity: Inspection, percussion and palpation performed. Cavazoss sign is negative.  Right Upper Extremity: Inspection, percussion and palpation performed. Cavazoss sign is negative.  Spine, Ribs and Pelvis: Hip pain. Inspection, percussion and palpation performed. Negative for misalignment, asymmetry, crepitation, defects, tenderness masses or effusions.  Left Lower Extremity: Inspection, percussion and palpation performed. Negative straight leg raise.  Right Lower Extremity: Inspection, percussion and palpation performed. Negative straight leg raise. SPINE EXAM:     Lumbar spine: No rash, ecchymosis, or gross obliquity. No focal atrophy is noted. ASSESSMENT    ICD-10-CM ICD-9-CM    1. Spinal stenosis of lumbar region with neurogenic claudication  M48.062 724.03    2. Left lumbar radiculopathy  M54.16 724.4        Written by Kiersten Shah, as dictated by Fatimah Orellana MD.    I, Dr. Fatimah Orellana MD, confirm that all documentation is accurate.

## 2020-10-01 RX ORDER — CARVEDILOL 25 MG/1
TABLET ORAL
Qty: 60 TAB | Refills: 11 | Status: SHIPPED | OUTPATIENT
Start: 2020-10-01 | End: 2021-05-27 | Stop reason: SDUPTHER

## 2020-11-24 RX ORDER — INDAPAMIDE 2.5 MG/1
TABLET, FILM COATED ORAL
Qty: 30 TAB | Refills: 6 | Status: SHIPPED | OUTPATIENT
Start: 2020-11-24 | End: 2022-01-21 | Stop reason: SDUPTHER

## 2021-02-03 ENCOUNTER — TELEPHONE (OUTPATIENT)
Dept: CARDIOLOGY CLINIC | Age: 51
End: 2021-02-03

## 2021-02-03 DIAGNOSIS — I50.22 CHRONIC SYSTOLIC CONGESTIVE HEART FAILURE (HCC): ICD-10-CM

## 2021-02-03 DIAGNOSIS — I50.22 CHRONIC SYSTOLIC CONGESTIVE HEART FAILURE (HCC): Primary | ICD-10-CM

## 2021-02-03 NOTE — TELEPHONE ENCOUNTER
Received call from patient states her device has been beeping for a few seconds the last three days. No other problems, no chest pain, no dizziness or shortness of breath. Appointment scheduled for tomorrow for device check. Patient also needs a follow up appointment due to Dr. Florez Ask long-term. Lab work ordered as noted in Dr. Florez Ask last office visit notes.

## 2021-02-04 ENCOUNTER — CLINICAL SUPPORT (OUTPATIENT)
Dept: CARDIOLOGY CLINIC | Age: 51
End: 2021-02-04
Payer: MEDICAID

## 2021-02-04 DIAGNOSIS — I42.0 DILATED CARDIOMYOPATHY (HCC): ICD-10-CM

## 2021-02-04 DIAGNOSIS — Z95.810 AICD (AUTOMATIC CARDIOVERTER/DEFIBRILLATOR) PRESENT: Primary | ICD-10-CM

## 2021-02-04 PROCEDURE — 93283 PRGRMG EVAL IMPLANTABLE DFB: CPT | Performed by: INTERNAL MEDICINE

## 2021-02-05 NOTE — PROGRESS NOTES
Patient called to state that her device has been beeping . 6.8 years on battery. Patient has had 2 nonsustained VT, monitored only 16 SVT, monitored only. 1 episode of atrial fib lasting 15 hours. AV sensed 99% . Patient is on Xarelto but no mention of afib in Dr Kellie Temple notes nor TEE Guevara note. Now a Dr Chinmay Dawkins patient.

## 2021-02-05 NOTE — PROGRESS NOTES
I have personally seen and evaluated the device findings. Interrogation reviewed and I agree with assessment.     Kathy Michel

## 2021-05-27 ENCOUNTER — CLINICAL SUPPORT (OUTPATIENT)
Dept: CARDIOLOGY CLINIC | Age: 51
End: 2021-05-27

## 2021-05-27 ENCOUNTER — OFFICE VISIT (OUTPATIENT)
Dept: CARDIOLOGY CLINIC | Age: 51
End: 2021-05-27
Payer: MEDICAID

## 2021-05-27 VITALS
BODY MASS INDEX: 41.15 KG/M2 | SYSTOLIC BLOOD PRESSURE: 104 MMHG | DIASTOLIC BLOOD PRESSURE: 80 MMHG | HEIGHT: 65 IN | WEIGHT: 247 LBS | OXYGEN SATURATION: 96 %

## 2021-05-27 DIAGNOSIS — E87.6 HYPOKALEMIA: ICD-10-CM

## 2021-05-27 DIAGNOSIS — Z95.810 AICD (AUTOMATIC CARDIOVERTER/DEFIBRILLATOR) PRESENT: ICD-10-CM

## 2021-05-27 DIAGNOSIS — M48.062 SPINAL STENOSIS OF LUMBAR REGION WITH NEUROGENIC CLAUDICATION: ICD-10-CM

## 2021-05-27 DIAGNOSIS — I42.0 DILATED CARDIOMYOPATHY (HCC): Primary | ICD-10-CM

## 2021-05-27 PROCEDURE — 99215 OFFICE O/P EST HI 40 MIN: CPT | Performed by: INTERNAL MEDICINE

## 2021-05-27 PROCEDURE — 93000 ELECTROCARDIOGRAM COMPLETE: CPT | Performed by: INTERNAL MEDICINE

## 2021-05-27 PROCEDURE — 93283 PRGRMG EVAL IMPLANTABLE DFB: CPT | Performed by: INTERNAL MEDICINE

## 2021-05-27 RX ORDER — GUAIFENESIN 100 MG/5ML
81 LIQUID (ML) ORAL DAILY
Qty: 30 TABLET | Refills: 5 | Status: SHIPPED | OUTPATIENT
Start: 2021-05-27 | End: 2021-11-03

## 2021-05-27 RX ORDER — SACUBITRIL AND VALSARTAN 24; 26 MG/1; MG/1
TABLET, FILM COATED ORAL
Qty: 60 TABLET | Refills: 5 | Status: SHIPPED | OUTPATIENT
Start: 2021-05-27 | End: 2022-02-03 | Stop reason: SDUPTHER

## 2021-05-27 RX ORDER — POTASSIUM CHLORIDE 20 MEQ/1
20 TABLET, EXTENDED RELEASE ORAL DAILY
Qty: 30 TABLET | Refills: 5 | Status: SHIPPED | OUTPATIENT
Start: 2021-05-27 | End: 2022-04-06

## 2021-05-27 RX ORDER — BACLOFEN 10 MG/1
5-10 TABLET ORAL
Qty: 90 TABLET | Refills: 3 | Status: SHIPPED | OUTPATIENT
Start: 2021-05-27 | End: 2022-04-06

## 2021-05-27 RX ORDER — FUROSEMIDE 80 MG/1
TABLET ORAL
Qty: 30 TABLET | Refills: 6 | Status: SHIPPED | OUTPATIENT
Start: 2021-05-27 | End: 2021-10-13

## 2021-05-27 RX ORDER — SPIRONOLACTONE 25 MG/1
TABLET ORAL
Qty: 30 TABLET | Refills: 6 | Status: SHIPPED | OUTPATIENT
Start: 2021-05-27 | End: 2022-02-03 | Stop reason: SDUPTHER

## 2021-05-27 RX ORDER — CARVEDILOL 25 MG/1
TABLET ORAL
Qty: 60 TABLET | Refills: 5 | Status: SHIPPED | OUTPATIENT
Start: 2021-05-27 | End: 2022-02-03 | Stop reason: SDUPTHER

## 2021-05-27 RX ORDER — ATORVASTATIN CALCIUM 80 MG/1
TABLET, FILM COATED ORAL
Qty: 30 TABLET | Refills: 5 | Status: SHIPPED | OUTPATIENT
Start: 2021-05-27 | End: 2022-02-03 | Stop reason: SDUPTHER

## 2021-05-27 NOTE — PROGRESS NOTES
Quiñonez Taylor presents today for   Chief Complaint   Patient presents with    Follow-up     prev [facility] pt, mitzy f/u       Olamide Vazquez preferred language for health care discussion is english/other. Is someone accompanying this pt? no    Is the patient using any DME equipment during [address]? no    Depression Screening:  3 most recent PHQ Screens 5/27/2021   Little interest or pleasure in doing things Not at all   Feeling down, depressed, irritable, or hopeless Not at all   Total Score PHQ 2 0       Learning Assessment:  Learning Assessment 5/8/2019   PRIMARY LEARNER Patient   HIGHEST LEVEL OF EDUCATION - PRIMARY LEARNER  -   BARRIERS PRIMARY LEARNER -   CO-LEARNER CAREGIVER -   PRIMARY LANGUAGE ENGLISH   LEARNER PREFERENCE PRIMARY DEMONSTRATION   ANSWERED BY patient   RELATIONSHIP SELF       Abuse Screening:  Abuse Screening Questionnaire 5/27/2021   Do you ever feel afraid of your partner? N   Are you in a relationship with someone who physically or mentally threatens you? N   Is it safe for you to go home? Y       Fall Risk  Fall Risk Assessment, last 12 mths 1/9/2020   Able to walk? Yes   Fall in past 12 months? No       Pt currently taking Anticoagulant therapy? Xarelto 20mg    Coordination of Care:  1. Have you been to the ER, urgent care clinic since your last visit? Hospitalized since your last visit? no    2. Have you seen or consulted any other health care providers outside of the [address] [place] [address] since your last visit? Include any pap smears or colon screening.  no

## 2021-05-27 NOTE — PROGRESS NOTES
Ronn Thakur    Chief Complaint   Patient presents with    Follow-up     prev Georgina pt, mitzy f/u       HPI    Ronn Thakur is a 53 y.o. AAF with HFrEF, primary prevention ICD here for followup. She used to follow with my partner, Dr. Lashanda Church for years. I have obtained his records. She is very upset and out of sorts today, crying, has lost 3 family members in short time. Ms. Jennifer Pritchett is a pleasant 71-year-old [facility] American female with history of presentation in March 2017 to [facility] with acute on chronic systolic heart failure. She subsequently was treated and during that hospitalization was found to have an echocardiogram showing an ejection fraction of 15%.  She also had a pharmacologic myocardial perfusion defect at that time which demonstrated an ejection fraction estimated at 25% without reversible or fixed defects.  She was placed on Coreg, lisinopril, Aldactone, and Lasix and had a LifeVest placed on discharge from the hospital. Ana Rausch has done quite well clinically, but her repeat echocardiogram completed on July 18, 2017 though better than her echo back in March 2017 still demonstrated a reduced ejection fraction in the 30% range.       She subsequently had a dual-chamber AICD placed on September 1, 2017 and has done reasonably well since that time, although she comes in today and relates that she really has not been feeling well for a few months.       She relates that she has been doing reasonably well recently. She did have some sciatica in her left leg for which she had an injection by orthopedics about 3 weeks ago which is helped a great deal.  She had gained some weight when she saw my nurse practitioner back on January 9, 2020, but has lost 11 pounds with adjustment of her medications and is feeling fine currently.  She relates that although she still sleeps on 3 pillows she is not having any episodes of paroxysmal nocturnal dyspnea and he has no peripheral edema at all at this time. She is only getting short of breath when she walks for long distances. Past Medical History:   Diagnosis Date    AICD (automatic cardioverter/defibrillator) present 09/2018    Asthma     Cardiac echocardiogram 03/22/2017    LVE. EF 15% (prev 50% on study of 12/10/14). Severe diffuse hypk. Indeterminate diastolic fx.  RVE. RVSP at least 44 mmHg. Mod LAE.  BISMARK. Mild-mod MR.  Cardiac nuclear imaging test 03/24/2017    High risk. Somewhat patchy uptake. No evidence of ischemia or infarction. No RWMA. Severe LVE. EF 25%. Neg EKG on pharm stress test.    Cardiovascular LLE venous duplex 03/06/2017    Left leg:  No DVT. Pulsatile flow.  Congestive heart failure (HCC)     Graves disease     Heart failure (HCC)     Hypercholesterolemia     Hypertension     Hyperthyroidism        Past Surgical History:   Procedure Laterality Date    BREAST SURGERY PROCEDURE UNLISTED      redfuction    HX GYN      hysterectomy  btl    HX MYOMECTOMY       fibroid tumo removed    HX ORTHOPAEDIC  March 2012 ORIF left fibula       Current Outpatient Medications   Medication Sig Dispense Refill    carvediloL (COREG) 25 mg tablet TAKE 1 TABLET BY MOUTH TWICE DAILY WITH MEALS 60 Tablet 5    sacubitriL-valsartan (Entresto) 24-26 mg tablet Take 1 tablet by mouth twice daily 60 Tablet 5    rivaroxaban (Xarelto) 20 mg tab tablet Take 1 tablet by mouth once daily 30 Tablet 5    baclofen (LIORESAL) 10 mg tablet Take 0.5-1 Tablets by mouth three (3) times daily as needed for Muscle Spasm(s) or Pain. 90 Tablet 3    atorvastatin (LIPITOR) 80 mg tablet TAKE 1 TABLET BY MOUTH ONCE DAILY AT NIGHT 30 Tablet 5    furosemide (LASIX) 80 mg tablet Take 1 tablet by mouth once daily 30 Tablet 6    spironolactone (ALDACTONE) 25 mg tablet Take 1 tablet by mouth once daily 30 Tablet 6    aspirin 81 mg chewable tablet Take 1 Tablet by mouth daily.  30 Tablet 5    potassium chloride (K-DUR, KLOR-CON) 20 mEq tablet Take 1 Tablet by mouth daily. 30 Tablet 5    indapamide (LOZOL) 2.5 mg tablet TAKE 1 TABLET BY MOUTH ONCE DAILY IN THE MORNING 30 Tab 6    DULoxetine (CYMBALTA) 60 mg capsule Take 1 Cap by mouth daily. One capsule every day w/dinner. 90 Cap 1    diclofenac (VOLTAREN) 1 % gel Apply 4 g to affected area four (4) times daily. 100 g 5    methIMAzole (TAPAZOLE) 10 mg tablet Take 1 Tab by mouth two (2) times a day. 1 Tab 0    nitroglycerin (NITROSTAT) 0.4 mg SL tablet 1 Tab by SubLINGual route every five (5) minutes as needed for Chest Pain. 1 Bottle 1    albuterol (ACCUNEB) 1.25 mg/3 mL nebu Take 3 mL by inhalation every four (4) hours as needed (wheezing). 25 Each 0       No Known Allergies    Social History     Socioeconomic History    Marital status: SINGLE     Spouse name: Not on file    Number of children: Not on file    Years of education: Not on file    Highest education level: Not on file   Occupational History    Not on file   Tobacco Use    Smoking status: Current Some Day Smoker     Packs/day: 0.00     Years: 18.00     Pack years: 0.00     Last attempt to quit: 2017     Years since quittin.2    Smokeless tobacco: Never Used   Substance and Sexual Activity    Alcohol use: Yes     Comment: socially     Drug use: No    Sexual activity: Yes     Partners: Male     Birth control/protection: Condom   Other Topics Concern    Not on file   Social History Narrative    Not on file     Social Determinants of Health     Financial Resource Strain:     Difficulty of Paying Living Expenses:    Food Insecurity:     Worried About Running Out of Food in the Last Year:     Ran Out of Food in the Last Year:    Transportation Needs:     Lack of Transportation (Medical):      Lack of Transportation (Non-Medical):    Physical Activity:     Days of Exercise per Week:     Minutes of Exercise per Session:    Stress:     Feeling of Stress :    Social Connections:     Frequency of Communication with Friends and Family:     Frequency of Social Gatherings with Friends and Family:     Attends Church Services:     Active Member of Clubs or Organizations:     Attends Club or Organization Meetings:     Marital Status:    Intimate Partner Violence:     Fear of Current or Ex-Partner:     Emotionally Abused:     Physically Abused:     Sexually Abused:     prior nursing experience    FH: unknown    Review of Systems    14 pt Review of Systems is negative unless otherwise mentioned in the HPI. Wt Readings from Last 3 Encounters:   05/27/21 112 kg (247 lb)   08/18/20 117.1 kg (258 lb 3.2 oz)   08/12/20 117.7 kg (259 lb 6.4 oz)     Temp Readings from Last 3 Encounters:   08/18/20 97.3 °F (36.3 °C) (Temporal)   08/12/20 97.7 °F (36.5 °C) (Oral)   07/17/20 97.4 °F (36.3 °C) (Oral)     BP Readings from Last 3 Encounters:   05/27/21 104/80   08/18/20 120/77   08/12/20 115/76     Pulse Readings from Last 3 Encounters:   08/18/20 99   08/12/20 98   07/17/20 76       Physical Exam:    Visit Vitals  /80 (BP 1 Location: Left upper arm, BP Patient Position: Sitting, BP Cuff Size: Large adult)   Ht 5' 5\" (1.651 m)   Wt 112 kg (247 lb)   LMP  (LMP Unknown) Comment: 2009   SpO2 96%   BMI 41.10 kg/m²      Physical Exam  HENT:      Head: Normocephalic and atraumatic. Eyes:      Pupils: Pupils are equal, round, and reactive to light. Cardiovascular:      Rate and Rhythm: Normal rate and regular rhythm. Heart sounds: Normal heart sounds. No murmur heard. No friction rub. No gallop. Pulmonary:      Effort: Pulmonary effort is normal. No respiratory distress. Breath sounds: Normal breath sounds. No wheezing or rales. Chest:      Chest wall: No tenderness. Abdominal:      General: Bowel sounds are normal.      Palpations: Abdomen is soft. Musculoskeletal:         General: No tenderness. Skin:     General: Skin is warm and dry.    Neurological:      Mental Status: She is alert and oriented to person, place, and time. Device check    Lab Results   Component Value Date/Time    Cholesterol, total 114 01/26/2019 09:09 AM    HDL Cholesterol 33 (L) 01/26/2019 09:09 AM    LDL, calculated 62 01/26/2019 09:09 AM    VLDL, calculated 19 01/26/2019 09:09 AM    Triglyceride 94 01/26/2019 09:09 AM    CHOL/HDL Ratio 3.0 03/21/2017 03:24 PM       Impression and Plan:  Jesus Truong is a 53 y.o. with:    1.) chronic systolic HFrEF ~76-19%, ACC stage C/ NYHA class II-II (assume nonischemic)  2.) Primary prevention ICD  3.) H/o neg nuc  4.) HTN  5.) AFib? (will have to research further next time, dont see documentation in Dr. Shama Choudhary notes)  6.) Thyroid disease    >50 mins spent,  Continue current HF meds  Fluid status and BP compensated/ okay considering intense stress/ bereavement  Needs PCP- recommendations given  First time meeting pt as she establishes her long-term cardiovascular care with me and appears determined nonischemic based on prior stress test but will ask her more details about her hx next visit  Call sooner if needed  RTC 6 months with device check    Thank you for allowing me to participate in the care of your patient, please do not hesitate to call with questions or concerns. Follow-up and Dispositions    · Return in about 6 months (around 11/27/2021).      [address] [place] [address],

## 2021-06-02 NOTE — PROGRESS NOTES
I have personally seen and evaluated the device findings. Interrogation reviewed and I agree with assessment.     Roseanne Morin

## 2021-07-13 ENCOUNTER — OFFICE VISIT (OUTPATIENT)
Dept: ORTHOPEDIC SURGERY | Age: 51
End: 2021-07-13
Payer: MEDICARE

## 2021-07-13 VITALS — RESPIRATION RATE: 15 BRPM | HEIGHT: 65 IN | HEART RATE: 75 BPM | BODY MASS INDEX: 42.82 KG/M2 | WEIGHT: 257 LBS

## 2021-07-13 DIAGNOSIS — M16.12 PRIMARY OSTEOARTHRITIS OF LEFT HIP: Primary | ICD-10-CM

## 2021-07-13 DIAGNOSIS — M25.552 LEFT HIP PAIN: ICD-10-CM

## 2021-07-13 PROCEDURE — G8510 SCR DEP NEG, NO PLAN REQD: HCPCS | Performed by: ORTHOPAEDIC SURGERY

## 2021-07-13 PROCEDURE — G8756 NO BP MEASURE DOC: HCPCS | Performed by: ORTHOPAEDIC SURGERY

## 2021-07-13 PROCEDURE — G8427 DOCREV CUR MEDS BY ELIG CLIN: HCPCS | Performed by: ORTHOPAEDIC SURGERY

## 2021-07-13 PROCEDURE — G8417 CALC BMI ABV UP PARAM F/U: HCPCS | Performed by: ORTHOPAEDIC SURGERY

## 2021-07-13 PROCEDURE — 72170 X-RAY EXAM OF PELVIS: CPT | Performed by: ORTHOPAEDIC SURGERY

## 2021-07-13 PROCEDURE — 99214 OFFICE O/P EST MOD 30 MIN: CPT | Performed by: ORTHOPAEDIC SURGERY

## 2021-07-13 NOTE — PROGRESS NOTES
Patient: John Baldwin                MRN: [id number]       SSN: [id number]  YOB: 1970        AGE: 53 y.o. SEX: female  Body mass index is 42.77 kg/m². PCP: Unknown (Inactive)  07/13/21    Ms. Alyssa Jones presents with worsening left hip pain it tends to radiate down the leg she saw Dr. Yana Figueredo about a year year and half ago who did not recommend surgery for her for her back her children and grandchildren have to put on her shoes and socks for on that side she is lost some weight and she has noted 2025 pounds to go for body mass index under 40    The pain is moderate sometimes severe activity limiting less than a 5-minute level walking tolerance difficulty putting shoes and socks on going up and down stairs activities of daily living household chores all extremely limited cannot go to the grocery store    Semination today she walks with an antalgic gait owing to that affected extremity on the left side the right hip rotates nicely the left hip is quite stiff she does have decreased sensation to L4 and 5 especially on the left side compared to the right straight leg raise just equivocal there is no foot drop EHLs are 5- out of 5 tib ant seems to be 5 out of 5 Homans' sign is negative she is pleasant alert oriented affect normal.    I did review her x-rays today as she had an AP pelvis on 7/13/2021 which confirms end-stage arthritis involving the left hip there may be a mild component of AVN as well    At this point surgery is recommended    Risk and benefits involving total hip replacement were described including but not limited to infection DVT pulmonary embolism anesthetic complications blood loss requiring transfusion dislocation leg length discrepancy limp fracture chronic pain bursitis    Her back in 4 weeks time for a weight and BMI check she understands that she does not lose the weight the surgery will have to be postponed    REVIEW OF SYSTEMS:      CON: negative  EYE: negative ENT: negative  RESP: negative  GI:    negative   :  negative  MSK: Positive  A twelve point review of systems was completed, positives noted and all other systems were reviewed and are negative          Past Medical History:   Diagnosis Date    AICD (automatic cardioverter/defibrillator) present 09/2018    Asthma     Cardiac echocardiogram 03/22/2017    LVE. EF 15% (prev 50% on study of 12/10/14). Severe diffuse hypk. Indeterminate diastolic fx.  RVE. RVSP at least 44 mmHg. Mod LAE.  BISMARK. Mild-mod MR.  Cardiac nuclear imaging test 03/24/2017    High risk. Somewhat patchy uptake. No evidence of ischemia or infarction. No RWMA. Severe LVE. EF 25%. Neg EKG on pharm stress test.    Cardiovascular LLE venous duplex 03/06/2017    Left leg:  No DVT. Pulsatile flow.  Congestive heart failure (HCC)     Graves disease     Heart failure (HCC)     Hypercholesterolemia     Hypertension     Hyperthyroidism        Family History   Problem Relation Age of Onset    Hypertension Mother     Heart Disease Maternal Grandmother         CHF    Hypertension Maternal Grandmother     Diabetes Maternal Grandmother     No Known Problems Father     No Known Problems Sister     No Known Problems Brother     No Known Problems Brother     No Known Problems Brother     No Known Problems Sister     No Known Problems Sister        Current Outpatient Medications   Medication Sig Dispense Refill    carvediloL (COREG) 25 mg tablet TAKE 1 TABLET BY MOUTH TWICE DAILY WITH MEALS 60 Tablet 5    sacubitriL-valsartan (Entresto) 24-26 mg tablet Take 1 tablet by mouth twice daily 60 Tablet 5    rivaroxaban (Xarelto) 20 mg tab tablet Take 1 tablet by mouth once daily 30 Tablet 5    baclofen (LIORESAL) 10 mg tablet Take 0.5-1 Tablets by mouth three (3) times daily as needed for Muscle Spasm(s) or Pain.  90 Tablet 3    atorvastatin (LIPITOR) 80 mg tablet TAKE 1 TABLET BY MOUTH ONCE DAILY AT NIGHT 30 Tablet 5    furosemide (LASIX) 80 mg tablet Take 1 tablet by mouth once daily 30 Tablet 6    spironolactone (ALDACTONE) 25 mg tablet Take 1 tablet by mouth once daily 30 Tablet 6    aspirin 81 mg chewable tablet Take 1 Tablet by mouth daily. 30 Tablet 5    potassium chloride (K-DUR, KLOR-CON) 20 mEq tablet Take 1 Tablet by mouth daily. 30 Tablet 5    indapamide (LOZOL) 2.5 mg tablet TAKE 1 TABLET BY MOUTH ONCE DAILY IN THE MORNING 30 Tab 6    DULoxetine (CYMBALTA) 60 mg capsule Take 1 Cap by mouth daily. One capsule every day w/dinner. 90 Cap 1    diclofenac (VOLTAREN) 1 % gel Apply 4 g to affected area four (4) times daily. 100 g 5    methIMAzole (TAPAZOLE) 10 mg tablet Take 1 Tab by mouth two (2) times a day. 1 Tab 0    nitroglycerin (NITROSTAT) 0.4 mg SL tablet 1 Tab by SubLINGual route every five (5) minutes as needed for Chest Pain. 1 Bottle 1    albuterol (ACCUNEB) 1.25 mg/3 mL nebu Take 3 mL by inhalation every four (4) hours as needed (wheezing).  25 Each 0       No Known Allergies    Past Surgical History:   Procedure Laterality Date    HX GYN      hysterectomy  btl    HX MYOMECTOMY       fibroid tumo removed    HX ORTHOPAEDIC  2012 ORIF left fibula    [place] BREAST SURGERY PROCEDURE UNLISTED      redfuction       Social History     Socioeconomic History    Marital status: SINGLE     Spouse name: Not on file    Number of children: Not on file    Years of education: Not on file    Highest education level: Not on file   Occupational History    Not on file   Tobacco Use    Smoking status: Current Some Day Smoker     Packs/day: 0.00     Years: 18.00     Pack years: 0.00     Last attempt to quit: 2017     Years since quittin.3    Smokeless tobacco: Never Used   Substance and Sexual Activity    Alcohol use: Yes     Comment: socially     Drug use: No    Sexual activity: Yes     Partners: Male     Birth control/protection: Condom   Other Topics Concern    Not on file   Social History Narrative  Not on file     Social Determinants of Health     Financial Resource Strain:     Difficulty of Paying Living Expenses:    Food Insecurity:     Worried About Running Out of Food in the Last Year:     [address] in the Last Year:    Transportation Needs:     Lack of Transportation (Medical):  Lack of Transportation (Non-Medical):    Physical Activity:     Days of Exercise per Week:     Minutes of Exercise per Session:    Stress:     Feeling of Stress :    Social Connections:     Frequency of Communication with Friends and Family:     Frequency of Social Gatherings with Friends and Family:     Attends [employer] Services:     Active Member of Clubs or Organizations:     Attends Club or Organization Meetings:     Marital Status:    Intimate Partner Violence:     Fear of Current or Ex-Partner:     Emotionally Abused:     Physically Abused:     Sexually Abused:        Visit Vitals  Pulse 75   Resp 15   Ht 5' 5\" (1.651 m)   Wt 116.6 kg (257 lb)   LMP  (LMP Unknown) Comment: 2009   BMI 42.77 kg/m²         PHYSICAL EXAMINATION:  GENERAL: Alert and oriented x3, in no acute distress, well-developed, well-nourished, afebrile. HEART: No JVD. EYES: No scleral icterus   NECK: No significant lymphadenopathy   LUNGS: No respiratory compromise or indrawing  ABDOMEN: Soft, non-tender, non-distended. Electronically signed by:  Ingrid Gillis MD

## 2021-07-20 ENCOUNTER — HOSPITAL ENCOUNTER (OUTPATIENT)
Dept: LAB | Age: 51
Discharge: HOME | End: 2021-07-20

## 2021-07-20 ENCOUNTER — OFFICE VISIT (OUTPATIENT)
Dept: FAMILY MEDICINE CLINIC | Age: 51
End: 2021-07-20
Payer: MEDICARE

## 2021-07-20 VITALS
TEMPERATURE: 97.1 F | SYSTOLIC BLOOD PRESSURE: 140 MMHG | HEIGHT: 65 IN | BODY MASS INDEX: 41.32 KG/M2 | OXYGEN SATURATION: 98 % | HEART RATE: 71 BPM | WEIGHT: 248 LBS | DIASTOLIC BLOOD PRESSURE: 98 MMHG | RESPIRATION RATE: 16 BRPM

## 2021-07-20 DIAGNOSIS — R11.2 NAUSEA AND VOMITING, INTRACTABILITY OF VOMITING NOT SPECIFIED, UNSPECIFIED VOMITING TYPE: ICD-10-CM

## 2021-07-20 DIAGNOSIS — N39.0 URINARY TRACT INFECTION WITH HEMATURIA, SITE UNSPECIFIED: Primary | ICD-10-CM

## 2021-07-20 DIAGNOSIS — I42.0 DILATED CARDIOMYOPATHY (HCC): ICD-10-CM

## 2021-07-20 DIAGNOSIS — R31.9 URINARY TRACT INFECTION WITH HEMATURIA, SITE UNSPECIFIED: Primary | ICD-10-CM

## 2021-07-20 DIAGNOSIS — I10 ESSENTIAL HYPERTENSION: ICD-10-CM

## 2021-07-20 DIAGNOSIS — R35.0 INCREASED FREQUENCY OF URINATION: ICD-10-CM

## 2021-07-20 DIAGNOSIS — R10.33 ABDOMINAL CRAMPING, PERIUMBILICAL: ICD-10-CM

## 2021-07-20 DIAGNOSIS — E05.90 HYPERTHYROIDISM: ICD-10-CM

## 2021-07-20 LAB
BILIRUB UR QL STRIP: NORMAL
GLUCOSE UR-MCNC: NEGATIVE MG/DL
KETONES P FAST UR STRIP-MCNC: NORMAL MG/DL
PH UR STRIP: 5 [PH] (ref 4.6–8)
PROT UR QL STRIP: NORMAL
SP GR UR STRIP: 1.02 (ref 1–1.03)
UA UROBILINOGEN AMB POC: NORMAL (ref 0.2–1)
URINALYSIS CLARITY POC: NORMAL
URINALYSIS COLOR POC: NORMAL
URINE BLOOD POC: NORMAL
URINE LEUKOCYTES POC: NORMAL
URINE NITRITES POC: POSITIVE
XX-LABCORP SPECIMEN COL,LCBCF: NORMAL

## 2021-07-20 PROCEDURE — G8755 DIAS BP > OR = 90: HCPCS | Performed by: STUDENT IN AN ORGANIZED HEALTH CARE EDUCATION/TRAINING PROGRAM

## 2021-07-20 PROCEDURE — 81003 URINALYSIS AUTO W/O SCOPE: CPT | Performed by: STUDENT IN AN ORGANIZED HEALTH CARE EDUCATION/TRAINING PROGRAM

## 2021-07-20 PROCEDURE — 99001 SPECIMEN HANDLING PT-LAB: CPT

## 2021-07-20 PROCEDURE — G8417 CALC BMI ABV UP PARAM F/U: HCPCS | Performed by: STUDENT IN AN ORGANIZED HEALTH CARE EDUCATION/TRAINING PROGRAM

## 2021-07-20 PROCEDURE — G8753 SYS BP > OR = 140: HCPCS | Performed by: STUDENT IN AN ORGANIZED HEALTH CARE EDUCATION/TRAINING PROGRAM

## 2021-07-20 PROCEDURE — 99204 OFFICE O/P NEW MOD 45 MIN: CPT | Performed by: STUDENT IN AN ORGANIZED HEALTH CARE EDUCATION/TRAINING PROGRAM

## 2021-07-20 PROCEDURE — G8427 DOCREV CUR MEDS BY ELIG CLIN: HCPCS | Performed by: STUDENT IN AN ORGANIZED HEALTH CARE EDUCATION/TRAINING PROGRAM

## 2021-07-20 PROCEDURE — G8432 DEP SCR NOT DOC, RNG: HCPCS | Performed by: STUDENT IN AN ORGANIZED HEALTH CARE EDUCATION/TRAINING PROGRAM

## 2021-07-20 RX ORDER — ONDANSETRON 4 MG/1
4 TABLET, ORALLY DISINTEGRATING ORAL
Qty: 10 TABLET | Refills: 0 | Status: SHIPPED | OUTPATIENT
Start: 2021-07-20 | End: 2021-07-23

## 2021-07-20 RX ORDER — ALBUTEROL SULFATE 90 UG/1
2 AEROSOL, METERED RESPIRATORY (INHALATION)
COMMUNITY
End: 2021-10-18 | Stop reason: SDUPTHER

## 2021-07-20 RX ORDER — NITROFURANTOIN 25; 75 MG/1; MG/1
100 CAPSULE ORAL 2 TIMES DAILY
Qty: 10 CAPSULE | Refills: 0 | Status: SHIPPED | OUTPATIENT
Start: 2021-07-20 | End: 2021-07-25

## 2021-07-20 NOTE — PATIENT INSTRUCTIONS
Urinary Tract Infection (UTI) in Women: Care Instructions  Overview     A urinary tract infection, or UTI, is a general term for an infection anywhere between the kidneys and the urethra (where urine comes out). Most UTIs are bladder infections. They often cause pain or burning when you urinate. UTIs are caused by bacteria and can be cured with antibiotics. Be sure to complete your treatment so that the infection does not get worse. Follow-up care is a key part of your treatment and safety. Be sure to make and go to all appointments, and call your doctor if you are having problems. It's also a good idea to know your test results and keep a list of the medicines you take. How can you care for yourself at home? · Take your antibiotics as directed. Do not stop taking them just because you feel better. You need to take the full course of antibiotics. · Drink extra water and other fluids for the next day or two. This will help make the urine less concentrated and help wash out the bacteria that are causing the infection. (If you have kidney, heart, or liver disease and have to limit fluids, talk with your doctor before you increase the amount of fluids you drink.)  · Avoid drinks that are carbonated or have caffeine. They can irritate the bladder. · Urinate often. Try to empty your bladder each time. · To relieve pain, take a hot bath or lay a heating pad set on low over your lower belly or genital area. Never go to sleep with a heating pad in place. To prevent UTIs  · Drink plenty of water each day. This helps you urinate often, which clears bacteria from your system. (If you have kidney, heart, or liver disease and have to limit fluids, talk with your doctor before you increase the amount of fluids you drink.)  · Urinate when you need to. · If you are sexually active, urinate right after you have sex. · Change sanitary pads often.   · Avoid douches, bubble baths, feminine hygiene sprays, and other feminine hygiene products that have deodorants. · After going to the bathroom, wipe from front to back. When should you call for help? Call your doctor now or seek immediate medical care if:    · Symptoms such as fever, chills, nausea, or vomiting get worse or appear for the first time.     · You have new pain in your back just below your rib cage. This is called flank pain.     · There is new blood or pus in your urine.     · You have any problems with your antibiotic medicine. Watch closely for changes in your health, and be sure to contact your doctor if:    · You are not getting better after taking an antibiotic for 2 days.     · Your symptoms go away but then come back. Where can you learn more? Go to http://www.gray.com/  Enter H085 in the search box to learn more about \"Urinary Tract Infection (UTI) in Women: Care Instructions. \"  Current as of: June 29, 2020               Content Version: 12.8  © 2006-2021 [employer]. Care instructions adapted under license by [employer] (which disclaims liability or warranty for this information). If you have questions about a medical condition or this instruction, always ask your healthcare professional. [address] any warranty or liability for your use of this information.

## 2021-07-20 NOTE — PROGRESS NOTES
Wilfredo Sanchez, 53 y.o.,  female presents to the office as a new patient    SUBJECTIVE  History of present illness  1. Abdominal cramping:  Patient complains of abdominal periumbilical cramping, increased frequency of urination, nausea and vomiting, chills. Onset was 4 days ago, gradually worsening since that time. Pain reported after eating. No constipation or diarrhea. Denies sick contact. Patient denies back pain. There is not any concern of sexual abuse. There is not a history of trauma to the genital area. Patient does not have a history of recurrent UTI. Patient does not have a history of pyelonephritis. 2.  Dilated cardiomyopathy:  History of acute on chronic systolic heart failure in March 2017 followed with hospitalization to [facility]. Echocardiogram showed an ejection fraction of 15%.  Was placed on Coreg, lisinopril, Aldactone, and Lasix and had a LifeVest placed on discharge from the hospital.  She has done quite well clinically. Echocardiogram completed on July 18, 2017 though better than her echo back in March 2017 still demonstrated a reduced ejection fraction in the 30% range.  She subsequently had a dual-chamber AICD placed on September 1, 2017 and has done reasonably well since that time. Follows with cardiologist, Dr. Jakob Ortiz. LOV 5/27/2021.  3. Left hip pain:  Pain in the left hip, 7/10. Activities of daily living, household chores extremely limited. Difficulty putting shoes and socks on; going up and down stairs. X-ray AP pelvis on 7/13/2021 confirmed end-stage arthritis involving the left hip. Surgery was recommended. Follows with Ortho Dr. Dedrick Kidd. LOV 7/13/2021. Left hip replacement scheduled for November 2021. 4.  Hyperthyroidism. Graves' disease. History of Graves' disease and a right lobe thyroid nodule. Follows with endocrinology, Dr. Lalito Thomas. LOV 7/2/2020. Currently on methimazole 10 mg twice daily.     ROS:  Review of Systems   Constitutional: Positive for chills. Gastrointestinal: Positive for abdominal pain, nausea and vomiting. Genitourinary: Positive for frequency. OBJECTIVE    Physical Exam:     Visit Vitals  BP (!) 140/98 (BP 1 Location: Left upper arm, BP Patient Position: Sitting, BP Cuff Size: Large adult)   Pulse 71   Temp 97.1 °F (36.2 °C) (Temporal)   Resp 16   Ht 5' 5\" (1.651 m)   Wt 248 lb (112.5 kg)   LMP  (LMP Unknown) Comment: 2009   SpO2 98%   BMI 41.27 kg/m²       General: alert, well-appearing, in no apparent distress or pain  Head: atraumatic. Non-tender maxillary and frontal sinuses  Eyes: Lids with no discharge, no matting, conjunctivae clear and non injected, full EOMs, PERLLA  Ears: pinna non-tender, external auditory canal patent, TM intact  Mouth/throat: teeth, gums, palate normal appearing, moist oral mucosa, tonsils non enlarged, pharynx non erythematous and no lesion  Neck: supple, no JVD , no carotid bruit, no adenopathy palpated  CVS: normal rate, regular rhythm, distinct S1 and S2, no murmurs, rubs clicks or gallops  Lungs: Breathing not labored, good chest excursion, clear to ausculation bilaterally, no crackles, wheezing or rhonchi noted  Abdomen: soft, no organomegaly. There is suprapubic tenderness. No CVA tenderness noted.   Extremities: no edema, no cyanosis,  Skin: warm, no lesions, rashes noted  Psych: alert and oriented x3, mood, insight, speech and affect normal  Results for orders placed or performed in visit on 07/20/21   AMB POC URINALYSIS DIP STICK AUTO W/O MICRO     Status: None   Result Value Ref Range Status    Color (UA POC) Liana  Final    Clarity (UA POC) Slightly Cloudy  Final    Glucose (UA POC) Negative Negative Final    Bilirubin (UA POC) 1+ Negative Final    Ketones (UA POC) Trace Negative Final    Specific gravity (UA POC) 1.025 1.001 - 1.035 Final    Blood (UA POC) 2+ Negative Final    pH (UA POC) 5.0 4.6 - 8.0 Final    Protein (UA POC) 1+ Negative Final    Urobilinogen (UA POC) 1 mg/dL 0.2 - 1 Final Nitrites (UA POC) Positive Negative Final    Leukocyte esterase (UA POC) 1+ Negative Final     ASSESSMENT/PLAN  Diagnoses and all orders for this visit:    ICD-10-CM ICD-9-CM    1. Urinary tract infection with hematuria, site unspecified  N39.0 599.0 CBC WITH AUTOMATED DIFF    R50.8 096.79 METABOLIC PANEL, COMPREHENSIVE      nitrofurantoin, macrocrystal-monohydrate, (Macrobid) 100 mg capsule      CULTURE, URINE   2. Increased frequency of urination  R35.0 788.41 AMB POC URINALYSIS DIP STICK AUTO W/O MICRO   3. Abdominal cramping, periumbilical  X85.92 571.65 CBC WITH AUTOMATED DIFF      METABOLIC PANEL, COMPREHENSIVE   4. Nausea and vomiting, intractability of vomiting not specified, unspecified vomiting type  C66.9 086.51 METABOLIC PANEL, COMPREHENSIVE      ondansetron (ZOFRAN ODT) 4 mg disintegrating tablet   5. Dilated cardiomyopathy (HCC)  I42.0 425.4    6. Essential hypertension  P30 775.6 METABOLIC PANEL, COMPREHENSIVE   7. Hyperthyroidism  E05.90 242.90      1. Urinary tract infection with hematuria, site unspecified  Urinalysis completed in the office. Specimen collected in the office, sent to the lab for urine culture. Patient advised to start taking Macrobid 100 mg twice daily for 5 days, drink enough water. Tips how to prevent UTI provided. Handout given. 2. Increased frequency of urination  -     AMB POC URINALYSIS DIP STICK AUTO W/O MICRO  Urinalysis completed in the office. Specimen collected in the office, sent to the lab for urine culture. 3. Abdominal cramping, periumbilical  Abdominal cramping might be related to UTI. Labs ordered. The patient is to call if condition worsens or fails to improve. Patient will return in 10 days. 4. Nausea and vomiting, intractability of vomiting not specified, unspecified vomiting type  Nausea and vomiting might be related to UTI. Labs ordered. Patient advised to take ondansetron 4 mg every 8 hours for nausea/vomiting for up to 3 days. The patient is to call if condition worsens or fails to improve. 5. Dilated cardiomyopathy ([facility] [address]  This condition managed by the cardiologist, Dr. Clement Galvez. LOV 5/27/2021. Patient advised to take all medications as prescribed. 6. Essential hypertension  Weight loss, DASH diet, salt reduction, regular exercise recommended. Take medications as prescribed, check your blood pressure at home and bring results next visit. 7. Hyperthyroidism  This condition managed by the endocrinologist, Dr. Afia Castelan. LOV 7/2/2021. All chart history elements were reviewed by me at the time of the visit even though marked at time of note closure. Patient understands our medical plan. Patient has provided input and agrees with goals. Alternatives have been explained and offered. All questions answered. The patient is to call if condition worsens or fails to improve. Follow-up and Dispositions    · Return in about 10 days (around 7/30/2021) for for routine follow up, review labs and urinalysis done in the office.

## 2021-07-20 NOTE — PROGRESS NOTES
1. Have you been to the ER, urgent care clinic since your last visit? Hospitalized since your last visit? No    2. Have you seen or consulted any other health care providers outside of the [address] [place] [address] since your last visit? Include any pap smears or colon screening.  Yes Where: Dr. Loco Rather for thyroid

## 2021-07-22 LAB — BACTERIA UR CULT: ABNORMAL

## 2021-07-26 NOTE — PROGRESS NOTES
Please notify the patient regarding her urine culture that revealed E. Coli, susceptible to nitrofurantoin (Macrobid) that was already empirically prescribed to treat patient's UTI. No change in therapy.

## 2021-07-26 NOTE — PROGRESS NOTES
[phone] ([place])  2 patient identifiers verified with Mary Grace Ignacio (name/). Azeem Baez was advised that her urine culture revealed  E. Coli, susceptible to nitrofurantoin (Macrobid) that was already empirically prescribed to treat patient's UTI and no change in therapy/treatment.  Azeem Baez had no further questions, advised to complete medication and to keep scheduled appointment with Fatimah Krishna PA-C on  @ 9:30 am.

## 2021-08-02 ENCOUNTER — OFFICE VISIT (OUTPATIENT)
Dept: FAMILY MEDICINE CLINIC | Age: 51
End: 2021-08-02
Payer: MEDICARE

## 2021-08-02 ENCOUNTER — OFFICE VISIT (OUTPATIENT)
Dept: FAMILY MEDICINE CLINIC | Age: 51
End: 2021-08-02

## 2021-08-02 ENCOUNTER — HOSPITAL ENCOUNTER (OUTPATIENT)
Dept: LAB | Age: 51
Discharge: HOME | End: 2021-08-02

## 2021-08-02 VITALS
HEIGHT: 65 IN | RESPIRATION RATE: 16 BRPM | SYSTOLIC BLOOD PRESSURE: 132 MMHG | DIASTOLIC BLOOD PRESSURE: 84 MMHG | HEART RATE: 75 BPM | BODY MASS INDEX: 41.65 KG/M2 | TEMPERATURE: 98.3 F | WEIGHT: 250 LBS | OXYGEN SATURATION: 98 %

## 2021-08-02 VITALS
SYSTOLIC BLOOD PRESSURE: 132 MMHG | BODY MASS INDEX: 41.67 KG/M2 | HEIGHT: 65 IN | DIASTOLIC BLOOD PRESSURE: 84 MMHG | TEMPERATURE: 98.3 F | OXYGEN SATURATION: 98 % | HEART RATE: 75 BPM | RESPIRATION RATE: 16 BRPM | WEIGHT: 250.13 LBS

## 2021-08-02 DIAGNOSIS — F17.200 SMOKING: ICD-10-CM

## 2021-08-02 DIAGNOSIS — M16.12 OSTEOARTHRITIS OF LEFT HIP, UNSPECIFIED OSTEOARTHRITIS TYPE: ICD-10-CM

## 2021-08-02 DIAGNOSIS — B96.20 E-COLI UTI: Primary | ICD-10-CM

## 2021-08-02 DIAGNOSIS — I42.0 DILATED CARDIOMYOPATHY (HCC): ICD-10-CM

## 2021-08-02 DIAGNOSIS — E05.90 HYPERTHYROIDISM: ICD-10-CM

## 2021-08-02 DIAGNOSIS — N39.0 E-COLI UTI: Primary | ICD-10-CM

## 2021-08-02 DIAGNOSIS — Z12.11 SCREEN FOR COLON CANCER: ICD-10-CM

## 2021-08-02 DIAGNOSIS — E66.01 MORBID OBESITY WITH BMI OF 40.0-44.9, ADULT (HCC): ICD-10-CM

## 2021-08-02 DIAGNOSIS — Z12.11 SCREENING FOR COLON CANCER: ICD-10-CM

## 2021-08-02 DIAGNOSIS — Z00.00 WELL WOMAN EXAM (NO GYNECOLOGICAL EXAM): Primary | ICD-10-CM

## 2021-08-02 DIAGNOSIS — E66.01 OBESITY, MORBID (HCC): ICD-10-CM

## 2021-08-02 LAB
BILIRUB UR QL STRIP: NEGATIVE
GLUCOSE UR-MCNC: NEGATIVE MG/DL
KETONES P FAST UR STRIP-MCNC: NEGATIVE MG/DL
PH UR STRIP: 5 [PH] (ref 4.6–8)
PROT UR QL STRIP: NEGATIVE
SP GR UR STRIP: 1.02 (ref 1–1.03)
UA UROBILINOGEN AMB POC: NORMAL (ref 0.2–1)
URINALYSIS CLARITY POC: NORMAL
URINALYSIS COLOR POC: NORMAL
URINE BLOOD POC: NORMAL
URINE LEUKOCYTES POC: NORMAL
URINE NITRITES POC: NEGATIVE
XX-LABCORP SPECIMEN COL,LCBCF: NORMAL

## 2021-08-02 PROCEDURE — 99214 OFFICE O/P EST MOD 30 MIN: CPT | Performed by: STUDENT IN AN ORGANIZED HEALTH CARE EDUCATION/TRAINING PROGRAM

## 2021-08-02 PROCEDURE — G8432 DEP SCR NOT DOC, RNG: HCPCS | Performed by: STUDENT IN AN ORGANIZED HEALTH CARE EDUCATION/TRAINING PROGRAM

## 2021-08-02 PROCEDURE — G8754 DIAS BP LESS 90: HCPCS | Performed by: STUDENT IN AN ORGANIZED HEALTH CARE EDUCATION/TRAINING PROGRAM

## 2021-08-02 PROCEDURE — G8417 CALC BMI ABV UP PARAM F/U: HCPCS | Performed by: STUDENT IN AN ORGANIZED HEALTH CARE EDUCATION/TRAINING PROGRAM

## 2021-08-02 PROCEDURE — G8752 SYS BP LESS 140: HCPCS | Performed by: STUDENT IN AN ORGANIZED HEALTH CARE EDUCATION/TRAINING PROGRAM

## 2021-08-02 PROCEDURE — 99001 SPECIMEN HANDLING PT-LAB: CPT

## 2021-08-02 PROCEDURE — G8427 DOCREV CUR MEDS BY ELIG CLIN: HCPCS | Performed by: STUDENT IN AN ORGANIZED HEALTH CARE EDUCATION/TRAINING PROGRAM

## 2021-08-02 PROCEDURE — 81001 URINALYSIS AUTO W/SCOPE: CPT | Performed by: STUDENT IN AN ORGANIZED HEALTH CARE EDUCATION/TRAINING PROGRAM

## 2021-08-02 RX ORDER — GRANULES FOR ORAL 3 G/1
3 POWDER ORAL ONCE
Qty: 1 PACKET | Refills: 0 | Status: SHIPPED | OUTPATIENT
Start: 2021-08-02 | End: 2021-08-02

## 2021-08-02 NOTE — PATIENT INSTRUCTIONS
Learning About E. Coli Infections  What is an E. coli infection? E. coli (Escherichia coli) is the name of a germ, or bacterium, that lives in the digestive tracts of humans and animals. There are many types of E. coli, and most of them are harmless. But some can cause bloody diarrhea. Some strains of E. coli may also cause severe anemia or kidney failure, which can lead to death. Other strains can cause other infections. How can you get an infection from E. coli? A common type of E. coli infection causes bloody diarrhea and cramps. People in the [place] most often get this infection by eating meat or other foods that have been infected with the bacteria. What are the symptoms? If you get an E. coli infection from food, you may have symptoms like bloody diarrhea, stomach cramps, nausea, and vomiting. Symptoms usually start 3 or 4 days after coming in contact with E. coli. But some people don't notice any symptoms. How is it treated? An E. coli infection usually goes away on its own. Your main treatment is to make yourself comfortable and drink sips of water. Diarrhea causes the body to lose more water than usual. This can lead to dehydration, which is especially dangerous for babies and older adults. Taking frequent, small sips of water will help prevent dehydration. If you have bloody diarrhea that may be from an E. coli infection, don't take diarrhea medicine or antibiotics. These medicines can slow down the digestion process. This can allow more time for your body to absorb the poisons made by the E. coli. Call your doctor instead. In some people, the infection causes serious problems with the blood and kidneys. These people may need blood transfusions or dialysis. How can you prevent an E. coli infection? To prevent intestinal tract infections from E. coli:  · Never eat raw or undercooked ground beef. Cook beef to a temperature of at least 160°F. Always use a meat thermometer.  Ground beef should be cooked until all pink color is gone. · Cut open restaurant and home-cooked hamburgers to make sure that they have been completely cooked. The juices should be clear or yellowish, with no trace of pink. · Always wash cooking tools, cutting boards, dishes, countertops, and utensils with hot, soapy water right after they have come into contact with raw meat. Do not put cooked meat back onto a plate that has held raw meat unless you have thoroughly washed the plate. · Use separate cutting boards for raw meat and other food items. · Wash your hands often with hot, soapy water, especially after handling raw meat. Always wash your hands after bowel movements or changing diapers. · Dispose of soiled diapers and stools carefully. Where can you learn more? Go to http://www.gray.com/  Enter Y224 in the search box to learn more about \"Learning About E. Coli Infections. \"  Current as of: September 23, 2020               Content Version: 12.8  © 2006-2021 [employer]. Care instructions adapted under license by [employer] (which disclaims liability or warranty for this information). If you have questions about a medical condition or this instruction, always ask your healthcare professional. [place] [address] any warranty or liability for your use of this information. Urinary Tract Infection (UTI) in Women: Care Instructions  Overview     A urinary tract infection, or UTI, is a general term for an infection anywhere between the kidneys and the urethra (where urine comes out). Most UTIs are bladder infections. They often cause pain or burning when you urinate. UTIs are caused by bacteria and can be cured with antibiotics. Be sure to complete your treatment so that the infection does not get worse. Follow-up care is a key part of your treatment and safety.  Be sure to make and go to all appointments, and call your doctor if you are having problems. It's also a good idea to know your test results and keep a list of the medicines you take. How can you care for yourself at home? · Take your antibiotics as directed. Do not stop taking them just because you feel better. You need to take the full course of antibiotics. · Drink extra water and other fluids for the next day or two. This will help make the urine less concentrated and help wash out the bacteria that are causing the infection. (If you have kidney, heart, or liver disease and have to limit fluids, talk with your doctor before you increase the amount of fluids you drink.)  · Avoid drinks that are carbonated or have caffeine. They can irritate the bladder. · Urinate often. Try to empty your bladder each time. · To relieve pain, take a hot bath or lay a heating pad set on low over your lower belly or genital area. Never go to sleep with a heating pad in place. To prevent UTIs  · Drink plenty of water each day. This helps you urinate often, which clears bacteria from your system. (If you have kidney, heart, or liver disease and have to limit fluids, talk with your doctor before you increase the amount of fluids you drink.)  · Urinate when you need to. · If you are sexually active, urinate right after you have sex. · Change sanitary pads often. · Avoid douches, bubble baths, feminine hygiene sprays, and other feminine hygiene products that have deodorants. · After going to the bathroom, wipe from front to back. When should you call for help? Call your doctor now or seek immediate medical care if:    · Symptoms such as fever, chills, nausea, or vomiting get worse or appear for the first time.     · You have new pain in your back just below your rib cage. This is called flank pain.     · There is new blood or pus in your urine.     · You have any problems with your antibiotic medicine.    Watch closely for changes in your health, and be sure to contact your doctor if:    · You are not getting better after taking an antibiotic for 2 days.     · Your symptoms go away but then come back. Where can you learn more? Go to http://www.gray.com/  Enter V987 in the search box to learn more about \"Urinary Tract Infection (UTI) in Women: Care Instructions. \"  Current as of: June 29, 2020               Content Version: 12.8  © 0777-5179 [employer]. Care instructions adapted under license by [employer] (which disclaims liability or warranty for this information). If you have questions about a medical condition or this instruction, always ask your healthcare professional. [place] [address] any warranty or liability for your use of this information.

## 2021-08-02 NOTE — PROGRESS NOTES
Iraida Null (: 1970) is a 53 y.o. female, established patient, here for evaluation of the following chief complaint(s):  Bladder Infection (UTI results ), CHF, Hip Pain, and Thyroid Problem (hyperthyroidism )       ASSESSMENT/PLAN:  Below is the assessment and plan developed based on review of pertinent history, physical exam, labs, studies, and medications. ICD-10-CM ICD-9-CM    1. E-coli UTI  N39.0 599.0 AMB POC URINALYSIS DIP STICK AUTO W/ MICRO completed in the office and revealed blood 2+, trace of leukocyte esterase. Urine culture revealed E. Coli, >100,000/mL    B96.20 041.49 CULTURE, URINE      fosfomycin (MONUROL) 3 gram pack oral packet   2. Dilated cardiomyopathy ([address]  I42.0 425.4 managed by cardiology. Next follow-up with Dr. Cindy Hernández scheduled 2021   3. Hyperthyroidism  E05.90 242.90 managed by endocrinology   4. Obesity, morbid ([facility]  E66.01 278.01 Diet and exercise recommended. Will follow with nutritionist   5. Osteoarthritis of left hip, unspecified osteoarthritis type  M16.12 715.95 Follows up with Ortho, Dr. Tono Villanueva. Surgery (hip replacement) scheduled for 2021         Return in about 11 weeks (around 10/18/2021) for routine care and possible recheck on urine . SUBJECTIVE/OBJECTIVE:  HPI  1. UTI  Diagnosed with UTI (2021). Urine culture revealed E. Coli (>100,000/mL). States feeling better, nausea subsided after taking antibiotics. Completed course of Macrobid. 2.  Dilated cardiomyopathy:  No complaints today. Follows with cardiologist, Dr. James Barrientos. LOV 2021.  3. Left hip pain:  End-stage arthritis involving the left hip. Follows with Ortho Dr. Tono Villanueva. LOV 2021. Left hip replacement scheduled for 2021. 4.  Hyperthyroidism. Graves' disease. History of Graves' disease and a right lobe thyroid nodule. Follows with endocrinology, Dr. Marisela Villatoro. LOV 2020. Currently on methimazole 10 mg twice daily.   5.  Obesity:  Current weight 250 pounds. BMI 41.62 kg/m2. Attempts to loss weight before surgery (replacement). Review of Systems  Pertinent items are noted in HPI    Physical Exam  General: alert, well-appearing, in no apparent distress or pain  CVS: normal rate, regular rhythm, distinct S1 and S2, no murmurs, rubs clicks or gallops  Lungs: Breathing not labored, good chest excursion, clear to ausculation bilaterally, no crackles, wheezing or rhonchi noted  Abdomen: soft, no organomegaly. No suprapubic tenderness or CVA tenderness noted.   Psych: alert and oriented x3, mood, insight, speech and affect normal    Results for orders placed or performed in visit on 08/02/21   AMB POC URINALYSIS DIP STICK AUTO W/ MICRO     Status: None   Result Value Ref Range Status    Color (UA POC) Dark Yellow  Final    Clarity (UA POC) Slightly Cloudy  Final    Glucose (UA POC) Negative Negative Final    Bilirubin (UA POC) Negative Negative Final    Ketones (UA POC) Negative Negative Final    Specific gravity (UA POC) 1.025 1.001 - 1.035 Final    Blood (UA POC) 2+ Negative Final    pH (UA POC) 5.0 4.6 - 8.0 Final    Protein (UA POC) Negative Negative Final    Urobilinogen (UA POC) 0.2 mg/dL 0.2 - 1 Final    Nitrites (UA POC) Negative Negative Final    Leukocyte esterase (UA POC) Trace Negative Final   Results for orders placed or performed in visit on 07/20/21   AMB POC URINALYSIS DIP STICK AUTO W/O MICRO     Status: None   Result Value Ref Range Status    Color (UA POC) Liana  Final    Clarity (UA POC) Slightly Cloudy  Final    Glucose (UA POC) Negative Negative Final    Bilirubin (UA POC) 1+ Negative Final    Ketones (UA POC) Trace Negative Final    Specific gravity (UA POC) 1.025 1.001 - 1.035 Final    Blood (UA POC) 2+ Negative Final    pH (UA POC) 5.0 4.6 - 8.0 Final    Protein (UA POC) 1+ Negative Final    Urobilinogen (UA POC) 1 mg/dL 0.2 - 1 Final    Nitrites (UA POC) Positive Negative Final    Leukocyte esterase (UA POC) 1+ Negative Final An electronic signature was used to authenticate this note. -- Nubia LAU PA-C   Follow-up and Dispositions    · Return in about 11 weeks (around 10/18/2021) for routine care and possible recheck on urine .

## 2021-08-02 NOTE — PROGRESS NOTES
Subjective:   53 y.o. female for Well Woman Check. Her gyne and breast care is done elsewhere by her Ob-Gyne physician. Patient Active Problem List   Diagnosis Code    Fluid overload E87.70    Acute on chronic congestive heart failure (HCC) I50.9    Essential hypertension I10    LV dysfunction I51.9    S/P ICD (internal cardiac defibrillator) procedure Z95.810    AICD (automatic cardioverter/defibrillator) present Z95.810    Obesity, morbid ([address] E66.01    Hyperthyroidism E05.90    Pulmonary HTN ([address] I27.20     Patient Active Problem List    Diagnosis Date Noted    S/P ICD (internal cardiac defibrillator) procedure 09/01/2017    Pulmonary HTN ([address] 03/04/2019    Hyperthyroidism     Obesity, morbid ([facility] 12/13/2017    AICD (automatic cardioverter/defibrillator) present 09/01/2017    LV dysfunction 07/11/2017    Essential hypertension 06/27/2017    Fluid overload 03/21/2017    Acute on chronic congestive heart failure ([facility] 03/21/2017     Current Outpatient Medications   Medication Sig Dispense Refill    carvediloL (COREG) 25 mg tablet TAKE 1 TABLET BY MOUTH TWICE DAILY WITH MEALS 60 Tablet 5    sacubitriL-valsartan (Entresto) 24-26 mg tablet Take 1 tablet by mouth twice daily 60 Tablet 5    rivaroxaban (Xarelto) 20 mg tab tablet Take 1 tablet by mouth once daily 30 Tablet 5    baclofen (LIORESAL) 10 mg tablet Take 0.5-1 Tablets by mouth three (3) times daily as needed for Muscle Spasm(s) or Pain. 90 Tablet 3    atorvastatin (LIPITOR) 80 mg tablet TAKE 1 TABLET BY MOUTH ONCE DAILY AT NIGHT 30 Tablet 5    furosemide (LASIX) 80 mg tablet Take 1 tablet by mouth once daily 30 Tablet 6    spironolactone (ALDACTONE) 25 mg tablet Take 1 tablet by mouth once daily 30 Tablet 6    aspirin 81 mg chewable tablet Take 1 Tablet by mouth daily. 30 Tablet 5    potassium chloride (K-DUR, KLOR-CON) 20 mEq tablet Take 1 Tablet by mouth daily.  30 Tablet 5    indapamide (LOZOL) 2.5 mg tablet TAKE 1 TABLET BY MOUTH ONCE DAILY IN THE MORNING 30 Tab 6    methIMAzole (TAPAZOLE) 10 mg tablet Take 1 Tab by mouth two (2) times a day. 1 Tab 0    albuterol (PROVENTIL HFA, VENTOLIN HFA, PROAIR HFA) 90 mcg/actuation inhaler Take 2 Puffs by inhalation every six (6) hours as needed for Wheezing.  nitroglycerin (NITROSTAT) 0.4 mg SL tablet 1 Tab by SubLINGual route every five (5) minutes as needed for Chest Pain. 1 Bottle 1    albuterol (ACCUNEB) 1.25 mg/3 mL nebu Take 3 mL by inhalation every four (4) hours as needed (wheezing). (Patient not taking: Reported on 7/20/2021) 25 Each 0     No Known Allergies  Past Medical History:   Diagnosis Date    AICD (automatic cardioverter/defibrillator) present 09/2018    Asthma     Cardiac echocardiogram 03/22/2017    LVE. EF 15% (prev 50% on study of 12/10/14). Severe diffuse hypk. Indeterminate diastolic fx.  RVE. RVSP at least 44 mmHg. Mod LAE.  BISMARK. Mild-mod MR.  Cardiac nuclear imaging test 03/24/2017    High risk. Somewhat patchy uptake. No evidence of ischemia or infarction. No RWMA. Severe LVE. EF 25%. Neg EKG on pharm stress test.    Cardiovascular LLE venous duplex 03/06/2017    Left leg:  No DVT. Pulsatile flow.     Congestive heart failure (HCC)     Graves disease     Graves disease     Heart failure ([address]     Hypercholesterolemia     Hypertension     Hyperthyroidism      Past Surgical History:   Procedure Laterality Date    HX GYN      hysterectomy  btl    HX MYOMECTOMY       fibroid tumo removed    HX ORTHOPAEDIC  March 2012 ORIF left fibula    TX BREAST SURGERY PROCEDURE UNLISTED      redfuction     Family History   Problem Relation Age of Onset    Hypertension Mother     High Cholesterol Mother     Heart Disease Maternal Grandmother         CHF    Hypertension Maternal Grandmother     Diabetes Maternal Grandmother     Ovarian Cancer Maternal Grandmother     High Cholesterol Maternal Grandmother     Thyroid Disease Maternal Grandmother     Osteoporosis Maternal Grandmother     Alcohol abuse Father     Liver Disease Father     Asthma Father     No Known Problems Sister     No Known Problems Brother     No Known Problems Brother     No Known Problems Brother     No Known Problems Sister     No Known Problems Sister      Social History     Tobacco Use    Smoking status: Current Some Day Smoker     Packs/day: 0.00     Years: 18.00     Pack years: 0.00     Last attempt to quit: 2017     Years since quittin.4    Smokeless tobacco: Never Used   Substance Use Topics    Alcohol use: Yes     Comment: socially         Lab Results   Component Value Date/Time    WBC 10.5 2019 12:00 AM    HGB 13.5 2019 12:00 AM    Hemoglobin, POC 13.6 2017 07:44 AM    HCT 40.2 2019 12:00 AM    Hematocrit, POC 40 2017 07:44 AM    PLATELET 222  12:00 AM    MCV 95 2019 12:00 AM     Lab Results   Component Value Date/Time    Glucose 124 (H) 2019 12:00 AM    Glucose,  (H) 2017 07:44 AM    LDL, calculated 62 2019 09:09 AM    Creatinine, POC 0.7 2017 07:44 AM    Creatinine 0.91 2019 12:00 AM      Lab Results   Component Value Date/Time    Cholesterol, total 114 2019 09:09 AM    HDL Cholesterol 33 (L) 2019 09:09 AM    LDL, calculated 62 2019 09:09 AM    Triglyceride 94 2019 09:09 AM    CHOL/HDL Ratio 3.0 2017 03:24 PM     Lab Results   Component Value Date/Time    ALT (SGPT) 9 2019 12:00 AM    Alk.  phosphatase 111 2019 12:00 AM    Bilirubin, direct 0.21 2019 12:00 AM    Bilirubin, total 0.9 2019 12:00 AM    Albumin 4.4 2019 12:00 AM    Protein, total 7.7 2019 12:00 AM    INR 1.2 2017 02:55 PM    Prothrombin time 14.6 2017 02:55 PM    PLATELET 882 3152 12:00 AM     Lab Results   Component Value Date/Time    INR 1.2 2017 02:55 PM    INR 1.0 2012 03:40 PM    INR 1.1 2011 01:24 PM    Prothrombin time 14.6 08/28/2017 02:55 PM    Prothrombin time 13.5 08/24/2012 03:40 PM    Prothrombin time 14.1 06/02/2011 01:24 PM      Lab Results   Component Value Date/Time    GFR est non-AA 75 07/12/2019 12:00 AM    GFRNA, POC >60 09/01/2017 07:44 AM    GFR est AA 87 07/12/2019 12:00 AM    GFRAA, POC >60 09/01/2017 07:44 AM    Creatinine 0.91 07/12/2019 12:00 AM    Creatinine, POC 0.7 09/01/2017 07:44 AM    BUN 15 07/12/2019 12:00 AM    BUN, POC 13 09/01/2017 07:44 AM    Sodium 136 07/12/2019 12:00 AM    Sodium,  09/01/2017 07:44 AM    Potassium 3.8 07/12/2019 12:00 AM    Potassium, POC 3.6 09/01/2017 07:44 AM    Chloride 96 07/12/2019 12:00 AM    Chloride, POC 97 (L) 09/01/2017 07:44 AM    CO2 22 07/12/2019 12:00 AM    Magnesium 2.1 01/03/2018 12:00 AM     No results found for: PSA, Morena Sparrow, PSAR3, [id number], [id number]  Lab Results   Component Value Date/Time    TSH <0.006 (L) 07/12/2019 12:00 AM    Triiodothyronine (T3), free 3.9 07/12/2019 12:00 AM    T4, Free 1.32 07/12/2019 12:00 AM    T4, Total 13.4 (H) 10/12/2018 09:40 AM      Lab Results   Component Value Date/Time    Glucose 124 (H) 07/12/2019 12:00 AM    Glucose,  (H) 09/01/2017 07:44 AM         ROS: Feeling generally well. No TIA's or unusual headaches, no dysphagia. No prolonged cough. No dyspnea or chest pain on exertion. No abdominal pain, change in bowel habits, black or bloody stools. No urinary tract symptoms. No new or unusual musculoskeletal symptoms. Specific concerns today:   1. Obesity: Estimated body mass index is 41.62 kg/m² as calculated from the following:    Height as of this encounter: 5' 5\" (1.651 m). Weight as of this encounter: 250 lb 2 oz (113.5 kg). Subjective:   Claire Caceres is a 53 y.o. female who is seen today for   Chief Complaint   Patient presents with   [employer] Annual Wellness Visit     Welcome to Medicare    .     Estimated body mass index is 41.62 kg/m² as calculated from the following:    Height as of this encounter: 5' 5\" (1.651 m). Weight as of this encounter: 250 lb 2 oz (113.5 kg). She has been at her current weight since ***. She has been overweight for {NUMBERS:98780:s} years. Her weight prior to that was *** lbs. She has tried multiple attempts at non-surgical weight loss, including {DIET TYPES:19689:s}, which resulted in small amounts of weight loss, but with rapid weight gain. She has been considering surgery for some time and is desirous of a bariatric surgical approach. {Choose one or more SmartLinks; press DELETE if none desired:[id number]}     {ROS - Complete:97820}    Objective:     Visit Vitals  /84 (BP 1 Location: Left arm, BP Patient Position: Sitting, BP Cuff Size: Large adult)   Pulse 75   Temp 98.3 °F (36.8 °C) (Temporal)   Resp 16   Ht 5' 5\" (1.651 m)   Wt 250 lb 2 oz (113.5 kg)   LMP  (LMP Unknown) Comment: 2009   SpO2 98%   BMI 41.62 kg/m²      {PE BARIATRIC:19686:s}    Assessment:   Baldemar Alonso meets the NIH criteria for morbid obesity based on the above BMI. She has the weight related co-morbidity of {OBESITY COMORBIDITY CONDITIONS:19690:s} and has failed multiple attempts at non-surgical weight loss. She is desirous of {BARIATRIC SURGERY TYPES:19687:s}. Baldemar Alonso attended a 80 minute Powerpoint presentation regarding the different forms of surgical weight loss. She understands her role, the appropriate results of 50% excess weight loss at one year and is comfortable with that result in her situation. A specific discussion regarding her particular weight goal, quality of life goals and medical goals was undertaken and these goals were felt to be consistent with placement of {BARIATRIC SURGERY TYPES:19687:s}. The possible risks, including slip, erosion, re-operation, port malposition and infection were discussed, and Baldemar Alonso understands those risks and wishes to proceed with this procedure. .    Objective:    The patient appears well, alert, oriented x 3, in no distress. Visit Vitals  /84 (BP 1 Location: Left arm, BP Patient Position: Sitting, BP Cuff Size: Large adult)   Pulse 75   Temp 98.3 °F (36.8 °C) (Temporal)   Resp 16   Ht 5' 5\" (1.651 m)   Wt 250 lb 2 oz (113.5 kg)   LMP  (LMP Unknown) Comment: 2009   SpO2 98%   BMI 41.62 kg/m²     ENT normal.  Neck supple. No adenopathy or thyromegaly. AGATA. Lungs are clear, good air entry, no wheezes, rhonchi or rales. S1 and S2 normal, no murmurs, regular rate and rhythm. Abdomen soft without tenderness, guarding, mass or organomegaly. Extremities show no edema, normal peripheral pulses. Neurological is normal, no focal findings. Breast and Pelvic exams are deferred. Assessment/Plan:   Well Woman      ICD-10-CM ICD-9-CM    1. Well woman exam (no gynecological exam)  Z00.00 V70.0 lose weight, increase physical activity, stop smoking (advice and handout given), follow low fat diet, follow low salt diet, have labs drawn prior to ROV, call if any problems    [V70.0]   2. Smoking  F17.200 305.1 Personalized risks of tobacco use on current patient's health and benefits from quitting smoking discussed. Advice and assistance to quit smoking offered. Patient's motivation level to quit smoking was low today. 3. Morbid obesity with BMI of 40.0-44.9, adult ([facility]  E66.01 278.01 Managing weight with diet and exercise recommended. REFERRAL TO NUTRITION for management    Z68.41 V85.41    4. Screen for colon cancer  Z12.11 V76.51 REFERRAL TO COLON AND RECTAL SURGERY for colonoscopy     Follow-up and Dispositions    · Return in about 1 year (around 8/2/2022) for annual medicare wellness .

## 2021-08-02 NOTE — PROGRESS NOTES
Chief Complaint   Patient presents with   [facility] Annual Wellness Visit     Welcome to Medicare      1. Have you been to the ER, urgent care clinic since your last visit? Hospitalized since your last visit? No    2. Have you seen or consulted any other health care providers outside of the [address] [place] [address] since your last visit? Include any pap smears or colon screening. No     Abuse Screening Questionnaire 8/2/2021   Do you ever feel afraid of your partner? N   Are you in a relationship with someone who physically or mentally threatens you? N   Is it safe for you to go home? Y     Fall Risk Assessment, last 12 mths 8/2/2021   Able to walk? Yes   Fall in past 12 months? 0   Do you feel unsteady?  0   Are you worried about falling 0       3 most recent PHQ Screens 8/2/2021   Little interest or pleasure in doing things Not at all   Feeling down, depressed, irritable, or hopeless Not at all   Total Score PHQ 2 0

## 2021-08-02 NOTE — PATIENT INSTRUCTIONS
Well Visit, Ages 25 to 48: Care Instructions  Overview     Well visits can help you stay healthy. Your doctor has checked your overall health and may have suggested ways to take good care of yourself. Your doctor also may have recommended tests. At home, you can help prevent illness with healthy eating, regular exercise, and other steps. Follow-up care is a key part of your treatment and safety. Be sure to make and go to all appointments, and call your doctor if you are having problems. It's also a good idea to know your test results and keep a list of the medicines you take. How can you care for yourself at home? · Get screening tests that you and your doctor decide on. Screening helps find diseases before any symptoms appear. · Eat healthy foods. Choose fruits, vegetables, whole grains, protein, and low-fat dairy foods. Limit fat, especially saturated fat. Reduce salt in your diet. · Limit alcohol. If you are a man, have no more than 2 drinks a day or 14 drinks a week. If you are a woman, have no more than 1 drink a day or 7 drinks a week. · Get at least 30 minutes of physical activity on most days of the week. Walking is a good choice. You also may want to do other activities, such as running, swimming, cycling, or playing tennis or team sports. Discuss any changes in your exercise program with your doctor. · Reach and stay at a healthy weight. This will lower your risk for many problems, such as obesity, diabetes, heart disease, and high blood pressure. · Do not smoke or allow others to smoke around you. If you need help quitting, talk to your doctor about stop-smoking programs and medicines. These can increase your chances of quitting for good. · Care for your mental health. It is easy to get weighed down by worry and stress. Learn strategies to manage stress, like deep breathing and mindfulness, and stay connected with your family and community.  If you find you often feel sad or hopeless, talk with your doctor. Treatment can help. · Talk to your doctor about whether you have any risk factors for sexually transmitted infections (STIs). You can help prevent STIs if you wait to have sex with a new partner (or partners) until you've each been tested for STIs. It also helps if you use condoms (male or female condoms) and if you limit your sex partners to one person who only has sex with you. Vaccines are available for some STIs, such as HPV. · Use birth control if it's important to you to prevent pregnancy. Talk with your doctor about the choices available and what might be best for you. · If you think you may have a problem with alcohol or drug use, talk to your doctor. This includes prescription medicines (such as amphetamines and opioids) and illegal drugs (such as cocaine and methamphetamine). Your doctor can help you figure out what type of treatment is best for you. · Protect your skin from too much sun. When you're outdoors from 10 a.m. to 4 p.m., stay in the shade or cover up with clothing and a hat with a wide brim. Wear sunglasses that block UV rays. Even when it's cloudy, put broad-spectrum sunscreen (SPF 30 or higher) on any exposed skin. · See a dentist one or two times a year for checkups and to have your teeth cleaned. · Wear a seat belt in the car. When should you call for help? Watch closely for changes in your health, and be sure to contact your doctor if you have any problems or symptoms that concern you. Where can you learn more? Go to http://www.[employer].com/  Enter P072 in the search box to learn more about \"Well Visit, Ages 25 to 48: Care Instructions. \"  Current as of: May 27, 2020               Content Version: 12.8  © 4888-3557 Healthwise, Incorporated. Care instructions adapted under license by [employer] (which disclaims liability or warranty for this information).  If you have questions about a medical condition or this instruction, always ask your healthcare professional. [place] [address] any warranty or liability for your use of this information. Eating Healthy Foods: Care Instructions  Your Care Instructions     Eating healthy foods can help lower your risk for disease. Healthy food gives you energy and keeps your heart strong, your brain active, your muscles working, and your bones strong. A healthy diet includes a variety of foods from the basic food groups: grains, vegetables, fruits, milk and milk products, and meat and beans. Some people may eat more of their favorite foods from only one food group and, as a result, miss getting the nutrients they need. So, it is important to pay attention not only to what you eat but also to what you are missing from your diet. You can eat a healthy, balanced diet by making a few small changes. Follow-up care is a key part of your treatment and safety. Be sure to make and go to all appointments, and call your doctor if you are having problems. It's also a good idea to know your test results and keep a list of the medicines you take. How can you care for yourself at home? Look at what you eat  · Keep a food diary for a week or two and record everything you eat or drink. Track the number of servings you eat from each food group. · For a balanced diet every day, eat a variety of:  ? 6 or more ounce-equivalents of grains, such as cereals, breads, crackers, rice, or pasta, every day. An ounce-equivalent is 1 slice of bread, 1 cup of ready-to-eat cereal, or ½ cup of cooked rice, cooked pasta, or cooked cereal.  ? 2½ cups of vegetables, especially:  § Dark-green vegetables such as broccoli and spinach. § Orange vegetables such as carrots and sweet potatoes. § Dry beans (such as ortez and kidney beans) and peas (such as lentils). ? 2 cups of fresh, frozen, or canned fruit. A small apple or 1 banana or orange equals 1 cup.   ? 3 cups of nonfat or low-fat milk, yogurt, or other milk products. ? 5½ ounces of meat and beans, such as chicken, fish, lean meat, beans, nuts, and seeds. One egg, 1 tablespoon of peanut butter, ½ ounce nuts or seeds, or ¼ cup of cooked beans equals 1 ounce of meat. · Learn how to read food labels for serving sizes and ingredients. Fast-food and convenience-food meals often contain few or no fruits or vegetables. Make sure you eat some fruits and vegetables to make the meal more nutritious. · Look at your food diary. For each food group, add up what you have eaten and then divide the total by the number of days. This will give you an idea of how much you are eating from each food group. See if you can find some ways to change your diet to make it more healthy. Start small  · Do not try to make dramatic changes to your diet all at once. You might feel that you are missing out on your favorite foods and then be more likely to fail. · Start slowly, and gradually change your habits. Try some of the following:  ? Use whole wheat bread instead of white bread. ? Use nonfat or low-fat milk instead of whole milk. ? Eat brown rice instead of white rice, and eat whole wheat pasta instead of white-flour pasta. ? Try low-fat cheeses and low-fat yogurt. ? Add more fruits and vegetables to meals and have them for snacks. ? Add lettuce, tomato, cucumber, and onion to sandwiches. ? Add fruit to yogurt and cereal.  Enjoy food  · You can still eat your favorite foods. You just may need to eat less of them. If your favorite foods are high in fat, salt, and sugar, limit how often you eat them, but do not cut them out entirely. · Eat a wide variety of foods. Make healthy choices when eating out  · The type of restaurant you choose can help you make healthy choices. Even fast-food chains are now offering more low-fat or healthier choices on the menu. · Choose smaller portions, or take half of your meal home. · When eating out, try:  ?  A veggie pizza with a whole wheat crust or grilled chicken (instead of sausage or pepperoni). ? Pasta with roasted vegetables, grilled chicken, or marinara sauce instead of cream sauce. ? A vegetable wrap or grilled chicken wrap. ? Broiled or poached food instead of fried or breaded items. Make healthy choices easy  · Buy packaged, prewashed, ready-to-eat fresh vegetables and fruits, such as baby carrots, salad mixes, and chopped or shredded broccoli and cauliflower. · Buy packaged, presliced fruits, such as melon or pineapple. · Choose 100% fruit or vegetable juice instead of soda. Limit juice intake to 4 to 6 oz (½ to ¾ cup) a day. · Blend low-fat yogurt, fruit juice, and canned or frozen fruit to make a smoothie for breakfast or a snack. Where can you learn more? Go to http://www.gunter.com/  Enter T756 in the search box to learn more about \"Eating Healthy Foods: Care Instructions. \"  Current as of: December 17, 2020               Content Version: 12.8  © 8419-7198 [employer]. Care instructions adapted under license by [employer] (which disclaims liability or warranty for this information). If you have questions about a medical condition or this instruction, always ask your healthcare professional. [address] any warranty or liability for your use of this information. Heart-Healthy Diet: Care Instructions  Your Care Instructions     A heart-healthy diet has lots of vegetables, fruits, nuts, beans, and whole grains, and is low in salt. It limits foods that are high in saturated fat, such as meats, cheeses, and fried foods. It may be hard to change your diet, but even small changes can lower your risk of heart attack and heart disease. Follow-up care is a key part of your treatment and safety. Be sure to make and go to all appointments, and call your doctor if you are having problems.  It's also a good idea to know your test results and keep a list of the medicines you take. How can you care for yourself at home? Watch your portions  · Use food labels to learn what the recommended servings are for the foods you eat. · Eat only the number of calories you need to stay at a healthy weight. If you need to lose weight, eat fewer calories than your body burns (through exercise and other physical activity). Eat more fruits and vegetables  · Eat a variety of fruit and vegetables every day. Dark green, deep orange, red, or yellow fruits and vegetables are especially good for you. Examples include spinach, carrots, peaches, and berries. · Keep carrots, celery, and other veggies handy for snacks. Buy fruit that is in season and store it where you can see it so that you will be tempted to eat it. · Cook dishes that have a lot of veggies in them, such as stir-fries and soups. Limit saturated fat  · Read food labels, and try to avoid saturated fats. They increase your risk of heart disease. · Use olive or canola oil when you cook. · Bake, broil, grill, or steam foods instead of frying them. · Choose lean meats instead of high-fat meats such as hot dogs and sausages. Cut off all visible fat when you prepare meat. · Eat fish, skinless poultry, and meat alternatives such as soy products instead of high-fat meats. Soy products, such as tofu, may be especially good for your heart. · Choose low-fat or fat-free milk and dairy products. Eat foods high in fiber  · Eat a variety of grain products every day. Include whole-grain foods that have lots of fiber and nutrients. Examples of whole-grain foods include oats, whole wheat bread, and brown rice. · Buy whole-grain breads and cereals, instead of white bread or pastries. Limit salt and sodium  · Limit how much salt and sodium you eat to help lower your blood pressure. · Taste food before you salt it. Add only a little salt when you think you need it. With time, your taste buds will adjust to less salt.   · Eat fewer snack items, fast foods, and other high-salt, processed foods. Check food labels for the amount of sodium in packaged foods. · Choose low-sodium versions of canned goods (such as soups, vegetables, and beans). Limit sugar  · Limit drinks and foods with added sugar. These include candy, desserts, and soda pop. Limit alcohol  · Limit alcohol to no more than 2 drinks a day for men and 1 drink a day for women. Too much alcohol can cause health problems. When should you call for help? Watch closely for changes in your health, and be sure to contact your doctor if:    · You would like help planning heart-healthy meals. Where can you learn more? Go to http://www.gunter.com/  Enter V137 in the search box to learn more about \"Heart-Healthy Diet: Care Instructions. \"  Current as of: December 17, 2020               Content Version: 12.8  © 8182-4549 Healthwise, Incorporated. Care instructions adapted under license by [employer] (which disclaims liability or warranty for this information). If you have questions about a medical condition or this instruction, always ask your healthcare professional. [address] any warranty or liability for your use of this information.

## 2021-08-02 NOTE — PROGRESS NOTES
Chief Complaint   Patient presents with    Asthma    CHF    Cholesterol Problem    Hypertension    Results     UTI results     Thyroid Problem     hyperthyroidism            1. Have you been to the ER, urgent care clinic since your last visit? Hospitalized since your last visit? No    2. Have you seen or consulted any other health care providers outside of the [address] [place] [address] since your last visit? Include any pap smears or colon screening.  No     Per Ramón Dunham she is taking every medication that is listed on her list (list not reviewed with Ramón Dunham)

## 2021-08-04 LAB — BACTERIA UR CULT: NO GROWTH

## 2021-08-11 ENCOUNTER — OFFICE VISIT (OUTPATIENT)
Dept: ORTHOPEDIC SURGERY | Age: 51
End: 2021-08-11
Payer: MEDICARE

## 2021-08-11 VITALS
TEMPERATURE: 96.9 F | WEIGHT: 259 LBS | HEART RATE: 88 BPM | HEIGHT: 65 IN | OXYGEN SATURATION: 97 % | BODY MASS INDEX: 43.15 KG/M2 | RESPIRATION RATE: 15 BRPM

## 2021-08-11 DIAGNOSIS — M16.12 PRIMARY OSTEOARTHRITIS OF LEFT HIP: Primary | ICD-10-CM

## 2021-08-11 DIAGNOSIS — M54.16 LUMBAR RADICULOPATHY: ICD-10-CM

## 2021-08-11 PROCEDURE — G8756 NO BP MEASURE DOC: HCPCS | Performed by: PHYSICIAN ASSISTANT

## 2021-08-11 PROCEDURE — G8427 DOCREV CUR MEDS BY ELIG CLIN: HCPCS | Performed by: PHYSICIAN ASSISTANT

## 2021-08-11 PROCEDURE — G8432 DEP SCR NOT DOC, RNG: HCPCS | Performed by: PHYSICIAN ASSISTANT

## 2021-08-11 PROCEDURE — G8417 CALC BMI ABV UP PARAM F/U: HCPCS | Performed by: PHYSICIAN ASSISTANT

## 2021-08-11 PROCEDURE — 99213 OFFICE O/P EST LOW 20 MIN: CPT | Performed by: PHYSICIAN ASSISTANT

## 2021-08-11 NOTE — PROGRESS NOTES
[address] [place] [address]  [phone]           Patient: Paolo Sen                MRN: [id number]       SSN: [id number]  YOB: 1970        AGE: 53 y.o. SEX: female  Body mass index is 43.1 kg/m². PCP: Christine Mansfield PA-C  08/11/21      This office note has been dictated. REVIEW OF SYSTEMS:  Constitutional: Negative for fever, chills, weight loss and malaise/fatigue. HENT: Negative. Eyes: Negative. Respiratory: Negative. Cardiovascular: Negative. Gastrointestinal: No bowel incontinence or constipation. Genitourinary: No bladder incontinence or saddle anesthesia. Skin: Negative. Neurological: Negative. Endo/Heme/Allergies: Negative. Psychiatric/Behavioral: Negative. Musculoskeletal: As per HPI above. Past Medical History:   Diagnosis Date    AICD (automatic cardioverter/defibrillator) present 09/2018    Asthma     Cardiac echocardiogram 03/22/2017    LVE. EF 15% (prev 50% on study of 12/10/14). Severe diffuse hypk. Indeterminate diastolic fx.  RVE. RVSP at least 44 mmHg. Mod LAE.  BISMARK. Mild-mod MR.  Cardiac nuclear imaging test 03/24/2017    High risk. Somewhat patchy uptake. No evidence of ischemia or infarction. No RWMA. Severe LVE. EF 25%. Neg EKG on pharm stress test.    Cardiovascular LLE venous duplex 03/06/2017    Left leg:  No DVT. Pulsatile flow.     Congestive heart failure (HCC)     Graves disease     Graves disease     Heart failure ([facility]     Hypercholesterolemia     Hypertension     Hyperthyroidism          Current Outpatient Medications:     albuterol (PROVENTIL HFA, VENTOLIN HFA, PROAIR HFA) 90 mcg/actuation inhaler, Take 2 Puffs by inhalation every six (6) hours as needed for Wheezing., Disp: , Rfl:     carvediloL (COREG) 25 mg tablet, TAKE 1 TABLET BY MOUTH TWICE DAILY WITH MEALS, Disp: 60 Tablet, Rfl: 5    sacubitriL-valsartan (Entresto) 24-26 mg tablet, Take 1 tablet by mouth twice daily, Disp: 60 Tablet, Rfl: 5    rivaroxaban (Xarelto) 20 mg tab tablet, Take 1 tablet by mouth once daily, Disp: 30 Tablet, Rfl: 5    baclofen (LIORESAL) 10 mg tablet, Take 0.5-1 Tablets by mouth three (3) times daily as needed for Muscle Spasm(s) or Pain., Disp: 90 Tablet, Rfl: 3    atorvastatin (LIPITOR) 80 mg tablet, TAKE 1 TABLET BY MOUTH ONCE DAILY AT NIGHT, Disp: 30 Tablet, Rfl: 5    furosemide (LASIX) 80 mg tablet, Take 1 tablet by mouth once daily, Disp: 30 Tablet, Rfl: 6    spironolactone (ALDACTONE) 25 mg tablet, Take 1 tablet by mouth once daily, Disp: 30 Tablet, Rfl: 6    aspirin 81 mg chewable tablet, Take 1 Tablet by mouth daily. , Disp: 30 Tablet, Rfl: 5    potassium chloride (K-DUR, KLOR-CON) 20 mEq tablet, Take 1 Tablet by mouth daily. , Disp: 30 Tablet, Rfl: 5    indapamide (LOZOL) 2.5 mg tablet, TAKE 1 TABLET BY MOUTH ONCE DAILY IN THE MORNING, Disp: 30 Tab, Rfl: 6    methIMAzole (TAPAZOLE) 10 mg tablet, Take 1 Tab by mouth two (2) times a day., Disp: 1 Tab, Rfl: 0    nitroglycerin (NITROSTAT) 0.4 mg SL tablet, 1 Tab by SubLINGual route every five (5) minutes as needed for Chest Pain., Disp: 1 Bottle, Rfl: 1    albuterol (ACCUNEB) 1.25 mg/3 mL nebu, Take 3 mL by inhalation every four (4) hours as needed (wheezing).  (Patient not taking: Reported on 2021), Disp: 25 Each, Rfl: 0    No Known Allergies    Social History     Socioeconomic History    Marital status: SINGLE     Spouse name: Not on file    Number of children: Not on file    Years of education: Not on file    Highest education level: Not on file   Occupational History    Not on file   Tobacco Use    Smoking status: Current Some Day Smoker     Packs/day: 0.00     Years: 18.00     Pack years: 0.00     Last attempt to quit: 2017     Years since quittin.4    Smokeless tobacco: Never Used   Vaping Use    Vaping Use: Never used   Substance and Sexual Activity    Alcohol use: Yes     Comment: socially     Drug use: No    Sexual activity: Yes     Partners: Male     Birth control/protection: Condom   Other Topics Concern    Not on file   Social History Narrative    Not on file     Social Determinants of Health     Financial Resource Strain:     Difficulty of Paying Living Expenses:    Food Insecurity:     Worried About Running Out of Food in the Last Year:     [address] in the Last Year:    Transportation Needs:     Lack of Transportation (Medical):  Lack of Transportation (Non-Medical):    Physical Activity:     Days of Exercise per Week:     Minutes of Exercise per Session:    Stress:     Feeling of Stress :    Social Connections:     Frequency of Communication with Friends and Family:     Frequency of Social Gatherings with Friends and Family:     Attends [employer] Services:     Active Member of Clubs or Organizations:     Attends Club or Organization Meetings:     Marital Status:    Intimate Partner Violence:     Fear of Current or Ex-Partner:     Emotionally Abused:     Physically Abused:     Sexually Abused:        Past Surgical History:   Procedure Laterality Date    HX GYN      hysterectomy  btl    HX MYOMECTOMY       fibroid tumo removed    HX ORTHOPAEDIC  March 2012 ORIF left fibula    UT BREAST SURGERY PROCEDURE UNLISTED      redfuction           Patient seen and evaluated today for her left hip. She does have known advanced arthritis of the left hip which is worsening. She has decreased walking tolerance. She has pain at night. She has trouble put on her shoes and socks. She has trouble getting out of a car. She is scheduled for surgery in November. We have been keeping an eye on her weight as she has been on a weight loss program.  She takes Tylenol for pain. She is unable to take NSAIDs due to CHF. Patient denies recent fevers, chills, chest pain, SOB, or injuries. No recent systemic changes noted.   A 12-point review of systems is performed today. Pertinent positives are noted. All other systems reviewed and otherwise are negative. Physical exam: General: Alert and oriented x3, nad.  well-developed, well nourished. normal affect, AF. NC/AT, EOMI, neck supple, trachea midline, no JVD present. Breathing is non-labored. Examination of extremities reveals decreased range of the left hip with reproduction of groin thigh and some buttock discomfort. Negative straight leg raise. Negative calf tenderness but negative Homans. No signs of DVT present. Review of previous radiographs confirms end-stage arthritis. Assessment: #1 left hip end-stage osteoarthritis, #2 obesity    Plan: At this point, we discussed treatment options. She will continue on a weight loss program.  She needs to lose approximately 18 more pounds. We will have her follow-up in 4 weeks time for evaluation and weight check. Total hip replacements were discussed as well as her BMI needs to be 40 for surgical invention.             JR Bro MAGALLANES, PAYingC, ATC

## 2021-08-11 NOTE — PROGRESS NOTES
[phone] ([place])  2 patient identifiers verified (name/) with Sumit Au. Carl Bernard was advised that her urine culture showed no growth. No further questions at this time.

## 2021-10-05 ENCOUNTER — TELEPHONE (OUTPATIENT)
Dept: FAMILY MEDICINE CLINIC | Age: 51
End: 2021-10-05

## 2021-10-05 DIAGNOSIS — M16.12 PRIMARY OSTEOARTHRITIS OF LEFT HIP: ICD-10-CM

## 2021-10-05 DIAGNOSIS — Z01.818 PREOPERATIVE TESTING: Primary | ICD-10-CM

## 2021-10-05 NOTE — TELEPHONE ENCOUNTER
Date of surgery 11/2/21  Type of surgery TOTAL LEFT HIP REPLACEMENT  Facility that procedure will be performed [facility] name that will perform procedure [facility]  Anesthesia type GENERAL  Pre op testing date11/19  Clear patient in note of forms will be faxed to [facility]  Point of contact in specialty office:   Anna Daugherty    phone   [phone]  fax

## 2021-10-06 NOTE — TELEPHONE ENCOUNTER
Left message via [employer] for   Mono to call [facility] in reference to [address] [facility]. [place].

## 2021-10-06 NOTE — TELEPHONE ENCOUNTER
Spoke with Zahra Pringle has been scheduled for pre-op clearance on 10-.  Total right knee replacement tessa Lockett in note and LALO- Jose Alfredo Gonzalez [phone]

## 2021-10-11 ENCOUNTER — OFFICE VISIT (OUTPATIENT)
Dept: ORTHOPEDIC SURGERY | Age: 51
End: 2021-10-11
Payer: MEDICARE

## 2021-10-11 VITALS
BODY MASS INDEX: 41.65 KG/M2 | RESPIRATION RATE: 16 BRPM | WEIGHT: 250 LBS | HEART RATE: 64 BPM | OXYGEN SATURATION: 100 % | HEIGHT: 65 IN | TEMPERATURE: 96.4 F

## 2021-10-11 DIAGNOSIS — M16.12 PRIMARY OSTEOARTHRITIS OF LEFT HIP: Primary | ICD-10-CM

## 2021-10-11 PROCEDURE — G8417 CALC BMI ABV UP PARAM F/U: HCPCS | Performed by: PHYSICIAN ASSISTANT

## 2021-10-11 PROCEDURE — G8432 DEP SCR NOT DOC, RNG: HCPCS | Performed by: PHYSICIAN ASSISTANT

## 2021-10-11 PROCEDURE — G8427 DOCREV CUR MEDS BY ELIG CLIN: HCPCS | Performed by: PHYSICIAN ASSISTANT

## 2021-10-11 PROCEDURE — 3017F COLORECTAL CA SCREEN DOC REV: CPT | Performed by: PHYSICIAN ASSISTANT

## 2021-10-11 PROCEDURE — 99213 OFFICE O/P EST LOW 20 MIN: CPT | Performed by: PHYSICIAN ASSISTANT

## 2021-10-11 PROCEDURE — G8756 NO BP MEASURE DOC: HCPCS | Performed by: PHYSICIAN ASSISTANT

## 2021-10-11 NOTE — PROGRESS NOTES
[address] [place] [address]  [phone]           Patient: Jamil Munoz                MRN: [id number]       SSN: [id number]  YOB: 1970        AGE: 49 y.o. SEX: female  Body mass index is 41.6 kg/m². PCP: Julisa Adams PA-C  10/11/21      This office note has been dictated. REVIEW OF SYSTEMS:  Constitutional: Negative for fever, chills, weight loss and malaise/fatigue. HENT: Negative. Eyes: Negative. Respiratory: Negative. Cardiovascular: Negative. Gastrointestinal: No bowel incontinence or constipation. Genitourinary: No bladder incontinence or saddle anesthesia. Skin: Negative. Neurological: Negative. Endo/Heme/Allergies: Negative. Psychiatric/Behavioral: Negative. Musculoskeletal: As per HPI above. Past Medical History:   Diagnosis Date    AICD (automatic cardioverter/defibrillator) present 09/2018    Asthma     Cardiac echocardiogram 03/22/2017    LVE. EF 15% (prev 50% on study of 12/10/14). Severe diffuse hypk. Indeterminate diastolic fx.  RVE. RVSP at least 44 mmHg. Mod LAE.  BISMARK. Mild-mod MR.  Cardiac nuclear imaging test 03/24/2017    High risk. Somewhat patchy uptake. No evidence of ischemia or infarction. No RWMA. Severe LVE. EF 25%. Neg EKG on pharm stress test.    Cardiovascular LLE venous duplex 03/06/2017    Left leg:  No DVT. Pulsatile flow.     Congestive heart failure (HCC)     Graves disease     Graves disease     Heart failure ([facility] [address]     Hypercholesterolemia     Hypertension     Hyperthyroidism          Current Outpatient Medications:     albuterol (PROVENTIL HFA, VENTOLIN HFA, PROAIR HFA) 90 mcg/actuation inhaler, Take 2 Puffs by inhalation every six (6) hours as needed for Wheezing., Disp: , Rfl:     carvediloL (COREG) 25 mg tablet, TAKE 1 TABLET BY MOUTH TWICE DAILY WITH MEALS, Disp: 60 Tablet, Rfl: 5    sacubitriL-valsartan (Entresto) 24-26 mg tablet, Take 1 tablet by mouth twice daily, Disp: 60 Tablet, Rfl: 5    rivaroxaban (Xarelto) 20 mg tab tablet, Take 1 tablet by mouth once daily, Disp: 30 Tablet, Rfl: 5    baclofen (LIORESAL) 10 mg tablet, Take 0.5-1 Tablets by mouth three (3) times daily as needed for Muscle Spasm(s) or Pain., Disp: 90 Tablet, Rfl: 3    atorvastatin (LIPITOR) 80 mg tablet, TAKE 1 TABLET BY MOUTH ONCE DAILY AT NIGHT, Disp: 30 Tablet, Rfl: 5    furosemide (LASIX) 80 mg tablet, Take 1 tablet by mouth once daily, Disp: 30 Tablet, Rfl: 6    spironolactone (ALDACTONE) 25 mg tablet, Take 1 tablet by mouth once daily, Disp: 30 Tablet, Rfl: 6    aspirin 81 mg chewable tablet, Take 1 Tablet by mouth daily. , Disp: 30 Tablet, Rfl: 5    potassium chloride (K-DUR, KLOR-CON) 20 mEq tablet, Take 1 Tablet by mouth daily. , Disp: 30 Tablet, Rfl: 5    indapamide (LOZOL) 2.5 mg tablet, TAKE 1 TABLET BY MOUTH ONCE DAILY IN THE MORNING, Disp: 30 Tab, Rfl: 6    methIMAzole (TAPAZOLE) 10 mg tablet, Take 1 Tab by mouth two (2) times a day., Disp: 1 Tab, Rfl: 0    nitroglycerin (NITROSTAT) 0.4 mg SL tablet, 1 Tab by SubLINGual route every five (5) minutes as needed for Chest Pain., Disp: 1 Bottle, Rfl: 1    albuterol (ACCUNEB) 1.25 mg/3 mL nebu, Take 3 mL by inhalation every four (4) hours as needed (wheezing).  (Patient not taking: Reported on 2021), Disp: 25 Each, Rfl: 0    No Known Allergies    Social History     Socioeconomic History    Marital status: SINGLE     Spouse name: Not on file    Number of children: Not on file    Years of education: Not on file    Highest education level: Not on file   Occupational History    Not on file   Tobacco Use    Smoking status: Current Some Day Smoker     Packs/day: 0.00     Years: 18.00     Pack years: 0.00     Last attempt to quit: 2017     Years since quittin.6    Smokeless tobacco: Never Used   Vaping Use    Vaping Use: Never used   Substance and Sexual Activity    Alcohol use: Yes     Comment: socially     Drug use: No    Sexual activity: Yes     Partners: Male     Birth control/protection: Condom   Other Topics Concern    Not on file   Social History Narrative    Not on file     Social Determinants of Health     Financial Resource Strain:     Difficulty of Paying Living Expenses:    Food Insecurity:     Worried About Running Out of Food in the Last Year:     [address] in the Last Year:    Transportation Needs:     Lack of Transportation (Medical):  Lack of Transportation (Non-Medical):    Physical Activity:     Days of Exercise per Week:     Minutes of Exercise per Session:    Stress:     Feeling of Stress :    Social Connections:     Frequency of Communication with Friends and Family:     Frequency of Social Gatherings with Friends and Family:     Attends Taoist Services:     Active Member of Clubs or Organizations:     Attends Club or Organization Meetings:     Marital Status:    Intimate Partner Violence:     Fear of Current or Ex-Partner:     Emotionally Abused:     Physically Abused:     Sexually Abused:        Past Surgical History:   Procedure Laterality Date    HX GYN      hysterectomy  btl    HX MYOMECTOMY       fibroid tumo removed    HX ORTHOPAEDIC  March 2012 ORIF left fibula    ME BREAST SURGERY PROCEDURE UNLISTED      redfuction         Patient seen and evaluated today for her left hip. She does have known end-stage arthritis of the left hip and is scheduled for surgery in couple weeks. She has been on a weight loss program and doing well with it. She has been walking in the mall as well as around the track. She has modified her diet. She is taken Tylenol for pain. She is looking forward to get her hip replaced. Patient denies recent fevers, chills, chest pain, SOB, or injuries. No recent systemic changes noted. A 12-point review of systems is performed today. Pertinent positives are noted.   All other systems reviewed and otherwise are negative. Physical exam: General: Alert and oriented x3, nad.  well-developed, well nourished. normal affect, AF. NC/AT, EOMI, neck supple, trachea midline, no JVD present. Breathing is non-labored. Examination of lower extremities reveals decreased range of his left hip with reproduction of groin thigh discomfort. Negative straight leg raise. Negative calf tenderness. Negative Homans. No signs of DVT present. Assessment: Left hip end-stage arthritis    Plan: At this point we discussed her weight. She needs to lose another 10 pounds to get the BMI of 40. This was relayed to the patient. She will continue on her weight loss program.  We will see her back for preoperative history and physical.  She continue on Tylenol for pain. She will call with any questions or concerns that shall arise.               JR Bro MAGALLANES, PAYingC, ATC

## 2021-10-13 ENCOUNTER — OFFICE VISIT (OUTPATIENT)
Dept: CARDIOLOGY CLINIC | Age: 51
End: 2021-10-13
Payer: MEDICARE

## 2021-10-13 ENCOUNTER — CLINICAL SUPPORT (OUTPATIENT)
Dept: CARDIOLOGY CLINIC | Age: 51
End: 2021-10-13

## 2021-10-13 VITALS
HEIGHT: 65 IN | HEART RATE: 96 BPM | BODY MASS INDEX: 41.32 KG/M2 | OXYGEN SATURATION: 97 % | SYSTOLIC BLOOD PRESSURE: 110 MMHG | WEIGHT: 248 LBS | DIASTOLIC BLOOD PRESSURE: 78 MMHG

## 2021-10-13 DIAGNOSIS — I42.0 DILATED CARDIOMYOPATHY (HCC): Primary | ICD-10-CM

## 2021-10-13 DIAGNOSIS — Z95.810 AICD (AUTOMATIC CARDIOVERTER/DEFIBRILLATOR) PRESENT: ICD-10-CM

## 2021-10-13 DIAGNOSIS — Z01.810 PREOP CARDIOVASCULAR EXAM: ICD-10-CM

## 2021-10-13 PROCEDURE — 93283 PRGRMG EVAL IMPLANTABLE DFB: CPT | Performed by: INTERNAL MEDICINE

## 2021-10-13 PROCEDURE — G8417 CALC BMI ABV UP PARAM F/U: HCPCS | Performed by: INTERNAL MEDICINE

## 2021-10-13 PROCEDURE — 3017F COLORECTAL CA SCREEN DOC REV: CPT | Performed by: INTERNAL MEDICINE

## 2021-10-13 PROCEDURE — G8427 DOCREV CUR MEDS BY ELIG CLIN: HCPCS | Performed by: INTERNAL MEDICINE

## 2021-10-13 PROCEDURE — G8752 SYS BP LESS 140: HCPCS | Performed by: INTERNAL MEDICINE

## 2021-10-13 PROCEDURE — G8510 SCR DEP NEG, NO PLAN REQD: HCPCS | Performed by: INTERNAL MEDICINE

## 2021-10-13 PROCEDURE — G8754 DIAS BP LESS 90: HCPCS | Performed by: INTERNAL MEDICINE

## 2021-10-13 PROCEDURE — 99215 OFFICE O/P EST HI 40 MIN: CPT | Performed by: INTERNAL MEDICINE

## 2021-10-13 RX ORDER — FUROSEMIDE 40 MG/1
TABLET ORAL
Qty: 30 TABLET | Refills: 5 | Status: SHIPPED | OUTPATIENT
Start: 2021-10-13 | End: 2022-02-03 | Stop reason: SDUPTHER

## 2021-10-13 NOTE — PROGRESS NOTES
Mariajose Baxter    Chief Complaint   Patient presents with    Follow-up     6 month follow up    Surgical Clearance     Dr. Dewayne Ferrell, 11-, [facility], [facility]    Pacemaker Check     [employer]       HPI    Mariajose Baxter is a 49 y.o. AAF with HFrEF, primary prevention ICD here for followup. She used to follow with my partner, Dr. Alyssa Lubin for years. I have obtained his records. Ms. Jorge Perez is a pleasant 32-year-old [facility] American female with history of presentation in March 2017 to [facility] with acute on chronic systolic heart failure. She subsequently was treated and during that hospitalization was found to have an echocardiogram showing an ejection fraction of 15%.  She also had a pharmacologic myocardial perfusion defect at that time which demonstrated an ejection fraction estimated at 25% without reversible or fixed defects.  She was placed on Coreg, lisinopril, Aldactone, and Lasix and had a LifeVest placed on discharge from the hospital. Barbara Rodriguez has done quite well clinically, but her repeat echocardiogram completed on July 18, 2017 though better than her echo back in March 2017 still demonstrated a reduced ejection fraction in the 30% range.       She subsequently had a dual-chamber AICD placed on September 1, 2017 and has done reasonably well since that time, although she comes in today and relates that she really has not been feeling well for a few months.       She relates that she has been doing reasonably well recently. She did have some sciatica in her left leg for which she had an injection by orthopedics about 3 weeks ago which is helped a great deal.  She had gained some weight when she saw my nurse practitioner back on January 9, 2020, but has lost 11 pounds with adjustment of her medications and is feeling fine currently.  She relates that although she still sleeps on 3 pillows she is not having any episodes of paroxysmal nocturnal dyspnea and he has no peripheral edema at all at this time. She is only getting short of breath when she walks for long distances. Past Medical History:   Diagnosis Date    AICD (automatic cardioverter/defibrillator) present 09/2018    Asthma     Cardiac echocardiogram 03/22/2017    LVE. EF 15% (prev 50% on study of 12/10/14). Severe diffuse hypk. Indeterminate diastolic fx.  RVE. RVSP at least 44 mmHg. Mod LAE.  BISMARK. Mild-mod MR.  Cardiac nuclear imaging test 03/24/2017    High risk. Somewhat patchy uptake. No evidence of ischemia or infarction. No RWMA. Severe LVE. EF 25%. Neg EKG on pharm stress test.    Cardiovascular LLE venous duplex 03/06/2017    Left leg:  No DVT. Pulsatile flow.  Congestive heart failure ([address]     Graves disease     Graves disease     Heart failure ([address]     Hypercholesterolemia     Hypertension     Hyperthyroidism        Past Surgical History:   Procedure Laterality Date    HX GYN      hysterectomy  btl    HX MYOMECTOMY       fibroid tumo removed    HX ORTHOPAEDIC  March 2012 ORIF left fibula    ND BREAST SURGERY PROCEDURE UNLISTED      redfuction       Current Outpatient Medications   Medication Sig Dispense Refill    albuterol (PROVENTIL HFA, VENTOLIN HFA, PROAIR HFA) 90 mcg/actuation inhaler Take 2 Puffs by inhalation every six (6) hours as needed for Wheezing.  carvediloL (COREG) 25 mg tablet TAKE 1 TABLET BY MOUTH TWICE DAILY WITH MEALS 60 Tablet 5    sacubitriL-valsartan (Entresto) 24-26 mg tablet Take 1 tablet by mouth twice daily 60 Tablet 5    rivaroxaban (Xarelto) 20 mg tab tablet Take 1 tablet by mouth once daily 30 Tablet 5    baclofen (LIORESAL) 10 mg tablet Take 0.5-1 Tablets by mouth three (3) times daily as needed for Muscle Spasm(s) or Pain.  90 Tablet 3    atorvastatin (LIPITOR) 80 mg tablet TAKE 1 TABLET BY MOUTH ONCE DAILY AT NIGHT 30 Tablet 5    furosemide (LASIX) 80 mg tablet Take 1 tablet by mouth once daily 30 Tablet 6    spironolactone (ALDACTONE) 25 mg tablet Take 1 tablet by mouth once daily 30 Tablet 6    aspirin 81 mg chewable tablet Take 1 Tablet by mouth daily. 30 Tablet 5    potassium chloride (K-DUR, KLOR-CON) 20 mEq tablet Take 1 Tablet by mouth daily. 30 Tablet 5    indapamide (LOZOL) 2.5 mg tablet TAKE 1 TABLET BY MOUTH ONCE DAILY IN THE MORNING 30 Tab 6    methIMAzole (TAPAZOLE) 10 mg tablet Take 1 Tab by mouth two (2) times a day. 1 Tab 0    nitroglycerin (NITROSTAT) 0.4 mg SL tablet 1 Tab by SubLINGual route every five (5) minutes as needed for Chest Pain. 1 Bottle 1    albuterol (ACCUNEB) 1.25 mg/3 mL nebu Take 3 mL by inhalation every four (4) hours as needed (wheezing). 25 Each 0       No Known Allergies    Social History     Socioeconomic History    Marital status: SINGLE     Spouse name: Not on file    Number of children: Not on file    Years of education: Not on file    Highest education level: Not on file   Occupational History    Not on file   Tobacco Use    Smoking status: Current Some Day Smoker     Packs/day: 0.00     Years: 18.00     Pack years: 0.00     Last attempt to quit: 2017     Years since quittin.6    Smokeless tobacco: Never Used   Vaping Use    Vaping Use: Never used   Substance and Sexual Activity    Alcohol use: Yes     Comment: socially     Drug use: No    Sexual activity: Yes     Partners: Male     Birth control/protection: Condom   Other Topics Concern    Not on file   Social History Narrative    Not on file     Social Determinants of Health     Financial Resource Strain:     Difficulty of Paying Living Expenses:    Food Insecurity:     Worried About Running Out of Food in the Last Year:     Ran Out of Food in the Last Year:    Transportation Needs:     Lack of Transportation (Medical):      Lack of Transportation (Non-Medical):    Physical Activity:     Days of Exercise per Week:     Minutes of Exercise per Session:    Stress:     Feeling of Stress :    Social Connections:     Frequency of Communication with Friends and Family:     Frequency of Social Gatherings with Friends and Family:     Attends Confucianist Services:     Active Member of Clubs or Organizations:     Attends Club or Organization Meetings:     Marital Status:    Intimate Partner Violence:     Fear of Current or Ex-Partner:     Emotionally Abused:     Physically Abused:     Sexually Abused:     prior nursing experience    FH: unknown    Review of Systems    14 pt Review of Systems is negative unless otherwise mentioned in the HPI. Wt Readings from Last 3 Encounters:   10/13/21 112.5 kg (248 lb)   10/11/21 113.4 kg (250 lb)   08/11/21 117.5 kg (259 lb)     Temp Readings from Last 3 Encounters:   10/11/21 (!) 96.4 °F (35.8 °C) (Temporal)   08/11/21 96.9 °F (36.1 °C)   08/02/21 98.3 °F (36.8 °C) (Temporal)     BP Readings from Last 3 Encounters:   10/13/21 110/78   08/02/21 132/84   08/02/21 132/84     Pulse Readings from Last 3 Encounters:   10/13/21 96   10/11/21 64   08/11/21 88       Physical Exam:    Visit Vitals  /78 (BP 1 Location: Left upper arm, BP Patient Position: Sitting, BP Cuff Size: Large adult)   Pulse 96   Ht 5' 5\" (1.651 m)   Wt 112.5 kg (248 lb)   LMP  (LMP Unknown) Comment: 2009   SpO2 97%   BMI 41.27 kg/m²      Physical Exam  HENT:      Head: Normocephalic and atraumatic. Eyes:      Pupils: Pupils are equal, round, and reactive to light. Cardiovascular:      Rate and Rhythm: Normal rate and regular rhythm. Heart sounds: Normal heart sounds. No murmur heard. No friction rub. No gallop. Pulmonary:      Effort: Pulmonary effort is normal. No respiratory distress. Breath sounds: Normal breath sounds. No wheezing or rales. Chest:      Chest wall: No tenderness. Abdominal:      General: Bowel sounds are normal.      Palpations: Abdomen is soft. Musculoskeletal:         General: No tenderness. Skin:     General: Skin is warm and dry.    Neurological:      Mental Status: She is alert and oriented to person, place, and time. Device check    Lab Results   Component Value Date/Time    Cholesterol, total 114 01/26/2019 09:09 AM    HDL Cholesterol 33 (L) 01/26/2019 09:09 AM    LDL, calculated 62 01/26/2019 09:09 AM    VLDL, calculated 19 01/26/2019 09:09 AM    Triglyceride 94 01/26/2019 09:09 AM    CHOL/HDL Ratio 3.0 03/21/2017 03:24 PM       Impression and Plan:  Cori Patel is a 49 y.o. with:    1.) chronic systolic HFrEF ~59-85%, ACC stage C/ NYHA class II-II (assume nonischemic)  2.) Primary prevention ICD  3.) H/o neg nuc  4.) HTN  5.) AFib, on Xarelto  6.) Thyroid disease  7.) Preop risk for hip replacement    She has no complaints today  There are no signs or symptoms of ACS or CHF and her device check was within normal limits  Will actually take the liberty of reducing her Lasix to 40 mg a day and have given her instruction on how to stop it perioperatively and monitor her weight. I did asked her to call me sooner if she feels like she is putting on weight or swollen postoperatively  >50 mins spent,    Due to her reduced ejection fraction comorbid conditions she would be considered at least moderate risk from a cardiac standpoint but can and should proceed to the OR without further cardiac testing    Thank you for allowing me to participate in the care of your patient, please do not hesitate to call with questions or concerns. Follow-up and Dispositions    · Return in about 6 months (around 4/13/2022).      [address] [facility],    [facility]

## 2021-10-13 NOTE — PROGRESS NOTES
Jair Palm presents today for   Chief Complaint   Patient presents with    Follow-up     6 month follow up    Surgical Clearance     Dr. Phyllis Baxter, 11-, [facility], [address] preferred language for health care discussion is english/other. Is someone accompanying this pt? no    Is the patient using any DME equipment during [address]? no    Depression Screening:  3 most recent PHQ Screens 8/2/2021   Little interest or pleasure in doing things Not at all   Feeling down, depressed, irritable, or hopeless Not at all   Total Score PHQ 2 0       Learning Assessment:  Learning Assessment 7/20/2021   PRIMARY LEARNER Patient   HIGHEST LEVEL OF EDUCATION - PRIMARY LEARNER  GRADUATED HIGH SCHOOL OR GED   BARRIERS PRIMARY LEARNER NONE   CO-LEARNER CAREGIVER No   PRIMARY LANGUAGE ENGLISH   LEARNER PREFERENCE PRIMARY LISTENING   ANSWERED BY patient   RELATIONSHIP SELF       Abuse Screening:  Abuse Screening Questionnaire 8/2/2021   Do you ever feel afraid of your partner? N   Are you in a relationship with someone who physically or mentally threatens you? N   Is it safe for you to go home? Y       Fall Risk  Fall Risk Assessment, last 12 mths 8/2/2021   Able to walk? Yes   Fall in past 12 months? 0   Do you feel unsteady? 0   Are you worried about falling 0           Pt currently taking Anticoagulant therapy? Xarelto 20 mg once a day    Pt currently taking Antiplatelet therapy ? ASA 81 mg once a day      Coordination of Care:  1. Have you been to the ER, urgent care clinic since your last visit? Hospitalized since your last visit? no    2. Have you seen or consulted any other health care providers outside of the [address] [place] [address] since your last visit? Include any pap smears or colon screening.  no

## 2021-10-13 NOTE — PATIENT INSTRUCTIONS
Follow up 6 months with device check   Moderate risk for hip surgery can proceed without further testing

## 2021-10-18 ENCOUNTER — OFFICE VISIT (OUTPATIENT)
Dept: FAMILY MEDICINE CLINIC | Age: 51
End: 2021-10-18
Payer: MEDICARE

## 2021-10-18 ENCOUNTER — OFFICE VISIT (OUTPATIENT)
Dept: FAMILY MEDICINE CLINIC | Age: 51
End: 2021-10-18

## 2021-10-18 VITALS
RESPIRATION RATE: 18 BRPM | SYSTOLIC BLOOD PRESSURE: 126 MMHG | TEMPERATURE: 98.4 F | WEIGHT: 242 LBS | DIASTOLIC BLOOD PRESSURE: 78 MMHG | OXYGEN SATURATION: 98 % | HEIGHT: 65 IN | HEART RATE: 68 BPM | BODY MASS INDEX: 40.32 KG/M2

## 2021-10-18 VITALS
WEIGHT: 242 LBS | BODY MASS INDEX: 40.32 KG/M2 | HEIGHT: 65 IN | OXYGEN SATURATION: 98 % | TEMPERATURE: 98.4 F | SYSTOLIC BLOOD PRESSURE: 126 MMHG | RESPIRATION RATE: 18 BRPM | HEART RATE: 68 BPM | DIASTOLIC BLOOD PRESSURE: 78 MMHG

## 2021-10-18 DIAGNOSIS — Z00.00 WELCOME TO MEDICARE PREVENTIVE VISIT: Primary | ICD-10-CM

## 2021-10-18 DIAGNOSIS — I42.0 DILATED CARDIOMYOPATHY (HCC): ICD-10-CM

## 2021-10-18 DIAGNOSIS — E66.01 MORBID OBESITY WITH BMI OF 40.0-44.9, ADULT (HCC): ICD-10-CM

## 2021-10-18 DIAGNOSIS — Z12.31 ENCOUNTER FOR SCREENING MAMMOGRAM FOR MALIGNANT NEOPLASM OF BREAST: ICD-10-CM

## 2021-10-18 DIAGNOSIS — F17.200 SMOKING: ICD-10-CM

## 2021-10-18 DIAGNOSIS — Z12.11 SCREENING FOR MALIGNANT NEOPLASM OF COLON: ICD-10-CM

## 2021-10-18 DIAGNOSIS — M16.12 OSTEOARTHRITIS OF LEFT HIP, UNSPECIFIED OSTEOARTHRITIS TYPE: Primary | ICD-10-CM

## 2021-10-18 DIAGNOSIS — E05.90 HYPERTHYROIDISM: ICD-10-CM

## 2021-10-18 DIAGNOSIS — Z23 NEEDS FLU SHOT: ICD-10-CM

## 2021-10-18 DIAGNOSIS — J45.20 REACTIVE AIRWAY DISEASE WITH WHEEZING, MILD INTERMITTENT, UNCOMPLICATED: ICD-10-CM

## 2021-10-18 PROCEDURE — G8417 CALC BMI ABV UP PARAM F/U: HCPCS | Performed by: STUDENT IN AN ORGANIZED HEALTH CARE EDUCATION/TRAINING PROGRAM

## 2021-10-18 PROCEDURE — G0008 ADMIN INFLUENZA VIRUS VAC: HCPCS | Performed by: STUDENT IN AN ORGANIZED HEALTH CARE EDUCATION/TRAINING PROGRAM

## 2021-10-18 PROCEDURE — G8754 DIAS BP LESS 90: HCPCS | Performed by: STUDENT IN AN ORGANIZED HEALTH CARE EDUCATION/TRAINING PROGRAM

## 2021-10-18 PROCEDURE — G8427 DOCREV CUR MEDS BY ELIG CLIN: HCPCS | Performed by: STUDENT IN AN ORGANIZED HEALTH CARE EDUCATION/TRAINING PROGRAM

## 2021-10-18 PROCEDURE — G8432 DEP SCR NOT DOC, RNG: HCPCS | Performed by: STUDENT IN AN ORGANIZED HEALTH CARE EDUCATION/TRAINING PROGRAM

## 2021-10-18 PROCEDURE — G0402 INITIAL PREVENTIVE EXAM: HCPCS | Performed by: STUDENT IN AN ORGANIZED HEALTH CARE EDUCATION/TRAINING PROGRAM

## 2021-10-18 PROCEDURE — 90686 IIV4 VACC NO PRSV 0.5 ML IM: CPT | Performed by: STUDENT IN AN ORGANIZED HEALTH CARE EDUCATION/TRAINING PROGRAM

## 2021-10-18 PROCEDURE — G8752 SYS BP LESS 140: HCPCS | Performed by: STUDENT IN AN ORGANIZED HEALTH CARE EDUCATION/TRAINING PROGRAM

## 2021-10-18 PROCEDURE — 99214 OFFICE O/P EST MOD 30 MIN: CPT | Performed by: STUDENT IN AN ORGANIZED HEALTH CARE EDUCATION/TRAINING PROGRAM

## 2021-10-18 PROCEDURE — 3017F COLORECTAL CA SCREEN DOC REV: CPT | Performed by: STUDENT IN AN ORGANIZED HEALTH CARE EDUCATION/TRAINING PROGRAM

## 2021-10-18 RX ORDER — ALBUTEROL SULFATE 90 UG/1
2 AEROSOL, METERED RESPIRATORY (INHALATION)
Qty: 18 G | Refills: 1 | Status: SHIPPED | OUTPATIENT
Start: 2021-10-18

## 2021-10-18 RX ORDER — DICLOFENAC SODIUM 10 MG/G
4 GEL TOPICAL 4 TIMES DAILY
Qty: 1 EACH | Refills: 1 | Status: SHIPPED | OUTPATIENT
Start: 2021-10-18 | End: 2021-11-03

## 2021-10-18 NOTE — PROGRESS NOTES
Chief Complaint   Patient presents with    Cholesterol Problem    Hypertension     1. \"Have you been to the ER, urgent care clinic since your last visit? Hospitalized since your last visit? \" No    2. \"Have you seen or consulted any other health care providers outside of the [address] [place] [address] since your last visit? \" No     3. For patients aged 39-70: Has the patient had a colonoscopy? No     If the patient is female:    4. For patients aged 41-77: Has the patient had a mammogram within the past 2 years? Yes,  satisfied with blue hyperlink    5. For patients aged 21-65: Has the patient had a pap smear?  No

## 2021-10-18 NOTE — PATIENT INSTRUCTIONS
Medicare Wellness Visit, Female     The best way to live healthy is to have a lifestyle where you eat a well-balanced diet, exercise regularly, limit alcohol use, and quit all forms of tobacco/nicotine, if applicable. Regular preventive services are another way to keep healthy. Preventive services (vaccines, screening tests, monitoring & exams) can help personalize your care plan, which helps you manage your own care. Screening tests can find health problems at the earliest stages, when they are easiest to treat. [facility] follows the current, evidence-based guidelines published by the [facility] ([facility]) when recommending preventive services for our patients. Because we follow these guidelines, sometimes recommendations change over time as research supports it. (For example, mammograms used to be recommended annually. Even though Medicare will still pay for an annual mammogram, the newer guidelines recommend a mammogram every two years for women of average risk). Of course, you and your doctor may decide to screen more often for some diseases, based on your risk and your co-morbidities (chronic disease you are already diagnosed with). Preventive services for you include:  - Medicare offers their members a free annual wellness visit, which is time for you and your primary care provider to discuss and plan for your preventive service needs. Take advantage of this benefit every year!  -All adults over the age of 72 should receive the recommended pneumonia vaccines. Current USPSTF guidelines recommend a series of two vaccines for the best pneumonia protection.   -All adults should have a flu vaccine yearly and a tetanus vaccine every 10 years.   -All adults age 48 and older should receive the shingles vaccines (series of two vaccines).       -All adults age 38-68 who are overweight should have a diabetes screening test once every three years.   -All adults born between [birth year] and 1964 should be screened once for Hepatitis C.  -Other screening tests and preventive services for persons with diabetes include: an eye exam to screen for diabetic retinopathy, a kidney function test, a foot exam, and stricter control over your cholesterol.   -Cardiovascular screening for adults with routine risk involves an electrocardiogram (ECG) at intervals determined by your doctor.   -Colorectal cancer screenings should be done for adults age 54-65 with no increased risk factors for colorectal cancer. There are a number of acceptable methods of screening for this type of cancer. Each test has its own benefits and drawbacks. Discuss with your doctor what is most appropriate for you during your annual wellness visit. The different tests include: colonoscopy (considered the best screening method), a fecal occult blood test, a fecal DNA test, and sigmoidoscopy.    -A bone mass density test is recommended when a woman turns 65 to screen for osteoporosis. This test is only recommended one time, as a screening. Some providers will use this same test as a disease monitoring tool if you already have osteoporosis. -Breast cancer screenings are recommended every other year for women of normal risk, age 54-69.  -Cervical cancer screenings for women over age 72 are only recommended with certain risk factors.      Here is a list of your current Health Maintenance items (your personalized list of preventive services) with a due date:  Health Maintenance Due   Topic Date Due    Hepatitis C Test  Never done    Pneumococcal Vaccine (1 of 2 - PPSV23) Never done    DTaP/Tdap/Td  (1 - Tdap) Never done    Colorectal Screening  Never done    Cholesterol Test   01/26/2020    Annual Well Visit  Never done    Yearly Flu Vaccine (1) 09/01/2021    Shingles Vaccine (1 of 2) Never done    Mammogram  09/02/2021         Vaccine Information Statement    Influenza (Flu) Vaccine (Inactivated or Recombinant): What You Need to Know    Many vaccine information statements are available in [place] and other languages. See www.immunize.org/vis. Hojas de información sobre vacunas están disponibles en español y en muchos otros idiomas. Visite www.immunize.org/vis. 1. Why get vaccinated? Influenza vaccine can prevent influenza (flu). Flu is a contagious disease that spreads around the [facility] every year, usually between October and May. Anyone can get the flu, but it is more dangerous for some people. Infants and young children, people 72 years and older, pregnant people, and people with certain health conditions or a weakened immune system are at greatest risk of flu complications. Pneumonia, bronchitis, sinus infections, and ear infections are examples of flu-related complications. If you have a medical condition, such as heart disease, cancer, or diabetes, flu can make it worse. Flu can cause fever and chills, sore throat, muscle aches, fatigue, cough, headache, and runny or stuffy nose. Some people may have vomiting and diarrhea, though this is more common in children than adults. In an average year, thousands of people in the [facility] die from flu, and many more are hospitalized. Flu vaccine prevents millions of illnesses and flu-related visits to the doctor each year. 2. Influenza vaccines     CDC recommends everyone 6 months and older get vaccinated every flu season. Children 6 months through 6years of age may need 2 doses during a single flu season. Everyone else needs only 1 dose each flu season. It takes about 2 weeks for protection to develop after vaccination. There are many flu viruses, and they are always changing. Each year a new flu vaccine is made to protect against the influenza viruses believed to be likely to cause disease in the upcoming flu season. Even when the vaccine doesnt exactly match these viruses, it may still provide some protection. Influenza vaccine does not cause flu. Influenza vaccine may be given at the same time as other vaccines. 3. Talk with your health care provider    Tell your vaccination provider if the person getting the vaccine:   Has had an allergic reaction after a previous dose of influenza vaccine, or has any severe, life-threatening allergies    Has ever had Guillain-Barré Syndrome (also called GBS)    In some cases, your health care provider may decide to postpone influenza vaccination until a future visit. Influenza vaccine can be administered at any time during pregnancy. People who are or will be pregnant during influenza season should receive inactivated influenza vaccine. People with minor illnesses, such as a cold, may be vaccinated. People who are moderately or severely ill should usually wait until they recover before getting influenza vaccine. Your health care provider can give you more information. 4. Risks of a vaccine reaction     Soreness, redness, and swelling where the shot is given, fever, muscle aches, and headache can happen after influenza vaccination.  There may be a very small increased risk of Guillain-Barré Syndrome (GBS) after inactivated influenza vaccine (the flu shot). Shan Mccurdy children who get the flu shot along with pneumococcal vaccine (PCV13) and/or DTaP vaccine at the same time might be slightly more likely to have a seizure caused by fever. Tell your health care provider if a child who is getting flu vaccine has ever had a seizure. People sometimes faint after medical procedures, including vaccination. Tell your provider if you feel dizzy or have vision changes or ringing in the ears. As with any medicine, there is a very remote chance of a vaccine causing a severe allergic reaction, other serious injury, or death. 5. What if there is a serious problem? An allergic reaction could occur after the vaccinated person leaves the clinic.  If you see signs of a severe allergic reaction (hives, swelling of the face and throat, difficulty breathing, a fast heartbeat, dizziness, or weakness), call 9-1-1 and get the person to the nearest hospital.    For other signs that concern you, call your health care provider. Adverse reactions should be reported to the Vaccine Adverse Event Reporting System (VAERS). Your health care provider will usually file this report, or you can do it yourself. Visit the VAERS website at www.vaers. [facility].gov or call 1-323.437.4840. VAERS is only for reporting reactions, and VAERS staff members do not give medical advice. 6. The National Vaccine Injury Compensation Program    The [facility] Vaccine Injury Compensation Program (VICP) is a federal program that was created to compensate people who may have been injured by certain vaccines. Claims regarding alleged injury or death due to vaccination have a time limit for filing, which may be as short as two years. Visit the VICP website at www.[facility].gov/vaccinecompensation or call 6-422.386.2213 to learn about the program and about filing a claim. 7. How can I learn more?  Ask your health care provider.  Call your local or state health department.  Visit the website of the Food and Drug Administration (FDA) for vaccine package inserts and additional information at www.fda.gov/vaccines-blood-biologics/vaccines.  Contact the Centers for Disease Control and Prevention (CDC):  - Call 6-954.813.2014 (1-800-CDC-INFO) or  - Visit CDCs influenza website at www.cdc.gov/flu. Vaccine Information Statement   Inactivated Influenza Vaccine   8/6/2021  [address] [id number]   Department of Health and Human Services  Centers for Disease Control and Prevention    Office Use Only

## 2021-10-18 NOTE — PROGRESS NOTES
Chief Complaint   Patient presents with    Annual Wellness Visit     Abuse Screening Questionnaire 10/18/2021   Do you ever feel afraid of your partner? N   Are you in a relationship with someone who physically or mentally threatens you? N   Is it safe for you to go home? Y     Fall Risk Assessment, last 12 mths 10/18/2021   Able to walk? Yes   Fall in past 12 months? 0   Do you feel unsteady? 0   Are you worried about falling 0     3 most recent PHQ Screens 10/18/2021   Little interest or pleasure in doing things Not at all   Feeling down, depressed, irritable, or hopeless Not at all   Total Score PHQ 2 0     Physician order obtained. Patient completed adult immunization consent form. Allergies, contraindications and recommendations reviewed with patient. Seasonal influenza vaccine administered IM right deltoid. Patient tolerated well. Patient remained in office for 15 minutes after injection and no adverse reactions were noted.     Lot # [id number]  Exp Date: 06/30/2022  [facility] # [id number]

## 2021-10-18 NOTE — PATIENT INSTRUCTIONS
Arthritis: Care Instructions  Overview     Arthritis, also called osteoarthritis, is a breakdown of the cartilage that cushions your joints. When the cartilage wears down, your bones rub against each other. This causes pain and stiffness. Many people have some arthritis as they age. Arthritis most often affects the joints of the spine, hands, hips, knees, or feet. Arthritis never goes away completely. But medicine and home treatment can help reduce pain and help you stay active. Follow-up care is a key part of your treatment and safety. Be sure to make and go to all appointments, and call your doctor if you are having problems. It's also a good idea to know your test results and keep a list of the medicines you take. How can you care for yourself at home? · Stay at a healthy weight. Being overweight puts extra strain on your joints. · Talk to your doctor or physical therapist about exercises that will help ease joint pain. ? Stretch. You may enjoy gentle forms of yoga to help keep your joints and muscles flexible. ? Walk instead of jog. Other types of exercise that are less stressful on the joints include riding a bike, swimming, jc chi, or water exercise. ? Lift weights. Strong muscles help reduce stress on your joints. Stronger thigh muscles, for example, take some of the stress off of the knees and hips. Learn the right way to lift weights so you don't make joint pain worse. · Take your medicines exactly as prescribed. Call your doctor if you think you are having a problem with your medicine. · Take pain medicines exactly as directed. ? If the doctor gave you a prescription medicine for pain, take it as prescribed. ? If you are not taking a prescription pain medicine, ask your doctor if you can take an over-the-counter medicine. · Use a cane, crutch, walker, or another device if you need help to get around. These can help rest your joints.  You also can use other things to make life easier, such as a higher toilet seat and padded handles on kitchen utensils. · Do not sit in low chairs. They can make it hard to get up. · Put heat or cold on your sore joints as needed. Use whichever helps you most. You can also switch between hot and cold packs. ? Apply heat 2 or 3 times a day for 20 to 30 minutes--using a heating pad, hot shower, or hot pack--to relieve pain and stiffness. But don't use heat on a swollen joint. ? Put ice or a cold pack on your sore joint for 10 to 20 minutes at a time. Put a thin cloth between the ice and your skin. When should you call for help? Call your doctor now or seek immediate medical care if:    · You have sudden swelling, warmth, or pain in any joint.     · You have joint pain and a fever or rash.     · You have such bad pain that you cannot use a joint. Watch closely for changes in your health, and be sure to contact your doctor if:    · You have mild joint symptoms that continue even with more than 6 weeks of care at home.     · You have stomach pain or other problems with your medicine. Where can you learn more? Go to http://www.gray.com/  Enter W770 in the search box to learn more about \"Arthritis: Care Instructions. \"  Current as of: April 30, 2021               Content Version: 13.0  © 6717-6636 [employer]. Care instructions adapted under license by [employer] (which disclaims liability or warranty for this information). If you have questions about a medical condition or this instruction, always ask your healthcare professional. [place] [address] any warranty or liability for your use of this information. Hip Arthritis: Care Instructions  Your Care Instructions     Arthritis, also called osteoarthritis, is a breakdown of the tissue (cartilage) that cushions your joints. Many people have some arthritis as they age.  When the cartilage in your hip joints wears down, your hip bone rubs against the hip socket. This causes pain and stiffness. Work with your doctor to find the right mix of treatments for your arthritis. There are things you can do at home to protect your hip joints, ease your pain, and help you stay active. But if your arthritis becomes so bad that you cannot walk, you may need surgery to replace the hip joint. Follow-up care is a key part of your treatment and safety. Be sure to make and go to all appointments, and call your doctor if you are having problems. It's also a good idea to know your test results and keep a list of the medicines you take. How can you care for yourself at home? · Stay at a healthy weight. Being overweight puts extra strain on your hip joints. · Talk to your doctor or physical therapist about exercises that will help ease hip pain. These tips may help. ? Stretch to help prevent stiffness and to prevent injury before you exercise. You may enjoy gentle forms of yoga to help keep your joints and muscles flexible. ? Walk instead of jog. Other types of exercise that are less stressful on the joints include riding a bike, swimming, and doing water exercise. ? Lift weights. Strong muscles help reduce stress on your joints. Stronger thigh muscles, for example, take some of the stress off of the knees and hips. Learn the right way to lift weights so you do not make joint pain worse. · Take pain medicines exactly as directed. ? If the doctor gave you a prescription medicine for pain, take it as prescribed. ? If you are not taking a prescription pain medicine, ask your doctor if you can take an over-the-counter medicine. · Use a cane, crutch, walker, or another device if you need help to get around. These can help rest your hips. You also can use other things to make life easier, such as a higher toilet seat. · Do not sit in low chairs, which can make it painful to get up. · Put heat or cold on your sore hips as needed.  Use whichever helps you most. You also can go back and forth between hot and cold packs. ? Apply heat 2 or 3 times a day for 20 to 30 minutes--using a heating pad, hot shower, or hot pack--to relieve pain and stiffness. ? Put ice or a cold pack on your sore hips for 10 to 20 minutes at a time to numb the area. Put a thin cloth between the ice and your skin. · Think about talking to your doctor about using capsaicin, a cream you apply to the skin for pain relief. When should you call for help? Call your doctor now or seek immediate medical care if:    · You have sudden swelling, warmth, or pain in any joint.     · You have joint pain and a fever or rash.     · You have such bad pain that you cannot use the joint. Watch closely for changes in your health, and be sure to contact your doctor if:    · You have mild joint symptoms that continue even with more than 6 weeks of care at home.     · You do not get better as expected.     · You have stomach pain or other problems with your medicine. Where can you learn more? Go to http://www.gray.com/  Enter T640 in the search box to learn more about \"Hip Arthritis: Care Instructions. \"  Current as of: April 30, 2021               Content Version: 13.0  © 2006-2021 [employer]. Care instructions adapted under license by [employer] (which disclaims liability or warranty for this information). If you have questions about a medical condition or this instruction, always ask your healthcare professional. [place] [address] any warranty or liability for your use of this information. Dilated Cardiomyopathy: Care Instructions  Your Care Instructions     Dilated cardiomyopathy is a condition that weakens your heart muscle and causes it to stretch, or dilate. When your heart muscle is weak, it can't pump out blood as well as it should. More blood stays in your heart after each heartbeat.  As more blood fills and stays in the heart, the heart muscle stretches even more and gets even weaker. Many things can cause dilated cardiomyopathy. It can be caused by another disease or condition. Some people have a family history of dilated cardiomyopathy. For some people, the cause is not known. You may not have any symptoms at first. Or you may have symptoms, such as feeling very tired or weak. If your heart gets weaker, you may develop heart failure. Heart failure means that your heart muscle doesn't pump as much blood as your body needs. If this happens, you will feel other symptoms such as shortness of breath or trouble breathing when you lie down. The goal of treatment is to slow the disease and help you feel better. You may also have treatment for the cause of the cardiomyopathy. You will probably take a few medicines. If your doctor thinks it will help your heart and prevent problems, you may get a device such as a pacemaker. Self-care is another important part of your treatment. It includes the things you can do every day to feel better and stay as healthy as possible. Follow-up care is a key part of your treatment and safety. Be sure to make and go to all appointments, and call your doctor if you are having problems. It's also a good idea to know your test results and keep a list of the medicines you take. How can you care for yourself at home? Medicines    · Be safe with medicines. Take your medicines exactly as prescribed. Call your doctor if you think you are having a problem with your medicine. You may be taking some of the following medicines:  ? Angiotensin-converting enzyme (ACE) inhibitors or angiotensin II receptor blockers (ARBs). These make it easier for blood to flow. ? Diuretics. These help remove excess fluid from the body. ? Beta-blockers. These slow the heart rate and can help the heart fill with blood more completely. Heart-healthy lifestyle    · Be active. Exercise regularly, but don't exercise too hard.  If you aren't already active, your doctor may want you to start exercising. But don't start until you have talked with your doctor to make an exercise program that is safe for you.     · Do not smoke. Smoking can make a heart condition worse. If you need help quitting, talk to your doctor about stop-smoking programs and medicines. These can increase your chances of quitting for good.     · Eat a heart-healthy diet.     · Stay at a healthy weight. Lose weight if you need to.     · If your doctor recommends it, limit sodium. This helps keep fluid from building up in your body. It may help you feel better.     · Manage other health problems. These include diabetes, high blood pressure, and high cholesterol. If you think you may have a problem with alcohol or drug use, talk to your doctor. Weight monitoring    · Weigh yourself without clothing at the same time each day. Record your weight. Call your doctor if you have a sudden weight gain, such as more than 2 to 3 pounds in a day or 5 pounds in a week. (Your doctor may suggest a different range of weight gain.) A sudden weight gain may mean that your condition is getting worse. When should you call for help? Call 911 anytime you think you may need emergency care. For example, call if:    · You have symptoms of sudden heart failure. These may include:  ? Severe trouble breathing. ? A fast or irregular heartbeat. ? Coughing up pink, foamy mucus. ? Passing out. Call your doctor now or seek immediate medical care if:    · You have new or changed symptoms of heart failure, such as:  ? New or increased shortness of breath. ? New or worse swelling in your legs, ankles, or feet. ? Sudden weight gain, such as more than 2 to 3 pounds in a day or 5 pounds in a week. (Your doctor may suggest a different range of weight gain.)  ? Feeling dizzy or lightheaded or like you may faint. ? Feeling so tired or weak that you cannot do your usual activities. ? Not sleeping well.  Shortness of breath wakes you at night. You need extra pillows to prop yourself up to breathe easier. Watch closely for changes in your health, and be sure to contact your doctor if you have any problems. Where can you learn more? Go to http://www.gray.com/  Enter B164 in the search box to learn more about \"Dilated Cardiomyopathy: Care Instructions. \"  Current as of: April 29, 2021               Content Version: 13.0  © 2006-2021 [employer]. Care instructions adapted under license by [employer] (which disclaims liability or warranty for this information). If you have questions about a medical condition or this instruction, always ask your healthcare professional. [place] [address] any warranty or liability for your use of this information. Influenza (Flu) Vaccine (Inactivated or Recombinant): What You Need to Know  Why get vaccinated? Influenza vaccine can prevent influenza (flu). Flu is a contagious disease that spreads around the [facility] every year, usually between October and May. Anyone can get the flu, but it is more dangerous for some people. Infants and young children, people 72years of age and older, pregnant women, and people with certain health conditions or a weakened immune system are at greatest risk of flu complications. Pneumonia, bronchitis, sinus infections and ear infections are examples of flu-related complications. If you have a medical condition, such as heart disease, cancer or diabetes, flu can make it worse. Flu can cause fever and chills, sore throat, muscle aches, fatigue, cough, headache, and runny or stuffy nose. Some people may have vomiting and diarrhea, though this is more common in children than adults. Each year, thousands of people in the [facility] die from flu, and many more are hospitalized. Flu vaccine prevents millions of illnesses and flu-related visits to the doctor each year.   Influenza vaccine  CDC recommends everyone 10months of age and older get vaccinated every flu season. Children 6 months through 6years of age may need 2 doses during a single flu season. Everyone else needs only 1 dose each flu season. It takes about 2 weeks for protection to develop after vaccination. There are many flu viruses, and they are always changing. Each year a new flu vaccine is made to protect against three or four viruses that are likely to cause disease in the upcoming flu season. Even when the vaccine doesn't exactly match these viruses, it may still provide some protection. Influenza vaccine does not cause flu. Influenza vaccine may be given at the same time as other vaccines. Talk with your health care provider  Tell your vaccine provider if the person getting the vaccine:  · Has had an allergic reaction after a previous dose of influenza vaccine, or has any severe, life-threatening allergies. · Has ever had Guillain-Barré Syndrome (also called GBS). In some cases, your health care provider may decide to postpone influenza vaccination to a future visit. People with minor illnesses, such as a cold, may be vaccinated. People who are moderately or severely ill should usually wait until they recover before getting influenza vaccine. Your health care provider can give you more information. Risks of a vaccine reaction  · Soreness, redness, and swelling where shot is given, fever, muscle aches, and headache can happen after influenza vaccine. · There may be a very small increased risk of Guillain-Barré Syndrome (GBS) after inactivated influenza vaccine (the flu shot). Tala Villafuerte children who get the flu shot along with pneumococcal vaccine (PCV13), and/or DTaP vaccine at the same time might be slightly more likely to have a seizure caused by fever. Tell your health care provider if a child who is getting flu vaccine has ever had a seizure.   People sometimes faint after medical procedures, including vaccination. Tell your provider if you feel dizzy or have vision changes or ringing in the ears. As with any medicine, there is a very remote chance of a vaccine causing a severe allergic reaction, other serious injury, or death. What if there is a serious problem? An allergic reaction could occur after the vaccinated person leaves the clinic. If you see signs of a severe allergic reaction (hives, swelling of the face and throat, difficulty breathing, a fast heartbeat, dizziness, or weakness), call 9-1-1 and get the person to the nearest hospital.  For other signs that concern you, call your health care provider. Adverse reactions should be reported to the Vaccine Adverse Event Reporting System (VAERS). Your health care provider will usually file this report, or you can do it yourself. Visit the VAERS website at www.vaers. hhs.gov or call 8-770.988.4878. VAERS is only for reporting reactions, and VAERS staff do not give medical advice. The National Vaccine Injury Compensation Program  The National Vaccine Injury Compensation Program (VICP) is a federal program that was created to compensate people who may have been injured by certain vaccines. Visit the VICP website at www.hrsa.gov/vaccinecompensation or call 1-568.147.5397 to learn about the program and about filing a claim. There is a time limit to file a claim for compensation. How can I learn more? · Ask your healthcare provider. · Call your local or state health department. · Contact the Centers for Disease Control and Prevention (CDC):  ? Call 7-499.187.7537 (1-800-CDC-INFO) or  ? Visit CDC's website at www.cdc.gov/flu  Vaccine Information Statement (Interim)  Inactivated Influenza Vaccine  8/15/2019  [address] GI[address] [place] 554DC-50  Department of Health and Human Services  Centers for Disease Control and Prevention  Many Vaccine Information Statements are available in [place] and other languages. See www.immunize.org/vis.   Muchas hojas de [employer] vacunas están disponibles en español y en otros idiomas. Visite www.immunize.org/vis. Care instructions adapted under license by [employer] (which disclaims liability or warranty for this information). If you have questions about a medical condition or this instruction, always ask your healthcare professional. [address] any warranty or liability for your use of this information.

## 2021-10-18 NOTE — PROGRESS NOTES
Cori Patel (: 1970) is a 49 y.o. female, established patient, here for evaluation of the following chief complaint(s):  Cardiomyopathy and Hip Pain       ASSESSMENT/PLAN:  Below is the assessment and plan developed based on review of pertinent history, physical exam, labs, studies, and medications. ICD-10-CM ICD-9-CM    1. Osteoarthritis of left hip, unspecified osteoarthritis type  M16.12 715.95 diclofenac (VOLTAREN) 1 % gel  End-stage arthritis involving the left hip. Follows with Ortho Dr. Abraham Smith. Left hip replacement scheduled for 2021. Consider trial of topical NSAID which appears to be effective for OA pain and not associated with an increased risk of systemic adverse events. 2. Dilated cardiomyopathy ([facility]  I42.0 425.4 Follows with the cardiologist, Dr. Talitha Libman. LOV 10/13/2021. Continue medications as prescribed. 3. Morbid obesity with BMI of 40.0-44.9, adult ([facility]  E66.01 278.01 Nutritionist consult canceled (unable to pay co-pay). Unable to exercise due to osteoarthritis. Continue managing weight with diet. Z68.41 V85.41    4. Hyperthyroidism  E05.90 242.90 Follows with endocrinology, Dr. Jaclyn Hoffmann. Continue medications as prescribed. 5. Reactive airway disease with wheezing, mild intermittent, uncomplicated  X26.58 000.96 albuterol (PROVENTIL HFA, VENTOLIN HFA, PROAIR HFA) 90 mcg/actuation inhaler  Takes 2 puffs as needed for wheezing     All chart history elements were reviewed by me at the time of the visit even though marked at time of note closure. Patient understands our medical plan. Patient has provided input and agrees with goals. Alternatives have been explained and offered. All questions answered. The patient is to call if condition worsens or fails to improve. Return in about 8 days (around 10/26/2021) for pre-op clearance . SUBJECTIVE/OBJECTIVE:  HPI   1. Osteoarthritis of left hip  End-stage arthritis involving the left hip.  Follows with Ortho Dr. Dewayne Ferrell. Left hip replacement scheduled for 11/02/2021. Reports pain in the left hip. 2.  Dilated cardiomyopathy:  History of dilated cardiomyopathy. Dual-chamber AICD placed on September 1, 2017 and has done reasonably well since that time. No complaints today. Follows with cardiologist, Dr. Viviane Leon. LOV 10/13/2021. BNP 61. States she was cleared for surgery by the cardiologist.  3.  Hyperthyroidism. Graves' disease. History of Graves' disease, right lobe thyroid nodule. Follows with endocrinology, Dr. Kelli Barillas. States she was cleared for surgery by the endocrinologist.  5.  Obesity:    Current weight 242 pounds. BMI 40.27 kg/m2. Attempted to loss weight before surgery (hip replacement). Nutritionist consult canceled (unable to pay co-pay). 6. Wheezing  Reports rare episodes of shortness of breath and wheezing. Uses albuterol inhaler with relief. Exhausted medication and seeking relief. Review of Systems  Pertinent items are noted in HPI    Physical Exam  General:  alert, cooperative, well appearing, in no apparent distress. CV: The heart sounds are regular in rate and rhythm. There is a normal S1 and S2. There or no murmurs, rubs, or gallops. Distal pulses are intact and equal.  Lungs: Inspiratory and expiratory efforts are full and unlabored. Lung sounds are clear and equal to auscultation throughout all lung fields without wheezing, rales, or rhonchi. GI:  The abdomen is obese, soft and nontender. Bowel sounds are present in all 4 quadrants. There is no rebound or guarding. There is no CVA or suprapubic tenderness. Extremities: There is no edema. An electronic signature was used to authenticate this note. -- Mayco Harden PA-C     Please note that this dictation was completed with [employer], the [employer]Plus voice recognition software.   Quite often unanticipated grammatical, syntax, homophones, and other interpretive errors are inadvertently transcribed by the computer software. Please disregard these errors. Please excuse any errors that have escaped final proofreading.

## 2021-10-18 NOTE — PROGRESS NOTES
This is a \"Welcome to [employer]"  Initial Preventive Physical Examination (IPPE) providing Personalized Prevention Plan Services (Performed in the first 12 months of enrollment)    I have reviewed the patient's medical history in detail and updated the computerized patient record. Assessment/Plan   Education and counseling provided:  Are appropriate based on today's review and evaluation  Influenza Vaccine: Administered today  Screening Mammography: Ordered  Screening Pap and pelvic (covered once every 2 years): History of hysterectomy  Colorectal cancer screening tests: Ordered  Cardiovascular screening blood test: Ordered 2/3/2021, but not completed  Diabetes screening test (Hgb A1c): Ordered  EKG: Completed 5/27/2021      ICD-10-CM ICD-9-CM    1. Welcome to Medicare preventive visit  Z00.00 V70.0 Education and counseling provided   2. Body mass index 40.0-44.9, adult (HCC)  Z68.41 V85.41 Managing weight with diet and exercise recommended. 3. Smoking  F17.200 305.1 Personalized risks of tobacco use on current patient's health and benefits from quitting smoking discussed. Advice and assistance to quit smoking offered. Patient's motivation level to quit smoking was low today. 4. Encounter for screening mammogram for malignant neoplasm of breast  Z12.31 V76.12 EMERITA MAMMO BI SCREENING INCL CAD   5.  Screening for malignant neoplasm of colon  Z12.11 V76.51 REFERRAL TO COLON AND RECTAL SURGERY   6. Needs flu shot  Z23 V04.81 INFLUENZA VIRUS VAC QUAD,SPLIT,PRESV FREE SYRINGE IM         Depression Risk Screen     3 most recent PHQ Screens 10/13/2021   Little interest or pleasure in doing things Not at all   Feeling down, depressed, irritable, or hopeless Not at all   Total Score PHQ 2 0       Alcohol Risk Screen    Do you average more than 1 drink per night or more than 7 drinks a week:  No    On any one occasion in the past three months have you have had more than 3 drinks containing alcohol:  No    Functional Ability and Level of Safety    Diet: No special diet      Hearing: Hearing is good. Vision Screening:  Vision is good. No exam data present      Activities of Daily Living: The home contains: no safety equipment. Patient does total self care      Ambulation: with no difficulty      Exercise level: moderately active     Fall Risk Screen:  Fall Risk Assessment, last 12 mths 8/2/2021   Able to walk? Yes   Fall in past 12 months? 0   Do you feel unsteady? 0   Are you worried about falling 0      Abuse Screen:  Patient is not abused       Screening EKG   EKG order placed: Yes    End of Life Planning   Advanced care planning directives were discussed with the patient and /or family/caregiver. Health Maintenance Due     Health Maintenance Due   Topic Date Due    Hepatitis C Screening  Never done    Pneumococcal 0-64 years (1 of 2 - PPSV23) Never done    DTaP/Tdap/Td series (1 - Tdap) Never done    Colorectal Cancer Screening Combo  Never done    Lipid Screen  01/26/2020    Medicare Yearly Exam  Never done    Flu Vaccine (1) 09/01/2021    Shingrix Vaccine Age 50> (1 of 2) Never done    Breast Cancer Screen Mammogram  09/02/2021       Patient Care Team   Patient Care Team:  Jan Kim PA-C as PCP - General (Physician Assistant)  [facility], Cecy Acevedo PA-C as PCP - [facility]  Geraldine Moreau NP (Nurse Practitioner)  Daniela Maldonado MD (Endocrinology)  Chris Ortega DO (Cardiology)  Francisca Mckeon MD (Orthopedic Surgery)    History     Past Medical History:   Diagnosis Date    AICD (automatic cardioverter/defibrillator) present 09/2018    Asthma     Cardiac echocardiogram 03/22/2017    LVE. EF 15% (prev 50% on study of 12/10/14). Severe diffuse hypk. Indeterminate diastolic fx.  RVE. RVSP at least 44 mmHg. Mod LAE.  BIMSARK. Mild-mod MR.  Cardiac nuclear imaging test 03/24/2017    High risk. Somewhat patchy uptake. No evidence of ischemia or infarction.   No RWMA.  Severe LVE. EF 25%. Neg EKG on pharm stress test.    Cardiovascular LLE venous duplex 03/06/2017    Left leg:  No DVT. Pulsatile flow.  Congestive heart failure ([address]     Graves disease     Graves disease     Heart failure ([address]     Hypercholesterolemia     Hypertension     Hyperthyroidism       Past Surgical History:   Procedure Laterality Date    HX GYN      hysterectomy  btl    HX MYOMECTOMY       fibroid tumo removed    HX ORTHOPAEDIC  March 2012 ORIF left fibula    DC BREAST SURGERY PROCEDURE UNLISTED      redfuction     Current Outpatient Medications   Medication Sig Dispense Refill    furosemide (LASIX) 40 mg tablet Take 1 tablet by mouth once daily 30 Tablet 5    albuterol (PROVENTIL HFA, VENTOLIN HFA, PROAIR HFA) 90 mcg/actuation inhaler Take 2 Puffs by inhalation every six (6) hours as needed for Wheezing.  carvediloL (COREG) 25 mg tablet TAKE 1 TABLET BY MOUTH TWICE DAILY WITH MEALS 60 Tablet 5    sacubitriL-valsartan (Entresto) 24-26 mg tablet Take 1 tablet by mouth twice daily 60 Tablet 5    rivaroxaban (Xarelto) 20 mg tab tablet Take 1 tablet by mouth once daily 30 Tablet 5    baclofen (LIORESAL) 10 mg tablet Take 0.5-1 Tablets by mouth three (3) times daily as needed for Muscle Spasm(s) or Pain. 90 Tablet 3    atorvastatin (LIPITOR) 80 mg tablet TAKE 1 TABLET BY MOUTH ONCE DAILY AT NIGHT 30 Tablet 5    spironolactone (ALDACTONE) 25 mg tablet Take 1 tablet by mouth once daily 30 Tablet 6    aspirin 81 mg chewable tablet Take 1 Tablet by mouth daily. 30 Tablet 5    potassium chloride (K-DUR, KLOR-CON) 20 mEq tablet Take 1 Tablet by mouth daily. 30 Tablet 5    indapamide (LOZOL) 2.5 mg tablet TAKE 1 TABLET BY MOUTH ONCE DAILY IN THE MORNING 30 Tab 6    methIMAzole (TAPAZOLE) 10 mg tablet Take 1 Tab by mouth two (2) times a day. 1 Tab 0    nitroglycerin (NITROSTAT) 0.4 mg SL tablet 1 Tab by SubLINGual route every five (5) minutes as needed for Chest Pain.  1 Bottle 1    albuterol (ACCUNEB) 1.25 mg/3 mL nebu Take 3 mL by inhalation every four (4) hours as needed (wheezing).  25 Each 0     No Known Allergies    Family History   Problem Relation Age of Onset    Hypertension Mother     High Cholesterol Mother     Heart Disease Maternal Grandmother         CHF    Hypertension Maternal Grandmother     Diabetes Maternal Grandmother     Ovarian Cancer Maternal Grandmother     High Cholesterol Maternal Grandmother     Thyroid Disease Maternal Grandmother     Osteoporosis Maternal Grandmother     Alcohol abuse Father     Liver Disease Father     Asthma Father     No Known Problems Sister     No Known Problems Brother     No Known Problems Brother     No Known Problems Brother     No Known Problems Sister     No Known Problems Sister      Social History     Tobacco Use    Smoking status: Current Some Day Smoker     Packs/day: 0.00     Years: 18.00     Pack years: 0.00     Last attempt to quit: 2017     Years since quittin.6    Smokeless tobacco: Never Used   Substance Use Topics    Alcohol use: Yes     Comment: rm LAU PA-C

## 2021-10-19 ENCOUNTER — HOSPITAL ENCOUNTER (OUTPATIENT)
Dept: GENERAL RADIOLOGY | Age: 51
Discharge: HOME | End: 2021-10-19
Payer: MEDICARE

## 2021-10-19 ENCOUNTER — HOSPITAL ENCOUNTER (OUTPATIENT)
Dept: PREADMISSION TESTING | Age: 51
Discharge: HOME | End: 2021-10-19
Payer: MEDICARE

## 2021-10-19 DIAGNOSIS — M16.12 PRIMARY OSTEOARTHRITIS OF LEFT HIP: ICD-10-CM

## 2021-10-19 DIAGNOSIS — Z01.818 PREOPERATIVE TESTING: ICD-10-CM

## 2021-10-19 LAB
ABO + RH BLD: NORMAL
ALBUMIN SERPL-MCNC: 3.7 G/DL (ref 3.4–5)
ANION GAP SERPL CALC-SCNC: 6 MMOL/L (ref 3–18)
APPEARANCE UR: ABNORMAL
APTT PPP: 40.4 SEC (ref 23–36.4)
ATRIAL RATE: 82 BPM
BACTERIA URNS QL MICRO: ABNORMAL /HPF
BASOPHILS # BLD: 0 K/UL (ref 0–0.1)
BASOPHILS NFR BLD: 1 % (ref 0–2)
BILIRUB UR QL: NEGATIVE
BLOOD GROUP ANTIBODIES SERPL: NORMAL
BUN SERPL-MCNC: 20 MG/DL (ref 7–18)
BUN/CREAT SERPL: 19 (ref 12–20)
CALCIUM SERPL-MCNC: 9.5 MG/DL (ref 8.5–10.1)
CALCULATED P AXIS, ECG09: 36 DEGREES
CALCULATED R AXIS, ECG10: -16 DEGREES
CALCULATED T AXIS, ECG11: -17 DEGREES
CHLORIDE SERPL-SCNC: 100 MMOL/L (ref 100–111)
CO2 SERPL-SCNC: 29 MMOL/L (ref 21–32)
COLOR UR: YELLOW
CREAT SERPL-MCNC: 1.05 MG/DL (ref 0.6–1.3)
DIAGNOSIS, 93000: NORMAL
DIFFERENTIAL METHOD BLD: ABNORMAL
EOSINOPHIL # BLD: 0.1 K/UL (ref 0–0.4)
EOSINOPHIL NFR BLD: 1 % (ref 0–5)
EPITH CASTS URNS QL MICRO: ABNORMAL /LPF (ref 0–5)
ERYTHROCYTE [DISTWIDTH] IN BLOOD BY AUTOMATED COUNT: 13.1 % (ref 11.6–14.5)
EST. AVERAGE GLUCOSE BLD GHB EST-MCNC: 117 MG/DL
GLUCOSE SERPL-MCNC: 135 MG/DL (ref 74–99)
GLUCOSE UR STRIP.AUTO-MCNC: NEGATIVE MG/DL
HBA1C MFR BLD: 5.7 % (ref 4.2–5.6)
HCT VFR BLD AUTO: 39 % (ref 35–45)
HGB BLD-MCNC: 13.1 G/DL (ref 12–16)
HGB UR QL STRIP: ABNORMAL
INR PPP: 1.8 (ref 0.8–1.2)
KETONES UR QL STRIP.AUTO: ABNORMAL MG/DL
LEUKOCYTE ESTERASE UR QL STRIP.AUTO: ABNORMAL
LYMPHOCYTES # BLD: 2.4 K/UL (ref 0.9–3.6)
LYMPHOCYTES NFR BLD: 29 % (ref 21–52)
MCH RBC QN AUTO: 30.8 PG (ref 24–34)
MCHC RBC AUTO-ENTMCNC: 33.6 G/DL (ref 31–37)
MCV RBC AUTO: 91.8 FL (ref 78–100)
MONOCYTES # BLD: 0.5 K/UL (ref 0.05–1.2)
MONOCYTES NFR BLD: 6 % (ref 3–10)
MUCOUS THREADS URNS QL MICRO: ABNORMAL /LPF
NEUTS SEG # BLD: 5.4 K/UL (ref 1.8–8)
NEUTS SEG NFR BLD: 63 % (ref 40–73)
NITRITE UR QL STRIP.AUTO: NEGATIVE
P-R INTERVAL, ECG05: 194 MS
PH UR STRIP: 5 [PH] (ref 5–8)
PLATELET # BLD AUTO: 336 K/UL (ref 135–420)
PMV BLD AUTO: 8.9 FL (ref 9.2–11.8)
POTASSIUM SERPL-SCNC: 3.4 MMOL/L (ref 3.5–5.5)
PROT UR STRIP-MCNC: NEGATIVE MG/DL
PROTHROMBIN TIME: 21.1 SEC (ref 11.5–15.2)
Q-T INTERVAL, ECG07: 392 MS
QRS DURATION, ECG06: 82 MS
QTC CALCULATION (BEZET), ECG08: 457 MS
RBC # BLD AUTO: 4.25 M/UL (ref 4.2–5.3)
RBC #/AREA URNS HPF: ABNORMAL /HPF (ref 0–5)
SODIUM SERPL-SCNC: 135 MMOL/L (ref 136–145)
SP GR UR REFRACTOMETRY: 1.02 (ref 1–1.03)
SPECIMEN EXP DATE BLD: NORMAL
TRICHOMONAS UR QL MICRO: ABNORMAL
UROBILINOGEN UR QL STRIP.AUTO: 1 EU/DL (ref 0.2–1)
VENTRICULAR RATE, ECG03: 82 BPM
WBC # BLD AUTO: 8.4 K/UL (ref 4.6–13.2)
WBC URNS QL MICRO: ABNORMAL /HPF (ref 0–4)

## 2021-10-19 PROCEDURE — 85025 COMPLETE CBC W/AUTO DIFF WBC: CPT

## 2021-10-19 PROCEDURE — 85730 THROMBOPLASTIN TIME PARTIAL: CPT

## 2021-10-19 PROCEDURE — 93005 ELECTROCARDIOGRAM TRACING: CPT

## 2021-10-19 PROCEDURE — 71046 X-RAY EXAM CHEST 2 VIEWS: CPT

## 2021-10-19 PROCEDURE — 80048 BASIC METABOLIC PNL TOTAL CA: CPT

## 2021-10-19 PROCEDURE — 36415 COLL VENOUS BLD VENIPUNCTURE: CPT

## 2021-10-19 PROCEDURE — 87086 URINE CULTURE/COLONY COUNT: CPT

## 2021-10-19 PROCEDURE — 81001 URINALYSIS AUTO W/SCOPE: CPT

## 2021-10-19 PROCEDURE — 85610 PROTHROMBIN TIME: CPT

## 2021-10-19 PROCEDURE — 86901 BLOOD TYPING SEROLOGIC RH(D): CPT

## 2021-10-19 PROCEDURE — 83036 HEMOGLOBIN GLYCOSYLATED A1C: CPT

## 2021-10-19 PROCEDURE — 82040 ASSAY OF SERUM ALBUMIN: CPT

## 2021-10-20 LAB
BACTERIA SPEC CULT: NORMAL
CC UR VC: NORMAL
SERVICE CMNT-IMP: NORMAL

## 2021-10-20 RX ORDER — NITROFURANTOIN 25; 75 MG/1; MG/1
100 CAPSULE ORAL 2 TIMES DAILY
Qty: 10 CAPSULE | Refills: 0 | Status: SHIPPED | OUTPATIENT
Start: 2021-10-20 | End: 2021-11-01

## 2021-10-20 RX ORDER — METRONIDAZOLE 500 MG/1
TABLET ORAL
Qty: 4 TABLET | Refills: 0 | Status: SHIPPED | OUTPATIENT
Start: 2021-10-20 | End: 2021-11-01

## 2021-10-25 DIAGNOSIS — Z01.818 ENCOUNTER FOR PREOPERATIVE EXAMINATION FOR GENERAL SURGICAL PROCEDURE: ICD-10-CM

## 2021-10-25 DIAGNOSIS — M16.12 PRIMARY OSTEOARTHRITIS OF LEFT HIP: Primary | ICD-10-CM

## 2021-10-25 PROCEDURE — 73502 X-RAY EXAM HIP UNI 2-3 VIEWS: CPT | Performed by: PHYSICIAN ASSISTANT

## 2021-10-26 ENCOUNTER — OFFICE VISIT (OUTPATIENT)
Dept: FAMILY MEDICINE CLINIC | Age: 51
End: 2021-10-26
Payer: MEDICARE

## 2021-10-26 ENCOUNTER — HOSPITAL ENCOUNTER (OUTPATIENT)
Dept: LAB | Age: 51
Discharge: HOME | End: 2021-10-26

## 2021-10-26 VITALS
OXYGEN SATURATION: 98 % | SYSTOLIC BLOOD PRESSURE: 128 MMHG | RESPIRATION RATE: 18 BRPM | WEIGHT: 240 LBS | DIASTOLIC BLOOD PRESSURE: 76 MMHG | TEMPERATURE: 98.5 F | HEIGHT: 65 IN | BODY MASS INDEX: 39.99 KG/M2 | HEART RATE: 75 BPM

## 2021-10-26 DIAGNOSIS — R73.03 PREDIABETES: ICD-10-CM

## 2021-10-26 DIAGNOSIS — E87.6 HYPOKALEMIA: ICD-10-CM

## 2021-10-26 DIAGNOSIS — R35.0 URINE FREQUENCY: ICD-10-CM

## 2021-10-26 DIAGNOSIS — M16.12 OSTEOARTHRITIS OF LEFT HIP, UNSPECIFIED OSTEOARTHRITIS TYPE: ICD-10-CM

## 2021-10-26 DIAGNOSIS — Z01.818 PREOPERATIVE TESTING: Primary | ICD-10-CM

## 2021-10-26 DIAGNOSIS — Z95.810 AICD (AUTOMATIC CARDIOVERTER/DEFIBRILLATOR) PRESENT: ICD-10-CM

## 2021-10-26 DIAGNOSIS — E05.90 HYPERTHYROIDISM: ICD-10-CM

## 2021-10-26 DIAGNOSIS — Z01.818 PREOPERATIVE GENERAL PHYSICAL EXAMINATION: Primary | ICD-10-CM

## 2021-10-26 DIAGNOSIS — I50.22 SYSTOLIC CHF, CHRONIC (HCC): ICD-10-CM

## 2021-10-26 DIAGNOSIS — E66.01 MORBID OBESITY WITH BMI OF 40.0-44.9, ADULT (HCC): ICD-10-CM

## 2021-10-26 DIAGNOSIS — I48.91 ATRIAL FIBRILLATION, UNSPECIFIED TYPE (HCC): ICD-10-CM

## 2021-10-26 LAB
BILIRUB UR QL STRIP: NEGATIVE
GLUCOSE UR-MCNC: NEGATIVE MG/DL
KETONES P FAST UR STRIP-MCNC: NEGATIVE MG/DL
PH UR STRIP: 5.5 [PH] (ref 4.6–8)
PROT UR QL STRIP: NEGATIVE
SP GR UR STRIP: 1.01 (ref 1–1.03)
UA UROBILINOGEN AMB POC: NORMAL (ref 0.2–1)
URINALYSIS CLARITY POC: CLEAR
URINALYSIS COLOR POC: YELLOW
URINE BLOOD POC: NORMAL
URINE LEUKOCYTES POC: NEGATIVE
URINE NITRITES POC: NEGATIVE
XX-LABCORP SPECIMEN COL,LCBCF: NORMAL

## 2021-10-26 PROCEDURE — G8427 DOCREV CUR MEDS BY ELIG CLIN: HCPCS | Performed by: STUDENT IN AN ORGANIZED HEALTH CARE EDUCATION/TRAINING PROGRAM

## 2021-10-26 PROCEDURE — 3017F COLORECTAL CA SCREEN DOC REV: CPT | Performed by: STUDENT IN AN ORGANIZED HEALTH CARE EDUCATION/TRAINING PROGRAM

## 2021-10-26 PROCEDURE — G8754 DIAS BP LESS 90: HCPCS | Performed by: STUDENT IN AN ORGANIZED HEALTH CARE EDUCATION/TRAINING PROGRAM

## 2021-10-26 PROCEDURE — G8432 DEP SCR NOT DOC, RNG: HCPCS | Performed by: STUDENT IN AN ORGANIZED HEALTH CARE EDUCATION/TRAINING PROGRAM

## 2021-10-26 PROCEDURE — G8417 CALC BMI ABV UP PARAM F/U: HCPCS | Performed by: STUDENT IN AN ORGANIZED HEALTH CARE EDUCATION/TRAINING PROGRAM

## 2021-10-26 PROCEDURE — 81001 URINALYSIS AUTO W/SCOPE: CPT | Performed by: STUDENT IN AN ORGANIZED HEALTH CARE EDUCATION/TRAINING PROGRAM

## 2021-10-26 PROCEDURE — 99214 OFFICE O/P EST MOD 30 MIN: CPT | Performed by: STUDENT IN AN ORGANIZED HEALTH CARE EDUCATION/TRAINING PROGRAM

## 2021-10-26 PROCEDURE — G8752 SYS BP LESS 140: HCPCS | Performed by: STUDENT IN AN ORGANIZED HEALTH CARE EDUCATION/TRAINING PROGRAM

## 2021-10-26 PROCEDURE — 99001 SPECIMEN HANDLING PT-LAB: CPT

## 2021-10-26 NOTE — PROGRESS NOTES
1. \"Have you been to the ER, urgent care clinic since your last visit? Hospitalized since your last visit? \" No    2. \"Have you seen or consulted any other health care providers outside of the [address] [place] [address] since your last visit? \" No     3. For patients aged 39-70: Has the patient had a colonoscopy? No     If the patient is female:    4. For patients aged 41-77: Has the patient had a mammogram within the past 2 years? Yes,  satisfied with blue hyperlink    5. For patients aged 21-65: Has the patient had a pap smear?  No

## 2021-10-26 NOTE — PATIENT INSTRUCTIONS
Learning About How to Prepare for Surgery  How can you prepare before surgery? You can do some things that will help you safely prepare for surgery. · Understand exactly what surgery is planned. You should know the risks, benefits, and other options. · Tell your doctors ALL the medicines, vitamins, supplements, and herbal remedies you take. Some of these can increase the risk of bleeding. Or they may interact with anesthesia. · Follow your doctor's instructions about which medicines to take or stop before your surgery. ? You may need to stop taking some medicines a week or more before surgery. ? If you take aspirin or some other blood thinner, be sure to talk to your doctor. · Follow any other instructions your doctor gave you. · If you have an advance directive, let your doctor know, and bring a copy to the hospital.   It may include a living will and a durable power of  for health care. It lets your doctor and loved ones know your health care wishes. If you don't have one, you may want to prepare one. How can you prepare on the day of surgery? Here are some tips about what to do at home before you leave for your surgery. · If your doctor told you to take your medicines on the day of surgery, take them with only a sip of water. · Follow the instructions about when to stop eating and drinking. If you don't, your surgery may be canceled. · Follow your doctor's instructions about when to bathe or shower before your surgery. · Do not shave the surgical site yourself. · Take off all jewelry and piercings. · Take out contact lenses, if you wear them. · Have a picture ID ready to take with you. Your ID will be checked before your surgery. · Know when to call your doctor. Call your doctor if you:  ? Become ill before surgery. ? Need to reschedule. ? Have changed your mind about having the surgery. What happens before surgery?   Here are some things you can expect to happen before your surgery. · Your picture ID will be checked. · The area of your body that needs surgery is often marked to make sure there are no errors. · You will be kept comfortable and safe by your anesthesia provider. The anesthesia may make you sleep. Or it may just numb the area being worked on. What happens when you are ready to go home? Be sure you have someone drive you home. Anesthesia and pain medicine make it unsafe for you to drive. You will get instructions about recovering from your surgery. This is called a discharge plan. It will cover things like diet, wound care, follow-up care, driving, and getting back to your normal routine. Follow-up care is a key part of your treatment and safety. Be sure to make and go to all appointments, and call your doctor if you are having problems. It's also a good idea to know your test results and keep a list of the medicines you take. Where can you learn more? Go to http://www.gray.com/  Enter Q270 in the search box to learn more about \"Learning About How to Prepare for Surgery. \"  Current as of: February 11, 2021               Content Version: 13.0  © 6102-9730 [employer]. Care instructions adapted under license by [employer] (which disclaims liability or warranty for this information). If you have questions about a medical condition or this instruction, always ask your healthcare professional. [address] any warranty or liability for your use of this information.

## 2021-10-26 NOTE — PROGRESS NOTES
Preoperative Evaluation    Date of Exam: 10/26/2021    Mariajose Baxter is a 49 y.o. female (:1970) who presents for preoperative evaluation. Left total hip arthroplasty scheduled for 2021 (Dr. Dewayne Ferrell MD)    Latex Allergy: no    Problem List:     Patient Active Problem List    Diagnosis Date Noted    S/P ICD (internal cardiac defibrillator) procedure 2017    [facility] 2019    Hyperthyroidism     [facility], [facility] 2017    AICD (automatic cardioverter/defibrillator) present 2017    LV dysfunction 2017    Essential hypertension 2017    Fluid overload 2017    Acute on chronic congestive heart failure ([facility] 2017     Medical History:     Past Medical History:   Diagnosis Date    AICD (automatic cardioverter/defibrillator) present 2018    Asthma     Cardiac echocardiogram 2017    LVE. EF 15% (prev 50% on study of 12/10/14). Severe diffuse hypk. Indeterminate diastolic fx.  RVE. RVSP at least 44 mmHg. Mod LAE.  BISMARK. Mild-mod MR.  Cardiac nuclear imaging test 2017    High risk. Somewhat patchy uptake. No evidence of ischemia or infarction. No RWMA. Severe LVE. EF 25%. Neg EKG on pharm stress test.    Cardiovascular LLE venous duplex 2017    Left leg:  No DVT. Pulsatile flow.  Congestive heart failure (HCC)     Graves disease     Graves disease     [facility] failure [facility]     Hypercholesterolemia     Hypertension     Hyperthyroidism      Allergies:   No Known Allergies   Medications:     Current Outpatient Medications   Medication Sig    nitrofurantoin, macrocrystal-monohydrate, (MACROBID) 100 mg capsule Take 1 Capsule by mouth two (2) times a day.  metroNIDAZOLE (FLAGYL) 500 mg tablet Take 4 tablets by mouth once.  diclofenac (VOLTAREN) 1 % gel Apply 4 g to affected area four (4) times daily.     albuterol (PROVENTIL HFA, VENTOLIN HFA, PROAIR HFA) 90 mcg/actuation inhaler Take 2 Puffs by inhalation every six (6) hours as needed for Wheezing.  furosemide (LASIX) 40 mg tablet Take 1 tablet by mouth once daily    carvediloL (COREG) 25 mg tablet TAKE 1 TABLET BY MOUTH TWICE DAILY WITH MEALS    sacubitriL-valsartan (Entresto) 24-26 mg tablet Take 1 tablet by mouth twice daily    rivaroxaban (Xarelto) 20 mg tab tablet Take 1 tablet by mouth once daily    baclofen (LIORESAL) 10 mg tablet Take 0.5-1 Tablets by mouth three (3) times daily as needed for Muscle Spasm(s) or Pain.  atorvastatin (LIPITOR) 80 mg tablet TAKE 1 TABLET BY MOUTH ONCE DAILY AT NIGHT    spironolactone (ALDACTONE) 25 mg tablet Take 1 tablet by mouth once daily    aspirin 81 mg chewable tablet Take 1 Tablet by mouth daily.  potassium chloride (K-DUR, KLOR-CON) 20 mEq tablet Take 1 Tablet by mouth daily.  indapamide (LOZOL) 2.5 mg tablet TAKE 1 TABLET BY MOUTH ONCE DAILY IN THE MORNING    methIMAzole (TAPAZOLE) 10 mg tablet Take 1 Tab by mouth two (2) times a day.  nitroglycerin (NITROSTAT) 0.4 mg SL tablet 1 Tab by SubLINGual route every five (5) minutes as needed for Chest Pain.  albuterol (ACCUNEB) 1.25 mg/3 mL nebu Take 3 mL by inhalation every four (4) hours as needed (wheezing). No current facility-administered medications for this visit.      Surgical History:     Past Surgical History:   Procedure Laterality Date    HX GYN      hysterectomy  btl    HX MYOMECTOMY       fibroid tumo removed    HX ORTHOPAEDIC  March 2012 ORIF left fibula    UT BREAST SURGERY PROCEDURE UNLISTED      redfuction     Social History:     Social History     Socioeconomic History    Marital status: SINGLE     Spouse name: Not on file    Number of children: Not on file    Years of education: Not on file    Highest education level: Not on file   Tobacco Use    Smoking status: Current Some Day Smoker     Packs/day: 0.00     Years: 18.00     Pack years: 0.00     Last attempt to quit: 2/28/2017     Years since quittin.6    Smokeless tobacco: Never Used   Vaping Use    Vaping Use: Never used   Substance and Sexual Activity    Alcohol use: Yes     Comment: socially     Drug use: No    Sexual activity: Yes     Partners: Male     Birth control/protection: Condom     Social Determinants of Health     Financial Resource Strain:     Difficulty of Paying Living Expenses:    Food Insecurity:     Worried About Running Out of Food in the Last Year:     [address] in the Last Year:    Transportation Needs:     Lack of Transportation (Medical):  Lack of Transportation (Non-Medical):    Physical Activity:     Days of Exercise per Week:     Minutes of Exercise per Session:    Stress:     Feeling of Stress :    Social Connections:     Frequency of Communication with Friends and Family:     Frequency of Social Gatherings with Friends and Family:     Attends Yarsani Services:     Active Member of Clubs or Organizations:     Attends Club or Organization Meetings:     Marital Status:        Anesthesia Complications: None  History of abnormal bleeding : None  History of Blood Transfusions: yes (no complications)  Health Care Directive or Living Will: no   Pimary decision maker: yes (daughters)    Objective:     ROS:   Feeling well. No dyspnea or chest pain on exertion. No abdominal pain, change in bowel habits, black or bloody stools. No urinary tract symptoms. No neurological complaints. OBJECTIVE:   The patient appears well, alert, oriented x 3, in no distress. Visit Vitals  /76 (BP 1 Location: Left arm, BP Patient Position: Sitting, BP Cuff Size: Large adult)   Pulse 75   Temp 98.5 °F (36.9 °C) (Temporal)   Resp 18   Ht 5' 5\" (1.651 m)   Wt 240 lb (108.9 kg)   LMP  (LMP Unknown) Comment:    SpO2 98%   BMI 39.94 kg/m²     HEENT:ENT normal.  Neck supple. No adenopathy or thyromegaly. AGATA. Chest: Lungs are clear, good air entry, no wheezes, rhonchi or rales.    Cardiovascular: S1 and S2 normal, no murmurs, regular rate and rhythm. Abdomen: soft without tenderness, guarding, mass or organomegaly. Extremities: show no edema, normal peripheral pulses. Neurological: is normal, no focal findings. BREAST EXAM: Not indicated  PELVIC EXAM: Not indicated    DIAGNOSTICS:   1. EKG: EKG FINDINGS - Normal sinus rhythm. Possible Left atrial enlargement. Left ventricular hypertrophy. Possible Lateral infarct (cited on or before 06-FEB-2017). T wave abnormality, consider anterior ischemia. 2. CXR: was negative for acute cardiopulmonary process  3. Labs:   Lab Results   Component Value Date/Time    WBC 8.4 10/19/2021 07:53 AM    HGB 13.1 10/19/2021 07:53 AM    Hemoglobin, POC 13.6 09/01/2017 07:44 AM    HCT 39.0 10/19/2021 07:53 AM    Hematocrit, POC 40 09/01/2017 07:44 AM    PLATELET 159 51/17/5631 07:53 AM    MCV 91.8 10/19/2021 07:53 AM     Lab Results   Component Value Date/Time    Hemoglobin A1c 5.7 (H) 10/19/2021 07:53 AM    Glucose 135 (H) 10/19/2021 07:53 AM    Glucose,  (H) 09/01/2017 07:44 AM    LDL, calculated 62 01/26/2019 09:09 AM    Creatinine, POC 0.7 09/01/2017 07:44 AM    Creatinine 1.05 10/19/2021 07:53 AM      Lab Results   Component Value Date/Time    Cholesterol, total 114 01/26/2019 09:09 AM    HDL Cholesterol 33 (L) 01/26/2019 09:09 AM    LDL, calculated 62 01/26/2019 09:09 AM    Triglyceride 94 01/26/2019 09:09 AM    CHOL/HDL Ratio 3.0 03/21/2017 03:24 PM     Lab Results   Component Value Date/Time    ALT (SGPT) 9 07/12/2019 12:00 AM    Alk.  phosphatase 111 07/12/2019 12:00 AM    Bilirubin, direct 0.21 07/12/2019 12:00 AM    Bilirubin, total 0.9 07/12/2019 12:00 AM    Albumin 3.7 10/19/2021 07:53 AM    Protein, total 7.7 07/12/2019 12:00 AM    INR 1.8 (H) 10/19/2021 07:53 AM    Prothrombin time 21.1 (H) 10/19/2021 07:53 AM    PLATELET 104 00/54/4410 07:53 AM     Lab Results   Component Value Date/Time    Glucose 135 (H) 10/19/2021 07:53 AM    Glucose,  (H) 09/01/2017 07:44 AM      Lab Results   Component Value Date/Time    Sodium 135 (L) 10/19/2021 07:53 AM    Potassium 3.4 (L) 10/19/2021 07:53 AM    Chloride 100 10/19/2021 07:53 AM    CO2 29 10/19/2021 07:53 AM    Anion gap 6 10/19/2021 07:53 AM    Glucose 135 (H) 10/19/2021 07:53 AM    BUN 20 (H) 10/19/2021 07:53 AM    Creatinine 1.05 10/19/2021 07:53 AM    BUN/Creatinine ratio 19 10/19/2021 07:53 AM    GFR est AA >60 10/19/2021 07:53 AM    GFR est non-AA 55 (L) 10/19/2021 07:53 AM    Calcium 9.5 10/19/2021 07:53 AM    Bilirubin, total 0.9 07/12/2019 12:00 AM    ALT (SGPT) 9 07/12/2019 12:00 AM    Alk.  phosphatase 111 07/12/2019 12:00 AM    Protein, total 7.7 07/12/2019 12:00 AM    Albumin 3.7 10/19/2021 07:53 AM    Globulin 4.3 (H) 08/28/2017 02:55 PM    A-G Ratio 1.2 02/23/2019 12:00 AM       Results for orders placed or performed in visit on 10/26/21   AMB POC URINALYSIS DIP STICK AUTO W/ MICRO     Status: None   Result Value Ref Range Status    Color (UA POC) Yellow  Final    Clarity (UA POC) Clear  Final    Glucose (UA POC) Negative Negative Final    Bilirubin (UA POC) Negative Negative Final    Ketones (UA POC) Negative Negative Final    Specific gravity (UA POC) 1.015 1.001 - 1.035 Final    Blood (UA POC) 1+ Negative Final    pH (UA POC) 5.5 4.6 - 8.0 Final    Protein (UA POC) Negative Negative Final    Urobilinogen (UA POC) 0.2 mg/dL 0.2 - 1 Final    Nitrites (UA POC) Negative Negative Final    Leukocyte esterase (UA POC) Negative Negative Final   Results for orders placed or performed in visit on 07/20/21   AMB POC URINALYSIS DIP STICK AUTO W/O MICRO     Status: None   Result Value Ref Range Status    Color (UA POC) Liana  Final    Clarity (UA POC) Slightly Cloudy  Final    Glucose (UA POC) Negative Negative Final    Bilirubin (UA POC) 1+ Negative Final    Ketones (UA POC) Trace Negative Final    Specific gravity (UA POC) 1.025 1.001 - 1.035 Final    Blood (UA POC) 2+ Negative Final    pH (UA POC) 5.0 4.6 - 8.0 Final Protein (UA POC) 1+ Negative Final    Urobilinogen (UA POC) 1 mg/dL 0.2 - 1 Final    Nitrites (UA POC) Positive Negative Final    Leukocyte esterase (UA POC) 1+ Negative Final     IMPRESSION:   Hypokalemia noted in the most recent labs. Patient advised to increase the dose of potassium chloride to 40 mEq (2 tabl) daily x 1 week until the surgery. Diagnosed with UTI on 10/19/2021, treated with macrobid x 5 days. Urinalysis completed in the office today and showed no evidence of UTI, blood 1+ ( Pt on Xarelto)  Patient has chronic systolic HFrEF ~26-47%, ACC stage C/ NYHA class II-II. AFib, on Xarelto. Consulted and cleared for surgery by the cardiology, Dr. Mendes Reach: Janeejose luis Padmini least moderate risk from a cardiac standpoint but can and should proceed to the OR without further cardiac testing\". Past medical history includes Graves' disease, hyperthyroidism, prediabetes. Most recent hemoglobin A1c (10/19/2021) 5.7%. Follows with the endocrinology, Dr. Kayy Bagley. According to the patient, she already cleared for surgery by the endocrinology. I will defer to the cardiologist, endocrinologist and surgeon to determine risk for planned surgery. With respect to everything else, she is cleared on our behalf.       Stephan Figueroa PA-C   10/26/2021

## 2021-10-27 ENCOUNTER — OFFICE VISIT (OUTPATIENT)
Dept: ORTHOPEDIC SURGERY | Age: 51
End: 2021-10-27
Payer: MEDICARE

## 2021-10-27 VITALS
BODY MASS INDEX: 40.98 KG/M2 | SYSTOLIC BLOOD PRESSURE: 105 MMHG | TEMPERATURE: 96.8 F | OXYGEN SATURATION: 100 % | DIASTOLIC BLOOD PRESSURE: 87 MMHG | WEIGHT: 246 LBS | HEIGHT: 65 IN | HEART RATE: 79 BPM

## 2021-10-27 DIAGNOSIS — M16.12 PRIMARY OSTEOARTHRITIS OF LEFT HIP: Primary | ICD-10-CM

## 2021-10-27 DIAGNOSIS — Z01.818 PRE-OPERATIVE EXAM: ICD-10-CM

## 2021-10-27 PROCEDURE — 99214 OFFICE O/P EST MOD 30 MIN: CPT | Performed by: PHYSICIAN ASSISTANT

## 2021-10-27 PROCEDURE — G8432 DEP SCR NOT DOC, RNG: HCPCS | Performed by: PHYSICIAN ASSISTANT

## 2021-10-27 PROCEDURE — G8754 DIAS BP LESS 90: HCPCS | Performed by: PHYSICIAN ASSISTANT

## 2021-10-27 PROCEDURE — 3017F COLORECTAL CA SCREEN DOC REV: CPT | Performed by: PHYSICIAN ASSISTANT

## 2021-10-27 PROCEDURE — G8417 CALC BMI ABV UP PARAM F/U: HCPCS | Performed by: PHYSICIAN ASSISTANT

## 2021-10-27 PROCEDURE — G8427 DOCREV CUR MEDS BY ELIG CLIN: HCPCS | Performed by: PHYSICIAN ASSISTANT

## 2021-10-27 PROCEDURE — G8752 SYS BP LESS 140: HCPCS | Performed by: PHYSICIAN ASSISTANT

## 2021-10-27 RX ORDER — ACETAMINOPHEN 325 MG/1
1000 TABLET ORAL ONCE
Status: CANCELLED | OUTPATIENT
Start: 2021-10-27 | End: 2021-10-27

## 2021-10-27 RX ORDER — PREGABALIN 25 MG/1
75 CAPSULE ORAL ONCE
Status: CANCELLED | OUTPATIENT
Start: 2021-10-27 | End: 2021-10-27

## 2021-10-27 RX ORDER — CELECOXIB 100 MG/1
400 CAPSULE ORAL ONCE
Status: CANCELLED | OUTPATIENT
Start: 2021-10-27 | End: 2021-10-27

## 2021-10-27 NOTE — H&P
[address], [address] [facility]  [phone]           HISTORY & PHYSICAL      Patient: Landen Bradshaw                MRN: [id number]       SSN: [id number]  YOB: 1970        AGE: 49 y.o. SEX: female  Body mass index is 40.94 kg/m². PCP: Daniela Gracia PA-C  10/27/21      CC: left hip end stage OA  Problem List Items Addressed This Visit     None      Visit Diagnoses     Primary osteoarthritis of left hip    -  Primary    Pre-operative exam                HPI:  The patient is a pleasant 49 y.o. whom has end stage OA of their Left hip and has failed conservative treatment including but not limited to NSAIDS, cortisone injections, viscosupplementation, PT, and pain medicine. Due to the current findings and affected activities of daily living, surgical intervention is indicated. The alternatives, risks, complications, as well as expected outcome were discussed. These include but are not limited to infection, blood loss, need for blood transfusion, neurovascular damage, DVT, PE,  post-op stiffness and pain, leg length discrepancy, dislocation, anesthetic complications, prothesis longevity, need for more surgery, MI, stroke, and even death. The patient understands and wishes to proceed with surgery. Past Medical History:   Diagnosis Date    AICD (automatic cardioverter/defibrillator) present 09/2018    Asthma     Cardiac echocardiogram 03/22/2017    LVE. EF 15% (prev 50% on study of 12/10/14). Severe diffuse hypk. Indeterminate diastolic fx.  RVE. RVSP at least 44 mmHg. Mod LAE.  BISMARK. Mild-mod MR.  Cardiac nuclear imaging test 03/24/2017    High risk. Somewhat patchy uptake. No evidence of ischemia or infarction. No RWMA. Severe LVE. EF 25%. Neg EKG on pharm stress test.    Cardiovascular LLE venous duplex 03/06/2017    Left leg:  No DVT. Pulsatile flow.     Congestive heart failure ([address]     Graves disease     Graves disease     [facility] [address] [address] [facility]     Hypercholesterolemia     Hypertension     Hyperthyroidism          Current Outpatient Medications:     nitrofurantoin, macrocrystal-monohydrate, (MACROBID) 100 mg capsule, Take 1 Capsule by mouth two (2) times a day., Disp: 10 Capsule, Rfl: 0    metroNIDAZOLE (FLAGYL) 500 mg tablet, Take 4 tablets by mouth once., Disp: 4 Tablet, Rfl: 0    diclofenac (VOLTAREN) 1 % gel, Apply 4 g to affected area four (4) times daily. , Disp: 1 Each, Rfl: 1    albuterol (PROVENTIL HFA, VENTOLIN HFA, PROAIR HFA) 90 mcg/actuation inhaler, Take 2 Puffs by inhalation every six (6) hours as needed for Wheezing., Disp: 18 g, Rfl: 1    furosemide (LASIX) 40 mg tablet, Take 1 tablet by mouth once daily, Disp: 30 Tablet, Rfl: 5    carvediloL (COREG) 25 mg tablet, TAKE 1 TABLET BY MOUTH TWICE DAILY WITH MEALS, Disp: 60 Tablet, Rfl: 5    sacubitriL-valsartan (Entresto) 24-26 mg tablet, Take 1 tablet by mouth twice daily, Disp: 60 Tablet, Rfl: 5    rivaroxaban (Xarelto) 20 mg tab tablet, Take 1 tablet by mouth once daily, Disp: 30 Tablet, Rfl: 5    baclofen (LIORESAL) 10 mg tablet, Take 0.5-1 Tablets by mouth three (3) times daily as needed for Muscle Spasm(s) or Pain., Disp: 90 Tablet, Rfl: 3    atorvastatin (LIPITOR) 80 mg tablet, TAKE 1 TABLET BY MOUTH ONCE DAILY AT NIGHT, Disp: 30 Tablet, Rfl: 5    spironolactone (ALDACTONE) 25 mg tablet, Take 1 tablet by mouth once daily, Disp: 30 Tablet, Rfl: 6    aspirin 81 mg chewable tablet, Take 1 Tablet by mouth daily. , Disp: 30 Tablet, Rfl: 5    potassium chloride (K-DUR, KLOR-CON) 20 mEq tablet, Take 1 Tablet by mouth daily. , Disp: 30 Tablet, Rfl: 5    indapamide (LOZOL) 2.5 mg tablet, TAKE 1 TABLET BY MOUTH ONCE DAILY IN THE MORNING, Disp: 30 Tab, Rfl: 6    methIMAzole (TAPAZOLE) 10 mg tablet, Take 1 Tab by mouth two (2) times a day., Disp: 1 Tab, Rfl: 0    nitroglycerin (NITROSTAT) 0.4 mg SL tablet, 1 Tab by SubLINGual route every five (5) minutes as needed for Chest Pain., Disp: 1 Bottle, Rfl: 1    albuterol (ACCUNEB) 1.25 mg/3 mL nebu, Take 3 mL by inhalation every four (4) hours as needed (wheezing). , Disp: 25 Each, Rfl: 0    No Known Allergies    Social History     Socioeconomic History    Marital status: SINGLE     Spouse name: Not on file    Number of children: Not on file    Years of education: Not on file    Highest education level: Not on file   Occupational History    Not on file   Tobacco Use    Smoking status: Current Some Day Smoker     Packs/day: 0.00     Years: 18.00     Pack years: 0.00     Last attempt to quit: 2017     Years since quittin.6    Smokeless tobacco: Never Used   Vaping Use    Vaping Use: Never used   Substance and Sexual Activity    Alcohol use: Yes     Comment: socially     Drug use: No    Sexual activity: Yes     Partners: Male     Birth control/protection: Condom   Other Topics Concern    Not on file   Social History Narrative    Not on file     Social Determinants of Health     Financial Resource Strain:     Difficulty of Paying Living Expenses:    Food Insecurity:     Worried About Running Out of Food in the Last Year:     Ran Out of Food in the Last Year:    Transportation Needs:     Lack of Transportation (Medical):      Lack of Transportation (Non-Medical):    Physical Activity:     Days of Exercise per Week:     Minutes of Exercise per Session:    Stress:     Feeling of Stress :    Social Connections:     Frequency of Communication with Friends and Family:     Frequency of Social Gatherings with Friends and Family:     Attends Protestant Services:     Active Member of Clubs or Organizations:     Attends Club or Organization Meetings:     Marital Status:    Intimate Partner Violence:     Fear of Current or Ex-Partner:     Emotionally Abused:     Physically Abused:     Sexually Abused:        Past Surgical History:   Procedure Laterality Date  HX GYN      hysterectomy  btl    HX MYOMECTOMY       fibroid tumo removed    HX ORTHOPAEDIC  March 2012 ORIF left fibula    AR BREAST SURGERY PROCEDURE UNLISTED      redfuction       Family History:  Non-contributory. PE:  Visit Vitals  /87 (BP 1 Location: Left upper arm, BP Patient Position: Sitting, BP Cuff Size: Large adult)   Pulse 79   Temp 96.8 °F (36 °C) (Temporal)   Ht 5' 5\" (1.651 m)   Wt 246 lb (111.6 kg)   LMP  (LMP Unknown) Comment: 2009   SpO2 100%   BMI 40.94 kg/m²     A&O X3, NAD, well develop, well nourished  Heart: S1-S2, rrr  Lungs: CTA bilat  Abd: soft, nt, nt, + bs in all quadrants  Ext:  Pos distal pulses to DP, PT  Leg lengths show the left LE to be slightly shorter grossly sitting in the chair    X-ray: Radiographs of the left hip done 10/25/2021 at the [facility] location include AP pelvis, AP and crosstable lateral left hip as well as an AP of the contralateral hip confirm end-stage arthritis of bone-on-bone eburnation. Labs: labs were reviewed and wnl.  ua pos, treated with macrobid and flagyll    A:  Left  hip end stage OA    P:  At this point we will move forward with surgery. Again, the alternatives, risks, complications, as well as expected outcome were discussed and the patient wishes to proceed with surgery. Pt has been instructed to stop aspirin, nsaids, rheumatologic medications and blood thinners. They have also been instructed to continue on any heart and bp meds and to take them the morning of surgery with sips of water. Lateal approach. The patient will require in-patient admission. Admission as an in-patient is reasonable and necessary due to increased risk of surgery due to the factors indicated as well as the possible need for prolonged in-hospital or skilled post-acute care in order to improve this patient's functional ability.       The patient was counseled at length about the risks of marcy Covid-19 during their perioperative period and any recovery window from their procedure. The patient was made aware that marcy Covid-19  may worsen their prognosis for recovering from their procedure and lend to a higher morbidity and/or mortality risk. All material risks, benefits, and reasonable alternatives including postponing the procedure were discussed. The patient does  wish to proceed with the procedure at this time.           Carmita Meek

## 2021-10-27 NOTE — H&P (VIEW-ONLY)
[address], [address] [place] [address] 
[phone] HISTORY & PHYSICAL Patient: Tanmay Cotto                MRN: [id number]       SSN: [id number] YOB: 1970        AGE: 49 y.o. SEX: female Body mass index is 40.94 kg/m². PCP: Elma Corcoran PA-C 
10/27/21 CC: left hip end stage OA Problem List Items Addressed This Visit None Visit Diagnoses Primary osteoarthritis of left hip    -  Primary Pre-operative exam      
  
 
 
 
HPI:  The patient is a pleasant 49 y.o. whom has end stage OA of their Left hip and has failed conservative treatment including but not limited to NSAIDS, cortisone injections, viscosupplementation, PT, and pain medicine. Due to the current findings and affected activities of daily living, surgical intervention is indicated. The alternatives, risks, complications, as well as expected outcome were discussed. These include but are not limited to infection, blood loss, need for blood transfusion, neurovascular damage, DVT, PE,  post-op stiffness and pain, leg length discrepancy, dislocation, anesthetic complications, prothesis longevity, need for more surgery, MI, stroke, and even death. The patient understands and wishes to proceed with surgery. Past Medical History:  
Diagnosis Date  AICD (automatic cardioverter/defibrillator) present 09/2018  Asthma  Cardiac echocardiogram 03/22/2017 LVE. EF 15% (prev 50% on study of 12/10/14). Severe diffuse hypk. Indeterminate diastolic fx.  RVE. RVSP at least 44 mmHg. Mod LAE.  BISMARK. Mild-mod MR.  Cardiac nuclear imaging test 03/24/2017 High risk. Somewhat patchy uptake. No evidence of ischemia or infarction. No RWMA. Severe LVE. EF 25%. Neg EKG on pharm stress test.  
 Cardiovascular LLE venous duplex 03/06/2017 Left leg:  No DVT. Pulsatile flow.  Congestive heart failure ([address]  Graves disease  Graves disease  Heart failure ([facility] [address] [facility]  Hypercholesterolemia  Hypertension  Hyperthyroidism Current Outpatient Medications:  
  nitrofurantoin, macrocrystal-monohydrate, (MACROBID) 100 mg capsule, Take 1 Capsule by mouth two (2) times a day., Disp: 10 Capsule, Rfl: 0 
  metroNIDAZOLE (FLAGYL) 500 mg tablet, Take 4 tablets by mouth once., Disp: 4 Tablet, Rfl: 0 
  diclofenac (VOLTAREN) 1 % gel, Apply 4 g to affected area four (4) times daily. , Disp: 1 Each, Rfl: 1 
  albuterol (PROVENTIL HFA, VENTOLIN HFA, PROAIR HFA) 90 mcg/actuation inhaler, Take 2 Puffs by inhalation every six (6) hours as needed for Wheezing., Disp: 18 g, Rfl: 1 
  furosemide (LASIX) 40 mg tablet, Take 1 tablet by mouth once daily, Disp: 30 Tablet, Rfl: 5 
  carvediloL (COREG) 25 mg tablet, TAKE 1 TABLET BY MOUTH TWICE DAILY WITH MEALS, Disp: 60 Tablet, Rfl: 5 
  sacubitriL-valsartan (Entresto) 24-26 mg tablet, Take 1 tablet by mouth twice daily, Disp: 60 Tablet, Rfl: 5 
  rivaroxaban (Xarelto) 20 mg tab tablet, Take 1 tablet by mouth once daily, Disp: 30 Tablet, Rfl: 5 
  baclofen (LIORESAL) 10 mg tablet, Take 0.5-1 Tablets by mouth three (3) times daily as needed for Muscle Spasm(s) or Pain., Disp: 90 Tablet, Rfl: 3 
  atorvastatin (LIPITOR) 80 mg tablet, TAKE 1 TABLET BY MOUTH ONCE DAILY AT NIGHT, Disp: 30 Tablet, Rfl: 5 
  spironolactone (ALDACTONE) 25 mg tablet, Take 1 tablet by mouth once daily, Disp: 30 Tablet, Rfl: 6 
  aspirin 81 mg chewable tablet, Take 1 Tablet by mouth daily. , Disp: 30 Tablet, Rfl: 5 
  potassium chloride (K-DUR, KLOR-CON) 20 mEq tablet, Take 1 Tablet by mouth daily. , Disp: 30 Tablet, Rfl: 5 
  indapamide (LOZOL) 2.5 mg tablet, TAKE 1 TABLET BY MOUTH ONCE DAILY IN THE MORNING, Disp: 30 Tab, Rfl: 6 
  methIMAzole (TAPAZOLE) 10 mg tablet, Take 1 Tab by mouth two (2) times a day., Disp: 1 Tab, Rfl: 0 
  nitroglycerin (NITROSTAT) 0.4 mg SL tablet, 1 Tab by SubLINGual route every five (5) minutes as needed for Chest Pain., Disp: 1 Bottle, Rfl: 1 
  albuterol (ACCUNEB) 1.25 mg/3 mL nebu, Take 3 mL by inhalation every four (4) hours as needed (wheezing). , Disp: 25 Each, Rfl: 0 No Known Allergies Social History Socioeconomic History  Marital status: SINGLE Spouse name: Not on file  Number of children: Not on file  Years of education: Not on file  Highest education level: Not on file Occupational History  Not on file Tobacco Use  Smoking status: Current Some Day Smoker Packs/day: 0.00 Years: 18.00 Pack years: 0.00 Last attempt to quit: 2017 Years since quittin.6  Smokeless tobacco: Never Used Vaping Use  Vaping Use: Never used Substance and Sexual Activity  Alcohol use: Yes Comment: socially  Drug use: No  
 Sexual activity: Yes  
  Partners: Male Birth control/protection: Condom Other Topics Concern  Not on file Social History Narrative  Not on file Social Determinants of Health Financial Resource Strain:  Difficulty of Paying Living Expenses:   
Food Insecurity:  Worried About [address] in the Last Year:   
[address] in the Last Year:   
Transportation Needs:   
 Lack of Transportation (Medical):  Lack of Transportation (Non-Medical): Physical Activity:   
 Days of Exercise per Week:  Minutes of Exercise per Session:   
Stress:  Feeling of Stress :   
Social Connections:  Frequency of Communication with Friends and Family:  Frequency of Social Gatherings with Friends and Family:  Attends Anglican Services:  Active Member of Clubs or Organizations:  Attends Club or Organization Meetings:  Marital Status: Intimate Partner Violence:  Fear of Current or Ex-Partner:  Emotionally Abused:   
 Physically Abused:   
 Sexually Abused:   
 
 
Past Surgical History:  
Procedure Laterality Date  HX GYN    
 hysterectomy  btl  HX MYOMECTOMY    
  fibroid tumo removed [facility]  March 2012 ORIF left fibula  CA BREAST SURGERY PROCEDURE UNLISTED    
 redfuction Family History:  Non-contributory. PE: 
Visit Vitals /87 (BP 1 Location: Left upper arm, BP Patient Position: Sitting, BP Cuff Size: Large adult) Pulse 79 Temp 96.8 °F (36 °C) (Temporal) Ht 5' 5\" (1.651 m) Wt 246 lb (111.6 kg) LMP  (LMP Unknown) Comment: 2009 SpO2 100% BMI 40.94 kg/m² A&O X3, NAD, well develop, well nourished Heart: S1-S2, rrr 
Lungs: CTA bilat Abd: soft, nt, nt, + bs in all quadrants Ext:  Pos distal pulses to DP, PT Leg lengths show the left LE to be slightly shorter grossly sitting in the chair X-ray: Radiographs of the left hip done 10/25/2021 at the [facility] location include AP pelvis, AP and crosstable lateral left hip as well as an AP of the contralateral hip confirm end-stage arthritis of bone-on-bone eburnation. Labs: labs were reviewed and wnl.  ua pos, treated with macrobid and flagyll A:  Left  hip end stage OA 
 
P:  At this point we will move forward with surgery. Again, the alternatives, risks, complications, as well as expected outcome were discussed and the patient wishes to proceed with surgery. Pt has been instructed to stop aspirin, nsaids, rheumatologic medications and blood thinners. They have also been instructed to continue on any heart and bp meds and to take them the morning of surgery with sips of water. Lateal approach. The patient will require in-patient admission. Admission as an in-patient is reasonable and necessary due to increased risk of surgery due to the factors indicated as well as the possible need for prolonged in-hospital or skilled post-acute care in order to improve this patient's functional ability.    
 
The patient was counseled at length about the risks of marcy Covid-19 during their perioperative period and any recovery window from their procedure. The patient was made aware that marcy Covid-19  may worsen their prognosis for recovering from their procedure and lend to a higher morbidity and/or mortality risk. All material risks, benefits, and reasonable alternatives including postponing the procedure were discussed. The patient does  wish to proceed with the procedure at this time. Flaco Vázquez

## 2021-10-28 ENCOUNTER — HOSPITAL ENCOUNTER (OUTPATIENT)
Dept: PREADMISSION TESTING | Age: 51
Discharge: HOME | End: 2021-10-28
Payer: MEDICARE

## 2021-10-28 DIAGNOSIS — Z01.818 PREOPERATIVE TESTING: ICD-10-CM

## 2021-10-28 LAB
BACTERIA UR CULT: NORMAL
SARS-COV-2, NAA: NOT DETECTED

## 2021-10-28 PROCEDURE — U0005 INFEC AGEN DETEC AMPLI PROBE: HCPCS

## 2021-10-28 RX ORDER — POTASSIUM CHLORIDE 20 MEQ/1
20 TABLET, EXTENDED RELEASE ORAL 2 TIMES DAILY
Qty: 7 TABLET | Refills: 0 | Status: SHIPPED | OUTPATIENT
Start: 2021-10-28 | End: 2022-02-03 | Stop reason: SDUPTHER

## 2021-10-28 NOTE — PROGRESS NOTES
Please, notify the patient regarding her urine culture: normal result. Please also notify the pt that potassium chloride (additional 7 tablets) were sent to the pharmacy to adjust the dose of potassium to 2 tabl daily for 1 week until surgery.  Thanks

## 2021-10-29 NOTE — PROGRESS NOTES
[phone] (home)  2 patient identifiers verified with Yolanda Stack (name/). Yolanda Stack was advised urine culture was normal and that [facility] has e-scribed potassium chloride (additional 7 tablets) to adjust the dose of potassium to 2 tablets daily for 1 week until surgery. Yolanda Stack voice understanding and no additional questions at this time.

## 2021-11-01 ENCOUNTER — ANESTHESIA EVENT (OUTPATIENT)
Dept: SURGERY | Age: 51
DRG: 470 | End: 2021-11-01
Payer: MEDICARE

## 2021-11-01 NOTE — PROGRESS NOTES
I have personally seen and evaluated the device findings. Interrogation reviewed and I agree with assessment.     Ministerio Krishnamurthy

## 2021-11-01 NOTE — PERIOP NOTES
PRE-SURGICAL INSTRUCTIONS        Patient's Name:  Ashlee Fernandez      Today's Date:  11/1/2021            Covid Testing Date and Time: Negative 10/28    Surgery Date:  11/2/2021                1. Do NOT eat or drink anything, including candy, gum, or ice chips after midnight on 11/1, unless you have specific instructions from your surgeon or anesthesia provider to do so.  2. You may brush your teeth before coming to the hospital.  3. No smoking 24 hours prior to the day of surgery. 4. No alcohol 24 hours prior to the day of surgery. 5. No recreational drugs for one week prior to the day of surgery. 6. Leave all valuables, including money/purse, at home. 7. Remove all jewelry, nail polish, acrylic nails, and makeup (including mascara); no lotions powders, deodorant, or perfume/cologne/after shave on the skin. 8. Follow instruction for Hibiclens washes and CHG wipes from surgeon's office. 9. Glasses/contact lenses and dentures may be worn to the hospital.  They will be removed prior to surgery. 10. Call your doctor if symptoms of a cold or illness develop within 24-48 hours prior to your surgery. 11.  If you are having an outpatient procedure, please make arrangements for a responsible ADULT TO [address] [place] [address] and stay with you for 24 hours after your surgery. 12. ONE VISITOR in the hospital at this time for outpatient procedures. Exceptions may be made for surgical admissions, per nursing unit guidelines      Special Instructions:        Bring inhaler. Bring any pertinent legal medical records. Take these medications the morning of surgery with a sip of water:  Per 's instructions. Follow physician instructions about stopping anticoagulants. Last dose Xarelto 10/29      On the day of surgery, come in the main entrance of [facility]. Let the  at the desk know you are there for surgery.   A staff member will come escort you to the surgical area on the second floor. If you have any questions or concerns, please do not hesitate to call:     (Prior to the day of surgery) PAT department:  [phone]   (Day of surgery) Pre-Op department:  [phone]      Theses instructions were reviewed with Rigo Molina during the Pre assessment phone call.

## 2021-11-02 ENCOUNTER — HOSPITAL ENCOUNTER (INPATIENT)
Age: 51
LOS: 1 days | Discharge: HOME HEALTH | DRG: 470 | End: 2021-11-03
Attending: ORTHOPAEDIC SURGERY | Admitting: ORTHOPAEDIC SURGERY
Payer: MEDICARE

## 2021-11-02 ENCOUNTER — ANESTHESIA (OUTPATIENT)
Dept: SURGERY | Age: 51
DRG: 470 | End: 2021-11-02
Payer: MEDICARE

## 2021-11-02 ENCOUNTER — APPOINTMENT (OUTPATIENT)
Dept: GENERAL RADIOLOGY | Age: 51
DRG: 470 | End: 2021-11-02
Attending: PHYSICIAN ASSISTANT
Payer: MEDICARE

## 2021-11-02 DIAGNOSIS — M16.10 HIP ARTHRITIS: ICD-10-CM

## 2021-11-02 DIAGNOSIS — E66.01 CLASS 3 SEVERE OBESITY DUE TO EXCESS CALORIES WITH BODY MASS INDEX (BMI) OF 40.0 TO 44.9 IN ADULT, UNSPECIFIED WHETHER SERIOUS COMORBIDITY PRESENT (HCC): ICD-10-CM

## 2021-11-02 LAB
ABO + RH BLD: NORMAL
ANION GAP SERPL CALC-SCNC: 3 MMOL/L (ref 3–18)
APTT PPP: 28.8 SEC (ref 23–36.4)
BLOOD GROUP ANTIBODIES SERPL: NORMAL
BUN SERPL-MCNC: 16 MG/DL (ref 7–18)
BUN/CREAT SERPL: 17 (ref 12–20)
CALCIUM SERPL-MCNC: 9.4 MG/DL (ref 8.5–10.1)
CHLORIDE SERPL-SCNC: 103 MMOL/L (ref 100–111)
CO2 SERPL-SCNC: 31 MMOL/L (ref 21–32)
CREAT SERPL-MCNC: 0.94 MG/DL (ref 0.6–1.3)
GLUCOSE SERPL-MCNC: 130 MG/DL (ref 74–99)
INR PPP: 1.1 (ref 0.8–1.2)
POTASSIUM SERPL-SCNC: 3.4 MMOL/L (ref 3.5–5.5)
PROTHROMBIN TIME: 14.5 SEC (ref 11.5–15.2)
SODIUM SERPL-SCNC: 137 MMOL/L (ref 136–145)
SPECIMEN EXP DATE BLD: NORMAL

## 2021-11-02 PROCEDURE — 74011000258 HC RX REV CODE- 258: Performed by: ORTHOPAEDIC SURGERY

## 2021-11-02 PROCEDURE — 77030002933 HC SUT MCRYL J&J -A: Performed by: ORTHOPAEDIC SURGERY

## 2021-11-02 PROCEDURE — 77030012935 HC DRSG AQUACEL BMS -B: Performed by: ORTHOPAEDIC SURGERY

## 2021-11-02 PROCEDURE — 74011000250 HC RX REV CODE- 250: Performed by: PHYSICIAN ASSISTANT

## 2021-11-02 PROCEDURE — 77030003602 HC NDL NRV BLK BBMI -B: Performed by: ORTHOPAEDIC SURGERY

## 2021-11-02 PROCEDURE — 77030018836 HC SOL IRR NACL ICUM -A: Performed by: ORTHOPAEDIC SURGERY

## 2021-11-02 PROCEDURE — 76210000017 HC OR PH I REC 1.5 TO 2 HR: Performed by: ORTHOPAEDIC SURGERY

## 2021-11-02 PROCEDURE — 51798 US URINE CAPACITY MEASURE: CPT

## 2021-11-02 PROCEDURE — C9290 INJ, BUPIVACAINE LIPOSOME: HCPCS | Performed by: ORTHOPAEDIC SURGERY

## 2021-11-02 PROCEDURE — 97530 THERAPEUTIC ACTIVITIES: CPT

## 2021-11-02 PROCEDURE — 74011000250 HC RX REV CODE- 250: Performed by: NURSE ANESTHETIST, CERTIFIED REGISTERED

## 2021-11-02 PROCEDURE — 97161 PT EVAL LOW COMPLEX 20 MIN: CPT

## 2021-11-02 PROCEDURE — 74011250636 HC RX REV CODE- 250/636: Performed by: ANESTHESIOLOGY

## 2021-11-02 PROCEDURE — 77030019605: Performed by: ORTHOPAEDIC SURGERY

## 2021-11-02 PROCEDURE — 27130 TOTAL HIP ARTHROPLASTY: CPT | Performed by: PHYSICIAN ASSISTANT

## 2021-11-02 PROCEDURE — 74011000250 HC RX REV CODE- 250: Performed by: ORTHOPAEDIC SURGERY

## 2021-11-02 PROCEDURE — 77030003028 HC SUT VCRL J&J -A: Performed by: ORTHOPAEDIC SURGERY

## 2021-11-02 PROCEDURE — 77030040361 HC SLV COMPR DVT MDII -B: Performed by: ORTHOPAEDIC SURGERY

## 2021-11-02 PROCEDURE — 74011250637 HC RX REV CODE- 250/637: Performed by: ORTHOPAEDIC SURGERY

## 2021-11-02 PROCEDURE — 77030040922 HC BLNKT HYPOTHRM STRY -A: Performed by: ORTHOPAEDIC SURGERY

## 2021-11-02 PROCEDURE — 73552 X-RAY EXAM OF FEMUR 2/>: CPT

## 2021-11-02 PROCEDURE — 01214 ANES OPEN PX TOT HIP ARTHRP: CPT | Performed by: ANESTHESIOLOGY

## 2021-11-02 PROCEDURE — 73502 X-RAY EXAM HIP UNI 2-3 VIEWS: CPT

## 2021-11-02 PROCEDURE — 77030018835 HC SOL IRR LR ICUM -A: Performed by: ORTHOPAEDIC SURGERY

## 2021-11-02 PROCEDURE — 0SRB04A REPLACEMENT OF LEFT HIP JOINT WITH CERAMIC ON POLYETHYLENE SYNTHETIC SUBSTITUTE, UNCEMENTED, OPEN APPROACH: ICD-10-PCS | Performed by: ORTHOPAEDIC SURGERY

## 2021-11-02 PROCEDURE — 74011000272 HC RX REV CODE- 272: Performed by: ORTHOPAEDIC SURGERY

## 2021-11-02 PROCEDURE — 77030013708 HC HNDPC SUC IRR PULS STRY –B: Performed by: ORTHOPAEDIC SURGERY

## 2021-11-02 PROCEDURE — 76060000035 HC ANESTHESIA 2 TO 2.5 HR: Performed by: ORTHOPAEDIC SURGERY

## 2021-11-02 PROCEDURE — 01214 ANES OPEN PX TOT HIP ARTHRP: CPT | Performed by: NURSE ANESTHETIST, CERTIFIED REGISTERED

## 2021-11-02 PROCEDURE — 74011250637 HC RX REV CODE- 250/637: Performed by: PHYSICIAN ASSISTANT

## 2021-11-02 PROCEDURE — 77030020294 HC ABD PLLW HIP DERY -B: Performed by: ORTHOPAEDIC SURGERY

## 2021-11-02 PROCEDURE — 77030013079 HC BLNKT BAIR HGGR 3M -A: Performed by: ANESTHESIOLOGY

## 2021-11-02 PROCEDURE — 2709999900 HC NON-CHARGEABLE SUPPLY: Performed by: ORTHOPAEDIC SURGERY

## 2021-11-02 PROCEDURE — 77030027138 HC INCENT SPIROMETER -A: Performed by: ORTHOPAEDIC SURGERY

## 2021-11-02 PROCEDURE — 77030006835 HC BLD SAW SAG STRY -B: Performed by: ORTHOPAEDIC SURGERY

## 2021-11-02 PROCEDURE — C1776 JOINT DEVICE (IMPLANTABLE): HCPCS | Performed by: ORTHOPAEDIC SURGERY

## 2021-11-02 PROCEDURE — 77030012890: Performed by: ORTHOPAEDIC SURGERY

## 2021-11-02 PROCEDURE — 77030008462 HC STPLR SKN PROX J&J -A: Performed by: ORTHOPAEDIC SURGERY

## 2021-11-02 PROCEDURE — 74011000258 HC RX REV CODE- 258: Performed by: PHYSICIAN ASSISTANT

## 2021-11-02 PROCEDURE — 77030019557 HC ELECTRD VES SEAL MEDT -F: Performed by: ORTHOPAEDIC SURGERY

## 2021-11-02 PROCEDURE — 74011250636 HC RX REV CODE- 250/636: Performed by: NURSE ANESTHETIST, CERTIFIED REGISTERED

## 2021-11-02 PROCEDURE — 85610 PROTHROMBIN TIME: CPT

## 2021-11-02 PROCEDURE — 77030012411 HC DRN WND CARD -A: Performed by: ORTHOPAEDIC SURGERY

## 2021-11-02 PROCEDURE — 77030031139 HC SUT VCRL2 J&J -A: Performed by: ORTHOPAEDIC SURGERY

## 2021-11-02 PROCEDURE — 88304 TISSUE EXAM BY PATHOLOGIST: CPT

## 2021-11-02 PROCEDURE — 65270000029 HC RM PRIVATE

## 2021-11-02 PROCEDURE — 88311 DECALCIFY TISSUE: CPT

## 2021-11-02 PROCEDURE — 74011000250 HC RX REV CODE- 250: Performed by: ANESTHESIOLOGY

## 2021-11-02 PROCEDURE — 74011250636 HC RX REV CODE- 250/636: Performed by: ORTHOPAEDIC SURGERY

## 2021-11-02 PROCEDURE — 76010000131 HC OR TIME 2 TO 2.5 HR: Performed by: ORTHOPAEDIC SURGERY

## 2021-11-02 PROCEDURE — 74011250637 HC RX REV CODE- 250/637: Performed by: NURSE ANESTHETIST, CERTIFIED REGISTERED

## 2021-11-02 PROCEDURE — 74011250636 HC RX REV CODE- 250/636: Performed by: PHYSICIAN ASSISTANT

## 2021-11-02 PROCEDURE — 64450 NJX AA&/STRD OTHER PN/BRANCH: CPT | Performed by: ANESTHESIOLOGY

## 2021-11-02 PROCEDURE — 80048 BASIC METABOLIC PNL TOTAL CA: CPT

## 2021-11-02 PROCEDURE — 27130 TOTAL HIP ARTHROPLASTY: CPT | Performed by: ORTHOPAEDIC SURGERY

## 2021-11-02 PROCEDURE — 86901 BLOOD TYPING SEROLOGIC RH(D): CPT

## 2021-11-02 PROCEDURE — 85730 THROMBOPLASTIN TIME PARTIAL: CPT

## 2021-11-02 PROCEDURE — 77030008683 HC TU ET CUF COVD -A: Performed by: ANESTHESIOLOGY

## 2021-11-02 PROCEDURE — 2709999900 HC NON-CHARGEABLE SUPPLY

## 2021-11-02 DEVICE — Z DUP USE 2377968 SCREW ACET HEX LOW PROFILE 6.5X25MM TRIDENT II: Type: IMPLANTABLE DEVICE | Site: HIP | Status: FUNCTIONAL

## 2021-11-02 DEVICE — LINER ACET SZ D ID36MM THK3.9MM 0DEG HIP X3 LOK RNG FOR: Type: IMPLANTABLE DEVICE | Site: HIP | Status: FUNCTIONAL

## 2021-11-02 DEVICE — HEAD FEM DELT V40 -2.5MM 36MM -- V40 BIOLOX: Type: IMPLANTABLE DEVICE | Site: HIP | Status: FUNCTIONAL

## 2021-11-02 DEVICE — STEM FEM SZ 3 127D 30X102MM -- ACCOLADE II V40: Type: IMPLANTABLE DEVICE | Site: HIP | Status: FUNCTIONAL

## 2021-11-02 DEVICE — SHELL ACET CLUS H 50D TRTANIUM -- TRIDENT II: Type: IMPLANTABLE DEVICE | Site: HIP | Status: FUNCTIONAL

## 2021-11-02 DEVICE — SCREW BNE L15MM DIA6.5MM LO PROF HEX TRIDENT LL: Type: IMPLANTABLE DEVICE | Site: HIP | Status: FUNCTIONAL

## 2021-11-02 DEVICE — COMPONENT TOT HIP CAPPED LNR POLYETH [H2STRYKER] [STRYKER CORP]: Type: IMPLANTABLE DEVICE | Site: HIP | Status: FUNCTIONAL

## 2021-11-02 RX ORDER — INDAPAMIDE 2.5 MG/1
2.5 TABLET, FILM COATED ORAL DAILY
Status: DISCONTINUED | OUTPATIENT
Start: 2021-11-03 | End: 2021-11-03 | Stop reason: HOSPADM

## 2021-11-02 RX ORDER — OXYCODONE HYDROCHLORIDE 10 MG/1
10-15 TABLET ORAL
Status: DISCONTINUED | OUTPATIENT
Start: 2021-11-02 | End: 2021-11-03 | Stop reason: HOSPADM

## 2021-11-02 RX ORDER — BACLOFEN 10 MG/1
5-10 TABLET ORAL
Status: DISCONTINUED | OUTPATIENT
Start: 2021-11-02 | End: 2021-11-03 | Stop reason: HOSPADM

## 2021-11-02 RX ORDER — SODIUM CHLORIDE 0.9 % (FLUSH) 0.9 %
5-40 SYRINGE (ML) INJECTION EVERY 8 HOURS
Status: DISCONTINUED | OUTPATIENT
Start: 2021-11-02 | End: 2021-11-02 | Stop reason: HOSPADM

## 2021-11-02 RX ORDER — CELECOXIB 400 MG/1
400 CAPSULE ORAL ONCE
Status: COMPLETED | OUTPATIENT
Start: 2021-11-02 | End: 2021-11-02

## 2021-11-02 RX ORDER — SODIUM CHLORIDE 9 MG/ML
100 INJECTION, SOLUTION INTRAVENOUS CONTINUOUS
Status: DISPENSED | OUTPATIENT
Start: 2021-11-02 | End: 2021-11-03

## 2021-11-02 RX ORDER — FENTANYL CITRATE 50 UG/ML
25 INJECTION, SOLUTION INTRAMUSCULAR; INTRAVENOUS AS NEEDED
Status: DISCONTINUED | OUTPATIENT
Start: 2021-11-02 | End: 2021-11-02 | Stop reason: HOSPADM

## 2021-11-02 RX ORDER — PREGABALIN 75 MG/1
75 CAPSULE ORAL ONCE
Status: COMPLETED | OUTPATIENT
Start: 2021-11-02 | End: 2021-11-02

## 2021-11-02 RX ORDER — PROPOFOL 10 MG/ML
INJECTION, EMULSION INTRAVENOUS AS NEEDED
Status: DISCONTINUED | OUTPATIENT
Start: 2021-11-02 | End: 2021-11-02 | Stop reason: HOSPADM

## 2021-11-02 RX ORDER — ONDANSETRON 2 MG/ML
4 INJECTION INTRAMUSCULAR; INTRAVENOUS
Status: DISCONTINUED | OUTPATIENT
Start: 2021-11-02 | End: 2021-11-03 | Stop reason: HOSPADM

## 2021-11-02 RX ORDER — ZOLPIDEM TARTRATE 5 MG/1
5 TABLET ORAL
Status: DISCONTINUED | OUTPATIENT
Start: 2021-11-02 | End: 2021-11-03 | Stop reason: HOSPADM

## 2021-11-02 RX ORDER — LIDOCAINE HYDROCHLORIDE 20 MG/ML
5 INJECTION, SOLUTION EPIDURAL; INFILTRATION; INTRACAUDAL; PERINEURAL ONCE
Status: COMPLETED | OUTPATIENT
Start: 2021-11-02 | End: 2021-11-02

## 2021-11-02 RX ORDER — SODIUM CHLORIDE 0.9 % (FLUSH) 0.9 %
5-40 SYRINGE (ML) INJECTION AS NEEDED
Status: DISCONTINUED | OUTPATIENT
Start: 2021-11-02 | End: 2021-11-03 | Stop reason: HOSPADM

## 2021-11-02 RX ORDER — OXYCODONE AND ACETAMINOPHEN 10; 325 MG/1; MG/1
1 TABLET ORAL
Qty: 28 TABLET | Refills: 0 | Status: SHIPPED | OUTPATIENT
Start: 2021-11-02 | End: 2021-11-08 | Stop reason: SDUPTHER

## 2021-11-02 RX ORDER — KETOROLAC TROMETHAMINE 15 MG/ML
15 INJECTION, SOLUTION INTRAMUSCULAR; INTRAVENOUS EVERY 6 HOURS
Status: DISCONTINUED | OUTPATIENT
Start: 2021-11-02 | End: 2021-11-03 | Stop reason: HOSPADM

## 2021-11-02 RX ORDER — ALBUTEROL SULFATE 0.83 MG/ML
2.5 SOLUTION RESPIRATORY (INHALATION)
Status: DISCONTINUED | OUTPATIENT
Start: 2021-11-02 | End: 2021-11-03 | Stop reason: HOSPADM

## 2021-11-02 RX ORDER — DOCUSATE SODIUM 100 MG/1
100 CAPSULE, LIQUID FILLED ORAL 2 TIMES DAILY
Qty: 60 CAPSULE | Refills: 2 | Status: SHIPPED | OUTPATIENT
Start: 2021-11-02 | End: 2022-01-31

## 2021-11-02 RX ORDER — HYDROCODONE BITARTRATE AND ACETAMINOPHEN 5; 325 MG/1; MG/1
1 TABLET ORAL AS NEEDED
Status: DISCONTINUED | OUTPATIENT
Start: 2021-11-02 | End: 2021-11-02 | Stop reason: HOSPADM

## 2021-11-02 RX ORDER — METHIMAZOLE 5 MG/1
10 TABLET ORAL 2 TIMES DAILY
Status: DISCONTINUED | OUTPATIENT
Start: 2021-11-02 | End: 2021-11-03 | Stop reason: HOSPADM

## 2021-11-02 RX ORDER — SODIUM CHLORIDE, SODIUM LACTATE, POTASSIUM CHLORIDE, CALCIUM CHLORIDE 600; 310; 30; 20 MG/100ML; MG/100ML; MG/100ML; MG/100ML
50 INJECTION, SOLUTION INTRAVENOUS CONTINUOUS
Status: DISCONTINUED | OUTPATIENT
Start: 2021-11-02 | End: 2021-11-02 | Stop reason: HOSPADM

## 2021-11-02 RX ORDER — SODIUM CHLORIDE 0.9 % (FLUSH) 0.9 %
5-40 SYRINGE (ML) INJECTION EVERY 8 HOURS
Status: DISCONTINUED | OUTPATIENT
Start: 2021-11-02 | End: 2021-11-03 | Stop reason: HOSPADM

## 2021-11-02 RX ORDER — FENTANYL CITRATE 50 UG/ML
100 INJECTION, SOLUTION INTRAMUSCULAR; INTRAVENOUS ONCE
Status: COMPLETED | OUTPATIENT
Start: 2021-11-02 | End: 2021-11-02

## 2021-11-02 RX ORDER — NITROGLYCERIN 0.4 MG/1
0.4 TABLET SUBLINGUAL
Status: DISCONTINUED | OUTPATIENT
Start: 2021-11-02 | End: 2021-11-03 | Stop reason: HOSPADM

## 2021-11-02 RX ORDER — POTASSIUM CHLORIDE 20 MEQ/1
20 TABLET, EXTENDED RELEASE ORAL DAILY
Status: DISCONTINUED | OUTPATIENT
Start: 2021-11-03 | End: 2021-11-03 | Stop reason: HOSPADM

## 2021-11-02 RX ORDER — CARVEDILOL 25 MG/1
25 TABLET ORAL 2 TIMES DAILY WITH MEALS
Status: DISCONTINUED | OUTPATIENT
Start: 2021-11-02 | End: 2021-11-03 | Stop reason: HOSPADM

## 2021-11-02 RX ORDER — HYDROMORPHONE HYDROCHLORIDE 2 MG/ML
INJECTION, SOLUTION INTRAMUSCULAR; INTRAVENOUS; SUBCUTANEOUS AS NEEDED
Status: DISCONTINUED | OUTPATIENT
Start: 2021-11-02 | End: 2021-11-02 | Stop reason: HOSPADM

## 2021-11-02 RX ORDER — ALBUTEROL SULFATE 90 UG/1
2 AEROSOL, METERED RESPIRATORY (INHALATION)
Status: DISCONTINUED | OUTPATIENT
Start: 2021-11-02 | End: 2021-11-02

## 2021-11-02 RX ORDER — GLYCOPYRROLATE 0.2 MG/ML
INJECTION INTRAMUSCULAR; INTRAVENOUS AS NEEDED
Status: DISCONTINUED | OUTPATIENT
Start: 2021-11-02 | End: 2021-11-02 | Stop reason: HOSPADM

## 2021-11-02 RX ORDER — FUROSEMIDE 20 MG/1
20 TABLET ORAL DAILY
Status: DISCONTINUED | OUTPATIENT
Start: 2021-11-03 | End: 2021-11-03 | Stop reason: HOSPADM

## 2021-11-02 RX ORDER — CEPHALEXIN 500 MG/1
500 CAPSULE ORAL 4 TIMES DAILY
Qty: 8 CAPSULE | Refills: 0 | Status: SHIPPED | OUTPATIENT
Start: 2021-11-02 | End: 2022-01-26 | Stop reason: ALTCHOICE

## 2021-11-02 RX ORDER — ONDANSETRON 2 MG/ML
4 INJECTION INTRAMUSCULAR; INTRAVENOUS
Status: COMPLETED | OUTPATIENT
Start: 2021-11-02 | End: 2021-11-02

## 2021-11-02 RX ORDER — AMOXICILLIN 250 MG
1 CAPSULE ORAL 2 TIMES DAILY
Status: DISCONTINUED | OUTPATIENT
Start: 2021-11-02 | End: 2021-11-03 | Stop reason: HOSPADM

## 2021-11-02 RX ORDER — INSULIN LISPRO 100 [IU]/ML
INJECTION, SOLUTION INTRAVENOUS; SUBCUTANEOUS ONCE
Status: DISCONTINUED | OUTPATIENT
Start: 2021-11-02 | End: 2021-11-02 | Stop reason: HOSPADM

## 2021-11-02 RX ORDER — FAMOTIDINE 20 MG/1
20 TABLET, FILM COATED ORAL ONCE
Status: COMPLETED | OUTPATIENT
Start: 2021-11-02 | End: 2021-11-02

## 2021-11-02 RX ORDER — SUCCINYLCHOLINE CHLORIDE 20 MG/ML
INJECTION INTRAMUSCULAR; INTRAVENOUS AS NEEDED
Status: DISCONTINUED | OUTPATIENT
Start: 2021-11-02 | End: 2021-11-02 | Stop reason: HOSPADM

## 2021-11-02 RX ORDER — LABETALOL HCL 20 MG/4 ML
SYRINGE (ML) INTRAVENOUS AS NEEDED
Status: DISCONTINUED | OUTPATIENT
Start: 2021-11-02 | End: 2021-11-02 | Stop reason: HOSPADM

## 2021-11-02 RX ORDER — ROPIVACAINE HYDROCHLORIDE 2 MG/ML
30 INJECTION, SOLUTION EPIDURAL; INFILTRATION; PERINEURAL
Status: COMPLETED | OUTPATIENT
Start: 2021-11-02 | End: 2021-11-02

## 2021-11-02 RX ORDER — MIDAZOLAM HYDROCHLORIDE 1 MG/ML
2 INJECTION, SOLUTION INTRAMUSCULAR; INTRAVENOUS ONCE
Status: COMPLETED | OUTPATIENT
Start: 2021-11-02 | End: 2021-11-02

## 2021-11-02 RX ORDER — PREGABALIN 25 MG/1
25 CAPSULE ORAL 2 TIMES DAILY
Status: DISCONTINUED | OUTPATIENT
Start: 2021-11-02 | End: 2021-11-03 | Stop reason: HOSPADM

## 2021-11-02 RX ORDER — SODIUM CHLORIDE 0.9 % (FLUSH) 0.9 %
5-40 SYRINGE (ML) INJECTION AS NEEDED
Status: DISCONTINUED | OUTPATIENT
Start: 2021-11-02 | End: 2021-11-02 | Stop reason: HOSPADM

## 2021-11-02 RX ORDER — SPIRONOLACTONE 25 MG/1
25 TABLET ORAL DAILY
Status: DISCONTINUED | OUTPATIENT
Start: 2021-11-03 | End: 2021-11-03 | Stop reason: HOSPADM

## 2021-11-02 RX ORDER — FLUMAZENIL 0.1 MG/ML
0.2 INJECTION INTRAVENOUS AS NEEDED
Status: DISCONTINUED | OUTPATIENT
Start: 2021-11-02 | End: 2021-11-03 | Stop reason: HOSPADM

## 2021-11-02 RX ORDER — ONDANSETRON 2 MG/ML
INJECTION INTRAMUSCULAR; INTRAVENOUS AS NEEDED
Status: DISCONTINUED | OUTPATIENT
Start: 2021-11-02 | End: 2021-11-02 | Stop reason: HOSPADM

## 2021-11-02 RX ORDER — HYDROMORPHONE HYDROCHLORIDE 1 MG/ML
0.5 INJECTION, SOLUTION INTRAMUSCULAR; INTRAVENOUS; SUBCUTANEOUS
Status: DISCONTINUED | OUTPATIENT
Start: 2021-11-02 | End: 2021-11-02 | Stop reason: HOSPADM

## 2021-11-02 RX ORDER — DEXAMETHASONE SODIUM PHOSPHATE 4 MG/ML
INJECTION, SOLUTION INTRA-ARTICULAR; INTRALESIONAL; INTRAMUSCULAR; INTRAVENOUS; SOFT TISSUE AS NEEDED
Status: DISCONTINUED | OUTPATIENT
Start: 2021-11-02 | End: 2021-11-02 | Stop reason: HOSPADM

## 2021-11-02 RX ORDER — ROCURONIUM BROMIDE 10 MG/ML
INJECTION, SOLUTION INTRAVENOUS AS NEEDED
Status: DISCONTINUED | OUTPATIENT
Start: 2021-11-02 | End: 2021-11-02 | Stop reason: HOSPADM

## 2021-11-02 RX ORDER — ACETAMINOPHEN 500 MG
1000 TABLET ORAL EVERY 6 HOURS
Status: DISCONTINUED | OUTPATIENT
Start: 2021-11-02 | End: 2021-11-03 | Stop reason: HOSPADM

## 2021-11-02 RX ORDER — CELECOXIB 100 MG/1
200 CAPSULE ORAL 2 TIMES DAILY
Status: DISCONTINUED | OUTPATIENT
Start: 2021-11-02 | End: 2021-11-03 | Stop reason: HOSPADM

## 2021-11-02 RX ORDER — NALOXONE HYDROCHLORIDE 0.4 MG/ML
0.4 INJECTION, SOLUTION INTRAMUSCULAR; INTRAVENOUS; SUBCUTANEOUS AS NEEDED
Status: DISCONTINUED | OUTPATIENT
Start: 2021-11-02 | End: 2021-11-03 | Stop reason: HOSPADM

## 2021-11-02 RX ORDER — ACETAMINOPHEN 500 MG
1000 TABLET ORAL ONCE
Status: COMPLETED | OUTPATIENT
Start: 2021-11-02 | End: 2021-11-02

## 2021-11-02 RX ORDER — SODIUM CHLORIDE, SODIUM LACTATE, POTASSIUM CHLORIDE, CALCIUM CHLORIDE 600; 310; 30; 20 MG/100ML; MG/100ML; MG/100ML; MG/100ML
25 INJECTION, SOLUTION INTRAVENOUS CONTINUOUS
Status: DISCONTINUED | OUTPATIENT
Start: 2021-11-02 | End: 2021-11-02 | Stop reason: HOSPADM

## 2021-11-02 RX ORDER — LIDOCAINE HYDROCHLORIDE 20 MG/ML
INJECTION, SOLUTION EPIDURAL; INFILTRATION; INTRACAUDAL; PERINEURAL AS NEEDED
Status: DISCONTINUED | OUTPATIENT
Start: 2021-11-02 | End: 2021-11-02 | Stop reason: HOSPADM

## 2021-11-02 RX ORDER — OXYCODONE AND ACETAMINOPHEN 10; 325 MG/1; MG/1
1 TABLET ORAL
Qty: 28 TABLET | Refills: 0 | Status: SHIPPED | OUTPATIENT
Start: 2021-11-02 | End: 2021-11-02 | Stop reason: SDUPTHER

## 2021-11-02 RX ORDER — NEOSTIGMINE METHYLSULFATE 1 MG/ML
INJECTION, SOLUTION INTRAVENOUS AS NEEDED
Status: DISCONTINUED | OUTPATIENT
Start: 2021-11-02 | End: 2021-11-02 | Stop reason: HOSPADM

## 2021-11-02 RX ORDER — OXYCODONE HYDROCHLORIDE 10 MG/1
20 TABLET ORAL
Status: DISCONTINUED | OUTPATIENT
Start: 2021-11-02 | End: 2021-11-03 | Stop reason: HOSPADM

## 2021-11-02 RX ORDER — FENTANYL CITRATE 50 UG/ML
INJECTION, SOLUTION INTRAMUSCULAR; INTRAVENOUS AS NEEDED
Status: DISCONTINUED | OUTPATIENT
Start: 2021-11-02 | End: 2021-11-02 | Stop reason: HOSPADM

## 2021-11-02 RX ORDER — LANOLIN ALCOHOL/MO/W.PET/CERES
1 CREAM (GRAM) TOPICAL 2 TIMES DAILY WITH MEALS
Status: DISCONTINUED | OUTPATIENT
Start: 2021-11-02 | End: 2021-11-03 | Stop reason: HOSPADM

## 2021-11-02 RX ADMIN — Medication 3 MG: at 12:27

## 2021-11-02 RX ADMIN — ACETAMINOPHEN 1000 MG: 500 TABLET ORAL at 23:29

## 2021-11-02 RX ADMIN — GLYCOPYRROLATE 0.4 MG: 0.2 INJECTION INTRAMUSCULAR; INTRAVENOUS at 12:27

## 2021-11-02 RX ADMIN — KETOROLAC TROMETHAMINE 15 MG: 15 INJECTION, SOLUTION INTRAMUSCULAR; INTRAVENOUS at 17:08

## 2021-11-02 RX ADMIN — HYDROMORPHONE HYDROCHLORIDE 1 MG: 2 INJECTION, SOLUTION INTRAMUSCULAR; INTRAVENOUS; SUBCUTANEOUS at 11:40

## 2021-11-02 RX ADMIN — ONDANSETRON 4 MG: 2 INJECTION INTRAMUSCULAR; INTRAVENOUS at 12:28

## 2021-11-02 RX ADMIN — WATER 2 G: 1 INJECTION INTRAMUSCULAR; INTRAVENOUS; SUBCUTANEOUS at 19:00

## 2021-11-02 RX ADMIN — SODIUM CHLORIDE, SODIUM LACTATE, POTASSIUM CHLORIDE, AND CALCIUM CHLORIDE 25 ML/HR: 600; 310; 30; 20 INJECTION, SOLUTION INTRAVENOUS at 09:15

## 2021-11-02 RX ADMIN — FENTANYL CITRATE 50 MCG: 50 INJECTION, SOLUTION INTRAMUSCULAR; INTRAVENOUS at 11:26

## 2021-11-02 RX ADMIN — ACETAMINOPHEN 1000 MG: 500 TABLET ORAL at 09:39

## 2021-11-02 RX ADMIN — PREGABALIN 25 MG: 25 CAPSULE ORAL at 17:11

## 2021-11-02 RX ADMIN — LABETALOL 20 MG/4 ML (5 MG/ML) INTRAVENOUS SYRINGE 5 MG: at 11:49

## 2021-11-02 RX ADMIN — CEFAZOLIN SODIUM 3 G: 1 INJECTION, POWDER, FOR SOLUTION INTRAMUSCULAR; INTRAVENOUS at 11:12

## 2021-11-02 RX ADMIN — DOCUSATE SODIUM 50MG AND SENNOSIDES 8.6MG 1 TABLET: 8.6; 5 TABLET, FILM COATED ORAL at 17:09

## 2021-11-02 RX ADMIN — Medication 10 ML: at 16:10

## 2021-11-02 RX ADMIN — FERROUS SULFATE TAB 325 MG (65 MG ELEMENTAL FE) 325 MG: 325 (65 FE) TAB at 17:09

## 2021-11-02 RX ADMIN — RIVAROXABAN 10 MG: 10 TABLET, FILM COATED ORAL at 19:47

## 2021-11-02 RX ADMIN — OXYCODONE HYDROCHLORIDE 10 MG: 10 TABLET ORAL at 20:34

## 2021-11-02 RX ADMIN — CELECOXIB 400 MG: 400 CAPSULE ORAL at 09:39

## 2021-11-02 RX ADMIN — ROPIVACAINE HYDROCHLORIDE 30 ML: 2 INJECTION, SOLUTION EPIDURAL; INFILTRATION at 10:35

## 2021-11-02 RX ADMIN — SUCCINYLCHOLINE CHLORIDE 100 MG: 20 INJECTION, SOLUTION INTRAMUSCULAR; INTRAVENOUS at 10:59

## 2021-11-02 RX ADMIN — HYDROMORPHONE HYDROCHLORIDE 0.5 MG: 1 INJECTION, SOLUTION INTRAMUSCULAR; INTRAVENOUS; SUBCUTANEOUS at 13:40

## 2021-11-02 RX ADMIN — TRANEXAMIC ACID 1 G: 100 INJECTION, SOLUTION INTRAVENOUS at 12:25

## 2021-11-02 RX ADMIN — FENTANYL CITRATE 50 MCG: 50 INJECTION, SOLUTION INTRAMUSCULAR; INTRAVENOUS at 11:19

## 2021-11-02 RX ADMIN — LIDOCAINE HYDROCHLORIDE 50 MG: 20 INJECTION, SOLUTION EPIDURAL; INFILTRATION; INTRACAUDAL; PERINEURAL at 10:59

## 2021-11-02 RX ADMIN — KETOROLAC TROMETHAMINE 15 MG: 15 INJECTION, SOLUTION INTRAMUSCULAR; INTRAVENOUS at 23:29

## 2021-11-02 RX ADMIN — MIDAZOLAM 2 MG: 1 INJECTION INTRAMUSCULAR; INTRAVENOUS at 10:26

## 2021-11-02 RX ADMIN — ONDANSETRON 4 MG: 2 INJECTION INTRAMUSCULAR; INTRAVENOUS at 13:40

## 2021-11-02 RX ADMIN — DEXAMETHASONE SODIUM PHOSPHATE 4 MG: 4 INJECTION, SOLUTION INTRAMUSCULAR; INTRAVENOUS at 11:15

## 2021-11-02 RX ADMIN — ROCURONIUM BROMIDE 30 MG: 50 INJECTION INTRAVENOUS at 11:21

## 2021-11-02 RX ADMIN — HYDROMORPHONE HYDROCHLORIDE 1 MG: 2 INJECTION, SOLUTION INTRAMUSCULAR; INTRAVENOUS; SUBCUTANEOUS at 13:02

## 2021-11-02 RX ADMIN — TRANEXAMIC ACID 1 G: 100 INJECTION, SOLUTION INTRAVENOUS at 11:07

## 2021-11-02 RX ADMIN — ACETAMINOPHEN 1000 MG: 500 TABLET ORAL at 17:09

## 2021-11-02 RX ADMIN — PROPOFOL 25 MG: 10 INJECTION, EMULSION INTRAVENOUS at 11:26

## 2021-11-02 RX ADMIN — PROPOFOL 150 MG: 10 INJECTION, EMULSION INTRAVENOUS at 10:59

## 2021-11-02 RX ADMIN — FENTANYL CITRATE 100 MCG: 50 INJECTION INTRAMUSCULAR; INTRAVENOUS at 10:26

## 2021-11-02 RX ADMIN — Medication 10 ML: at 22:31

## 2021-11-02 RX ADMIN — LIDOCAINE HYDROCHLORIDE 100 MG: 20 INJECTION, SOLUTION EPIDURAL; INFILTRATION; INTRACAUDAL; PERINEURAL at 10:29

## 2021-11-02 RX ADMIN — ROCURONIUM BROMIDE 10 MG: 50 INJECTION INTRAVENOUS at 11:50

## 2021-11-02 RX ADMIN — METHIMAZOLE 10 MG: 5 TABLET ORAL at 17:12

## 2021-11-02 RX ADMIN — SODIUM CHLORIDE 100 ML/HR: 900 INJECTION, SOLUTION INTRAVENOUS at 16:10

## 2021-11-02 RX ADMIN — PREGABALIN 75 MG: 75 CAPSULE ORAL at 09:39

## 2021-11-02 RX ADMIN — CELECOXIB 200 MG: 100 CAPSULE ORAL at 17:09

## 2021-11-02 RX ADMIN — FAMOTIDINE 20 MG: 20 TABLET, FILM COATED ORAL at 09:39

## 2021-11-02 NOTE — PROGRESS NOTES
Problem: Mobility Impaired (Adult and Pediatric)  Goal: *Acute Goals and Plan of Care (Insert Text)  Description: Physical Therapy Goals  Initiated 11/2/2021 and to be accomplished within 7 day(s)  1. Patient will move from supine to sit and sit to supine  in bed with modified independence. 2.  Patient will transfer from bed to chair and chair to bed with modified independence using the least restrictive device. 3.  Patient will perform sit to stand with modified independence. 4.  Patient will ambulate with modified independence for 150 feet with the least restrictive device. 5.  Patient will ascend/descend 2 stairs with handrail(s) with supervision/set-up. PLOF: Patient was independent with mobility occasionally using SPC. She lives in single story home with her mother. Outcome: Progressing Towards Goal    PHYSICAL THERAPY EVALUATION    Patient: Jaz South (37 y.o. female)  Date: 11/2/2021  Primary Diagnosis: Osteoarthritis of left hip, unspecified osteoarthritis type [M16.12]  Hip arthritis [M16.10]  Procedure(s) (LRB):  LEFT TOTAL HIP ARTHROPLASTY LATERAL/PAVITHRA/ALEX TO ASSIST/NERVE BLOCK (Left) Day of Surgery   Precautions: falls, total hip, WBAT           ASSESSMENT :  Patient is 50 yo female admitted to hospital for AUDI and presents today drowsy and agreeable to therapy. Patient transferred to sitting EOB for objective assessment and is more alert. Patient was educated on weight bearing status, hip precautions, and role of therapy. Patient is able to perform left ankle pump, though reports numbness along left thigh. Left knee extension strength 2+/5. Patient was given demo with instruction on sit <> stand transfer and SPT to the [facility]. Patient transferred to standing at [facility] with min A and bed elevated. Patient unable to pivot on right LE and left knee gave out. Patient slide down EOB with support of PT and left knee landed on the ground.  Two nurses entered to the room and assisted patient back onto the bed. Patient maintained hip precautions throughout incident. Patient is able to scoot herself up in the bed with SBA using bed rails with additional time to improve positioning. Patient instructed to call for assistance and declines to utilize bed pan at end of session. Patient demonstrates decreased strength, mobility, and endurance and will continue to benefit from skilled PT to maximize independence for safe return home. Patient will benefit from skilled intervention to address the above impairments. Patient's rehabilitation potential is considered to be Good  Factors which may influence rehabilitation potential include:   []         None noted  []         Mental ability/status  [x]         Medical condition  []         Home/family situation and support systems  []         Safety awareness  [x]         Pain tolerance/management  []         Other:      PLAN :  Recommendations and Planned Interventions:   [x]           Bed Mobility Training             [x]    Neuromuscular Re-Education  [x]           Transfer Training                   []    Orthotic/Prosthetic Training  [x]           Gait Training                          []    Modalities  [x]           Therapeutic Exercises           []    Edema Management/Control  [x]           Therapeutic Activities            [x]    Family Training/Education  [x]           Patient Education  []           Other (comment):    Frequency/Duration: Patient will be followed by physical therapy 1-2 times per day/4-7 days per week to address goals. Discharge Recommendations: Home Health with family support  Further Equipment Recommendations for Discharge: N/A; pt has RW, SC, and BSC     SUBJECTIVE:   Patient stated I didn't know I had to get up today.     OBJECTIVE DATA SUMMARY:     Past Medical History:   Diagnosis Date    AICD (automatic cardioverter/defibrillator) present 09/2018    Asthma     Cardiac echocardiogram 03/22/2017    LVE.   EF 15% (prev 50% on study of 12/10/14). Severe diffuse hypk. Indeterminate diastolic fx.  RVE. RVSP at least 44 mmHg. Mod LAE.  BISMARK. Mild-mod MR.  Cardiac nuclear imaging test 03/24/2017    High risk. Somewhat patchy uptake. No evidence of ischemia or infarction. No RWMA. Severe LVE. EF 25%. Neg EKG on pharm stress test.    Cardiovascular LLE venous duplex 03/06/2017    Left leg:  No DVT. Pulsatile flow.  Congestive heart failure (HCC)     Graves disease     Graves disease     Heart failure ([facility] [address]     Hypercholesterolemia     Hypertension     Hyperthyroidism      Past Surgical History:   Procedure Laterality Date    HX ANKLE FRACTURE TX Left 2012    Screws plates and rods    HX BREAST REDUCTION      HX HYSTERECTOMY  2009    hysterectomy  btl    HX PACEMAKER  2017    AICD    HX TONSILLECTOMY       Barriers to Learning/Limitations: None  Compensate with: N/A  Home Situation:  Home Situation  Home Environment: Private residence  # Steps to Enter: 2  Rails to Enter: Yes  One/Two Story Residence: One story  Living Alone: No  Support Systems: Parent(s), Child(khalif)  Patient Expects to be Discharged to[de-identified] House  Current DME Used/Available at Home: Yovanny Keith, tasneem  Critical Behavior:  Neurologic State: Alert  Orientation Level: Oriented X4  Cognition: Follows commands  Safety/Judgement: Fall prevention; Awareness of environment  Psychosocial  Patient Behaviors: Calm; Cooperative  Skin Condition/Temp: Warm     Skin Integrity: Incision (comment); Tattoos (comment) (surgical incision left hip)  Skin Integumentary  Skin Color: Appropriate for ethnicity  Skin Condition/Temp: Warm  Skin Integrity: Incision (comment); Tattoos (comment) (surgical incision left hip)     Strength:    Strength: Generally decreased, functional             Tone & Sensation:   Tone: Normal  Sensation: Impaired (right hip)            Range Of Motion:  AROM: Generally decreased, functional (right AUDI)             Functional Mobility:  Bed Mobility:     Supine to Sit: Minimum assistance;Bed Modified; Additional time (to manage right LE)   Scooting: SBA( supine in bed)     Transfers:  Sit to Stand: Minimum assistance( bed elevated)     Balance:   Sitting: Intact  Standing: Impaired; With support  Standing - Static: Fair           Therapeutic Exercises: Ankle pumps, assisted heel slide, quad set    Pain:  Pain level pre-treatment: 5-6/10 left LE  Pain level post-treatment: 8-9/10 left LE  Pain Intervention(s) : Medication (see MAR); Rest, Ice, Repositioning  Response to intervention: Nurse notified, See doc flow    Activity Tolerance:   Fair  Please refer to the flowsheet for vital signs taken during this treatment. After treatment:   []         Patient left in no apparent distress sitting up in chair  [x]         Patient left in no apparent distress in bed  [x]         Call bell left within reach  [x]         Nursing notified  []         Caregiver present  [x]         Bed alarm activated  []         SCDs applied    COMMUNICATION/EDUCATION:   [x]         Role of Physical Therapy in the acute care setting. [x]         Fall prevention education was provided and the patient/caregiver indicated understanding. [x]         Patient/family have participated as able in goal setting and plan of care. [x]         Patient/family agree to work toward stated goals and plan of care. []         Patient understands intent and goals of therapy, but is neutral about his/her participation. []         Patient is unable to participate in goal setting/plan of care: ongoing with therapy staff.  []         Other:     Thank you for this referral.  Arthurine Patient, PT   Time Calculation: 36 mins      Eval Complexity: History: LOW Complexity : Zero comorbidities / personal factors that will impact the outcome / POCExam:LOW Complexity : 1-2 Standardized tests and measures addressing body structure, function, activity limitation and / or participation in recreation  Presentation: LOW Complexity : Stable, uncomplicated  Clinical Decision Making:Low Complexity    Overall Complexity:LOW

## 2021-11-02 NOTE — INTERVAL H&P NOTE
Update History & Physical    The Patient's History and Physical of November 1, 2021 was reviewed with the patient and I examined the patient. There was no change. The surgical site was confirmed by the patient and me. Plan:  The risk, benefits, expected outcome, and alternative to the recommended procedure have been discussed with the patient. Patient understands and wants to proceed with the procedure.     Electronically signed by Rachel Quintana MD on 11/2/2021 at 10:09 AM

## 2021-11-02 NOTE — ANESTHESIA POSTPROCEDURE EVALUATION
Procedure(s):  LEFT TOTAL HIP ARTHROPLASTY LATERAL/PAVITHRA/ALEX TO ASSIST/NERVE BLOCK.    general, regional    Anesthesia Post Evaluation      Multimodal analgesia: multimodal analgesia used between 6 hours prior to anesthesia start to PACU discharge  Patient location during evaluation: PACU  Patient participation: complete - patient participated  Level of consciousness: sleepy but conscious  Pain management: adequate  Airway patency: patent  Anesthetic complications: no  Cardiovascular status: acceptable  Respiratory status: acceptable  Hydration status: acceptable  Post anesthesia nausea and vomiting:  controlled  Final Post Anesthesia Temperature Assessment:  Normothermia (36.0-37.5 degrees C)      INITIAL Post-op Vital signs:   Vitals Value Taken Time   BP 93/43 11/02/21 1502   Temp 36.4 °C (97.5 °F) 11/02/21 1302   Pulse 85 11/02/21 1504   Resp 16 11/02/21 1504   SpO2 100 % 11/02/21 1504   Vitals shown include unvalidated device data.

## 2021-11-02 NOTE — ROUTINE PROCESS
TRANSFER - OUT REPORT:    Verbal report given to [address] on [facility]  being transferred to [address] for routine post - op       Report consisted of patients Situation, Background, Assessment and   Recommendations(SBAR). Information from the following report(s) SBAR, Kardex, Procedure Summary, Intake/Output, MAR and Recent Results was reviewed with the receiving nurse. Lines:   Peripheral IV 11/02/21 Anterior;Right Hand (Active)   Site Assessment Clean, dry, & intact 11/02/21 0926   Phlebitis Assessment 0 11/02/21 0926   Infiltration Assessment 0 11/02/21 0926   Dressing Status Clean, dry, & intact 11/02/21 0926   Dressing Type Tape;Transparent 11/02/21 0926   Hub Color/Line Status Flushed; Infusing;Pink 11/02/21 0926   Action Taken Dressing reinforced 11/02/21 0926   Alcohol Cap Used No 11/02/21 0926        Opportunity for questions and clarification was provided.       Patient transported with:   O2 @ 3 liters  Registered Nurse

## 2021-11-02 NOTE — BRIEF OP NOTE
Brief Postoperative Note    Patient: Jamil Munoz  YOB: 1970  MRN: [id number]    Date of Procedure: 11/2/2021     Pre-Op Diagnosis: Osteoarthritis of left hip, unspecified osteoarthritis type [M16.12]    Post-Op Diagnosis: Same as preoperative diagnosis. Procedure(s):  LEFT TOTAL HIP ARTHROPLASTY LATERAL/PAVITHRA/ALEX TO ASSIST/NERVE BLOCK    Surgeon(s):  Scott Ricci MD    Surgical Assistant: Physician Assistant: Syed Oconnor PA-C  Surg Asst-1: Jett Arias    Anesthesia: General     Estimated Blood Loss (mL): less than 50     Complications: None    Specimens:   ID Type Source Tests Collected by Time Destination   1 : left femoral head Preservative Hip, left  Scott Ricci MD 11/2/2021 1126 Pathology        Implants:   Implant Name Type Inv.  Item Serial No.  Lot No. LRB No. Used Action   SHELL ACET CLUS H 50D TRTANIUM -- TRIDENT II - [id number]  SHELL ACET CLUS H 50D TRTANIUM -- TRIDENT -[id number] [employer] [facility] [id number] Left 1 Implanted   SCREW ACET HEX LOW PROFILE 6.5X25MM TRIDENT II - [id number]  SCREW ACET HEX LOW PROFILE 6.5X25MM TRIDENT II [id number] [id number] Left 1 Implanted   hex dome hole plug   [id number] [employer] [id number] Left 1 Implanted   SCR HEX LOW PROFILE 6.5X15MM --  - [id number]  SCR HEX LOW PROFILE 6.5X15MM --  [id number] [employer] [facility] [id number] Left 1 Implanted   INSERT 0DEG TRIDN X3 POLY 36 D --  - [id number]  INSERT 0DEG TRIDN X3 POLY 36 D --  [id number] [employer] [id number] Left 1 Implanted   STEM FEM SZ 3 127D [id number] -- ACCOLADE II V40 - -[id number]  STEM FEM SZ 3 127D [id number] -- ACCOLADE II V40 [id number] [employer] [facility] [id number] Left 1 Implanted   HEAD FEM DELT V40 -2.5MM 36MM -- V40 BIOLOX - -[id number]  HEAD FEM DELT V40 -2.5MM 36MM -- V40 BIOLOX [id number] [id number] Left 1 Implanted       Drains: * No LDAs found *    Findings: same    Electronically Signed by Jazlyn Hanna MD on 11/2/2021 at 12:33 PM

## 2021-11-02 NOTE — ANESTHESIA PREPROCEDURE EVALUATION
Relevant Problems   No relevant active problems       Anesthetic History   No history of anesthetic complications            Review of Systems / Medical History  Patient summary reviewed and pertinent labs reviewed    Pulmonary            Asthma        Neuro/Psych   Within defined limits           Cardiovascular    Hypertension              Exercise tolerance: >4 METS     GI/Hepatic/Renal  Within defined limits              Endo/Other      Hypothyroidism       Other Findings              Physical Exam    Airway  Mallampati: II  TM Distance: 4 - 6 cm  Neck ROM: normal range of motion   Mouth opening: Normal     Cardiovascular  Regular rate and rhythm,  S1 and S2 normal,  no murmur, click, rub, or gallop  Rhythm: regular  Rate: normal         Dental    Dentition: Lower dentition intact and Upper dentition intact     Pulmonary  Breath sounds clear to auscultation               Abdominal  GI exam deferred       Other Findings            Anesthetic Plan    ASA: 3  Anesthesia type: general and regional - lumbar plexus block          Induction: Intravenous  Anesthetic plan and risks discussed with: Patient

## 2021-11-02 NOTE — DISCHARGE SUMMARY
11/2/2021  7:24 AM    11/3/2021, 1:06 PM    Primary Dx:left Orthopedic / Rheumatologic: Total Hip Replacement  Secondary Dx: Etiological Diagnoses: none    HPI:  Pt has end stage OA of her left hip and had failed conservative treatment. Due to the current findings and affected activity of daily living surgical intervention is indicated. The alternatives, risks, complications as well as expected outcome were discussed, the patient understands and wishes to proceed with surgery    Past Medical History:   Diagnosis Date    AICD (automatic cardioverter/defibrillator) present 09/2018    Asthma     Cardiac echocardiogram 03/22/2017    LVE. EF 15% (prev 50% on study of 12/10/14). Severe diffuse hypk. Indeterminate diastolic fx.  RVE. RVSP at least 44 mmHg. Mod LAE.  BISMARK. Mild-mod MR.  Cardiac nuclear imaging test 03/24/2017    High risk. Somewhat patchy uptake. No evidence of ischemia or infarction. No RWMA. Severe LVE. EF 25%. Neg EKG on pharm stress test.    Cardiovascular LLE venous duplex 03/06/2017    Left leg:  No DVT. Pulsatile flow.  Congestive heart failure (HCC)     Graves disease     Graves disease     Heart failure ([facility] [address]     Hypercholesterolemia     Hypertension     Hyperthyroidism        No current facility-administered medications for this encounter. Patient has no known allergies. Physical Exam:  General A&O x3 NAD, well developed, well nourished, normal affect  Heart: S1-S2, RRR  Lungs: CTA Bilat  Abd: soft NT, ND  Ext: n/v intact    Hospital Course:    Pt. Had leftOrthopedic / Rheumatologic: Total Hip Replacement    Post -op Course: The patient tolerated the procedure well. They were followed by internal medicine for help with medical management. Pt. Was place on Abx pre and post-op for prophylaxis against infection as well as coumadin pre and post-op for prophylaxis against DVT. Vitals signs remained stable, remained af. The wound wasclean, dry, no drainage. Pain was well controlled. Pt. Had negative calf tenderness or swelling, no evidence for DVT. Patient had PT/OT consult for evaluation and treatment. CBC  Lab Results   Component Value Date/Time    WBC 8.4 10/19/2021 07:53 AM    RBC 4.25 10/19/2021 07:53 AM    HCT 39.0 10/19/2021 07:53 AM    MCV 91.8 10/19/2021 07:53 AM    MCH 30.8 10/19/2021 07:53 AM    MCHC 33.6 10/19/2021 07:53 AM    RDW 13.1 10/19/2021 07:53 AM     Coagulation  Lab Results   Component Value Date    INR 1.1 11/02/2021    APTT 28.8 11/02/2021      Basic Metabolic Profile  Lab Results   Component Value Date     11/02/2021    CO2 31 11/02/2021    BUN 16 11/02/2021       Discharge Meds:  Current Discharge Medication List      START taking these medications    Details   cephALEXin (Keflex) 500 mg capsule Take 1 Capsule by mouth four (4) times daily. Qty: 8 Capsule, Refills: 0    Associated Diagnoses: Hip arthritis      docusate sodium (Colace) 100 mg capsule Take 1 Capsule by mouth two (2) times a day for 90 days. Qty: 60 Capsule, Refills: 2    Associated Diagnoses: Hip arthritis      oxyCODONE-acetaminophen (Percocet)  mg per tablet Take 1 Tablet by mouth every six (6) hours as needed for Pain for up to 7 days. Max Daily Amount: 4 Tablets. Qty: 28 Tablet, Refills: 0    Associated Diagnoses: Hip arthritis         CONTINUE these medications which have CHANGED    Details   rivaroxaban (Xarelto) 10 mg tablet Take 1 Tablet by mouth daily (with lunch). Qty: 30 Tablet, Refills: 1    Associated Diagnoses: Hip arthritis         CONTINUE these medications which have NOT CHANGED    Details   ! ! potassium chloride (K-DUR, KLOR-CON) 20 mEq tablet Take 1 Tablet by mouth two (2) times a day. Qty: 7 Tablet, Refills: 0    Associated Diagnoses: Hypokalemia      albuterol (PROVENTIL HFA, VENTOLIN HFA, PROAIR HFA) 90 mcg/actuation inhaler Take 2 Puffs by inhalation every six (6) hours as needed for Wheezing.   Qty: 18 g, Refills: 1    Associated Diagnoses: Reactive airway disease with wheezing, mild intermittent, uncomplicated      furosemide (LASIX) 40 mg tablet Take 1 tablet by mouth once daily  Qty: 30 Tablet, Refills: 5      carvediloL (COREG) 25 mg tablet TAKE 1 TABLET BY MOUTH TWICE DAILY WITH MEALS  Qty: 60 Tablet, Refills: 5      sacubitriL-valsartan (Entresto) 24-26 mg tablet Take 1 tablet by mouth twice daily  Qty: 60 Tablet, Refills: 5      baclofen (LIORESAL) 10 mg tablet Take 0.5-1 Tablets by mouth three (3) times daily as needed for Muscle Spasm(s) or Pain. Qty: 90 Tablet, Refills: 3    Associated Diagnoses: Spinal stenosis of lumbar region with neurogenic claudication      atorvastatin (LIPITOR) 80 mg tablet TAKE 1 TABLET BY MOUTH ONCE DAILY AT NIGHT  Qty: 30 Tablet, Refills: 5      spironolactone (ALDACTONE) 25 mg tablet Take 1 tablet by mouth once daily  Qty: 30 Tablet, Refills: 6      indapamide (LOZOL) 2.5 mg tablet TAKE 1 TABLET BY MOUTH ONCE DAILY IN THE MORNING  Qty: 30 Tab, Refills: 6      methIMAzole (TAPAZOLE) 10 mg tablet Take 1 Tab by mouth two (2) times a day. Qty: 1 Tab, Refills: 0      !! potassium chloride (K-DUR, KLOR-CON) 20 mEq tablet Take 1 Tablet by mouth daily. Qty: 30 Tablet, Refills: 5    Associated Diagnoses: Hypokalemia      nitroglycerin (NITROSTAT) 0.4 mg SL tablet 1 Tab by SubLINGual route every five (5) minutes as needed for Chest Pain. Qty: 1 Bottle, Refills: 1       !! - Potential duplicate medications found. Please discuss with provider. STOP taking these medications       aspirin 81 mg chewable tablet Comments:   Reason for Stopping:         diclofenac (VOLTAREN) 1 % gel Comments:   Reason for Stopping:               Discharge Plan:  The patient will be d/c'd to home, total hip protocol, WBAT. She will have [facility] and nursing. Total joint protocol. Pt safe for homebound transfer, sp Total joint replacement. A walker, bedside commode, and shower chair will be utilized for ADL's.   Follow up with  Clarence in 10-12 days. Call with any questions or concerns.

## 2021-11-03 ENCOUNTER — HOME HEALTH ADMISSION (OUTPATIENT)
Dept: HOME HEALTH SERVICES | Facility: HOME HEALTH | Age: 51
End: 2021-11-03
Payer: MEDICARE

## 2021-11-03 VITALS
RESPIRATION RATE: 14 BRPM | OXYGEN SATURATION: 98 % | DIASTOLIC BLOOD PRESSURE: 70 MMHG | HEIGHT: 66 IN | WEIGHT: 249 LBS | TEMPERATURE: 98.2 F | HEART RATE: 78 BPM | BODY MASS INDEX: 40.02 KG/M2 | SYSTOLIC BLOOD PRESSURE: 110 MMHG

## 2021-11-03 PROCEDURE — 97116 GAIT TRAINING THERAPY: CPT

## 2021-11-03 PROCEDURE — 97165 OT EVAL LOW COMPLEX 30 MIN: CPT

## 2021-11-03 PROCEDURE — 74011250636 HC RX REV CODE- 250/636: Performed by: ORTHOPAEDIC SURGERY

## 2021-11-03 PROCEDURE — 97535 SELF CARE MNGMENT TRAINING: CPT

## 2021-11-03 PROCEDURE — 74011250637 HC RX REV CODE- 250/637: Performed by: ORTHOPAEDIC SURGERY

## 2021-11-03 PROCEDURE — 74011000250 HC RX REV CODE- 250: Performed by: ORTHOPAEDIC SURGERY

## 2021-11-03 RX ADMIN — FERROUS SULFATE TAB 325 MG (65 MG ELEMENTAL FE) 325 MG: 325 (65 FE) TAB at 09:05

## 2021-11-03 RX ADMIN — OXYCODONE HYDROCHLORIDE 15 MG: 10 TABLET ORAL at 15:43

## 2021-11-03 RX ADMIN — WATER 2 G: 1 INJECTION INTRAMUSCULAR; INTRAVENOUS; SUBCUTANEOUS at 11:15

## 2021-11-03 RX ADMIN — SPIRONOLACTONE 25 MG: 25 TABLET ORAL at 09:06

## 2021-11-03 RX ADMIN — ACETAMINOPHEN 1000 MG: 500 TABLET ORAL at 11:15

## 2021-11-03 RX ADMIN — METHIMAZOLE 10 MG: 5 TABLET ORAL at 09:04

## 2021-11-03 RX ADMIN — POTASSIUM CHLORIDE 20 MEQ: 1500 TABLET, EXTENDED RELEASE ORAL at 09:01

## 2021-11-03 RX ADMIN — Medication 10 ML: at 05:45

## 2021-11-03 RX ADMIN — CELECOXIB 200 MG: 100 CAPSULE ORAL at 09:04

## 2021-11-03 RX ADMIN — KETOROLAC TROMETHAMINE 15 MG: 15 INJECTION, SOLUTION INTRAMUSCULAR; INTRAVENOUS at 05:45

## 2021-11-03 RX ADMIN — FUROSEMIDE 20 MG: 20 TABLET ORAL at 09:04

## 2021-11-03 RX ADMIN — KETOROLAC TROMETHAMINE 15 MG: 15 INJECTION, SOLUTION INTRAMUSCULAR; INTRAVENOUS at 11:15

## 2021-11-03 RX ADMIN — PREGABALIN 25 MG: 25 CAPSULE ORAL at 09:05

## 2021-11-03 RX ADMIN — ACETAMINOPHEN 1000 MG: 500 TABLET ORAL at 05:45

## 2021-11-03 RX ADMIN — DOCUSATE SODIUM 50MG AND SENNOSIDES 8.6MG 1 TABLET: 8.6; 5 TABLET, FILM COATED ORAL at 09:03

## 2021-11-03 RX ADMIN — WATER 2 G: 1 INJECTION INTRAMUSCULAR; INTRAVENOUS; SUBCUTANEOUS at 04:34

## 2021-11-03 RX ADMIN — SODIUM CHLORIDE 100 ML/HR: 900 INJECTION, SOLUTION INTRAVENOUS at 03:00

## 2021-11-03 NOTE — OP NOTES
[address] [place] [address]   OPERATIVE REPORT    Name:  Sissy Wang  MR#:   [id number]  :  1970  ACCOUNT #:  [de-identified]  DATE OF SERVICE:  2021    PREOPERATIVE DIAGNOSES:  End-stage arthritis of the left hip, possible avascular necrosis, and morbid obesity, body mass index of 40. POSTOPERATIVE DIAGNOSES:  End-stage arthritis of the left hip, possible avascular necrosis, and morbid obesity, body mass index of 40. PROCEDURE PERFORMED:  Left total hip replacement using the [employer] Accolade II system with a 50-mm Trident II Tritanium acetabular shell, neutral 36 liner, size 3, 127 high-offset femoral component, and a 36 -2.5 ceramic femoral head. SURGEON:  Irasema Vázquez MD.    FIRST ASSISTANT:  Cheri Sutton. SECOND ASSISTANT:  Shonna Lovelace. ANESTHESIA:  Preoperative nerve block with light general.    ANESTHESIOLOGIST:  Dr. Sekou Reza. COMPLICATIONS:  None. SPECIMENS REMOVED:  Femoral head. IMPLANTS:  As above mentioned. ESTIMATED BLOOD LOSS:  300 mL. Cheri Sutton was the first assistant who assisted with all phases of the surgery commencing with patient positioning, patient prep, patient drape, leg positioning during the surgery, retracting, assisting with the surgery itself, closure, dressing placement, and transfer. DESCRIPTION OF PROCEDURE:  After the anesthetic was successfully induced, it was confirmed she received her antibiotics, leg lengths determined, placed in the lateral decubitus position taking great care to pad all sensitive areas, ensured the pelvis was square and confirmed the antibiotics were given. A time-out was performed. A lengthy lateral approach to the hip. We extended the incision due to the obesity. There was at least 4 or 5 inches of adipose tissue prior to getting to the fascia. IT band delineated and incised sharply. Sciatic nerve identified, protected, and avoided and the Charnley retractor introduced.   Bursa released using the capsular scissors. Gluteus medius was intact, but attenuated due to disuse. Minimus and capsule divided directly over the femoral neck carrying back to the tip of the trochanter anteriorly along down the vastus lateralis. Whole cuff of tissue was taken in one contiguous layer and lesser trochanter identified. A pin placed in the superior iliac crest for offset and leg length measurement, and with a little bit of more capsular release, the hip was dislocated uneventfully. As per the preoperative templating, femoral neck divided while protecting the soft tissue structures and the posterior capsule was somewhat adherent and reflected with a combination of electrocautery and Mendiola elevator. We then instrumented around the acetabular area, protecting the neurovascular structures, identified the medial wall for the foveal notch, and then medialized appropriately at appropriate inclination and anteversion, reamed up to accommodate the cup, and went for a line-to-line ream.  We trialed this and I was very pleased with it. We implanted definitive shell at the appropriate inclination and anteversion, removed some osteophyte, and Aquamantys and Exparel cocktail, initially with a neutral liner, but we would go back later and placed the definitive one, made sure it was fully seated, protected with a Ray-April gauze later to be accounted for. With the leg in a sterile leg bag, we lateralized. She had some subtrochanteric narrowing, so we made sure we started a little bit more posterior. We then broached our way up to accommodate the size 3, which was very snug. Calcar planer reduced the hip and then referred back to our offset and leg length guide with excellent restoration of offset and leg lengths. No impingement. Combined anteversion was excellent. I was very, very pleased.     We then implanted the definitive components and re-trialed and happiest with the -2.5, cleaned the trunnion, impacted on, again reducing the hip.    I was very delighted with it, further control of hemostasis, Exparel cocktail, dilute Betadine protocol, and standard closure. The patient was brought to the recovery room in stable condition without complications.         Dayna Moore MD [id number]  D:  11/02/2021 12:50  T:  11/03/2021 0:35  JOB #:  [id number]

## 2021-11-03 NOTE — DISCHARGE INSTRUCTIONS
Patient Education        Hip Replacement Surgery (Posterior): What to Expect at Home  Your Recovery  Hip replacement surgery replaces the worn parts of your hip joint. When you leave the hospital, you will probably be walking with crutches or a walker. You may be able to climb a few stairs and get in and out of bed and chairs. But you will need someone to help you at home until you have more energy and can move around better. You will go home with a bandage and stitches, staples, skin glue, or tape strips. You can remove the bandage when your doctor tells you to. If you have stitches or staples, your doctor will remove them about 2 weeks after your surgery. Glue or tape strips will fall off on their own over time. You may still have some mild pain, and the area may be swollen for 3 to 4 months after surgery. Your doctor may give you medicine for the pain. You will continue the rehabilitation program (rehab) you started in the hospital. The better you do with your rehab exercises, the sooner you will get your strength and movement back. Most people are able to return to work 4 weeks to 4 months after surgery. This care sheet gives you a general idea about how long it will take for you to recover. But each person recovers at a different pace. Follow the steps below to get better as quickly as possible. How can you care for yourself at home? Activity    · Your doctor may not want your affected leg to cross the center of your body toward the other leg. If so, your therapist may suggest these ideas:  ? Do not cross your legs. ? Be very careful as you get in or out of bed or a car so your leg does not cross the imaginary line in the middle of your body.     · Go slowly when you climb stairs. Make sure the lights are on. Have someone watch you, if you can. When you climb stairs:  ? Step up first with your unaffected leg. Then bring the affected leg up to the same step. Bring your crutches or cane up.   ? To go down stairs, reverse the order. First, put your crutches or cane on the lower step. Then bring the affected leg down to that step. Finally, step down with the unaffected leg.     · You can ride in a car, but stop at least once every hour to get out and walk around.     · You may want to sleep on your back. Don't reach down too far to pull up blankets when you lie in bed.     · If your doctor recommends exercises, do them as directed. You can cut back on your exercises if your muscles start to ache, but don't stop doing them.     · Rest when you feel tired. You may take a nap, but don't stay in bed all day.     · Work with your physical therapist to learn the best way to exercise. You will probably have to use a walker, crutches, or a cane for at least 4 to 6 weeks.     · Your doctor may advise you to stay away from activities that put stress on the joint. This includes sports such as tennis, football, and jogging.     · Try not to sit for too long at one time. You will feel less stiff if you take a short walk about every hour. When you sit, use chairs with arms, and don't sit in low chairs.     · Do not bend over more than 90 degrees (like the angle in a letter \"L\").     · Sleep on your back with your legs slightly apart or on your side with a pillow between your knees for about 6 weeks or as your doctor tells you. Do not sleep on your stomach or affected leg.     · Ask your doctor when you can drive again.     · Most people are able to return to work 4 weeks to 4 months after surgery.     · Ask your doctor when it is okay for you to have sex. Diet    · By the time you leave the hospital, you will probably be eating your normal diet. Your doctor may recommend that you take iron and vitamin supplements.     · Drink plenty of fluids (unless your doctor tells you not to).   · Eat healthy foods, and watch your portion sizes. Try to stay at your ideal weight.  Too much weight puts more stress on your new hip joint.     · You may notice that your bowel movements are not regular right after your surgery. This is common. Try to avoid constipation and straining with bowel movements. You may want to take a fiber supplement every day. If you have not had a bowel movement after a couple of days, ask your doctor about taking a mild laxative. Medicines    · Your doctor will tell you if and when you can restart your medicines. You will also get instructions about taking any new medicines.     · If you take aspirin or some other blood thinner, ask your doctor if and when to start taking it again. Make sure that you understand exactly what your doctor wants you to do.     · Your doctor may give you a blood-thinning medicine to prevent blood clots. If you take a blood thinner, be sure you get instructions about how to take your medicine safely. Blood thinners can cause serious bleeding problems. This medicine could be in pill form or as a shot (injection). If a shot is necessary, your doctor will tell you how to do this.     · Be safe with medicines. Take pain medicines exactly as directed. ? If the doctor gave you a prescription medicine for pain, take it as prescribed. ? If you are not taking a prescription pain medicine, ask your doctor if you can take an over-the-counter medicine.     · If you think your pain medicine is making you sick to your stomach:  ? Take your medicine after meals (unless your doctor has told you not to). ? Ask your doctor for a different pain medicine.     · If your doctor prescribed antibiotics, take them as directed. Do not stop taking them just because you feel better. You need to take the full course of antibiotics. Incision care    · If your doctor told you how to care for your cut (incision), follow your doctor's instructions. You will have a dressing over the cut. A dressing helps the incision heal and protects it.  Your doctor will tell you how to take care of this.     · If you did not get instructions, follow this general advice:  ? If you have strips of tape on the cut the doctor made, leave the tape on for a week or until it falls off.  ? If you have stitches or staples, your doctor will tell you when to come back to have them removed. ? If you have skin glue on the cut, leave it on until it falls off. Skin glue is also called skin adhesive or liquid stitches. ? Change the bandage every day. ? Wash the area daily with warm water, and pat it dry. Don't use hydrogen peroxide or alcohol. They can slow healing. ? You may cover the area with a gauze bandage if it oozes fluid or rubs against clothing. ? You may shower 24 to 48 hours after surgery. Pat the incision dry. Don't swim or take a bath for the first 2 weeks, or until your doctor tells you it is okay. Exercise    · Your physical therapist will teach you exercises to do at home. Always do them as your therapist tells you.     · Avoid activities where you might fall. Ice and elevation    · For pain, put ice or a cold pack on the area for 10 to 20 minutes at a time. Put a thin cloth between the ice and your skin.     · Your ankle may swell for about 3 months. Prop up your ankle when you ice it or anytime you sit or lie down. Try to keep it above the level of your heart. This will help reduce swelling. Other instructions    · Wear compression stockings if your doctor told you to. These may help to prevent blood clots. Your doctor will tell you how long you need to keep wearing the compression stockings.     · Try to prevent falls. To avoid falling:  ? Arrange furniture so that you will not trip on it. ? Get rid of throw rugs, and move electrical cords out of the way. ? Walk only in areas with plenty of light. ? Put grab bars in showers and bathtubs. ? Try to avoid icy or snowy sidewalks. Choose shoes with good traction, or consider using traction devices that attach to your shoes. ? Wear shoes with sturdy, flat soles.    Follow-up care is a key part of your treatment and safety. Be sure to make and go to all appointments, and call your doctor if you are having problems. It's also a good idea to know your test results and keep a list of the medicines you take. When should you call for help? Call 911 anytime you think you may need emergency care. For example, call if:    · You passed out (lost consciousness).     · You have severe trouble breathing.     · You have sudden chest pain and shortness of breath, or you cough up blood. Call your doctor now or seek immediate medical care if:    · You have signs that your hip may be dislocated, including:  ? Severe pain and not being able to stand. ? A crooked leg that looks like your hip is out of position. ? Not being able to bend or straighten your leg.     · Your leg or foot is cool or pale or changes color.     · You cannot feel or move your leg.     · You have signs of a blood clot, such as:  ? Pain in your calf, back of the knee, thigh, or groin. ? Redness and swelling in your leg or groin.     · Your incision comes open and begins to bleed, or the bleeding increases.     · You feel like your heart is racing or beating irregularly.     · You have signs of infection, such as:  ? Increased pain, swelling, warmth, or redness. ? Red streaks leading from the incision. ? Pus draining from the incision. ? A fever. Watch closely for changes in your health, and be sure to contact your doctor if:    · You do not have a bowel movement after taking a laxative.     · You do not get better as expected. Where can you learn more? Go to http://www.gray.com/  Enter Q746 in the search box to learn more about \"Hip Replacement Surgery (Posterior): What to Expect at Home. \"  Current as of: July 1, 2021               Content Version: 13.0  © 8807-7215 [employer], [employer].    Care instructions adapted under license by [employer] (which disclaims liability or warranty for this information). If you have questions about a medical condition or this instruction, always ask your healthcare professional. [place] [address] any warranty or liability for your use of this information.

## 2021-11-03 NOTE — PROGRESS NOTES
OCCUPATIONAL THERAPY EVALUATION/DISCHARGE    Patient: Ana Self (87 y.o. female)  Date: 11/3/2021  Primary Diagnosis: Osteoarthritis of left hip, unspecified osteoarthritis type [M16.12]  Hip arthritis [M16.10]  Procedure(s) (LRB):  LEFT TOTAL HIP ARTHROPLASTY LATERAL/PAVITHRA/JOHNSON TO ASSIST/NERVE BLOCK (Left) 1 Day Post-Op   Precautions:   WBAT, Total hip  PLOF: Pt reports being independent for ADLs and functional mobility. Per pt she will be staying with her mother and will have her daughter with her also at all times. ASSESSMENT AND RECOMMENDATIONS:  Based on the objective data described below, the patient presents with pain and total hip precautions affecting her participation in ADLs. Patient educated on the role of OT, hip precautions and how they relate to ADLs and functional mobility. Pt verbalized understanding and was able to return education. Pt educated on AE for LB ADLs and issued reacher, sock aid, long handled shoe horn, and long handled sponge. Following education pt was able to perform LB dressing and bathing with Supervision. Pt educated on DME for BR safety. Pt reports she shower chair to increase independence with bathing. Pt also has supportive family at home to assist as needed for ADls and provide Supervision for safety. Per pt she will have assistance available 24/7. Skilled occupational therapy is not indicated at this time. Discharge Recommendations: Home Health for safety check  Further Equipment Recommendations for Discharge: N/A      SUBJECTIVE:   Patient stated I will be fine.     OBJECTIVE DATA SUMMARY:     Past Medical History:   Diagnosis Date    AICD (automatic cardioverter/defibrillator) present 09/2018    Asthma     Cardiac echocardiogram 03/22/2017    LVE. EF 15% (prev 50% on study of 12/10/14). Severe diffuse hypk. Indeterminate diastolic fx.  RVE. RVSP at least 44 mmHg. Mod LAE.  BISMARK. Mild-mod MR. Cardiac nuclear imaging test 03/24/2017    High risk. Somewhat patchy uptake. No evidence of ischemia or infarction. No RWMA. Severe LVE. EF 25%. Neg EKG on pharm stress test.    Cardiovascular LLE venous duplex 03/06/2017    Left leg:  No DVT. Pulsatile flow. Congestive heart failure ([address]     Graves disease     Graves disease     Heart failure ([facility] [address]     Hypercholesterolemia     Hypertension     Hyperthyroidism      Past Surgical History:   Procedure Laterality Date    HX ANKLE FRACTURE TX Left 2012    Screws plates and rods    HX BREAST REDUCTION      HX HYSTERECTOMY  2009    hysterectomy  btl    HX PACEMAKER  2017    AICD    HX TONSILLECTOMY       Barriers to Learning/Limitations: None  Compensate with: visual, verbal, tactile, kinesthetic cues/model    Home Situation:   Home Situation  Home Environment: Private residence  # Steps to Enter: 2  Rails to Enter: Yes  One/Two Story Residence: One story  Living Alone: No  Support Systems: Child(khalif)  Patient Expects to be Discharged to[de-identified] House  Current DME Used/Available at Home: Telsima Jess, straight  Tub or Shower Type: Shower  [x]     Right hand dominant   []     Left hand dominant    Cognitive/Behavioral Status:  Neurologic State: Alert  Orientation Level: Oriented X4  Cognition: Follows commands  Safety/Judgement: Home safety    Skin: visible skin intact  Edema: none noted    Vision/Perceptual:       Acuity: Able to read clock/calendar on wall without difficulty     Coordination: BUE  Coordination: Generally decreased, functional  Fine Motor Skills-Upper: Left Intact; Right Intact    Gross Motor Skills-Upper: Left Intact; Right Intact    Balance:  Sitting: Intact  Standing: Intact;  With support  Standing - Static: Good  Standing - Dynamic : Good    Strength: BUE  Strength: Generally decreased, functional   Tone & Sensation: BUE  Tone: Normal  Sensation: Intact   Range of Motion: BUE  AROM: Generally decreased, functional   Functional Mobility and Transfers for ADLs:  Bed Mobility:     Supine to Sit: (found in recliner )     Scooting: Modified independent  Transfers:  Sit to Stand: Modified independent  Stand to Sit: Modified independent   Toilet Transfer : Modified independent    Bathroom Mobility: Supervision/set up  ADL Assessment:  Feeding: Independent  Oral Facial Hygiene/Grooming: Independent  Bathing: Modified independent  Upper Body Dressing: Modified independent  Lower Body Dressing: Stand-by assistance  Toileting: Modified independent   ADL Intervention:   Cognitive Retraining  Safety/Judgement: Home safety  Pain:  Pain level pre-treatment: not rated  Pain level post-treatment: not rated    Activity Tolerance:   Good  Please refer to the flowsheet for vital signs taken during this treatment. After treatment:   [x]  Patient left in no apparent distress sitting up in chair  []  Patient left in no apparent distress in bed  [x]  Call bell left within reach  [x]  Nursing notified  []  Caregiver present  []  Bed alarm activated    COMMUNICATION/EDUCATION:   [x]      Role of Occupational Therapy in the acute care setting  [x]      Home safety education was provided and the patient/caregiver indicated understanding. []      Patient/family have participated as able and agree with findings and recommendations. []      Patient is unable to participate in plan of care at this time. Thank you for this referral.  Frances Floyd OTR/L  Time Calculation: 26 mins      Eval Complexity: History: LOW Complexity : Brief history review ; Examination: LOW Complexity : 1-3 performance deficits relating to physical, cognitive , or psychosocial skils that result in activity limitations and / or participation restrictions ;    Decision Making:LOW Complexity : No comorbidities that affect functional and no verbal or physical assistance needed to complete eval tasks

## 2021-11-03 NOTE — ROUTINE PROCESS
Bedside and Verbal shift change report given to [employer] (oncoming nurse) by Paola Chiang RN (offgoing nurse). Report included the following information SBAR, Kardex, Intake/Output, MAR and Recent Results.

## 2021-11-03 NOTE — PROGRESS NOTES
I have reviewed discharge  instruction with the patient. The patient verbalized understanding. Discharged medications reviewed with patient and appropriate educational materials and adverse effects of medications teaching were provided. Patient's VS was stable and Pt is cleared by OT/PT. Patient armband was removed and shredded. Patient is cleared for discharge. Patient escorted to the main entrance via wheelchair, all belonging sent with patient.

## 2021-11-03 NOTE — ROUTINE PROCESS
Bedside and verbal shift change report given to  Kelton Coughlin RN by Isabel Alvarez RN.  Report included the following information:  -procedure summary  -MAR  -Recent Results  -Med Rec Status  -SBAR

## 2021-11-03 NOTE — PROGRESS NOTES
Problem: Falls - Risk of  Goal: *Absence of Falls  Description: Document Lan Yumiko Fall Risk and appropriate interventions in the flowsheet.   Outcome: Progressing Towards Goal  Note: Fall Risk Interventions:  Mobility Interventions: PT Consult for mobility concerns         Medication Interventions: Teach patient to arise slowly         History of Falls Interventions: Room close to nurse's station         Problem: Patient Education: Go to Patient Education Activity  Goal: Patient/Family Education  Outcome: Progressing Towards Goal     Problem: Patient Education: Go to Patient Education Activity  Goal: Patient/Family Education  Outcome: Progressing Towards Goal     Problem: Pain  Goal: *Control of Pain  Outcome: Progressing Towards Goal  Goal: *PALLIATIVE CARE:  Alleviation of Pain  Outcome: Progressing Towards Goal     Problem: Patient Education: Go to Patient Education Activity  Goal: Patient/Family Education  Outcome: Progressing Towards Goal     Problem: Hip Replacement: Day of Surgery/Unit  Goal: Off Pathway (Use only if patient is Off Pathway)  Outcome: Resolved/Met  Goal: Activity/Safety  Outcome: Resolved/Met  Goal: Consults, if ordered  Outcome: Resolved/Met  Goal: Diagnostic Test/Procedures  Outcome: Resolved/Met  Goal: Nutrition/Diet  Outcome: Resolved/Met  Goal: Medications  Outcome: Resolved/Met  Goal: Respiratory  Outcome: Resolved/Met  Goal: Treatments/Interventions/Procedures  Outcome: Resolved/Met  Goal: Psychosocial  Outcome: Resolved/Met  Goal: *Initiate mobility  Outcome: Resolved/Met  Goal: *Optimal pain control at patient's stated goal  Outcome: Resolved/Met  Goal: *Hemodynamically stable  Outcome: Resolved/Met

## 2021-11-03 NOTE — PROGRESS NOTES
Ortho    Pt. Seen and evaluated. Doing well, up in chair, pain well controlled  Slipped with PT yesterday  Denies cp, sob, abd pain    Visit Vitals  BP 97/79   Pulse 85   Temp 97.9 °F (36.6 °C)   Resp 17   Ht 5' 6\" (1.676 m)   Wt 249 lb (112.9 kg)   LMP  (LMP Unknown) Comment: 2009   SpO2 98%   BMI 40.19 kg/m²       left total hip replacement  left hip Woundclean, dry, no drainage  Sensory intact to LT  Motor intact  nv intact  Neg calf tenderness. Labs. CBC  @  CBC:   Lab Results   Component Value Date/Time    WBC 8.4 10/19/2021 07:53 AM    RBC 4.25 10/19/2021 07:53 AM    HGB 13.1 10/19/2021 07:53 AM    HCT 39.0 10/19/2021 07:53 AM    PLATELET 420 92/22/0020 07:53 AM    and BMP:   Lab Results   Component Value Date/Time    Glucose 130 (H) 11/02/2021 09:15 AM    Sodium 137 11/02/2021 09:15 AM    Potassium 3.4 (L) 11/02/2021 09:15 AM    Chloride 103 11/02/2021 09:15 AM    CO2 31 11/02/2021 09:15 AM    BUN 16 11/02/2021 09:15 AM    Creatinine 0.94 11/02/2021 09:15 AM    Calcium 9.4 11/02/2021 09:15 AM   @  Coagulation  Lab Results   Component Value Date    INR 1.1 11/02/2021    APTT 28.8 11/02/2021      Basic Metabolic Profile  Lab Results   Component Value Date     11/02/2021    CO2 31 11/02/2021    BUN 16 11/02/2021     X-rays left hip reviewed. No acute abnormality noted    Assesment:leftOrthopedic / Rheumatologic: Total Hip Replacement  Past Medical History:   Diagnosis Date    AICD (automatic cardioverter/defibrillator) present 09/2018    Asthma     Cardiac echocardiogram 03/22/2017    LVE. EF 15% (prev 50% on study of 12/10/14). Severe diffuse hypk. Indeterminate diastolic fx.  RVE. RVSP at least 44 mmHg. Mod LAE.  BISMARK. Mild-mod MR.  Cardiac nuclear imaging test 03/24/2017    High risk. Somewhat patchy uptake. No evidence of ischemia or infarction. No RWMA. Severe LVE. EF 25%. Neg EKG on pharm stress test.    Cardiovascular LLE venous duplex 03/06/2017    Left leg:  No DVT. Pulsatile flow.  Congestive heart failure (HCC)     Graves disease     Graves disease     Heart failure ([facility] [address]     Hypercholesterolemia     Hypertension     Hyperthyroidism          Plan:  aspirin, PT- wbat. DC to home if cleared by PT and ok with medicine.

## 2021-11-03 NOTE — PROGRESS NOTES
Reason for Admission:  Osteoarthritis of left hip, unspecified osteoarthritis type [M16.12]  Hip arthritis [M16.10]                 RUR Score:    7%            Plan for utilizing home health:    yes                      Likelihood of Readmission:   LOW                         Transition of Care Plan:              Initial assessment completed with patient. Cognitive status of patient: oriented to time, place, person and situation. Face sheet information confirmed:  yes. The patient designates daughterBen to participate in her discharge plan and to receive any needed information. This patient lives in a single family home with mother. Patient is not able to navigate steps as needed. Prior to hospitalization, patient was considered to be independent with ADLs/IADLS : yes . Patient has a current ACP document on file: no      Healthcare Decision Maker:   Primary Decision Maker: Severo Freed - Daughter - [phone]    Primary Decision Maker: Melvin Bauer - Daughter - [phone]    Click here to complete [address] including selection of the Healthcare Decision Maker Relationship (ie \"Primary\")    The daughter will be available to transport patient home upon discharge. The patient already has [facility], [facility], and [facility] available in the home. Patient is not currently active with home health. Patient has not stayed in a skilled nursing facility or rehab. This patient is on dialysis :no    List of available Home Health agencies were provided and reviewed with the patient prior to discharge. Freedom of choice signed: yes, for [facility]. Currently, the discharge plan is Home with [address] [place] [address] Juan Marks. The patient states that she can obtain her medications from the pharmacy, and take her medications as directed. Patient's current insurance is [employer] and [place] [employer]. Care Management Interventions  PCP Verified by CM:  Yes  Mode of Transport at Discharge: Self  Transition of Care Consult (CM Consult): Discharge Planning, [address]: Yes  Support Systems: Child(khalif)  Confirm Follow Up Transport: Family  The Patient and/or Patient Representative was Provided with a Choice of Provider and Agrees with the Discharge Plan?: Yes  Freedom of Choice List was Provided with Basic Dialogue that Supports the Patient's Individualized Plan of Care/Goals, Treatment Preferences and Shares the Quality Data Associated with the Providers?: Yes  Discharge Location  Discharge Placement: Home with home health        Carlos Cortes RN - Outcomes Manager  [phone]

## 2021-11-03 NOTE — PROGRESS NOTES
Pt slid to ground during transfer to bedside commode with PT. Pt vital signs assessed and stable, complaint of pain in left hip. MD notified. X-ray ordered. Pt returned to bed with assistance from PT and 2 RNs.

## 2021-11-03 NOTE — ROUTINE PROCESS
2100 Patient attempted keny void but unsuccessful, bladder scanned 261 ml, patient encouraged to drink plenty fluid, will continues to monitor. 0030 Patient assisted to bedside commode, voided 200 ml of yellow urine,back in bed.

## 2021-11-03 NOTE — HOME CARE
Received home health referral for [facility] for (SN, PT- Dr. Ora Rush protocol). Spoke with patient in room;  patient identifiers verified. Explained home care services and routines. Verbalized understanding. Demographics verified including insurance, phone and address confirmed. Patient has the following DME: already available in home for use:  BSC; SC; RW.  Caregivers available lives with mother and brother with other family member also available. Orders noted and arranged to be processed to central intake.      ---   Jeremiah Doyle, NEDAN  [address] [facility] Liaison

## 2021-11-03 NOTE — PROGRESS NOTES
Problem: Mobility Impaired (Adult and Pediatric)  Goal: *Acute Goals and Plan of Care (Insert Text)  Description: Physical Therapy Goals  Initiated 11/2/2021 and to be accomplished within 7 day(s)  1. Patient will move from supine to sit and sit to supine  in bed with modified independence. 2.  Patient will transfer from bed to chair and chair to bed with modified independence using the least restrictive device. 3.  Patient will perform sit to stand with modified independence. 4.  Patient will ambulate with modified independence for 150 feet with the least restrictive device. 5.  Patient will ascend/descend 2 stairs with handrail(s) with supervision/set-up. PLOF: Patient was independent with mobility occasionally using SPC. She lives in single story home with her mother. Outcome: Resolved/Met   PHYSICAL THERAPY TREATMENT AND DISCHARGE    Patient: Jaye Remy (29 y.o. female)  Date: 11/3/2021  Diagnosis: Osteoarthritis of left hip, unspecified osteoarthritis type [M16.12]  Hip arthritis [M16.10]   <principal problem not specified>  Procedure(s) (LRB):  LEFT TOTAL HIP ARTHROPLASTY LATERAL/PAVITHRA/JOHNSON TO ASSIST/NERVE BLOCK (Left) 1 Day Post-Op  Precautions: WBAT      ASSESSMENT:  Pt cleared to participate in PT session, pt received semi-reclined in recliner and agreeable to therapy session. Pt performing all mobility with Key this session with RW. Pt reporting pain through hip with limited knee flexion due stance phase of LLE. Pt ambulating x200 feet with RW and Key, ascending/descending 4 steps with B handrails and step to gait pattern. Pt with no questions or concerns regarding mobility at home. Pt returned to recliner. Pt positioned for comfort and educated to call for assist before getting up, pt verbalized understanding. Pt left with all needs met and call bell in reach. RN notified of position and participation.      Pt safe for discharge home with RW, HH and increased family support as needed. PLAN:  Maximum therapeutic gains met at current level of care and patient will be discharged from physical therapy at this time. Rationale for discharge:  [x]     Goals Achieved  []     [address]  []     Patient not participating in therapy  []     Other:  Discharge Recommendations:  Home Health  Further Equipment Recommendations for Discharge:  RW     SUBJECTIVE:   Patient stated Jennifer Vallecillo wanted me to go home yesterday.     OBJECTIVE DATA SUMMARY:   Critical Behavior:  Neurologic State: Alert  Orientation Level: Oriented X4  Cognition: Follows commands  Safety/Judgement: Fall prevention, Awareness of environment  Functional Mobility Training:  Bed Mobility:     Supine to Sit:  (found in recliner )     Scooting: Modified independent    Transfers:  Sit to Stand: Modified independent  Stand to Sit: Modified independent    Balance:  Sitting: Intact  Standing: Intact; With support  Standing - Static: Good  Standing - Dynamic : Good      Posture:   Posture (WDL): Within defined limits     Ambulation/Gait Training:  Distance (ft): 200 Feet (ft)  Assistive Device: Walker, rolling  Ambulation - Level of Assistance: Modified independent     Gait Description (WDL): Exceptions to WDL  Gait Abnormalities: Antalgic; Decreased step clearance    Base of Support: Shift to right  Stance: Left decreased  Speed/Debbie: Slow  Step Length: Left shortened    Stairs:  Number of Stairs Trained: 4  Stairs - Level of Assistance: Modified independent  Rail Use: Both      Pain:  Pain level pre-treatment: 6/10   Pain level post-treatment: 6/10     Activity Tolerance:   Good activity tolerance     Please refer to the flowsheet for vital signs taken during this treatment.   After treatment:   [x] Patient left in no apparent distress sitting up in chair  [] Patient left in no apparent distress in bed  [x] Call bell left within reach  [x] Nursing notified  [] Caregiver present  [] Bed alarm activated  [] SCDs applied      COMMUNICATION/EDUCATION:   [x]         Role of Physical Therapy in the acute care setting. [x]         Fall prevention education was provided and the patient/caregiver indicated understanding. [x]         Patient/family have participated as able and agree with findings and recommendations. []         Patient is unable to participate in plan of care at this time.   []         Other:        Honorio Root, PT   Time Calculation: 12 mins

## 2021-11-03 NOTE — PROGRESS NOTES
Discharge order noted for today. Pt has been accepted to [facility] agency. Met with patient and she is agreeable to the transition plan today. Transport has been arranged through her family. Patient's discharge summary and home health  orders have been forwarded to [facility] home health  agency via [facility]. Updated bedside RN, Laura Zhu,  to the transition plan.   Discharge information has been documented on the AVS.       Vincent Ridley RN - Outcomes Manager  [phone]

## 2021-11-04 ENCOUNTER — HOME CARE VISIT (OUTPATIENT)
Dept: SCHEDULING | Facility: HOME HEALTH | Age: 51
End: 2021-11-04
Payer: MEDICARE

## 2021-11-04 PROCEDURE — 400013 HH SOC

## 2021-11-04 PROCEDURE — A6212 FOAM DRG <=16 SQ IN W/BORDER: HCPCS

## 2021-11-04 PROCEDURE — G0299 HHS/HOSPICE OF RN EA 15 MIN: HCPCS

## 2021-11-04 PROCEDURE — G0151 HHCP-SERV OF PT,EA 15 MIN: HCPCS

## 2021-11-04 NOTE — HOME HEALTH
Clarence f/u 11/17 9:30    ADMISSION OASIS  Admission Summary: Ms. Shey Briscoe  is a 49year old female being admitted to [facility] by [facility] for PT/SN services. Pt was admitted for surgery on 11/2/21 to 11/3/21  for L THR posterior/lateral approach with THR precautions, by Dr. Phyllis Baxter at [facility] hospital.    The patient's caregiver/representative present, & able and willing to provide care daily. Patient participated in goal setting and care planning and are agreeable to the care plan. Discharge planning/training was initiated for  independence and Outpt. PT as recommended by Dr. Phyllis Baxter. Admission booklet reviewed with patient; including review of rights and responsibilities, discharge/transfer policies, and contact information for , , Medicare, O, and outside resources for independence. PMHx:  AICD (automatic cardioverter/defibrillator) present 09/2018    Asthma    Cardiac echocardiogram 03/22/2017   LVE. EF 15% (prev 50% on study of 12/10/14). Severe diffuse hypk. Indeterminate diastolic fx. RVE. RVSP at least 44 mmHg. Mod LAE. BISMARK. Mild-mod MR.  Cardiac nuclear imaging test 03/24/2017   High risk. Somewhat patchy uptake. No evidence of ischemia or infarction. No RWMA. Severe LVE. EF 25%. Neg EKG on pharm stress test.    Cardiovascular LLE venous duplex 03/06/2017   Left leg: No DVT. Pulsatile flow.  Congestive heart failure (HCC)    Graves disease    Graves disease    Heart failure ([facility] [address]    Hypercholesterolemia    Hypertension    Hyperthyroidism   SUBJECTIVE:   \"I am in so much pain, I haven't had any pain meds since the hospital.  My brother is at the store picking up my medications now. Did you know I fell at the hospital?  The therapist came in to get me up and I fell. I didn't injure myself. \"  Pt reports 10/10 and increased to10/10 over the past 24 hours in L hip due to no pain meds not from fall injury. Pt brother able to  meds and deliver by end of PT session and pt took Percocet. REQUIRES CAREGIVER ASSISTANCE FOR: meals, ADLs, transportation, medications, ambulation, exercises  PLOF: pt lives with mother and brother in 1 level home with steps to enter. Pt was ambulating with no device PTA and was indep with all mobility as well as ADLs, medications, home tasks, and driving. DME: FWW, cane, commode, THR hip kit, bath chair, reacher, wedge for hip precautions  MEDICATIONS REVIEWED AND UPDATED: Medications reconciled and all medications are available in the home this visit. The following education was provided regarding high risk medications (Xarelto 10 mg as blood thinner, Oxycodone - acet  mg ), medication interactions, and look a like medications: Continue medication as prescribed by MD. Medications are effective at this time. NEXT MD APPT:  with Dr. Patrick Worley 11/17/21 at 9:30 am   ROM: L hip lmited due to pain and recent surgery   STRENGTH:  see strength section for details. WOUNDS: L hip incision site with aquacell dressing present with 40% drainage noted in middle of dressing. Dressing change to be done on POD #7- 11/9/21 by SN. Honey comb dressing replacements order via [employer] for delivery to pt. BED MOBILITY: sup <> sit NT per pt requested   TRANSFERS: sit to stand from  couch and toilet with SBA but cues for proper hand placement for safety and proper technique and also to not break precautions. Pt's toilet on the higher side and has commode as needed. GAIT: pt ambulated 20'x3 with FWW and SBA with cues for proper heel to toe gait pattern for BLE,  step through gait pattern, as well as  wider MARY ELLEN, not to break precautions of IR when turning with walking.   Pt also hip hiking LE, with circumduction and cued to bend L LE with swing phase to prevent    STAIRS: NT  BALANCE: fair + balance with FWW with gait  PATIENT EDUCATION PROVIDED THIS VISIT: safety for transfers and proper hand placement, review and instructed in THR precautions with pt able to recite precautions back 2/3,   HEP per handout, walking with heel to toe gait pattern, and step through gait pattern, deep breathing/PLB, ice with barrier between L hip and skin for 20 min on and 40 min off, clearing objects and clear pathways for fall prevention as well as well lit areas at nighttime, FWW at bedside at night,  signs and symptoms of infection. HEP consisting of:  1. Walking every 30 min to 1 hour during the day with FWW   2. THR protocol per handout given to pt for supine and sitting exercises. Pt refused to perform HEP with PT today due to lack of pain meds for 24 hours and 10/10 pain. HEP handout was reviewed with pt. Written HEP issued, patient/caregiver verbalized understanding. CONTINUED NEED FOR THE FOLLOWING SKILLS: HH PT is medically necessary to address  L hip pain, decreased ROM of L hip,  decreased strength, increased swelling, decreased independence with functional transfers, impaired gait, impaired stair negotiation, and impaired balance in order to improve functional independence, quality of life, return to PLOF, and reduce the risk for falls.   PLAN: (14 days daily)  until f/u with Dr. Ti Martinez on 11/17/21  DISCHARGE PLANNING DISCUSSED: Discharge to self and family under MD supervision once all goals have been met or patient has reached max potential.

## 2021-11-05 ENCOUNTER — HOME CARE VISIT (OUTPATIENT)
Dept: SCHEDULING | Facility: HOME HEALTH | Age: 51
End: 2021-11-05
Payer: MEDICARE

## 2021-11-05 ENCOUNTER — HOME CARE VISIT (OUTPATIENT)
Dept: HOME HEALTH SERVICES | Facility: HOME HEALTH | Age: 51
End: 2021-11-05
Payer: MEDICARE

## 2021-11-05 VITALS
TEMPERATURE: 97.5 F | RESPIRATION RATE: 16 BRPM | DIASTOLIC BLOOD PRESSURE: 76 MMHG | HEART RATE: 106 BPM | OXYGEN SATURATION: 99 % | SYSTOLIC BLOOD PRESSURE: 108 MMHG

## 2021-11-05 PROCEDURE — G0151 HHCP-SERV OF PT,EA 15 MIN: HCPCS

## 2021-11-05 NOTE — HOME HEALTH
PT VISIT NOTE  S: Pt. states that she slept well last night  O: Medications reconciled with the patient, medications updates as needed. Gait with FWW/NBQC AD x 75 feet with min A. Pt. required min verbal cues for sequencing and coordination. Pt. demonstrates with decreased hip and knee flexion in pre and mid swing phase of gait on LLE lower extremity. Pt. demonstrates with slow cadences and decreased stride length. Transfers are min A  with sit to stand and bed to chair. Pt. received therapeutic exercises which included:  LAQ with 5 second hold, Squats, marching in place, toe raises and hip ext. x 15 x 2. The need for the therex include lower extremity strengthening to facilitate safe and effective functional mobility, improvement with gait activities and to allow him/her to safely transfer within the home and allow for maximal functional independence. Reviewed HEP with the patient which include  Squats, marching in place, toe raises and hip ext. A: Pt. requires skilled PT for instruction in strengthening activities, balance and coordination activities as well as gait, transfer and safety activities. Instructed the patient with safety during mobility as well as reviewing the HEP program to facilitate increased independence with all aspects of functional mobility. Instruction with gait sequencing to facilitate increase in gait quality by increasing the hip and knee flexion in pre and mid swing phase of gait. Pt. was able to return demonstrate the gait training by demonstrating an increase in hip and knee flexion in the pre and mid swing phase of gait. Pt. was also able to return demonstrate the HEP as instructed. P: Next session there will be continued focus on transfer, mobility and gait as well as on safety education during all mobility including balance as well as strengthening the hip and knee musculature to allow for an increased level of functional independence with mobility.

## 2021-11-05 NOTE — CASE COMMUNICATION
Dear Dr. Joel Pulido ,     This message is to inform you that your patient, Argelia Morgan,  , 72, has been admitted  to [address] [place] [address] services under Home PT today. It was determined that pt will benefit from Home PT for 14 daily until seen in your office for f/u on 21 to improve strength, ROM, and endurance, gait, balance, safety on stairs, and return to PLOF. Pt's BP was low today 93/70 with no signs or symptoms of  dizziness, and pt encouraged to push fluids to increase BP. Pt pain also at 10/10 and had been without pain meds for 24 hours. Brother had picked up meds by end of treatment and was instructed to take as directed. Any questions please contact me at [phone].     Sincerely,     Radha Downs, PT

## 2021-11-06 ENCOUNTER — HOME CARE VISIT (OUTPATIENT)
Dept: SCHEDULING | Facility: HOME HEALTH | Age: 51
End: 2021-11-06
Payer: MEDICARE

## 2021-11-06 VITALS
OXYGEN SATURATION: 99 % | RESPIRATION RATE: 16 BRPM | HEART RATE: 106 BPM | DIASTOLIC BLOOD PRESSURE: 80 MMHG | SYSTOLIC BLOOD PRESSURE: 120 MMHG | TEMPERATURE: 97.1 F

## 2021-11-06 PROCEDURE — G0157 HHC PT ASSISTANT EA 15: HCPCS

## 2021-11-06 NOTE — HOME HEALTH
Subjective: Pt reports feeling good. No hip pain. She feels sx in her thigh. .  Caregiver involvement/assistance needed for: meals, meds, ADL's, transportation  . Home health supplies by type and quantity ordered/delivered this visit include: no  .  Objective:  See interventions. .  Assessment and progress towards goals: The patient's bandage > 50% filled but contained. her bandages are due today. Will change next session. Patient with fluctuating sx. She is able to bring the sx down with meds, but is not using the cold therapy a lot. She feels it does not work well. She was incorrectly applying it. Educated in correct barriers for best outcomes. Fair exercise tolerance with Fair return demonstration. Pt able to report 2/3 precautions. She required re education and demonstration of the 3rd. Education for s/sx to look for for infection. Instructed to let therapist now if she notices any of the  s/sx. Pt was able to report 50% of s/sx of infection initially without assist.  .  Home Health PT is medically necessary to address the following:  pain, decreased ROM, decreased strength, increased edema, impaired bed mobility, decreased Ind with functional transfers, impaired gait, impaired stair negotiation and impaired stability to decrease sx, improve functional Ind, quality of life, reduce the fall risk. Plan for next visit:  change bandage, assess standing exercises  .   The following discharge planning discussed with the patient/caregiver: Estimated DC 11/18

## 2021-11-07 ENCOUNTER — HOME CARE VISIT (OUTPATIENT)
Dept: SCHEDULING | Facility: HOME HEALTH | Age: 51
End: 2021-11-07
Payer: MEDICARE

## 2021-11-07 VITALS
TEMPERATURE: 97.9 F | SYSTOLIC BLOOD PRESSURE: 120 MMHG | OXYGEN SATURATION: 97 % | DIASTOLIC BLOOD PRESSURE: 80 MMHG | HEART RATE: 115 BPM

## 2021-11-07 PROCEDURE — G0157 HHC PT ASSISTANT EA 15: HCPCS

## 2021-11-08 ENCOUNTER — HOME CARE VISIT (OUTPATIENT)
Dept: SCHEDULING | Facility: HOME HEALTH | Age: 51
End: 2021-11-08
Payer: MEDICARE

## 2021-11-08 VITALS
RESPIRATION RATE: 16 BRPM | TEMPERATURE: 97.3 F | DIASTOLIC BLOOD PRESSURE: 80 MMHG | HEART RATE: 110 BPM | OXYGEN SATURATION: 98 % | SYSTOLIC BLOOD PRESSURE: 110 MMHG

## 2021-11-08 DIAGNOSIS — G89.18 ACUTE POST-OPERATIVE PAIN: Primary | ICD-10-CM

## 2021-11-08 DIAGNOSIS — M16.10 HIP ARTHRITIS: ICD-10-CM

## 2021-11-08 PROCEDURE — G0157 HHC PT ASSISTANT EA 15: HCPCS

## 2021-11-08 NOTE — TELEPHONE ENCOUNTER
Patient had a left total hip on 11/02/21. Her Percocet is due to run out tomorrow 11/09/21. She is requesting a refill so that she does not run out.      Radha Sotelo  Patient [phone]

## 2021-11-08 NOTE — HOME HEALTH
Subjective: I feel good. It's not throbbing now, it's more of an ache. .  Caregiver involvement/assistance needed for: transportation, chores  . Home health supplies by type and quantity ordered/delivered this visit include: none  . Objective:  See interventions. .  Assessment and progress towards goals:   Patient reports gradual decrease in her sx. She reports them as an ache now. The throbbing has resolved. Pt was able to report fall prevention i.e. walking with an AD, she cuts the lights on, ensures nothing is in her walking path and if she is dizzy she will stay seated. She reports understanding to meet goal.  Pt was able to report 3/3 of her hip precautions today, to achieve understanding and reciting her THR precautions goal.  She was able to return demonstrate her standing exercises with 50% accuracy. She required re education for the remainder. No increased sx with the exercises. Bandage 80% filled and required changing. No obvious/sx of infection present at this time. .  Home Health PT is medically necessary to address the following:  pain, decreased ROM, decreased strength, increased edema, impaired bed mobility, decreased Ind with functional transfers, impaired gait, impaired stair negotiation and impaired stability to decrease sx, improve functional Ind, quality of life, reduce the fall risk. Plan for next visit:  Gait training  . The following discharge planning discussed with the patient/caregiver:  Estimated DC 11/8.

## 2021-11-09 ENCOUNTER — HOME CARE VISIT (OUTPATIENT)
Dept: HOME HEALTH SERVICES | Facility: HOME HEALTH | Age: 51
End: 2021-11-09
Payer: MEDICARE

## 2021-11-09 ENCOUNTER — HOME CARE VISIT (OUTPATIENT)
Dept: SCHEDULING | Facility: HOME HEALTH | Age: 51
End: 2021-11-09
Payer: MEDICARE

## 2021-11-09 VITALS
TEMPERATURE: 97.1 F | DIASTOLIC BLOOD PRESSURE: 70 MMHG | HEART RATE: 72 BPM | SYSTOLIC BLOOD PRESSURE: 93 MMHG | OXYGEN SATURATION: 98 % | RESPIRATION RATE: 17 BRPM

## 2021-11-09 VITALS
HEART RATE: 107 BPM | TEMPERATURE: 98.2 F | SYSTOLIC BLOOD PRESSURE: 110 MMHG | RESPIRATION RATE: 17 BRPM | DIASTOLIC BLOOD PRESSURE: 70 MMHG | OXYGEN SATURATION: 97 %

## 2021-11-09 VITALS
OXYGEN SATURATION: 98 % | RESPIRATION RATE: 16 BRPM | TEMPERATURE: 97 F | DIASTOLIC BLOOD PRESSURE: 70 MMHG | SYSTOLIC BLOOD PRESSURE: 100 MMHG | HEART RATE: 71 BPM

## 2021-11-09 PROCEDURE — G0151 HHCP-SERV OF PT,EA 15 MIN: HCPCS

## 2021-11-09 RX ORDER — OXYCODONE AND ACETAMINOPHEN 10; 325 MG/1; MG/1
1 TABLET ORAL
Qty: 21 TABLET | Refills: 0 | Status: SHIPPED | OUTPATIENT
Start: 2021-11-09 | End: 2021-11-16 | Stop reason: SDUPTHER

## 2021-11-09 NOTE — HOME HEALTH
Subjective: Pt reports not wanting to have PT today. She did agree to amb in the home. .  Caregiver involvement/assistance needed for: meals, transportation  . Home health supplies by type and quantity ordered/delivered this visit include:  none  . Objective:  See interventions. .  Assessment and progress towards goals:   Pt with c/o continued pain in her L thigh region inferior aspect and diego knee. She reports that is what she hit in the hospital, when she fell. She also reports having had an x-ray that was (-) for Fx. Pt was able to amb in the home with QC with Fair (-)stability. Recommended practicing in the espinal for the walls in case of instability. .  Home Health PT is medically necessary to address the following:  pain, decreased ROM, decreased strength, increased edema, impaired bed mobility, decreased Ind with functional transfers, impaired gait, impaired stair negotiation and impaired stability to decrease sx, improve functional Ind, quality of life, reduce the fall risk. Plan for next visit: Cont with gait training and add standing exercises CECILLE Dalal   The following discharge planning discussed with the patient/caregiver: Estimated DC 11/18

## 2021-11-09 NOTE — HOME HEALTH
Caregiver involvement: COOKS, CLEANS AND TAKES PATIENT TO MD APPT. Medications reconciled and all medications are available in the home this visit. The following education was provided regarding medications, medication interactions, and look a like medications: REVIEWED MEDICATIONS WITH PATIENT. Medications  are effective at this time. Home health supplies by type and quantity ordered/delivered this visit include: NA    Patient education provided this visit:TAUGHT PATIENT ON S/S OF INFECTION AT SURGICAL SITE. INSTRUCTED ON IMPORTANCE OF MEDIC ATION COMPLIANCY AND DIETARY NEEDS. PHYSICAL THERAPY IN HOME. INSTRUCTED PATIENT ON WHEN TO NOTIFY MD OF MEDICAL CHANGES R/T LT. TOTAL HIP SURGICAL SITE. NEGIN HOSE IN USE. PATIENT TAKES XARELTO 10MG. DAILY. INSTRUCTED ON ANTICOAGULATION THERAPY. INSTRUCTED ON BLEEDING. Progress toward goals:SURGICAL SITE HEALING LT HIP. NON OBSERVABLE SURGICAL SITE. DSG INTACT. NEXT DSG CHANGE WILL BE ON 11-9-21. Home exercise program: BREATHING EXERCISES TO HELP PREVENT RESPIRATORY PROBLEMS. Continued need for the following skills: Nursing    The following discharge planning was discussed with the pt/caregiver: PATIENT WILL BE DISCHARGED ONCE ALL GOALS HAVE BEEN MET AND MEDICALLY STABLE.

## 2021-11-10 ENCOUNTER — HOME CARE VISIT (OUTPATIENT)
Dept: SCHEDULING | Facility: HOME HEALTH | Age: 51
End: 2021-11-10
Payer: MEDICARE

## 2021-11-10 VITALS
TEMPERATURE: 98.4 F | SYSTOLIC BLOOD PRESSURE: 104 MMHG | DIASTOLIC BLOOD PRESSURE: 70 MMHG | OXYGEN SATURATION: 97 % | RESPIRATION RATE: 17 BRPM | HEART RATE: 105 BPM

## 2021-11-10 PROCEDURE — G0151 HHCP-SERV OF PT,EA 15 MIN: HCPCS

## 2021-11-10 NOTE — HOME HEALTH
SUBJECTIVE: \"I have been up and about this morning but I took my pain meds about an hour ago knowing we were going outside. \"  Pt 4-5/10  pain in L thigh/groin region today, and reports no falls. CAREGIVER INVOLVEMENT/ASSISTANCE NEEDED FOR: meals, transportation, ADLs, medications, outside mobility, stairs   HOME HEALTH SUPPLIES BY TYPE AND QUANTITY ORDERED/DELIVERED THIS VISIT INCLUDE: none (no dressing change needed today)  OBJECTIVE:  See interventions. ASSESSMENT OF PROGRESS TOWARD GOALS: Pt has cont to progress well with HEP for L LE in supine, seated and standing and is now indep with supine and seated. Pt still needing some instruction with standing and cues to slow down with exercises. Pt also performed better transfer today from recliner chair and not needing to slightly turn self to stand due to pain today. Pt began ambulating on uneven surfaces today (incline, decline, stairs, and gravel), and did well with quad cane and step through gait. PT encourage pt to turn around outside so not to \"over do it\" today on first time walking longer distance with cane. Pt verbalized understanding of using more ice to L thigh today as well as elevation and staying on top of pain meds. Pt will need cont education and safety cues for stairs and community distance. CONTINUED NEED FOR THE FOLLOWING SKILLS: Cont Home PT needed to improve L LE weakness, improvement with safety and indep with transfers, progression off walker to cane as tolerated and also progress community distances and balance.    PLAN FOR NEXT VISIT: Cont Home PT daily to focus on next treatment: stairs, standing balance and weight shift , and uneven surfaces  THE FOLLOWING DISCHARGE PLANNING WAS DISCUSSED WITH THE PATIENT/CAREGIVER: Discharge to self and family under MD supervision once all goals have been met or patient has reached max potential.

## 2021-11-10 NOTE — HOME HEALTH
[facility] unhappy am as pt was suppose to seen at 9-9:30 this am rom nursing and nurse had called out. Covering nurse Arnulfo Primrose RN called pt to schedule appt in pm and pt was \"tired of it and wanted another agency. \" PT called pt and pt was unhappy that she was ready and waiting for nurse and then got a call to reschedule. Pt did not have knowledge the nurse called out. Pt also unhappy with LPTA, as she felt LPTA took the bandages that were sent to her home for her own car stock after replacing dressing to her L hip on 11/7/21 with an [address]. PT explained to pt that the dressings ordered are the dressings Dr. Sachin Sweet prefers but her incision is longer than usual so the dressing that were ordered and sent to home did not cover incision properly so the LPTA redressed it with an appropriate dressing and because the ones delivered to home did not fit and she was not to use then LPTA took back. PT agreeable for this PT to come out and see pt for PT and cont to see her through her end of care until seen by Dr. Sachin Sweet in office next week. PT to also perform dressing changes as needed as pt wishes to cancel SN as well. PT called Dr. Sachin Sweet office and after being on hold for 20 minutes, pt has sent Dr. Sachin Sweet case communication to make him aware and that PT will cont with dressing changes. PT to retry Dr. Sachin Sweet office in the am next treatment. \"My pain is better controlled today. I think I did too much yesterday\"  \"I am a 3/10 today. \"  Pt denies falls. CAREGIVER INVOLVEMENT/ASSISTANCE NEEDED FOR: meals, transportation, ADLs, medications, mobility    HOME HEALTH SUPPLIES BY TYPE AND QUANTITY ORDERED/DELIVERED THIS VISIT INCLUDE: none   OBJECTIVE:  See interventions. ASSESSMENT OF PROGRESS TOWARD GOALS: Pt was able to report better pain control with L hip today and felt her increased pain yesterday had to do with increased mobility and moving around the home trying to clean the house up.   PT re instructed pt on taking pain meds on time and prior to exercises as well as alternating Percocet and Tylenol so that she stays ahead of the pain. Pt verblized understanding. Pt was able to go through full HEP THR protocol 10 -15 reps of each and 20 reps each of isometrics. Pt also ambulating in home with FWW with improved heel to toe gait pattern on L LE and beginning of step through gait pattern. Pt has limited space in her home so not always able to take step through gait pattern. Pt has less antalgic gait. Pt still with weakness noted in L hip and drainage at about 40% on current bandage in place. Pt may need dressing change prior to next 7 days. Will cont to monitor. CONTINUED NEED FOR THE FOLLOWING SKILLS:Cont need for Home PT to focus on L LE strengthening, gait with improvement of step through gait pattern as well as progression to cane, stair training and community rentry. PLAN FOR NEXT VISIT: Cont Home PT with this PT daily until f/u with Dr. Wright Sit next week. Will focus on outside steps and unevne surfaces the next 2 days while weather will be permitting.     THE FOLLOWING DISCHARGE PLANNING WAS DISCUSSED WITH THE PATIENT/CAREGIVER: Discharge to self and family under MD supervision once all goals have been met or patient has reached max potential.

## 2021-11-11 ENCOUNTER — HOME CARE VISIT (OUTPATIENT)
Dept: SCHEDULING | Facility: HOME HEALTH | Age: 51
End: 2021-11-11
Payer: MEDICARE

## 2021-11-11 VITALS
OXYGEN SATURATION: 98 % | DIASTOLIC BLOOD PRESSURE: 80 MMHG | HEART RATE: 110 BPM | SYSTOLIC BLOOD PRESSURE: 110 MMHG | TEMPERATURE: 97.9 F | RESPIRATION RATE: 17 BRPM

## 2021-11-11 PROCEDURE — G0151 HHCP-SERV OF PT,EA 15 MIN: HCPCS

## 2021-11-11 NOTE — HOME HEALTH
SUBJECTIVE: \" I feel like I am having hot flashes. My stomach is so uncomfortable. I haven't gone to the bathroom in 2 days for a BM. \" PT encouraged now that pain is < 5/10 that she use Tylenol for pain during the day and Percocet at night. Pt verbalized understanding. Pt also did take Colace this am to help. Pt reports 3-4/10 L thigh/groin pain and no falls. CAREGIVER INVOLVEMENT/ASSISTANCE NEEDED FOR: meals, transportation, ADLs, medications, mobility    HOME HEALTH SUPPLIES BY TYPE AND QUANTITY ORDERED/DELIVERED THIS VISIT INCLUDE: none . Dressing still <=40% drainage and PT did not change bandage today. Will cont to monitor. OBJECTIVE:  See interventions. ASSESSMENT OF PROGRESS TOWARD GOALS: Pt cont to progress well with strengthening, transfers, and ambulating with quad cane on uneven surfaces. Pt reported she felt great after ambulating outside yesterday and looked forward to increasing distance. Pt seems to tolerate adding 100' more feet today and only discomfort was from no BM in 2 days. Pt was able to perform steps today with no cues for sequence and also able to tolerate more weight bearing through LLE in balance and weight shifting. CONTINUED NEED FOR THE FOLLOWING SKILLS: Pt will benefit from cont Home PT for increasing strength, and progressing transfers and ambulation to mod indep and increased distance. PLAN FOR NEXT VISIT: Cont Home PT to progess community distances, weight bearing on LLE and cont strength LLE.      THE FOLLOWING DISCHARGE PLANNING WAS DISCUSSED WITH THE PATIENT/CAREGIVER: Discharge to self and family under MD supervision once all goals have been met or patient has reached max potential.

## 2021-11-12 ENCOUNTER — HOME CARE VISIT (OUTPATIENT)
Dept: SCHEDULING | Facility: HOME HEALTH | Age: 51
End: 2021-11-12
Payer: MEDICARE

## 2021-11-12 VITALS
RESPIRATION RATE: 17 BRPM | OXYGEN SATURATION: 97 % | SYSTOLIC BLOOD PRESSURE: 98 MMHG | TEMPERATURE: 98.1 F | HEART RATE: 96 BPM | DIASTOLIC BLOOD PRESSURE: 64 MMHG

## 2021-11-12 PROCEDURE — G0151 HHCP-SERV OF PT,EA 15 MIN: HCPCS

## 2021-11-12 NOTE — CASE COMMUNICATION
Dear Dr. Abraham Smith,     This message is to inform you that your patient, Chandana Griggs,  , 72, has been DC from Home PT services due to Insurance denial for further services. Here is [facility] Summary of pt's status at [facility]. DISCHARGE SUMMARY OF CARE  Patient is s/p L THR lateral approach and has been treated for BLE ROM, strengthening, gait training, stair training, HEP training, safety training, and balance training. Pt is indep with  bed mob, transfers from all surfaces and ambulating in home with FWW with mod indep and quad cane with  SBA/mod indep. Pt has ambulated up and down 4 steps with rail and cane and SBA/mod indep and ambulated 250' x2 with quad cane and SBA. Recommended cont SBA on steps and uneven surfaces at this time. Pt is indep with HEP for L THR per protocol. L hip dressing change was completed today and new Aquacell dressing applied. No new steve inage and incision clean, dry and intact. Pt also reported no medications changes, except that she is taking Tylenol now during the day and night and only Percocet as needed for severe pain. Pt is indep with managing her own medications and all medicaitons were reconciled at this time. Pt was able to have BM last night and this morning. Pt has made excellent progress and is being DC today due to insurance has denied any furhter Home  PT at this time. Pt was made aware and sent Case Communication to Dr. Abraham Smith to inform of early DC. Pt to see Dr. Abraham Smith in the office on 21. Discharge plan: Discharge from [address] [place] [address] services. Any questions please contact me at [phone].     Sincerely,     Elise Jones, PT

## 2021-11-16 DIAGNOSIS — M16.10 HIP ARTHRITIS: ICD-10-CM

## 2021-11-16 DIAGNOSIS — G89.18 ACUTE POST-OPERATIVE PAIN: ICD-10-CM

## 2021-11-16 RX ORDER — OXYCODONE AND ACETAMINOPHEN 10; 325 MG/1; MG/1
1 TABLET ORAL
Qty: 21 TABLET | Refills: 0 | Status: SHIPPED | OUTPATIENT
Start: 2021-11-16 | End: 2021-11-23

## 2021-11-16 NOTE — TELEPHONE ENCOUNTER
Last Visit: 11/2/21 post-op with MD Lima  Next Appointment: 11/17/21 with NARCISO Zurita  Previous Refill Encounter(s): 11/9/21 #21    Requested Prescriptions     Pending Prescriptions Disp Refills    oxyCODONE-acetaminophen (Percocet)  mg per tablet 21 Tablet 0     Sig: Take 1 Tablet by mouth every eight (8) hours as needed for Pain for up to 7 days. Max Daily Amount: 3 Tablets.

## 2021-11-16 NOTE — TELEPHONE ENCOUNTER
Patient is requesting a refill of rx   oxyCODONE-acetaminophen (Percocet)  mg per tablet called in to [facility] on file.

## 2021-11-17 ENCOUNTER — OFFICE VISIT (OUTPATIENT)
Dept: ORTHOPEDIC SURGERY | Age: 51
End: 2021-11-17
Payer: MEDICARE

## 2021-11-17 VITALS
HEIGHT: 66 IN | HEART RATE: 116 BPM | TEMPERATURE: 97.3 F | WEIGHT: 249 LBS | BODY MASS INDEX: 40.02 KG/M2 | OXYGEN SATURATION: 93 %

## 2021-11-17 DIAGNOSIS — Z96.642 S/P HIP REPLACEMENT, LEFT: Primary | ICD-10-CM

## 2021-11-17 PROCEDURE — 99024 POSTOP FOLLOW-UP VISIT: CPT | Performed by: PHYSICIAN ASSISTANT

## 2021-11-17 NOTE — PROGRESS NOTES
Karolina Mancuso returns 15 days status post her primary left total hip replacement under the care of Dr. Narcisa Kwon. Is doing well today. She has completed her in-home physical therapy. She is set to begin outpatient services. She is on Xarelto for thromboembolism prophylaxis. She has maintained her surgical site as directed and is currently full weightbearing on the left lower extremity with 4 post rolling walker. Surgical Aquacel dressing removed today to reveal a craniocaudal oriented left hip surgical incision measuring 27 cm [id number] surgical staples are present with wound borders well approximated. Soft tissue swelling improved bracketing the surgical site. No evidence of induration, warmth, erythema, cellulitis or fluctuance. .  There is a single anterior iliac left stab incision site also noted with 2 surgical staples present. Passive internal/external rotation of the left hip without pain 10 and 12 degrees respectively. Quad and femoral nerve activity full left lower extremity. Procedure: Using clean technique all staples today removed with no complications. Sterile strips were placed with a sterile top-cover. Plan: Patient may continue outpatient home therapy per protocol. She will continue her Xarelto per instructions previous. Follow-up in 2 weeks with Mauri Gregory PA-C. Continue full weightbearing of the left lower extremity. Patient may shower but avoid any soaking of the surgical site. All questions answered to her satisfaction today.

## 2021-11-22 ENCOUNTER — TELEPHONE (OUTPATIENT)
Dept: ORTHOPEDIC SURGERY | Age: 51
End: 2021-11-22

## 2021-11-22 DIAGNOSIS — Z96.642 S/P HIP REPLACEMENT, LEFT: Primary | ICD-10-CM

## 2021-11-22 NOTE — TELEPHONE ENCOUNTER
Post op patient called and is asking for a Refill on Oxycodone medication from [address] on [place]  Tel [phone]. Patient tel. [phone].

## 2021-11-23 RX ORDER — OXYCODONE AND ACETAMINOPHEN 7.5; 325 MG/1; MG/1
1 TABLET ORAL
Qty: 28 TABLET | Refills: 0 | Status: SHIPPED | OUTPATIENT
Start: 2021-11-23 | End: 2021-11-30

## 2021-12-01 ENCOUNTER — HOSPITAL ENCOUNTER (OUTPATIENT)
Dept: PHYSICAL THERAPY | Age: 51
Discharge: HOME | End: 2021-12-01
Attending: PHYSICIAN ASSISTANT
Payer: MEDICARE

## 2021-12-01 PROCEDURE — 97110 THERAPEUTIC EXERCISES: CPT

## 2021-12-01 PROCEDURE — 97161 PT EVAL LOW COMPLEX 20 MIN: CPT

## 2021-12-01 NOTE — PROGRESS NOTES
[facility] [employer]  [address] [place] [address]  [phone] fax    Plan of Care/ Statement of Necessity for Physical Therapy Services    Patient name: Jair Palm Start of Care: 2021   Referral source: Renu Zapata : 1970    Medical Diagnosis: Left hip pain [M25.552]  Payor: BLUE CROSS MEDICARE / Plan: [facility] [address] HMO / Product Type: Managed Care Medicare /  Onset Date: DOS:21    Treatment Diagnosis: Left Hip Pain   Prior Hospitalization: see medical history Provider#: [id number]   Medications: Verified on Patient summary List    Comorbidities: HBP, Arthritis, Heart Disease   Prior Level of Function: Active [employer] The Plan of Care and following information is based on the information from the initial evaluation. Assessment/ key information: Pt is a 49 yr old female SP Left AUDI (lateral Approach) on 21. Pt reports pain 4/10 at evaluation. Pt recently finished [address] [facility]. She is able to Ambulate w/o an AD. Incision is covered with band aids for protection from clothing. There is no observable swelling or redness around band aids. Pt presents with decreased hip strength, Decreased ROM and decreased Ambulation tolerance. Pt does not use an AD. Pt is an active computer ambulator. She has 7 Grandchildren and wants to get back to PLOF. Pt will benefit from skilled Pt to improve Ambulation, ROM, strength and activity tolerance to fully return to PLOF.      Evaluation Complexity History MEDIUM  Complexity : 1-2 comorbidities / personal factors will impact the outcome/ POC ; Examination MEDIUM Complexity : 3 Standardized tests and measures addressing body structure, function, activity limitation and / or participation in recreation  ;Presentation LOW Complexity : Stable, uncomplicated  ;Clinical Decision Making MEDIUM Complexity : FOTO score of 26-74  Overall Complexity Rating: LOW Problem List: pain affecting function, decrease ROM, decrease strength, impaired gait/ balance, decrease ADL/ functional abilitiies, decrease activity tolerance, decrease flexibility/ joint mobility and decrease transfer abilities   Treatment Plan may include any combination of the following: Therapeutic exercise, Therapeutic activities, Neuromuscular re-education, Physical agent/modality, Gait/balance training, Manual therapy, Patient education, Self Care training, Functional mobility training, Home safety training and Stair training  Patient / Family readiness to learn indicated by: asking questions, trying to perform skills and interest  Persons(s) to be included in education: patient (P)  Barriers to Learning/Limitations: None  Patient Goal (s): To get back to normal  Patient Self Reported Health Status: good  Rehabilitation Potential: good    Short Term Goals: To be accomplished in 1 weeks:   1. Pt will be compliant with current HEP to improve hip function  Long Term Goals: To be accomplished in 6 weeks:   1. Pt will increase FOTO score by 11 pts to improve function. 2. Pt will increase SL Hip Abduction strength to 5/5 to ease with Ambulation function. 3. Pt will perform Left SLS x 30 sec to ease with improving balance function. 4. Pt will report pain <3/10 during light household activities. 5. Pt will report >85% improvement to return to PLOF. Frequency / Duration: Patient to be seen 2 times per week for 6 weeks. Patient/ CarPatient/ Caregiver education and instruction: Diagnosis, prognosis, self care, activity modification, brace/ splint application and exercises   [x]  Plan of care has been reviewed with NACHO Bhatt, PT 12/1/2021 11:01 AM    ________________________________________________________________________    I certify that the above Therapy Services are being furnished while the patient is under my care.  I agree with the treatment plan and certify that this therapy is necessary.     [facility] Signature:____________Date:_________TIME:________     Blu Talbot PA-C  ** Signature, Date and Time must be completed for valid certification **    Please sign and return to [facility] [employer] [facility]  [address] [place] [address]  [phone] fax

## 2021-12-01 NOTE — PROGRESS NOTES
PT DAILY TREATMENT NOTE - [facility]     Patient Name: Fransico Cuevas  Date:2021  : 1970  [x]  Patient  Verified  Payor: BLUE CROSS MEDICARE / Plan: [facility] HMO / Product Type: Managed Care Medicare /    In time:950  Out time:1030  Total Treatment Time (min): 40  Visit #: 1 of 12    Treatment Area: Left hip pain [M25.552]    SUBJECTIVE  Pain Level (0-10 scale): 4/10  Any medication changes, allergies to medications, adverse drug reactions, diagnosis change, or new procedure performed?: [x] No    [] Yes (see summary sheet for update)  Subjective functional status/changes:   [] No changes reported  See eval    OBJECTIVE      Evaluation: 15 mins    30 min Therapeutic Exercise:  [] See flow sheet :   Rationale: increase ROM and increase strength to improve the patients ability to ease with adl's      With   [] TE   [] TA   [] neuro   [] other: Patient Education: [x] Review HEP    [] Progressed/Changed HEP based on:   [] positioning   [] body mechanics   [] transfers   [] heat/ice application    [] other:      Other Objective/Functional Measures: see eval     Pain Level (0-10 scale) post treatment: 4/10    ASSESSMENT/Changes in Function: see poc    Patient will continue to benefit from skilled PT services to modify and progress therapeutic interventions, address functional mobility deficits, address ROM deficits, address strength deficits, analyze and address soft tissue restrictions, analyze and cue movement patterns, analyze and modify body mechanics/ergonomics, assess and modify postural abnormalities, address imbalance/dizziness and instruct in home and community integration to attain remaining goals.      [x]  See Plan of Care  []  See progress note/recertification  []  See Discharge Summary         Progress towards goals / Updated goals:  See poc    PLAN  [x]  Upgrade activities as tolerated     [x]  Continue plan of care  []  Update interventions per flow sheet       [] Discharge due to:_  []  Other:_      Abiodun Aaron, PT 12/1/2021  9:49 AM    Future Appointments   Date Time Provider Yehuda Vasquez   12/10/2021  1:10 PM Ivory Gonzalez PA-C [facility] BS AMB   1/18/2022  1:00 PM TSS [facility] NURSE VISIT [facility] BS AMB   1/26/2022 10:30 AM Tory Allan PA-C [facility] BS AMB   3/18/2022  2:40 PM Marine FARIAS DO [facility] BS AMB   3/18/2022  2:40 PM CSI, PACER [facility] BS AMB

## 2021-12-08 ENCOUNTER — HOSPITAL ENCOUNTER (OUTPATIENT)
Dept: PHYSICAL THERAPY | Age: 51
Discharge: HOME | End: 2021-12-08
Attending: PHYSICIAN ASSISTANT
Payer: MEDICARE

## 2021-12-08 DIAGNOSIS — Z96.642 S/P HIP REPLACEMENT, LEFT: Primary | ICD-10-CM

## 2021-12-08 PROCEDURE — 97110 THERAPEUTIC EXERCISES: CPT

## 2021-12-08 RX ORDER — OXYCODONE AND ACETAMINOPHEN 7.5; 325 MG/1; MG/1
1 TABLET ORAL
Qty: 21 TABLET | Refills: 0 | Status: SHIPPED | OUTPATIENT
Start: 2021-12-08 | End: 2021-12-15

## 2021-12-08 NOTE — TELEPHONE ENCOUNTER
Patient is requesting a refill for Percocet. She is status post left hip replacement 11/02/21. This was last written 12/02/21. She has an upcoming appt on 12/10/21. Please advise.      Patient Sunni Chang

## 2021-12-08 NOTE — PROGRESS NOTES
PT DAILY TREATMENT NOTE     Patient Name: Mariajose Baxter  Date:2021  : 1970  [x]  Patient  Verified  Payor: BLUE CROSS MEDICARE / Plan: [facility]  HMO / Product Type: Managed Care Medicare /    In time:1202  Out time:1240  Total Treatment Time (min): 38  Visit #: 2 of 12    Medicare/BCBS Only   Total Timed Codes (min):  38 1:1 Treatment Time:  38       Treatment Area: Left hip pain [M25.552]    SUBJECTIVE  Pain Level (0-10 scale): 3/10  Any medication changes, allergies to medications, adverse drug reactions, diagnosis change, or new procedure performed?: [x] No    [] Yes (see summary sheet for update)  Subjective functional status/changes:   [] No changes reported  Pt reports decreased pain in her hip today. Reports difficulty sleeping at night. OBJECTIVE    38 min Therapeutic Exercise:  [x] See flow sheet :   Rationale: increase ROM, increase strength, improve coordination, improve balance and increase proprioception to improve the patients ability to perform ADL's pain free and safely. With   [] TE   [] TA   [] neuro   [] other: Patient Education: [x] Review HEP    [] Progressed/Changed HEP based on:   [] positioning   [] body mechanics   [] transfers   [] heat/ice application    [] other:      Other Objective/Functional Measures: Initiated POC as per flowsheet  Pt required (A) with SLR  No LOB noted with SLS  Slight increased in left hip pain post treatment session     Pain Level (0-10 scale) post treatment: 4/10    ASSESSMENT/Changes in Function: Initiated POC. Pt tolerated new exercises well. Strength is improving but pt is challenged with SLS and has difficulty sleeping at night d/t pain. Will continue to progress as tolerated to increase functional strength and mobility for ease of ADL's.     Patient will continue to benefit from skilled PT services to modify and progress therapeutic interventions, address functional mobility deficits, address ROM deficits, address strength deficits, analyze and address soft tissue restrictions, analyze and cue movement patterns and analyze and modify body mechanics/ergonomics to attain remaining goals. []  See Plan of Care  []  See progress note/recertification  []  See Discharge Summary         Progress towards goals / Updated goals:  Short Term Goals: To be accomplished in 1 weeks:               1. Pt will be compliant with current HEP to improve hip function  Long Term Goals: To be accomplished in 6 weeks:               1. Pt will increase FOTO score by 11 pts to improve function. 2. Pt will increase SL Hip Abduction strength to 5/5 to ease with Ambulation function. 3. Pt will perform Left SLS x 30 sec to ease with improving balance function. Current: Not met but progressing: 10 sec 12/8/21               4. Pt will report pain <3/10 during light household activities. 5. Pt will report >85% improvement to return to PLOF.      PLAN  []  Upgrade activities as tolerated     [x]  Continue plan of care  []  Update interventions per flow sheet       []  Discharge due to:_  []  Other:_      Noemí Anne PTA 12/8/2021  12:10 PM    Future Appointments   Date Time Provider Yehuda Vasquez   12/10/2021  1:10 PM Marcella Adames PA-C [facility] AMB   12/13/2021  9:45 AM Luis Roche [facility]   12/16/2021  8:15 AM Stephan Almeida, PT [facility]CP[facility]   12/20/2021  8:15 AM Stephan Almeida, PT [id number] [facility]   12/23/2021  8:15 AM Stephan Almeida, PT [facility]   12/27/2021  8:15 AM [place] Sos, PTA [id number] [facility]   12/30/2021  8:15 AM Lee Sos, PTA [facility]   1/3/2022  8:15 AM Lee Sos, PTA [facility]   1/6/2022  8:15 AM Stephan Almeida, PT [id number] [facility]   1/10/2022  8:15 AM [place] Sos, PTA [facility]   1/13/2022  8:15 AM [place] Sos, PTA [id number] [facility]   1/17/2022  8:15 AM Stephan Almeida, PT [facility]CP[facility]   1/18/2022  1:00 PM TSS HBV NURSE VISIT [facility] BS AMB   1/20/2022  8:15 AM [place] Sos, PTA [facility]CPTPB [facility]   1/26/2022 10:30 AM Cecy Allan PA-C [facility] BS AMB   3/18/2022  2:40 PM Chris Ortega DO [facility] AMB   3/18/2022  2:40 PM CSI, PACER [facility] AMB

## 2021-12-10 ENCOUNTER — OFFICE VISIT (OUTPATIENT)
Dept: ORTHOPEDIC SURGERY | Age: 51
End: 2021-12-10
Payer: MEDICARE

## 2021-12-10 VITALS
HEIGHT: 66 IN | WEIGHT: 249 LBS | TEMPERATURE: 98.6 F | RESPIRATION RATE: 15 BRPM | OXYGEN SATURATION: 94 % | BODY MASS INDEX: 40.02 KG/M2 | HEART RATE: 90 BPM

## 2021-12-10 DIAGNOSIS — Z96.642 S/P HIP REPLACEMENT, LEFT: Primary | ICD-10-CM

## 2021-12-10 DIAGNOSIS — M25.552 LEFT HIP PAIN: ICD-10-CM

## 2021-12-10 PROCEDURE — 99024 POSTOP FOLLOW-UP VISIT: CPT | Performed by: PHYSICIAN ASSISTANT

## 2021-12-10 PROCEDURE — 73502 X-RAY EXAM HIP UNI 2-3 VIEWS: CPT | Performed by: PHYSICIAN ASSISTANT

## 2021-12-10 RX ORDER — HYDROCODONE BITARTRATE AND ACETAMINOPHEN 7.5; 325 MG/1; MG/1
1 TABLET ORAL
Qty: 28 TABLET | Refills: 0 | Status: SHIPPED | OUTPATIENT
Start: 2021-12-10 | End: 2021-12-17

## 2021-12-10 NOTE — PROGRESS NOTES
[email or web address]   March 5, 2024    Cody Fitzgerald  [address]  [facility] [address]    Dear Cody Fitzgerald:  I wanted to reach out to you personally as I feel you would benefit from our Chronic Care Management program here at [facility] formally known as [facility]. [facility]  has reached out to you on my behalf to introduce the program and all its benefits. To help you take control of your health and provide you with optimal quality care, I am recommending this program to you.  If you choose to enroll in this Chronic Care Management program you will be working closely with your very own Health  Care Manager.  They will help provide you with the extra support and education needed to keep you healthy, as well as keeping me informed about you and your health in between office visits.   I ask that you take the time to review the information the Health  sent you, and consider all of the outstanding benefits available to you.   Your health is important to me! The Health  will be calling you again soon to discuss your final decision and any questions you may have.   You may also call them at:    GANESH Alonzo   Health  Care Management   Population Health    [email or web address]   ([phone] work      [address] [place] [address]         [email or web address]   Thank you for considering,  Dr. Bam Hampton MD         [address] [place] [address]  [phone]           Patient: Delma Santillan                MRN: [id number]       SSN: [id number]  YOB: 1970        AGE: 49 y.o. SEX: female  Body mass index is 40.19 kg/m². PCP: Alva Rich PA-C  12/10/21      This office note has been dictated. REVIEW OF SYSTEMS:  Constitutional: Negative for fever, chills, weight loss and malaise/fatigue. HENT: Negative. Eyes: Negative. Respiratory: Negative. Cardiovascular: Negative. Gastrointestinal: No bowel incontinence or constipation. Genitourinary: No bladder incontinence or saddle anesthesia. Skin: Negative. Neurological: Negative. Endo/Heme/Allergies: Negative. Psychiatric/Behavioral: Negative. Musculoskeletal: As per HPI above. Past Medical History:   Diagnosis Date    AICD (automatic cardioverter/defibrillator) present 09/2018    Asthma     Cardiac echocardiogram 03/22/2017    LVE. EF 15% (prev 50% on study of 12/10/14). Severe diffuse hypk. Indeterminate diastolic fx.  RVE. RVSP at least 44 mmHg. Mod LAE.  BISMARK. Mild-mod MR.  Cardiac nuclear imaging test 03/24/2017    High risk. Somewhat patchy uptake. No evidence of ischemia or infarction. No RWMA. Severe LVE. EF 25%. Neg EKG on pharm stress test.    Cardiovascular LLE venous duplex 03/06/2017    Left leg:  No DVT. Pulsatile flow.  Congestive heart failure (HCC)     Graves disease     Graves disease     Heart failure ([facility] [address]     Hypercholesterolemia     Hypertension     Hyperthyroidism          Current Outpatient Medications:     oxyCODONE-acetaminophen (Percocet) 7.5-325 mg per tablet, Take 1 Tablet by mouth every eight (8) hours as needed for Pain for up to 7 days. Max Daily Amount: 3 Tablets. , Disp: 21 Tablet, Rfl: 0    acetaminophen (TYLENOL) 500 mg tablet, Take 500 mg by mouth every six (6) hours as needed for Pain. , Disp: , Rfl:     rivaroxaban (Xarelto) 10 mg tablet, Take 1 Tablet by mouth daily (with lunch). , Disp: 30 Tablet, Rfl: 1    cephALEXin (Keflex) 500 mg capsule, Take 1 Capsule by mouth four (4) times daily. , Disp: 8 Capsule, Rfl: 0    docusate sodium (Colace) 100 mg capsule, Take 1 Capsule by mouth two (2) times a day for 90 days. , Disp: 60 Capsule, Rfl: 2    potassium chloride (K-DUR, KLOR-CON) 20 mEq tablet, Take 1 Tablet by mouth two (2) times a day., Disp: 7 Tablet, Rfl: 0    albuterol (PROVENTIL HFA, VENTOLIN HFA, PROAIR HFA) 90 mcg/actuation inhaler, Take 2 Puffs by inhalation every six (6) hours as needed for Wheezing., Disp: 18 g, Rfl: 1    furosemide (LASIX) 40 mg tablet, Take 1 tablet by mouth once daily, Disp: 30 Tablet, Rfl: 5    carvediloL (COREG) 25 mg tablet, TAKE 1 TABLET BY MOUTH TWICE DAILY WITH MEALS, Disp: 60 Tablet, Rfl: 5    sacubitriL-valsartan (Entresto) 24-26 mg tablet, Take 1 tablet by mouth twice daily, Disp: 60 Tablet, Rfl: 5    baclofen (LIORESAL) 10 mg tablet, Take 0.5-1 Tablets by mouth three (3) times daily as needed for Muscle Spasm(s) or Pain., Disp: 90 Tablet, Rfl: 3    atorvastatin (LIPITOR) 80 mg tablet, TAKE 1 TABLET BY MOUTH ONCE DAILY AT NIGHT, Disp: 30 Tablet, Rfl: 5    spironolactone (ALDACTONE) 25 mg tablet, Take 1 tablet by mouth once daily, Disp: 30 Tablet, Rfl: 6    potassium chloride (K-DUR, KLOR-CON) 20 mEq tablet, Take 1 Tablet by mouth daily. (Patient taking differently: Take 20 mEq by mouth daily.  taking 1x/day now after surgery. ), Disp: 30 Tablet, Rfl: 5    indapamide (LOZOL) 2.5 mg tablet, TAKE 1 TABLET BY MOUTH ONCE DAILY IN THE MORNING, Disp: 30 Tab, Rfl: 6    methIMAzole (TAPAZOLE) 10 mg tablet, Take 1 Tab by mouth two (2) times a day., Disp: 1 Tab, Rfl: 0    nitroglycerin (NITROSTAT) 0.4 mg SL tablet, 1 Tab by SubLINGual route every five (5) minutes as needed for Chest Pain., Disp: 1 Bottle, Rfl: 1    No Known Allergies    Social History Socioeconomic History    Marital status: SINGLE     Spouse name: Not on file    Number of children: Not on file    Years of education: Not on file    Highest education level: Not on file   Occupational History    Not on file   Tobacco Use    Smoking status: Current Some Day Smoker     Packs/day: 0.00     Years: 18.00     Pack years: 0.00     Last attempt to quit: 2017     Years since quittin.7    Smokeless tobacco: Never Used    Tobacco comment: 2-3 cigs /day   Vaping Use    Vaping Use: Never used   Substance and Sexual Activity    Alcohol use: Yes     Comment: socially     Drug use: Never    Sexual activity: Yes     Partners: Male     Birth control/protection: Condom   Other Topics Concern    Not on file   Social History Narrative    Not on file     Social Determinants of Health     Financial Resource Strain:     Difficulty of Paying Living Expenses: Not on file   Food Insecurity:     Worried About Running Out of Food in the Last Year: Not on file    Marlyn of Food in the Last Year: Not on file   Transportation Needs:     Lack of Transportation (Medical): Not on file    Lack of Transportation (Non-Medical):  Not on file   Physical Activity:     Days of Exercise per Week: Not on file    Minutes of Exercise per Session: Not on file   Stress:     Feeling of Stress : Not on file   Social Connections:     Frequency of Communication with Friends and Family: Not on file    Frequency of Social Gatherings with Friends and Family: Not on file    Attends Judaism Services: Not on file    Active Member of Clubs or Organizations: Not on file    Attends Club or Organization Meetings: Not on file    Marital Status: Not on file   Intimate Partner Violence:     Fear of Current or Ex-Partner: Not on file    Emotionally Abused: Not on file    Physically Abused: Not on file    Sexually Abused: Not on file   Housing Stability:     Unable to Pay for Housing in the Last Year: Not on file    Number of Places Lived in the Last Year: Not on file    Unstable Housing in the Last Year: Not on file       Past Surgical History:   Procedure Laterality Date    HX ANKLE FRACTURE TX Left 2012    Screws plates and rods    HX BREAST REDUCTION      HX HIP REPLACEMENT      HX HYSTERECTOMY  2009    hysterectomy  btl    HX PACEMAKER  2017    AICD    HX TONSILLECTOMY           Patient seen evaluate today for her left hip. She is now 5 weeks status post total replacement. She is doing well. She can ambulate with no assist devices. She is in physical therapy without complications. She does continue on pain medicine. She is asking for refill. She has had no troubles with the wound. Patient denies recent fevers, chills, chest pain, SOB, or injuries. No recent systemic changes noted. A 12-point review of systems is performed today. Pertinent positives are noted. All other systems reviewed and otherwise are negative. Physical exam: General: Alert and oriented x3, nad.  well-developed, well nourished. normal affect, AF. NC/AT, EOMI, neck supple, trachea midline, no JVD present. Breathing is non-labored. Examination of the left hip reveals skin intact. The surgical wound is healed nicely. There is no erythema or ecchymosis noted. There are no signs of infection or cellulitis present. She has no pain with rotation of the hip. Negative straight leg raise. Negative calf tenderness. Negative Homans. No signs of DVT present. Leg lengths are perfect. Radiographs obtained the office today 12/10/2021 at the [facility] location including AP pelvis, AP and crosstable lateral of the left hip shows a total hip components to be well fixed without evidence for loosening or subsidence noted. She likely has a nutrient vessel at the distal tip of the stem. Assessment: Status post left total hip replacement    Plan: At this point, I have asked the patient to be protected weightbearing.   We'll get a CT scan of the left femur to rule out fracture. As well she is doing be surprised if there was a fracture. A refill of [place] be sent to her pharmacy. We'll see her back in the office after the CT scan for review. She will continue with physical therapy for strengthening activities.               JR Bro MAGALLANES, PAYingC, ATC

## 2021-12-13 ENCOUNTER — HOSPITAL ENCOUNTER (OUTPATIENT)
Dept: PHYSICAL THERAPY | Age: 51
Discharge: HOME | End: 2021-12-13
Attending: PHYSICIAN ASSISTANT
Payer: MEDICARE

## 2021-12-13 PROCEDURE — 97110 THERAPEUTIC EXERCISES: CPT

## 2021-12-13 NOTE — PROGRESS NOTES
PT DAILY TREATMENT NOTE     Patient Name: Russell Chapman  Date:2021  : 1970  [x]  Patient  Verified  Payor: BLUE CROSS MEDICARE / Plan: [place]  HMO / Product Type: Managed Care Medicare /    In time:947  Out time:1029  Total Treatment Time (min): 42  Visit #: 3 of 12    Medicare/BCBS Only   Total Timed Codes (min):  42 1:1 Treatment Time:  42       Treatment Area: Left hip pain [M25.552]    SUBJECTIVE  Pain Level (0-10 scale): 0/10  Any medication changes, allergies to medications, adverse drug reactions, diagnosis change, or new procedure performed?: [x] No    [] Yes (see summary sheet for update)  Subjective functional status/changes:   [] No changes reported  Pt states she saw her doctor last week and they wanted her to schedule an appt for a CT scan for possible hairline fracture in femur. Patient reports she fell postop while working with PT in the hospital. States the doctor would like her to continue with PT.    OBJECTIVE    42 min Therapeutic Exercise:  [x] See flow sheet :   Rationale: increase ROM, increase strength, improve coordination, improve balance and increase proprioception to improve the patients ability to perform ADL's pain free and safely         With   [] TE   [] TA   [] neuro   [] other: Patient Education: [x] Review HEP    [] Progressed/Changed HEP based on:   [] positioning   [] body mechanics   [] transfers   [] heat/ice application    [] other:      Other Objective/Functional Measures: Added HR/TR  Added mini squats  Increased time for SLS  Added HS stretch on 6\" step  Verbal cues for proper form and mechanics with squats  Pt education for total hip precautions  Pt able to complete exercises without pain or discomfort     Pain Level (0-10 scale) post treatment: 0/10    ASSESSMENT/Changes in Function: Pt making steady progress in therapy. Strength and mobility is improving and pt is compliant with HEP.  Pt tolerated new exercises well without pain or discomfort. Pt required A with SLR d/t pain and weakness. Will continue to progress as tolerated to increase functional strength and mobility to perform ADL's pain free and safely. Patient will continue to benefit from skilled PT services to modify and progress therapeutic interventions, address functional mobility deficits, address ROM deficits, address strength deficits, analyze and address soft tissue restrictions and analyze and cue movement patterns to attain remaining goals. []  See Plan of Care  []  See progress note/recertification  []  See Discharge Summary         Progress towards goals / Updated goals:  Short Term Goals: To be accomplished in 1 weeks:               1. Pt will be compliant with current HEP to improve hip function  Current: Met: 12/13/21  Long Term Goals: To be accomplished in 6 weeks:               1. Pt will increase FOTO score by 11 pts to improve function.               2. Pt will increase SL Hip Abduction strength to 5/5 to ease with Ambulation function.                3. Pt will perform Left SLS x 30 sec to ease with improving balance function. Current: Not met but progressing: 10 sec 12/8/21               4. Pt will report pain <3/10 during light household activities.                 5.  Pt will report >85% improvement to return to PLOF.     PLAN  []  Upgrade activities as tolerated     [x]  Continue plan of care  []  Update interventions per flow sheet       []  Discharge due to:_  []  Other:_      Nicanor Mariscal PTA 12/13/2021  9:53 AM    Future Appointments   Date Time Provider Yehuda Vasquez   12/16/2021  8:15 AM Jarrod Washington, PT MMCPTPB [facility]   12/20/2021  8:15 AM Jarrod Washington, PT ANHGNHQE [facility]   12/23/2021  8:15 AM Jarrod Washington, PT MMCPTPB [facility]   12/27/2021  8:15 AM NACHO Hayes [facility]   12/30/2021  8:15 AM Cuca Fierro PTA MMCPTPB [facility]   1/3/2022  8:15 AM Cuca Fierro PTA [facility]CPTP[facility]   1/6/2022  8:15 AM Ron Villalpando, PT MMCPTPB [facility]   1/10/2022  8:15 AM Maribell Bartlett, PTA [facility]   1/13/2022  8:15 AM Maribell Tri, PTA MMCPTPB [facility]   1/17/2022  8:15 AM Ron Villalpando, PT [facility]CPTP[facility]   1/18/2022  1:00 PM TSS HBV NURSE VISIT BSSV BS AMB   1/20/2022  8:15 AM Maribell Bartlett, PTA MMCPTPB [facility]   1/26/2022 10:30 AM Sena Allan PA-C [facility] BS AMB   3/18/2022  2:40 PM Cayetano Small DO [facility] BS AMB   3/18/2022  2:40 PM CSI, PACER [facility] BS AMB

## 2021-12-16 ENCOUNTER — HOSPITAL ENCOUNTER (OUTPATIENT)
Dept: PHYSICAL THERAPY | Age: 51
Discharge: HOME | End: 2021-12-16
Attending: PHYSICIAN ASSISTANT
Payer: MEDICARE

## 2021-12-16 PROCEDURE — 97110 THERAPEUTIC EXERCISES: CPT

## 2021-12-16 NOTE — PROGRESS NOTES
PT DAILY TREATMENT NOTE     Patient Name: Tono Redmond  Date:2021  : 1970  [x]  Patient  Verified  Payor: BLUE CROSS MEDICARE / Plan: [address]  HMO / Product Type: Managed Care Medicare /    In time: 8:14  Out time: 8:53  Total Treatment Time (min): 39  Visit #: 4 of 12    Medicare/BCBS Only   Total Timed Codes (min):  39 1:1 Treatment Time:  39       Treatment Area: Left hip pain [M25.552]    SUBJECTIVE  Pain Level (0-10 scale):  2/10  Any medication changes, allergies to medications, adverse drug reactions, diagnosis change, or new procedure performed?: [x] No    [] Yes (see summary sheet for update)  Subjective functional status/changes:   [] No changes reported  Pt. Reports she is doing well today. She continues to have stiffness in the mornings but is doing well overall. OBJECTIVE    39 min Therapeutic Exercise:  [x] See flow sheet :   Rationale: increase ROM and increase strength to improve the patients ability to increase ease of ambulation           With   [x] TE   [] TA   [] neuro   [] other: Patient Education: [x] Review HEP    [] Progressed/Changed HEP based on:   [] positioning   [] body mechanics   [] transfers   [] heat/ice application    [] other:      Other Objective/Functional Measures:   Left hip abduction MMT: 2+/5  Pt. Demonstrates good understanding of hip precautions   Pt. Was educated on clamshells for HEP  She was challenged with prone hip extensions     Pain Level (0-10 scale) post treatment: 1-2/10    ASSESSMENT/Changes in Function:  Pt. Is progressing with physical therapy. She continues to demonstrate decreased left hip abduction strength which is contributing to Trendelenburg gait pattern. She continues to be complaint with her hip precautions and HEP. She continues to demonstrate decreased left single leg stability.      Patient will continue to benefit from skilled PT services to modify and progress therapeutic interventions, address functional mobility deficits, address ROM deficits, address strength deficits, analyze and address soft tissue restrictions, analyze and cue movement patterns, analyze and modify body mechanics/ergonomics and address imbalance/dizziness to attain remaining goals. Progress towards goals / Updated goals:  Short Term Goals: To be accomplished in 1 weeks:               1. Pt will be compliant with current HEP to improve hip function  Current: Met: 12/13/21  Long Term Goals: To be accomplished in 6 weeks:               1. Pt will increase FOTO score by 11 pts to improve function.               2. Pt will increase SL Hip Abduction strength to 5/5 to ease with Ambulation function. Not met: 2+/5 (12/16/21)                3. Pt will perform Left SLS x 30 sec to ease with improving balance function. Current: Not met but progressing: 10 sec 12/8/21               4. Pt will report pain <3/10 during light household activities.                 5.  Pt will report >85% improvement to return to PLOF.     PLAN  []  Upgrade activities as tolerated     [x]  Continue plan of care  []  Update interventions per flow sheet       []  Discharge due to:_  []  Other:_      Kamilla Sanderson, PT 12/16/2021  7:51 AM    Future Appointments   Date Time Provider Yehuda Vasquez   12/16/2021  8:15 AM Anni Coreas, PT MMCPTPB [facility]   12/20/2021  8:15 AM Anni Coreas, PT MMCPTPB [facility]   12/22/2021 10:00 AM [facility] CT RM 2 MMCRCT [facility]   12/23/2021  8:15 AM Anni Coreas, PT NIFSUQU [facility]   12/27/2021  8:15 AM Kecia Alt, PTA MMCPTPB [facility]   12/28/2021  8:30 AM Kenneth Augustine PA-C [facility] BS AMB   12/30/2021  8:15 AM Kecia Alt, PTA MMCPTPB [facility]   1/3/2022  8:15 AM Kecia Alt, PTA MMCPTPB [facility]   1/6/2022  8:15 AM Anni Coreas, PT RICSWWT [facility]   1/10/2022  8:15 AM Kecia Alt, PTA MMCPTPB [facility]   1/13/2022  8:15 AM Kecia Alt, PTA [id number] [facility]   1/17/2022  8:15 AM Anni Coreas, PT MMCPTPB [facility]   1/18/2022  1:00 PM TSS HBV NURSE VISIT BSSSHV BS AMB   1/20/2022  8:15 AM Kecia Alt, PTA MMCPTPB [facility]   1/26/2022 10:30 AM Alyson Allan PA-C [facility] BS AMB   3/18/2022  2:40 PM Adelia Kelly DO [facility] BS AMB   3/18/2022  2:40 PM CSI, PACER [facility] BS AMB

## 2021-12-20 ENCOUNTER — HOSPITAL ENCOUNTER (OUTPATIENT)
Dept: PHYSICAL THERAPY | Age: 51
Discharge: HOME | End: 2021-12-20
Attending: PHYSICIAN ASSISTANT
Payer: MEDICARE

## 2021-12-20 PROCEDURE — 97110 THERAPEUTIC EXERCISES: CPT

## 2021-12-20 NOTE — PROGRESS NOTES
PT DAILY TREATMENT NOTE     Patient Name: Jj Albrecht  Date:2021  : 1970  [x]  Patient  Verified  Payor: BLUE CROSS MEDICARE / Plan: [address] [place]  HMO / Product Type: Managed Care Medicare /    In time: 8:22  Out time: 9:00  Total Treatment Time (min): 38  Visit #: 5 of 12    Medicare/BCBS Only   Total Timed Codes (min):  38 1:1 Treatment Time:  38       Treatment Area: Left hip pain [M25.552]    SUBJECTIVE  Pain Level (0-10 scale):  3/10  Any medication changes, allergies to medications, adverse drug reactions, diagnosis change, or new procedure performed?: [x] No    [] Yes (see summary sheet for update)  Subjective functional status/changes:   [] No changes reported  Pt. Reports she continues to be stiff in the morning. She reports she is working on new exercises at home. OBJECTIVE    38 min Therapeutic Exercise:  [x] See flow sheet :   Rationale: increase ROM and increase strength to improve the patients ability to increase ease of ADLs          With   [x] TE   [] TA   [] neuro   [] other: Patient Education: [x] Review HEP    [] Progressed/Changed HEP based on:   [] positioning   [] body mechanics   [] transfers   [] heat/ice application    [] other:      Other Objective/Functional Measures:   Pt. Required cues to improve form during clamshells and prone hip extensions  Required cues to performing mini squats correctly  She continues to demonstrate decreased balance during single balance    Pain Level (0-10 scale) post treatment:  -2/10    ASSESSMENT/Changes in Function:  Pt. Is progressing slowly towards goals. She continues to demonstrate decreased left hip abduction and extension strength which is affecting her ambulation. She continues to demonstrate decreased left single leg stability. She continues to have good pain control but does have more pain and stiffness in the mornings.      Patient will continue to benefit from skilled PT services to modify and progress therapeutic interventions, address functional mobility deficits, address ROM deficits, address strength deficits, analyze and address soft tissue restrictions, analyze and cue movement patterns, analyze and modify body mechanics/ergonomics and assess and modify postural abnormalities to attain remaining goals. Progress towards goals / Updated goals:  Short Term Goals: To be accomplished in 1 weeks:               1. Pt will be compliant with current HEP to improve hip function  Current: Met: 12/13/21    Long Term Goals: To be accomplished in 6 weeks:               1. Pt will increase FOTO score by 11 pts to improve function.               2. Pt will increase SL Hip Abduction strength to 5/5 to ease with Ambulation function. Not met: 2+/5 (12/16/21)                3. Pt will perform Left SLS x 30 sec to ease with improving balance function. Current: Not met but progressing: 10 sec 12/8/21               4. Pt will report pain <3/10 during light household activities.                 5.  Pt will report >85% improvement to return to PLOF.   PLAN  []  Upgrade activities as tolerated     [x]  Continue plan of care  []  Update interventions per flow sheet       []  Discharge due to:_  []  Other:_      Amy Mcclelland, PT 12/20/2021  7:46 AM    Future Appointments   Date Time Provider Yehuda Vasquez   12/20/2021  8:15 AM Nacho Goldsmith, PT MMCPTPB [facility]   12/22/2021 10:00 AM [facility] CT RM 2 MMCRCT [facility]   12/23/2021  8:15 AM Nacho Goldsmith, PT NQULAQM [facility]   12/27/2021  8:15 AM Orbbret Kaykay, PTA MMCPTPB [facility]   12/28/2021  8:30 AM Geraldine Black PA-C [facility] BS AMB   12/30/2021  8:15 AM Orbie Kaykay, PTA MMCPTPB [facility]   1/3/2022  8:15 AM Orbie Kaykay, PTA MMCPTPB [facility]   1/6/2022  8:15 AM Nacho Goldsmith, PT QVMUMXE [facility]   1/10/2022  8:15 AM Polina Mccracken, PTA MMCPTPB [facility]   1/13/2022  8:15 AM Polina Mccracken, PTA AHKYLSU [facility]   1/17/2022  8:15 AM Nacho Goldsmith, PT MMCPTPB [facility]   1/18/2022  1:00 PM TSS HBV NURSE VISIT BSSSHV BS AMB   1/20/2022  8:15 AM Herve Post PTA MMCPTPB [facility]   1/26/2022 10:30 AM Daisy Allan PA-C [facility] BS AMB   3/18/2022  2:40 PM Garfield Pepper DO [facility] BS AMB   3/18/2022  2:40 PM CSI, PACER [facility] BS AMB

## 2021-12-21 DIAGNOSIS — E87.6 HYPOKALEMIA: ICD-10-CM

## 2021-12-22 ENCOUNTER — HOSPITAL ENCOUNTER (OUTPATIENT)
Dept: CT IMAGING | Age: 51
Discharge: HOME | End: 2021-12-22
Attending: PHYSICIAN ASSISTANT
Payer: MEDICARE

## 2021-12-22 DIAGNOSIS — M25.552 LEFT HIP PAIN: ICD-10-CM

## 2021-12-22 PROCEDURE — 73700 CT LOWER EXTREMITY W/O DYE: CPT

## 2021-12-22 RX ORDER — HYDROCODONE BITARTRATE AND ACETAMINOPHEN 10; 325 MG/1; MG/1
1 TABLET ORAL
Qty: 21 TABLET | Refills: 0 | Status: SHIPPED | OUTPATIENT
Start: 2021-12-22 | End: 2021-12-29 | Stop reason: SDUPTHER

## 2021-12-22 NOTE — TELEPHONE ENCOUNTER
Patient called requesting another refill of the prescription oxyCODONE-acetaminophen (Percocet) 7.5-325 mg per tablet. She also wanted to let NARCISO Giordano know she did get her CT scan completed today at [facility] so he can see the results. She has physical therapy tomorrow 12/23/21 at 8:30 AM so she needs this done before then. She confirmed her pharmacy is [facility] and she's requesting a call back when completed at [phone].

## 2021-12-22 NOTE — TELEPHONE ENCOUNTER
Notified patient that RX:   oxyCODONE-acetaminophen (Percocet) 7.5-325 mg per tablet. Has been sent to pharmacy and that her CT results were still pending.      Patient verbalized understanding

## 2021-12-23 ENCOUNTER — HOSPITAL ENCOUNTER (OUTPATIENT)
Dept: PHYSICAL THERAPY | Age: 51
Discharge: HOME | End: 2021-12-23
Attending: PHYSICIAN ASSISTANT
Payer: MEDICARE

## 2021-12-23 PROCEDURE — 97110 THERAPEUTIC EXERCISES: CPT

## 2021-12-23 NOTE — PROGRESS NOTES
PT DAILY TREATMENT NOTE     Patient Name: Jj Albrecht  Date:2021  : 1970  [x]  Patient  Verified  Payor: BLUE CROSS MEDICARE / Plan: [place]  HMO / Product Type: Managed Care Medicare /    In time: 8:18  Out time: 8:56  Total Treatment Time (min): 38  Visit #: 6 of 12    Medicare/BCBS Only   Total Timed Codes (min):  38 1:1 Treatment Time:  38       Treatment Area: Left hip pain [M25.552]    SUBJECTIVE  Pain Level (0-10 scale):  0/10  Any medication changes, allergies to medications, adverse drug reactions, diagnosis change, or new procedure performed?: [x] No    [] Yes (see summary sheet for update)  Subjective functional status/changes:   [] No changes reported  Pt. Reports she is doing pretty well today. She reports she still feels off balance at times but bringing leg into bed is getting easier. OBJECTIVE    38 min Therapeutic Exercise:  [x] See flow sheet :   Rationale: increase ROM and increase strength to improve the patients ability to increase ease of ADLs           With   [x] TE   [] TA   [] neuro   [] other: Patient Education: [x] Review HEP    [] Progressed/Changed HEP based on:   [] positioning   [] body mechanics   [] transfers   [] heat/ice application    [] other:      Other Objective/Functional Measures:   Left hip abd MMT: 2+/5  Pt. Was challenged with stability during standing marches   Pt. Was able to perform 6 inch step ups without UE support    Pain Level (0-10 scale) post treatment:  0/10    ASSESSMENT/Changes in Function: pt. Is progressing slowly towards goals. She continues to demonstrate decreased left hip strength but is slowly improving. She has improving pain control and increased ease of ambulation. Pt. Also continues to be limited by decreased single leg stability on left side.      Patient will continue to benefit from skilled PT services to modify and progress therapeutic interventions, address functional mobility deficits, address ROM deficits, address strength deficits, analyze and address soft tissue restrictions, analyze and cue movement patterns, analyze and modify body mechanics/ergonomics, assess and modify postural abnormalities and address imbalance/dizziness to attain remaining goals. Progress towards goals / Updated goals:  Short Term Goals: To be accomplished in 1 weeks:               1. Pt will be compliant with current HEP to improve hip function  Current: Met: 12/13/21     Long Term Goals: To be accomplished in 6 weeks:               1. Pt will increase FOTO score by 11 pts to improve function.               2. Pt will increase SL Hip Abduction strength to 5/5 to ease with Ambulation function. Not met: 2+/5 (12/23/21)                3. Pt will perform Left SLS x 30 sec to ease with improving balance function. Current: Not met but progressing: 10 sec 12/8/21               4. Pt will report pain <3/10 during light household activities.                 5.  Pt will report >85% improvement to return to PLOF.     PLAN  []  Upgrade activities as tolerated     [x]  Continue plan of care  []  Update interventions per flow sheet       []  Discharge due to:_  []  Other:_      Sandhya Le, PT 12/23/2021  7:48 AM    Future Appointments   Date Time Provider Yehuda Vasquez   12/23/2021  8:15 AM Kathie Sullivan, PT [facility] [address]   12/27/2021  8:15 AM Sherlene Mc[place], PTA [facility] [address]   12/28/2021  8:30 AM Shanda Nava PA-C [facility] BS AMB   12/30/2021  8:15 AM Sherlene [place], PTA [facility] [address]   1/3/2022  8:15 AM Sherlene [place], PTA [facility] [address]   1/6/2022  8:15 AM Alberta Laurie, PT [facility] [address]   1/10/2022  8:15 AM Sherlene [place], PTA [facility] [address]   1/13/2022  8:15 AM Sherlene [place], PTA [facility] [address]   1/17/2022  8:15 AM Herbertta Laurie, PT [facility] [address]   1/18/2022  1:00 PM TSS HBV NURSE VISIT [facility] BS AMB   1/20/2022  8:15 AM Yesi Bah PTA [facility] [address]   1/26/2022 10:30 AM Jerrell, Shruti Sorto PA-C [facility] BS AMB   3/18/2022  2:40 PM Linh FARIAS DO [facility] BS AMB   3/18/2022  2:40 PM CSI, PACER [facility] BS AMB

## 2021-12-25 RX ORDER — POTASSIUM CHLORIDE 20 MEQ/1
TABLET, EXTENDED RELEASE ORAL
Qty: 7 TABLET | Refills: 0 | OUTPATIENT
Start: 2021-12-25

## 2021-12-27 ENCOUNTER — HOSPITAL ENCOUNTER (OUTPATIENT)
Dept: PHYSICAL THERAPY | Age: 51
Discharge: HOME | End: 2021-12-27
Attending: PHYSICIAN ASSISTANT
Payer: MEDICARE

## 2021-12-27 PROCEDURE — 97110 THERAPEUTIC EXERCISES: CPT

## 2021-12-27 NOTE — TELEPHONE ENCOUNTER
Left detailed message via voicemail for [facility] with a update on potassium medication. Per Cynthia Gilford PA-C medication was only prescribed for 1 week for only preoperatively measurements. Petar Higginbotham was advised to keep upcoming January 26,2022 appointment.

## 2021-12-27 NOTE — PROGRESS NOTES
PT DAILY TREATMENT NOTE     Patient Name: Russell Chapman  Date:2021  : 1970  [x]  Patient  Verified  Payor: BLUE CROSS MEDICARE / Plan: [place]  HMO / Product Type: Managed Care Medicare /    In time:825  Out time:855  Total Treatment Time (min): 30  Visit #: 7 of 12    Medicare/BCBS Only   Total Timed Codes (min):  30 1:1 Treatment Time:  30       Treatment Area: Left hip pain [M25.552]    SUBJECTIVE  Pain Level (0-10 scale): 0/10  Any medication changes, allergies to medications, adverse drug reactions, diagnosis change, or new procedure performed?: [x] No    [] Yes (see summary sheet for update)  Subjective functional status/changes:   [] No changes reported  Pt stated that she is doing pretty good today    OBJECTIVE      30 min Therapeutic Exercise:  [x] See flow sheet :   Rationale: increase ROM and increase strength to improve the patients ability to increase ease with ADLs    With   [x] TE   [] TA   [] neuro   [] other: Patient Education: [x] Review HEP    [] Progressed/Changed HEP based on:   [] positioning   [] body mechanics   [] transfers   [] heat/ice application    [] other:      Other Objective/Functional Measures:   Pt was 10 minutes late for session   Needed cueing with TRX squats for technique  No LOB with static balance  No complaint of pain during session    Pain Level (0-10 scale) post treatment: 0/10    ASSESSMENT/Changes in Function:   Pt is progressing well toward goals. Pt has had no pain for the past 2 visits.  Pt reports 100% improvement and is to be discharged next visit with updated HEP    Patient will continue to benefit from skilled PT services to modify and progress therapeutic interventions, address functional mobility deficits, address ROM deficits, address strength deficits, analyze and cue movement patterns, analyze and modify body mechanics/ergonomics, assess and modify postural abnormalities, address imbalance/dizziness and instruct in home and community integration to attain remaining goals. [x]  See Plan of Care  []  See progress note/recertification  []  See Discharge Summary         Progress towards goals / Updated goals:  Short Term Goals: To be accomplished in 1 weeks:               1. Pt will be compliant with current HEP to improve hip function  Current: Met: 12/13/21     Long Term Goals: To be accomplished in 6 weeks:               1. Pt will increase FOTO score by 11 pts to improve function.               2. Pt will increase SL Hip Abduction strength to 5/5 to ease with Ambulation function. Not met: 2+/5 (12/23/21)                3. Pt will perform Left SLS x 30 sec to ease with improving balance function. Current: Not met but progressing: 10 sec 12/8/21               4. Pt will report pain <3/10 during light household activities.                 5. Pt will report >85% improvement to return to PLOF.   Goal met. Pt reported 100% improvement in overall sx's since beginning therapy.  12/27/21    PLAN  []  Upgrade activities as tolerated     [x]  Continue plan of care  []  Update interventions per flow sheet       []  Discharge due to:_  []  Other:_      Merrill Jimenez, NACHO 12/27/2021  6:43 AM    Future Appointments   Date Time Provider Yehuda Vasquez   12/27/2021  8:15 AM Yahaira Arrjosefina, PTA [facility]   12/28/2021  8:30 AM Claudetta Kindred, PA-C [facility] BS AMB   12/30/2021  8:15 AM Yahaira Arrant, PTA [facility]   1/3/2022  8:15 AM Yahaira Arrant, PTA [facility]   1/6/2022  8:15 AM Bailey Bound, PT [id number]   1/10/2022  8:15 AM Yahaira Arrant, PTA [facility]   1/13/2022  8:15 AM Yahaira Arrant, PTA [facility]   1/17/2022  8:15 AM Bailey Bound, PT [facility]   1/18/2022  1:00 PM TSS HBV NURSE VISIT [facility] BS AMB   1/20/2022  8:15 AM Yahaira Roberson PTA [facility]   1/26/2022 10:30 AM Tahira Allan PA-C [facility] BS AMB   3/18/2022  2:40 PM Yoel Mora DO [facility] BS AMB 3/18/2022  2:40 PM CSI, MRAK MADDOX CSELISE BS AMB

## 2021-12-28 ENCOUNTER — OFFICE VISIT (OUTPATIENT)
Dept: ORTHOPEDIC SURGERY | Age: 51
End: 2021-12-28
Payer: MEDICARE

## 2021-12-28 VITALS
TEMPERATURE: 95.7 F | WEIGHT: 250 LBS | BODY MASS INDEX: 40.18 KG/M2 | HEIGHT: 66 IN | OXYGEN SATURATION: 100 % | HEART RATE: 90 BPM

## 2021-12-28 DIAGNOSIS — Z96.642 S/P HIP REPLACEMENT, LEFT: Primary | ICD-10-CM

## 2021-12-28 DIAGNOSIS — M25.552 LEFT HIP PAIN: ICD-10-CM

## 2021-12-28 PROCEDURE — 99024 POSTOP FOLLOW-UP VISIT: CPT | Performed by: PHYSICIAN ASSISTANT

## 2021-12-28 NOTE — PROGRESS NOTES
[address] [place] [address]  [phone]           Patient: Jennifer Cisneros                MRN: [id number]       SSN: [id number]  YOB: 1970        AGE: 49 y.o. SEX: female  Body mass index is 40.35 kg/m². PCP: Sanjuanita Poole PA-C  12/28/21      This office note has been dictated. REVIEW OF SYSTEMS:  Constitutional: Negative for fever, chills, weight loss and malaise/fatigue. HENT: Negative. Eyes: Negative. Respiratory: Negative. Cardiovascular: Negative. Gastrointestinal: No bowel incontinence or constipation. Genitourinary: No bladder incontinence or saddle anesthesia. Skin: Negative. Neurological: Negative. Endo/Heme/Allergies: Negative. Psychiatric/Behavioral: Negative. Musculoskeletal: As per HPI above. Past Medical History:   Diagnosis Date    AICD (automatic cardioverter/defibrillator) present 09/2018    Asthma     Cardiac echocardiogram 03/22/2017    LVE. EF 15% (prev 50% on study of 12/10/14). Severe diffuse hypk. Indeterminate diastolic fx.  RVE. RVSP at least 44 mmHg. Mod LAE.  BISMARK. Mild-mod MR.  Cardiac nuclear imaging test 03/24/2017    High risk. Somewhat patchy uptake. No evidence of ischemia or infarction. No RWMA. Severe LVE. EF 25%. Neg EKG on pharm stress test.    Cardiovascular LLE venous duplex 03/06/2017    Left leg:  No DVT. Pulsatile flow.  Congestive heart failure (HCC)     Graves disease     Graves disease     Heart failure ([facility] [address]     Hypercholesterolemia     Hypertension     Hyperthyroidism          Current Outpatient Medications:     HYDROcodone-acetaminophen (Norco)  mg tablet, Take 1 Tablet by mouth every eight (8) hours as needed for Pain for up to 7 days. Max Daily Amount: 3 Tablets. , Disp: 21 Tablet, Rfl: 0    acetaminophen (TYLENOL) 500 mg tablet, Take 500 mg by mouth every six (6) hours as needed for Pain., Disp: , Rfl:     rivaroxaban (Xarelto) 10 mg tablet, Take 1 Tablet by mouth daily (with lunch). , Disp: 30 Tablet, Rfl: 1    cephALEXin (Keflex) 500 mg capsule, Take 1 Capsule by mouth four (4) times daily. , Disp: 8 Capsule, Rfl: 0    docusate sodium (Colace) 100 mg capsule, Take 1 Capsule by mouth two (2) times a day for 90 days. , Disp: 60 Capsule, Rfl: 2    potassium chloride (K-DUR, KLOR-CON) 20 mEq tablet, Take 1 Tablet by mouth two (2) times a day., Disp: 7 Tablet, Rfl: 0    albuterol (PROVENTIL HFA, VENTOLIN HFA, PROAIR HFA) 90 mcg/actuation inhaler, Take 2 Puffs by inhalation every six (6) hours as needed for Wheezing., Disp: 18 g, Rfl: 1    furosemide (LASIX) 40 mg tablet, Take 1 tablet by mouth once daily, Disp: 30 Tablet, Rfl: 5    carvediloL (COREG) 25 mg tablet, TAKE 1 TABLET BY MOUTH TWICE DAILY WITH MEALS, Disp: 60 Tablet, Rfl: 5    sacubitriL-valsartan (Entresto) 24-26 mg tablet, Take 1 tablet by mouth twice daily, Disp: 60 Tablet, Rfl: 5    baclofen (LIORESAL) 10 mg tablet, Take 0.5-1 Tablets by mouth three (3) times daily as needed for Muscle Spasm(s) or Pain., Disp: 90 Tablet, Rfl: 3    atorvastatin (LIPITOR) 80 mg tablet, TAKE 1 TABLET BY MOUTH ONCE DAILY AT NIGHT, Disp: 30 Tablet, Rfl: 5    spironolactone (ALDACTONE) 25 mg tablet, Take 1 tablet by mouth once daily, Disp: 30 Tablet, Rfl: 6    potassium chloride (K-DUR, KLOR-CON) 20 mEq tablet, Take 1 Tablet by mouth daily. (Patient taking differently: Take 20 mEq by mouth daily.  taking 1x/day now after surgery. ), Disp: 30 Tablet, Rfl: 5    indapamide (LOZOL) 2.5 mg tablet, TAKE 1 TABLET BY MOUTH ONCE DAILY IN THE MORNING, Disp: 30 Tab, Rfl: 6    methIMAzole (TAPAZOLE) 10 mg tablet, Take 1 Tab by mouth two (2) times a day., Disp: 1 Tab, Rfl: 0    nitroglycerin (NITROSTAT) 0.4 mg SL tablet, 1 Tab by SubLINGual route every five (5) minutes as needed for Chest Pain., Disp: 1 Bottle, Rfl: 1    No Known Allergies    Social History Socioeconomic History    Marital status: SINGLE     Spouse name: Not on file    Number of children: Not on file    Years of education: Not on file    Highest education level: Not on file   Occupational History    Not on file   Tobacco Use    Smoking status: Current Some Day Smoker     Packs/day: 0.00     Years: 18.00     Pack years: 0.00     Last attempt to quit: 2017     Years since quittin.8    Smokeless tobacco: Never Used    Tobacco comment: 2-3 cigs /day   Vaping Use    Vaping Use: Never used   Substance and Sexual Activity    Alcohol use: Yes     Comment: socially     Drug use: Never    Sexual activity: Yes     Partners: Male     Birth control/protection: Condom   Other Topics Concern    Not on file   Social History Narrative    Not on file     Social Determinants of Health     Financial Resource Strain:     Difficulty of Paying Living Expenses: Not on file   Food Insecurity:     Worried About Running Out of Food in the Last Year: Not on file    Marlyn of Food in the Last Year: Not on file   Transportation Needs:     Lack of Transportation (Medical): Not on file    Lack of Transportation (Non-Medical):  Not on file   Physical Activity:     Days of Exercise per Week: Not on file    Minutes of Exercise per Session: Not on file   Stress:     Feeling of Stress : Not on file   Social Connections:     Frequency of Communication with Friends and Family: Not on file    Frequency of Social Gatherings with Friends and Family: Not on file    Attends Synagogue Services: Not on file    Active Member of Clubs or Organizations: Not on file    Attends Club or Organization Meetings: Not on file    Marital Status: Not on file   Intimate Partner Violence:     Fear of Current or Ex-Partner: Not on file    Emotionally Abused: Not on file    Physically Abused: Not on file    Sexually Abused: Not on file   Housing Stability:     Unable to Pay for Housing in the Last Year: Not on file    Number of Places Lived in the Last Year: Not on file    Unstable Housing in the Last Year: Not on file       Past Surgical History:   Procedure Laterality Date    HX ANKLE FRACTURE TX Left 2012    Screws plates and rods    HX BREAST REDUCTION      HX HIP REPLACEMENT      HX HYSTERECTOMY  2009    hysterectomy  btl    HX PACEMAKER  2017    AICD    HX TONSILLECTOMY             Patient seen evaluate today for her left hip. She is now approaching 8 weeks status post surgery. She had a questionable nutrient vessel versus an occult fracture seen on x-ray and I sent her for CT scan. Stress today for reevaluation. She is having really no pain in her hip. She has been weightbearing as tolerated. She uses no assistive devices. She is working hard physical therapy the complications. She had no troubles the wound. No fevers or chills. Patient denies recent fevers, chills, chest pain, SOB, or injuries. No recent systemic changes noted. A 12-point review of systems is performed today. Pertinent positives are noted. All other systems reviewed and otherwise are negative. Physical exam: General: Alert and oriented x3, nad.  well-developed, well nourished. normal affect, AF. NC/AT, EOMI, neck supple, trachea midline, no JVD present. Breathing is non-labored. Examination of the left hip reveals skin intact. The surgical wound healed nicely. There is no erythema or ecchymosis noted. There are no signs of infection or cellulitis present. There is no pain with rotation of the hip. There is no pain to palpation trochanter bursa. Leg lengths are perfect. Abduction strength is 5 - on the left and 5 out of 5 on the right. Review of CT scan:  IMPRESSION  :  No stress fracture.     Residual fluid collection in subcutaneous tissues lateral to the hip compatible  with seroma.     Dystrophic calcifications anterior to the femur probably related to remote  Injury.     Assessment: Status post left total hip replacement    Plan: At this point, the patient will continue with outpatient physical therapy. She will continue the home exercise program.  We will see her back in 4 weeks time for evaluation. She will call with any questions or concerns that shall arise.           JR Bro MAGALLANES PAYingC, ATC

## 2021-12-29 DIAGNOSIS — Z96.642 S/P HIP REPLACEMENT, LEFT: ICD-10-CM

## 2021-12-29 RX ORDER — HYDROCODONE BITARTRATE AND ACETAMINOPHEN 10; 325 MG/1; MG/1
1 TABLET ORAL
Qty: 21 TABLET | Refills: 0 | Status: SHIPPED | OUTPATIENT
Start: 2021-12-29 | End: 2022-01-05

## 2021-12-29 NOTE — TELEPHONE ENCOUNTER
Last Visit: 12/28/21 with NARCISO Schroeder  Next Appointment: 2/10/22 with NARCISO Schroeder  Previous Refill Encounter(s): 12/22/21 #21    Requested Prescriptions     Pending Prescriptions Disp Refills    HYDROcodone-acetaminophen (Norco)  mg tablet 21 Tablet 0     Sig: Take 1 Tablet by mouth every eight (8) hours as needed for Pain for up to 7 days. Max Daily Amount: 3 Tablets.

## 2021-12-29 NOTE — TELEPHONE ENCOUNTER
Patient is requesting a refill of Norco. Patient used [facility] [address] [facility] on [address].     Patient can be reached at [phone]

## 2021-12-30 ENCOUNTER — HOSPITAL ENCOUNTER (OUTPATIENT)
Dept: PHYSICAL THERAPY | Age: 51
Discharge: HOME | End: 2021-12-30
Attending: PHYSICIAN ASSISTANT
Payer: MEDICARE

## 2021-12-30 PROCEDURE — 97110 THERAPEUTIC EXERCISES: CPT

## 2021-12-30 NOTE — PROGRESS NOTES
[facility] - [address]  [phone] fax    Physical Therapy Discharge Summary    Patient name: Trice Real Start of Care: 2021   Referral source: Kenyatta Alfred : 1970                Medical Diagnosis: Left hip pain [M25.552]  Payor: BLUE CROSS MEDICARE / Plan: [facility] HMO / Product Type: Managed Care Medicare /  Onset Date: DOS:21                Treatment Diagnosis: Left Hip Pain   Prior Hospitalization: see medical history Provider#: [id number]   Medications: Verified on Patient summary List    Comorbidities: HBP, Arthritis, Heart Disease   Prior Level of Function: Active [employer] Visits from Start of Care: 8    Missed Visits: 0    Reporting Period : 21 to 21    Summary of Care:  Goal: Pt will increase FOTO score by 11 pts to improve function. Status at last note/certification: 59  Status at discharge: met    Goal:  Pt will increase SL Hip Abduction strength to 5/5 to ease with Ambulation function. Status at last note/certification: 4  Status at discharge: not met    Goal:  Pt will perform Left SLS x 30 sec to ease with improving balance function. Status at last note/certification: unable  Status at discharge: met    Goal: Pt will report pain <3/10 during light household activities. Status at last note/certification:   Status at discharge: met    Goal: Pt will report >85% improvement to return to PLOF. Status at last note/certification: n/a  Status at discharge: met    Pt. Has progressed well with physical therapy. She reports a 100% improvement in symptoms since [facility] and FOTO score improved to 99 points indicating a significant improvement in function. She no longer is experiencing hip pain and left single leg stability improved to 30 seconds. She does continue to demonstrate decreased left hip abduction strength at 3+/5.  She was educated on a HEP and educated on continuing with this following D/C.        ASSESSMENT/RECOMMENDATIONS:  [x]Discontinue therapy: [x]Patient has reached or is progressing toward set goals      []Patient is non-compliant or has abdicated      []Due to lack of appreciable progress towards set goals    Eric Storey PT 12/30/2021 2:25 PM

## 2021-12-30 NOTE — PROGRESS NOTES
PT DAILY TREATMENT NOTE     Patient Name: Dionisio Hunt  Date:2021  : 1970  [x]  Patient  Verified  Payor: BLUE CROSS MEDICARE / Plan: [address] [facility] HMO / Product Type: Managed Care Medicare /    In time:815  Out time:845  Total Treatment Time (min): 30  Visit #: 8 of 12    Medicare/BCBS Only   Total Timed Codes (min):  30 1:1 Treatment Time:  30       Treatment Area: Left hip pain [M25.552]    SUBJECTIVE  Pain Level (0-10 scale): 0/10  Any medication changes, allergies to medications, adverse drug reactions, diagnosis change, or new procedure performed?: [x] No    [] Yes (see summary sheet for update)  Subjective functional status/changes:   [] No changes reported  Pt stated that she is doing good today    OBJECTIVE    30 min Therapeutic Exercise:  [x] See flow sheet :   Rationale: increase ROM and increase strength to improve the patients ability to increase ease with ADLs    With   [x] TE   [] TA   [] neuro   [] other: Patient Education: [x] Review HEP    [x] Progressed/Changed HEP based on:   [] positioning   [] body mechanics   [] transfers   [] heat/ice application    [] other:      Other Objective/Functional Measures:   Issued final HEP  Pt showed understanding of all HEP  FOTO score 99    Pain Level (0-10 scale) post treatment: 0/10    ASSESSMENT/Changes in Function:   []  See Plan of Care  []  See progress note/recertification  [x]  See Discharge Summary         Progress towards goals / Updated goals:  Short Term Goals: To be accomplished in 1 weeks:               1. Pt will be compliant with current HEP to improve hip function  Current: Met: 21     Long Term Goals: To be accomplished in 6 weeks:               1. Pt will increase FOTO score by 11 pts to improve function. Goal met. Increased from 59 to 99               2. Pt will increase SL Hip Abduction strength to 5/5 to ease with Ambulation function. Progressing.  Strength for left hip abd is 3+/5              3. Pt will perform Left SLS x 30 sec to ease with improving balance function. Goal met.               4. Pt will report pain <3/10 during light household activities.   Goal met. Pt reports no longer having pain. 12/30/21               5. Pt will report >85% improvement to return to PLOF.   Goal met. Pt reported 100% improvement in overall sx's since beginning therapy.  12/27/21    PLAN  []  Upgrade activities as tolerated     []  Continue plan of care  []  Update interventions per flow sheet       [x]  Discharge due to:_  []  Other:_      Audrey Cage, PTA 12/30/2021  6:46 AM    Future Appointments   Date Time Provider Yehuda Christina   12/30/2021  8:15 AM Jerline Argenis, PTA [facility]   1/3/2022  8:15 AM Jerline Cowing, PTA [facility]   1/6/2022  8:15 AM Cheron Kami, PT [facility]   1/10/2022  8:15 AM Jerline Cowing, PTA [facility]   1/13/2022  8:15 AM Jerline Cowing, PTA [facility]   1/17/2022  8:15 AM Cheron Biferu, PT [facility]   1/18/2022  1:00 PM HR BSSSH NURSE [facility] BS AMB   1/20/2022  8:15 AM Jerline Argenis, PTA [facility]   1/26/2022 10:30 AM Ernie Allan PA-C [facility] BS AMB   2/10/2022  9:30 AM Indigo Locke PA-C [facility] BS AMB   3/18/2022  2:40 PM Rodríguez Stockton, DO [facility] BS AMB   3/18/2022  2:40 PM CSI, PACER [facility] BS AMB

## 2022-01-03 ENCOUNTER — HOSPITAL ENCOUNTER (OUTPATIENT)
Dept: PHYSICAL THERAPY | Age: 52
End: 2022-01-03
Attending: PHYSICIAN ASSISTANT

## 2022-01-06 ENCOUNTER — TELEPHONE (OUTPATIENT)
Dept: ORTHOPEDIC SURGERY | Age: 52
End: 2022-01-06

## 2022-01-06 ENCOUNTER — APPOINTMENT (OUTPATIENT)
Dept: PHYSICAL THERAPY | Age: 52
End: 2022-01-06
Attending: PHYSICIAN ASSISTANT

## 2022-01-06 DIAGNOSIS — Z96.642 S/P HIP REPLACEMENT, LEFT: Primary | ICD-10-CM

## 2022-01-06 RX ORDER — HYDROCODONE BITARTRATE AND ACETAMINOPHEN 10; 325 MG/1; MG/1
1 TABLET ORAL
Qty: 21 TABLET | Refills: 0 | Status: SHIPPED | OUTPATIENT
Start: 2022-01-06 | End: 2022-01-13

## 2022-01-06 NOTE — TELEPHONE ENCOUNTER
Patient is requesting a refill of HYDROcodone-acetaminophen (Norco)  mg tablet to be sent to [facility] on [address]. Patient can be reached at [phone].

## 2022-01-10 ENCOUNTER — APPOINTMENT (OUTPATIENT)
Dept: PHYSICAL THERAPY | Age: 52
End: 2022-01-10
Attending: PHYSICIAN ASSISTANT

## 2022-01-13 ENCOUNTER — APPOINTMENT (OUTPATIENT)
Dept: PHYSICAL THERAPY | Age: 52
End: 2022-01-13
Attending: PHYSICIAN ASSISTANT

## 2022-01-17 ENCOUNTER — APPOINTMENT (OUTPATIENT)
Dept: PHYSICAL THERAPY | Age: 52
End: 2022-01-17
Attending: PHYSICIAN ASSISTANT

## 2022-01-20 ENCOUNTER — APPOINTMENT (OUTPATIENT)
Dept: PHYSICAL THERAPY | Age: 52
End: 2022-01-20
Attending: PHYSICIAN ASSISTANT

## 2022-01-20 DIAGNOSIS — M16.10 HIP ARTHRITIS: ICD-10-CM

## 2022-01-20 RX ORDER — RIVAROXABAN 10 MG/1
TABLET, FILM COATED ORAL
Qty: 30 TABLET | Refills: 0 | OUTPATIENT
Start: 2022-01-20

## 2022-01-21 RX ORDER — INDAPAMIDE 2.5 MG/1
2.5 TABLET, FILM COATED ORAL DAILY
Qty: 30 TABLET | Refills: 6 | Status: SHIPPED | OUTPATIENT
Start: 2022-01-21 | End: 2022-02-03 | Stop reason: SDUPTHER

## 2022-01-25 PROBLEM — E04.2 NON-TOXIC MULTINODULAR GOITER: Status: ACTIVE | Noted: 2022-01-25

## 2022-01-25 PROBLEM — E05.00 TOXIC DIFFUSE GOITER WITHOUT CRISIS: Status: ACTIVE | Noted: 2019-02-07

## 2022-01-25 RX ORDER — DOCUSATE SODIUM 100 MG
1 CAPSULE ORAL
COMMUNITY
Start: 2021-11-02

## 2022-01-25 RX ORDER — FUROSEMIDE 80 MG/1
80 TABLET ORAL DAILY
COMMUNITY
Start: 2021-11-21 | End: 2022-01-26 | Stop reason: DRUGHIGH

## 2022-01-26 ENCOUNTER — OFFICE VISIT (OUTPATIENT)
Dept: FAMILY MEDICINE CLINIC | Age: 52
End: 2022-01-26

## 2022-01-26 ENCOUNTER — HOSPITAL ENCOUNTER (OUTPATIENT)
Dept: LAB | Age: 52
Discharge: HOME | End: 2022-01-26

## 2022-01-26 ENCOUNTER — TELEPHONE (OUTPATIENT)
Dept: ORTHOPEDIC SURGERY | Age: 52
End: 2022-01-26

## 2022-01-26 VITALS
HEART RATE: 91 BPM | TEMPERATURE: 98 F | RESPIRATION RATE: 16 BRPM | BODY MASS INDEX: 39.7 KG/M2 | OXYGEN SATURATION: 99 % | HEIGHT: 66 IN | WEIGHT: 247 LBS | SYSTOLIC BLOOD PRESSURE: 122 MMHG | DIASTOLIC BLOOD PRESSURE: 70 MMHG

## 2022-01-26 DIAGNOSIS — E04.2 NON-TOXIC MULTINODULAR GOITER: ICD-10-CM

## 2022-01-26 DIAGNOSIS — Z95.810 AICD (AUTOMATIC CARDIOVERTER/DEFIBRILLATOR) PRESENT: ICD-10-CM

## 2022-01-26 DIAGNOSIS — R73.03 PREDIABETES: ICD-10-CM

## 2022-01-26 DIAGNOSIS — R31.9 URINARY TRACT INFECTION WITH HEMATURIA, SITE UNSPECIFIED: Primary | ICD-10-CM

## 2022-01-26 DIAGNOSIS — E66.9 OBESITY (BMI 30-39.9): ICD-10-CM

## 2022-01-26 DIAGNOSIS — R10.30 LOWER ABDOMINAL PAIN: ICD-10-CM

## 2022-01-26 DIAGNOSIS — I10 ESSENTIAL HYPERTENSION: ICD-10-CM

## 2022-01-26 DIAGNOSIS — R35.0 URINE FREQUENCY: ICD-10-CM

## 2022-01-26 DIAGNOSIS — N39.0 URINARY TRACT INFECTION WITH HEMATURIA, SITE UNSPECIFIED: Primary | ICD-10-CM

## 2022-01-26 LAB
BILIRUB UR QL STRIP: NEGATIVE
GLUCOSE UR-MCNC: NEGATIVE MG/DL
KETONES P FAST UR STRIP-MCNC: NEGATIVE MG/DL
PH UR STRIP: 5.5 [PH] (ref 4.6–8)
PROT UR QL STRIP: NEGATIVE
SP GR UR STRIP: 1.02 (ref 1–1.03)
UA UROBILINOGEN AMB POC: NORMAL (ref 0.2–1)
URINALYSIS CLARITY POC: CLEAR
URINALYSIS COLOR POC: YELLOW
URINE BLOOD POC: NORMAL
URINE LEUKOCYTES POC: NEGATIVE
URINE NITRITES POC: POSITIVE
XX-LABCORP SPECIMEN COL,LCBCF: NORMAL

## 2022-01-26 PROCEDURE — G8754 DIAS BP LESS 90: HCPCS | Performed by: STUDENT IN AN ORGANIZED HEALTH CARE EDUCATION/TRAINING PROGRAM

## 2022-01-26 PROCEDURE — 3017F COLORECTAL CA SCREEN DOC REV: CPT | Performed by: STUDENT IN AN ORGANIZED HEALTH CARE EDUCATION/TRAINING PROGRAM

## 2022-01-26 PROCEDURE — G8417 CALC BMI ABV UP PARAM F/U: HCPCS | Performed by: STUDENT IN AN ORGANIZED HEALTH CARE EDUCATION/TRAINING PROGRAM

## 2022-01-26 PROCEDURE — G8752 SYS BP LESS 140: HCPCS | Performed by: STUDENT IN AN ORGANIZED HEALTH CARE EDUCATION/TRAINING PROGRAM

## 2022-01-26 PROCEDURE — 99001 SPECIMEN HANDLING PT-LAB: CPT

## 2022-01-26 PROCEDURE — G8432 DEP SCR NOT DOC, RNG: HCPCS | Performed by: STUDENT IN AN ORGANIZED HEALTH CARE EDUCATION/TRAINING PROGRAM

## 2022-01-26 PROCEDURE — 99214 OFFICE O/P EST MOD 30 MIN: CPT | Performed by: STUDENT IN AN ORGANIZED HEALTH CARE EDUCATION/TRAINING PROGRAM

## 2022-01-26 PROCEDURE — G8427 DOCREV CUR MEDS BY ELIG CLIN: HCPCS | Performed by: STUDENT IN AN ORGANIZED HEALTH CARE EDUCATION/TRAINING PROGRAM

## 2022-01-26 PROCEDURE — 81001 URINALYSIS AUTO W/SCOPE: CPT | Performed by: STUDENT IN AN ORGANIZED HEALTH CARE EDUCATION/TRAINING PROGRAM

## 2022-01-26 RX ORDER — NITROFURANTOIN 25; 75 MG/1; MG/1
100 CAPSULE ORAL 2 TIMES DAILY
Qty: 10 CAPSULE | Refills: 0 | Status: SHIPPED | OUTPATIENT
Start: 2022-01-26 | End: 2022-01-31

## 2022-01-26 NOTE — TELEPHONE ENCOUNTER
Patient is requesting a refill for Norco.  This was last written on 01/14/22. Patient last seen on 12/28/21 status post left hip replacement. Please advise.      Patient [phone]

## 2022-01-26 NOTE — PATIENT INSTRUCTIONS
Urinary Tract Infection (UTI) in Women: Care Instructions  Overview     A urinary tract infection, or UTI, is a general term for an infection anywhere between the kidneys and the urethra (where urine comes out). Most UTIs are bladder infections. They often cause pain or burning when you urinate. UTIs are caused by bacteria and can be cured with antibiotics. Be sure to complete your treatment so that the infection does not get worse. Follow-up care is a key part of your treatment and safety. Be sure to make and go to all appointments, and call your doctor if you are having problems. It's also a good idea to know your test results and keep a list of the medicines you take. How can you care for yourself at home? · Take your antibiotics as directed. Do not stop taking them just because you feel better. You need to take the full course of antibiotics. · Drink extra water and other fluids for the next day or two. This will help make the urine less concentrated and help wash out the bacteria that are causing the infection. (If you have kidney, heart, or liver disease and have to limit fluids, talk with your doctor before you increase the amount of fluids you drink.)  · Avoid drinks that are carbonated or have caffeine. They can irritate the bladder. · Urinate often. Try to empty your bladder each time. · To relieve pain, take a hot bath or lay a heating pad set on low over your lower belly or genital area. Never go to sleep with a heating pad in place. To prevent UTIs  · Drink plenty of water each day. This helps you urinate often, which clears bacteria from your system. (If you have kidney, heart, or liver disease and have to limit fluids, talk with your doctor before you increase the amount of fluids you drink.)  · Urinate when you need to. · If you are sexually active, urinate right after you have sex. · Change sanitary pads often.   · Avoid douches, bubble baths, feminine hygiene sprays, and other feminine hygiene products that have deodorants. · After going to the bathroom, wipe from front to back. When should you call for help? Call your doctor now or seek immediate medical care if:    · Symptoms such as fever, chills, nausea, or vomiting get worse or appear for the first time.     · You have new pain in your back just below your rib cage. This is called flank pain.     · There is new blood or pus in your urine.     · You have any problems with your antibiotic medicine. Watch closely for changes in your health, and be sure to contact your doctor if:    · You are not getting better after taking an antibiotic for 2 days.     · Your symptoms go away but then come back. Where can you learn more? Go to http://www.gray.com/  Enter K726 in the search box to learn more about \"Urinary Tract Infection (UTI) in Women: Care Instructions. \"  Current as of: February 10, 2021               Content Version: 13.0  © 2006-2021 Healthwise, Incorporated. Care instructions adapted under license by [employer] (which disclaims liability or warranty for this information). If you have questions about a medical condition or this instruction, always ask your healthcare professional. [place] [address] any warranty or liability for your use of this information.

## 2022-01-26 NOTE — PROGRESS NOTES
Chief Complaint   Patient presents with    Abdominal Pain    Coronary Artery Disease    Thyroid Problem     hypothyroid / graves disease     1. \"Have you been to the ER, urgent care clinic since your last visit? Hospitalized since your last visit? \" No    2. \"Have you seen or consulted any other health care providers outside of the [address] [place] [address] since your last visit? \" No     3. For patients aged 39-70: Has the patient had a colonoscopy / FIT/ Cologuard? No      If the patient is female:    4. For patients aged 41-77: Has the patient had a mammogram within the past 2 years? Yes - no Care Gap present  See top three    5. For patients aged 21-65: Has the patient had a pap smear?  No hysterectomy axp 2009, would like a referral to OB-GYN

## 2022-01-26 NOTE — PROGRESS NOTES
Donald Christian (: 1970) is a 49 y.o. female, established patient, here for evaluation of the following chief complaint(s):  Abdominal Pain, Urinary Frequency, Thyroid Problem, and Hypertension       ASSESSMENT/PLAN:  Below is the assessment and plan developed based on review of pertinent history, physical exam, labs, studies, and medications. ICD-10-CM ICD-9-CM    1. Urinary tract infection with hematuria, site unspecified  N39.0 599.0 nitrofurantoin, macrocrystal-monohydrate, (MACROBID) 100 mg capsule    R31.9 599.70 CULTURE, URINE   2. Urine frequency  R35.0 788.41 CULTURE, URINE      CANCELED: CULTURE, URINE   3. Prediabetes  R73.03 790.29    4. Obesity (BMI 30-39. 9)  E66.9 278.00    5. Non-toxic multinodular goiter  E04.2 241.1    6. AICD (automatic cardioverter/defibrillator) present  Z95.810 V45.02    7. Lower abdominal pain  R10.30 789.09 AMB POC URINALYSIS DIP STICK AUTO W/ MICRO      CULTURE, URINE   8. Essential hypertension  I10 401.9        1. Urinary tract infection with hematuria, site unspecified  -     nitrofurantoin, macrocrystal-monohydrate, (MACROBID) 100 mg capsule; Take 1 Capsule by mouth two (2) times a day for 5 days. , Normal, Disp-10 Capsule, R-0  -     CULTURE, URINE; Future  Urine dip revealed blood 1+, nitrates positive. We will send the urine for culture. We will treat with Macrobid. The patient was encouraged  to drink plenty of fluids. Handout given. The patient was instructed to follow-up if their condition worsened or persisted. 2. Urine frequency  -     CULTURE, URINE; Future  Urine dip revealed blood 1+, nitrates positive. We will send the urine for culture. 3.  Lower abdominal pain  -     AMB POC URINALYSIS DIP STICK AUTO W/ MICRO  -     CULTURE, URINE; Future  Most likely related to UTI. No abnormal findings on physical examination. Urine dip revealed blood 1+, nitrates positive. We will send the urine for  culture. We will treat with Macrobid.   The patient was encouraged to drink plenty of fluids. The patient was instructed to follow-up if their condition worsened or persisted. 4. Prediabetes  Most recent hemoglobin A1c (10/19/2022): 5.7%. Patient advised to lose weight, follow low-carb diet, regular exercise. 5. Obesity (BMI 30-39. 9)  Estimated BMI is 39.87 kg/m², weight of this encounter: 247 lb. Lost 3 pounds for the last month. Advised to continue managing weight with diet and exercise. 6. Non-toxic multinodular goiter  This condition is managed by the endocrinology. Follows up with Dr. Lashawn Gracia. LOV 1/12/2022.    7. AICD (automatic cardioverter/defibrillator) present  This condition is managed by the cardiology. Follows up with Dr. Lexy Daniels. LOV 10/13/2021. Next office visit scheduled for 3/18/2022.    8.  Essential hypertension  Lifestyle modifications (weight loss, DASH diet, salt reduction, regular exercise) discussed. Take medications as prescribed. Follows up with Dr. Lexy Daniels. LOV 10/13/2021. Next office visit scheduled for 3/18/2022. All chart history elements were reviewed by me at the time of the visit even though marked at time of note closure. Patient understands our medical plan. Patient has provided input and agrees with goals. Alternatives have been explained and offered. All questions answered. The patient is to call if condition worsens or fails to improve. Return if symptoms worsen or fail to improve. SUBJECTIVE/OBJECTIVE:  HPI  1. Pt complains of frequency, lower abdomen cramping, suprapubic pressure for several days. She denies hematuria, abnormal smelling urine, burning with urination, erythema of vaginal area, vaginal discharge, nausea, vomiting, diarrhea, nocturnal enuresis, hesitancy, incomplete bladder emptying, incontinence, nocturia, bilaterally flank pain. Since starting she reports her symptoms are not changed. Treatments tried: none. 2.  Left hip replacement on 11/2/2021 due to primary osteoarthritis. Follows up with Ortho, Dr. Bradley Garcia. LOV 12/28/2021. Next office visit scheduled for 2/10/2022. Doing well. 3.  PMH of nontoxic multinodular goiter. Follows up with endocrinology, Dr. Lashawn Gracia. LOV 1/12/2022. Doing well, no concerns. 4.  Dilated cardiomyopathy. Atrial fibrillation. AICD. Follows up with cardiology, Dr. Lexy Daniels. LOV 10/13/2021. Next office visit scheduled for 3/18/2022. No concerns today. 5. Hypertension: Home BP Monitoring: is not measured at home. Blood pressure normal today in the office. 6. Prediabetes. Most recent hemoglobin A1c (10/19/2022): 5.7%  7. Obesity. Estimated BMI is 39.87 kg/m², weight of this encounter: 247 lb. Lost 3 pounds for the last month. Review of Systems  Pertinent items are noted in HPI    Physical Exam  Visit Vitals  /70 (BP 1 Location: Left arm, BP Patient Position: Sitting, BP Cuff Size: Large adult)   Pulse 91   Temp 98 °F (36.7 °C) (Temporal)   Resp 16   Ht 5' 6\" (1.676 m)   Wt 247 lb (112 kg)   SpO2 99%   BMI 39.87 kg/m²     General:  alert, cooperative, well appearing, in no apparent distress. CV: The heart sounds are regular in rate and rhythm. Lungs: Inspiratory and expiratory efforts are full and unlabored. Lung sounds are clear and equal to auscultation throughout all lung fields. GI:  The abdomen is obese, soft and nontender. There is no rebound or guarding. There is no CVA or suprapubic tenderness. Extremities: There is no edema.      Results for orders placed or performed in visit on 01/26/22   AMB POC URINALYSIS DIP STICK AUTO W/ MICRO     Status: None   Result Value Ref Range Status    Color (UA POC) Yellow  Final    Clarity (UA POC) Clear  Final    Glucose (UA POC) Negative Negative Final    Bilirubin (UA POC) Negative Negative Final    Ketones (UA POC) Negative Negative Final    Specific gravity (UA POC) 1.025 1.001 - 1.035 Final    Blood (UA POC) 1+ Negative Final    pH (UA POC) 5.5 4.6 - 8.0 Final    Protein (UA POC) Negative Negative Final    Urobilinogen (UA POC) 0.2 mg/dL 0.2 - 1 Final    Nitrites (UA POC) Positive Negative Final    Leukocyte esterase (UA POC) Negative Negative Final   Results for orders placed or performed in visit on 07/20/21   AMB POC URINALYSIS DIP STICK AUTO W/O MICRO     Status: None   Result Value Ref Range Status    Color (UA POC) Liana  Final    Clarity (UA POC) Slightly Cloudy  Final    Glucose (UA POC) Negative Negative Final    Bilirubin (UA POC) 1+ Negative Final    Ketones (UA POC) Trace Negative Final    Specific gravity (UA POC) 1.025 1.001 - 1.035 Final    Blood (UA POC) 2+ Negative Final    pH (UA POC) 5.0 4.6 - 8.0 Final    Protein (UA POC) 1+ Negative Final    Urobilinogen (UA POC) 1 mg/dL 0.2 - 1 Final    Nitrites (UA POC) Positive Negative Final    Leukocyte esterase (UA POC) 1+ Negative Final     An electronic signature was used to authenticate this note. -- Joceline Yepez PA-C     Medical dictation software was used for portions of this report. Unintended voice recognition errors may occur.

## 2022-01-29 LAB — BACTERIA UR CULT: ABNORMAL

## 2022-02-01 NOTE — PROGRESS NOTES
[phone] ([place])  2 patient identifiers verified with Sakina Crowley (name/). Chemo was advised that her urine culture revealed e.coli and to completed nitrofurantoin. Per Chemo she is still having some lower abdominal pain and it was worse on .

## 2022-02-01 NOTE — PROGRESS NOTES
Please notify the patient regarding her urine culture result, which showed E. coli susceptible to nitrofurantoin, previously empirically prescribed.

## 2022-02-02 DIAGNOSIS — B96.20 E-COLI UTI: Primary | ICD-10-CM

## 2022-02-02 DIAGNOSIS — N39.0 RECURRENT UTI: ICD-10-CM

## 2022-02-02 DIAGNOSIS — N39.0 E-COLI UTI: Primary | ICD-10-CM

## 2022-02-02 RX ORDER — CIPROFLOXACIN 250 MG/1
250 TABLET, FILM COATED ORAL 2 TIMES DAILY
Qty: 6 TABLET | Refills: 0 | Status: SHIPPED | OUTPATIENT
Start: 2022-02-02 | End: 2022-02-05

## 2022-02-02 NOTE — PROGRESS NOTES
[phone] (home)  2 patient identifiers verified with Mynor Damico (name/) Ankita was advised that JOEY Gonzalez Sender has has e-scribed antibiotic Ciprofloxacin 250 ng to be taken twice a day for 3 days and been referred to Urology for history of e-coli in urine. Ankita voice understanding and also given urology contact information.

## 2022-02-02 NOTE — PROGRESS NOTES
History of recurrent UTI since 2013. [place] in urine culture. Empiric treatment with Macrobid, but symptoms persist.  Referral to urology placed for further assessment. E-prescription for Ciprofloxacin (250 mg BID x 3 days) sent to the pharmacy. Please notify the pt and advise to take antibiotics for 3 days as prescribed.

## 2022-02-03 DIAGNOSIS — E87.6 HYPOKALEMIA: ICD-10-CM

## 2022-02-03 DIAGNOSIS — M16.10 HIP ARTHRITIS: ICD-10-CM

## 2022-02-03 RX ORDER — INDAPAMIDE 2.5 MG/1
2.5 TABLET, FILM COATED ORAL DAILY
Qty: 30 TABLET | Refills: 6 | Status: SHIPPED | OUTPATIENT
Start: 2022-02-03 | End: 2022-04-06 | Stop reason: SDUPTHER

## 2022-02-03 RX ORDER — SACUBITRIL AND VALSARTAN 24; 26 MG/1; MG/1
TABLET, FILM COATED ORAL
Qty: 60 TABLET | Refills: 6 | Status: SHIPPED | OUTPATIENT
Start: 2022-02-03 | End: 2022-04-06 | Stop reason: SDUPTHER

## 2022-02-03 RX ORDER — CARVEDILOL 25 MG/1
TABLET ORAL
Qty: 60 TABLET | Refills: 6 | Status: SHIPPED | OUTPATIENT
Start: 2022-02-03 | End: 2022-04-06 | Stop reason: SDUPTHER

## 2022-02-03 RX ORDER — SPIRONOLACTONE 25 MG/1
TABLET ORAL
Qty: 30 TABLET | Refills: 6 | Status: SHIPPED | OUTPATIENT
Start: 2022-02-03 | End: 2022-04-06 | Stop reason: SDUPTHER

## 2022-02-03 RX ORDER — ATORVASTATIN CALCIUM 80 MG/1
TABLET, FILM COATED ORAL
Qty: 30 TABLET | Refills: 6 | Status: SHIPPED | OUTPATIENT
Start: 2022-02-03 | End: 2022-04-06 | Stop reason: SDUPTHER

## 2022-02-03 RX ORDER — POTASSIUM CHLORIDE 20 MEQ/1
20 TABLET, EXTENDED RELEASE ORAL 2 TIMES DAILY
Qty: 60 TABLET | Refills: 6 | Status: SHIPPED | OUTPATIENT
Start: 2022-02-03 | End: 2022-04-06 | Stop reason: SDUPTHER

## 2022-02-03 RX ORDER — FUROSEMIDE 40 MG/1
TABLET ORAL
Qty: 30 TABLET | Refills: 6 | Status: SHIPPED | OUTPATIENT
Start: 2022-02-03 | End: 2022-04-06 | Stop reason: SDUPTHER

## 2022-03-18 PROBLEM — Z95.810 S/P ICD (INTERNAL CARDIAC DEFIBRILLATOR) PROCEDURE: Status: ACTIVE | Noted: 2017-09-01

## 2022-03-18 PROBLEM — E05.00 TOXIC DIFFUSE GOITER WITHOUT CRISIS: Status: ACTIVE | Noted: 2019-02-07

## 2022-03-19 PROBLEM — I10 ESSENTIAL HYPERTENSION: Status: ACTIVE | Noted: 2017-06-27

## 2022-03-19 PROBLEM — I51.9 LV DYSFUNCTION: Status: ACTIVE | Noted: 2017-07-11

## 2022-03-19 PROBLEM — E66.01 OBESITY, MORBID (HCC): Status: ACTIVE | Noted: 2017-12-13

## 2022-03-19 PROBLEM — E04.2 NON-TOXIC MULTINODULAR GOITER: Status: ACTIVE | Noted: 2022-01-25

## 2022-03-19 PROBLEM — Z95.810 AICD (AUTOMATIC CARDIOVERTER/DEFIBRILLATOR) PRESENT: Status: ACTIVE | Noted: 2017-09-01

## 2022-03-19 PROBLEM — E87.70 FLUID OVERLOAD: Status: ACTIVE | Noted: 2017-03-21

## 2022-03-20 PROBLEM — M16.10 HIP ARTHRITIS: Status: ACTIVE | Noted: 2021-11-02

## 2022-03-20 PROBLEM — I27.20 PULMONARY HTN (HCC): Status: ACTIVE | Noted: 2019-03-04

## 2022-03-20 PROBLEM — I50.9 ACUTE ON CHRONIC CONGESTIVE HEART FAILURE (HCC): Status: ACTIVE | Noted: 2017-03-21

## 2022-04-06 ENCOUNTER — OFFICE VISIT (OUTPATIENT)
Dept: CARDIOLOGY CLINIC | Age: 52
End: 2022-04-06
Payer: MEDICARE

## 2022-04-06 ENCOUNTER — CLINICAL SUPPORT (OUTPATIENT)
Dept: CARDIOLOGY CLINIC | Age: 52
End: 2022-04-06

## 2022-04-06 VITALS
SYSTOLIC BLOOD PRESSURE: 120 MMHG | HEART RATE: 85 BPM | DIASTOLIC BLOOD PRESSURE: 82 MMHG | OXYGEN SATURATION: 97 % | WEIGHT: 259 LBS | BODY MASS INDEX: 41.62 KG/M2 | HEIGHT: 66 IN

## 2022-04-06 DIAGNOSIS — I42.0 DILATED CARDIOMYOPATHY (HCC): Primary | ICD-10-CM

## 2022-04-06 DIAGNOSIS — M16.10 HIP ARTHRITIS: ICD-10-CM

## 2022-04-06 DIAGNOSIS — E87.6 HYPOKALEMIA: ICD-10-CM

## 2022-04-06 DIAGNOSIS — Z95.810 AICD (AUTOMATIC CARDIOVERTER/DEFIBRILLATOR) PRESENT: ICD-10-CM

## 2022-04-06 PROCEDURE — 99214 OFFICE O/P EST MOD 30 MIN: CPT | Performed by: INTERNAL MEDICINE

## 2022-04-06 PROCEDURE — G8752 SYS BP LESS 140: HCPCS | Performed by: INTERNAL MEDICINE

## 2022-04-06 PROCEDURE — G8427 DOCREV CUR MEDS BY ELIG CLIN: HCPCS | Performed by: INTERNAL MEDICINE

## 2022-04-06 PROCEDURE — G8510 SCR DEP NEG, NO PLAN REQD: HCPCS | Performed by: INTERNAL MEDICINE

## 2022-04-06 PROCEDURE — G8754 DIAS BP LESS 90: HCPCS | Performed by: INTERNAL MEDICINE

## 2022-04-06 PROCEDURE — 3017F COLORECTAL CA SCREEN DOC REV: CPT | Performed by: INTERNAL MEDICINE

## 2022-04-06 PROCEDURE — 93283 PRGRMG EVAL IMPLANTABLE DFB: CPT | Performed by: INTERNAL MEDICINE

## 2022-04-06 PROCEDURE — G8417 CALC BMI ABV UP PARAM F/U: HCPCS | Performed by: INTERNAL MEDICINE

## 2022-04-06 RX ORDER — FUROSEMIDE 40 MG/1
TABLET ORAL
Qty: 30 TABLET | Refills: 6 | Status: SHIPPED | OUTPATIENT
Start: 2022-04-06

## 2022-04-06 RX ORDER — ASPIRIN 81 MG/1
81 TABLET ORAL DAILY
COMMUNITY
End: 2022-04-06 | Stop reason: SDUPTHER

## 2022-04-06 RX ORDER — POTASSIUM CHLORIDE 20 MEQ/1
20 TABLET, EXTENDED RELEASE ORAL 2 TIMES DAILY
Qty: 60 TABLET | Refills: 6 | Status: SHIPPED | OUTPATIENT
Start: 2022-04-06

## 2022-04-06 RX ORDER — ATORVASTATIN CALCIUM 80 MG/1
TABLET, FILM COATED ORAL
Qty: 30 TABLET | Refills: 6 | Status: SHIPPED | OUTPATIENT
Start: 2022-04-06

## 2022-04-06 RX ORDER — CARVEDILOL 25 MG/1
TABLET ORAL
Qty: 60 TABLET | Refills: 6 | Status: SHIPPED | OUTPATIENT
Start: 2022-04-06

## 2022-04-06 RX ORDER — ASPIRIN 81 MG/1
81 TABLET ORAL DAILY
Qty: 30 TABLET | Refills: 6 | Status: SHIPPED | OUTPATIENT
Start: 2022-04-06

## 2022-04-06 RX ORDER — NITROGLYCERIN 0.4 MG/1
0.4 TABLET SUBLINGUAL
Qty: 25 TABLET | Refills: 6 | Status: SHIPPED | OUTPATIENT
Start: 2022-04-06

## 2022-04-06 RX ORDER — SACUBITRIL AND VALSARTAN 24; 26 MG/1; MG/1
TABLET, FILM COATED ORAL
Qty: 60 TABLET | Refills: 6 | Status: SHIPPED | OUTPATIENT
Start: 2022-04-06

## 2022-04-06 RX ORDER — SPIRONOLACTONE 25 MG/1
TABLET ORAL
Qty: 30 TABLET | Refills: 6 | Status: SHIPPED | OUTPATIENT
Start: 2022-04-06

## 2022-04-06 RX ORDER — INDAPAMIDE 2.5 MG/1
2.5 TABLET, FILM COATED ORAL DAILY
Qty: 30 TABLET | Refills: 6 | Status: SHIPPED | OUTPATIENT
Start: 2022-04-06

## 2022-04-06 NOTE — PROGRESS NOTES
Jazz Coleman    Chief Complaint   Patient presents with    Follow-up     6 month follow up    Pacemaker Check     [employer]       HPI    Jazz Coleman is a 49 y.o. AAF with HFrEF, primary prevention ICD here for followup. She used to follow with my partner, Dr. Tabatha Barcenas for years. I have obtained his records. Ms. Eulogio Cannon is a pleasant 43-year-old [facility] American female with history of presentation in March 2017 to [facility] with acute on chronic systolic heart failure. She subsequently was treated and during that hospitalization was found to have an echocardiogram showing an ejection fraction of 15%.  She also had a pharmacologic myocardial perfusion defect at that time which demonstrated an ejection fraction estimated at 25% without reversible or fixed defects.  She was placed on Coreg, lisinopril, Aldactone, and Lasix and had a LifeVest placed on discharge from the hospital. Arpit Maurer has done quite well clinically, but her repeat echocardiogram completed on July 18, 2017 though better than her echo back in March 2017 still demonstrated a reduced ejection fraction in the 30% range.       She subsequently had a dual-chamber AICD placed on September 1, 2017 and has done reasonably well since that time, although she comes in today and relates that she really has not been feeling well for a few months.       She relates that she has been doing reasonably well recently. She did have some sciatica in her left leg for which she had an injection by orthopedics about 3 weeks ago which is helped a great deal.  She had gained some weight when she saw my nurse practitioner back on January 9, 2020, but has lost 11 pounds with adjustment of her medications and is feeling fine currently. She relates that although she still sleeps on 3 pillows she is not having any episodes of paroxysmal nocturnal dyspnea and he has no peripheral edema at all at this time.  She is only getting short of breath when she walks for long distances. Past Medical History:   Diagnosis Date    AICD (automatic cardioverter/defibrillator) present 09/2018    Asthma     Cardiac echocardiogram 03/22/2017    LVE. EF 15% (prev 50% on study of 12/10/14). Severe diffuse hypk. Indeterminate diastolic fx.  RVE. RVSP at least 44 mmHg. Mod LAE.  BISMARK. Mild-mod MR.  Cardiac nuclear imaging test 03/24/2017    High risk. Somewhat patchy uptake. No evidence of ischemia or infarction. No RWMA. Severe LVE. EF 25%. Neg EKG on pharm stress test.    Cardiovascular LLE venous duplex 03/06/2017    Left leg:  No DVT. Pulsatile flow.  Congestive heart failure (HCC)     Graves disease     Graves disease     Heart failure ([address]     Hypercholesterolemia     Hypertension     Hyperthyroidism        Past Surgical History:   Procedure Laterality Date    HX ANKLE FRACTURE TX Left 2012    Screws plates and rods    HX BREAST REDUCTION      HX HIP REPLACEMENT      HX HYSTERECTOMY  2009    hysterectomy  btl    HX PACEMAKER  2017    AICD    HX TONSILLECTOMY         Current Outpatient Medications   Medication Sig Dispense Refill    aspirin delayed-release 81 mg tablet Take 81 mg by mouth daily.  indapamide (LOZOL) 2.5 mg tablet Take 1 Tablet by mouth daily. in the morning 30 Tablet 6    rivaroxaban (Xarelto) 10 mg tablet Take 1 Tablet by mouth daily (with lunch). 30 Tablet 6    potassium chloride (K-DUR, KLOR-CON M20) 20 mEq tablet Take 1 Tablet by mouth two (2) times a day.  60 Tablet 6    furosemide (LASIX) 40 mg tablet Take 1 tablet by mouth once daily 30 Tablet 6    carvediloL (COREG) 25 mg tablet TAKE 1 TABLET BY MOUTH TWICE DAILY WITH MEALS 60 Tablet 6    sacubitriL-valsartan (Entresto) 24-26 mg tablet Take 1 tablet by mouth twice daily 60 Tablet 6    atorvastatin (LIPITOR) 80 mg tablet TAKE 1 TABLET BY MOUTH ONCE DAILY AT NIGHT 30 Tablet 6    spironolactone (ALDACTONE) 25 mg tablet Take 1 tablet by mouth once daily 30 Tablet 6    Stool Softener 100 mg capsule Take 1 Capsule by mouth daily as needed.  acetaminophen (TYLENOL) 500 mg tablet Take 500 mg by mouth every six (6) hours as needed for Pain.  albuterol (PROVENTIL HFA, VENTOLIN HFA, PROAIR HFA) 90 mcg/actuation inhaler Take 2 Puffs by inhalation every six (6) hours as needed for Wheezing. 18 g 1    nitroglycerin (NITROSTAT) 0.4 mg SL tablet 1 Tab by SubLINGual route every five (5) minutes as needed for Chest Pain. 1 Bottle 1       No Known Allergies    Social History     Socioeconomic History    Marital status: LEGALLY      Spouse name: Not on file    Number of children: Not on file    Years of education: Not on file    Highest education level: Not on file   Occupational History    Not on file   Tobacco Use    Smoking status: Former Smoker     Packs/day: 0.00     Years: 18.00     Pack years: 0.00     Quit date: 2021     Years since quittin.4    Smokeless tobacco: Never Used    Tobacco comment: 2-3 cigs /day   Vaping Use    Vaping Use: Never used   Substance and Sexual Activity    Alcohol use: Yes     Comment: socially     Drug use: Never    Sexual activity: Yes     Partners: Male     Birth control/protection: Condom   Other Topics Concern    Not on file   Social History Narrative    Not on file     Social Determinants of Health     Financial Resource Strain:     Difficulty of Paying Living Expenses: Not on file   Food Insecurity:     Worried About [address] in the Last Year: Not on file    [address] in the Last Year: Not on file   Transportation Needs:     Lack of Transportation (Medical): Not on file    Lack of Transportation (Non-Medical):  Not on file   Physical Activity:     Days of Exercise per Week: Not on file    Minutes of Exercise per Session: Not on file   Stress:     Feeling of Stress : Not on file   Social Connections:     Frequency of Communication with Friends and Family: Not on file    Frequency of Social Gatherings with Friends and Family: Not on file    Attends Tenri[employer] Services: Not on file    Active Member of Clubs or Organizations: Not on file    Attends Club or Organization Meetings: Not on file    Marital Status: Not on file   Intimate Partner Violence:     Fear of Current or Ex-Partner: Not on file    Emotionally Abused: Not on file    Physically Abused: Not on file    Sexually Abused: Not on file   Housing Stability:     Unable to Pay for Housing in the Last Year: Not on file    Number of [place] in the Last Year: Not on file    Unstable Housing in the Last Year: Not on file    prior nursing experience    FH: unknown    Review of Systems    14 pt Review of Systems is negative unless otherwise mentioned in the HPI. Wt Readings from Last 3 Encounters:   04/06/22 117.5 kg (259 lb)   01/26/22 112 kg (247 lb)   12/28/21 113.4 kg (250 lb)     Temp Readings from Last 3 Encounters:   01/26/22 98 °F (36.7 °C) (Temporal)   12/28/21 (!) 95.7 °F (35.4 °C) (Temporal)   12/10/21 98.6 °F (37 °C) (Temporal)     BP Readings from Last 3 Encounters:   04/06/22 120/82   01/26/22 122/70   11/12/21 98/64     Pulse Readings from Last 3 Encounters:   04/06/22 85   01/26/22 91   12/28/21 90       Physical Exam:    Visit Vitals  /82 (BP 1 Location: Right arm, BP Patient Position: Sitting, BP Cuff Size: Large adult)   Pulse 85   Ht 5' 6\" (1.676 m)   Wt 117.5 kg (259 lb)   LMP  (LMP Unknown) Comment: 2009   SpO2 97%   BMI 41.80 kg/m²      Physical Exam  HENT:      Head: Normocephalic and atraumatic. Eyes:      Pupils: Pupils are equal, round, and reactive to light. Cardiovascular:      Rate and Rhythm: Normal rate and regular rhythm. Heart sounds: Normal heart sounds. No murmur heard. No friction rub. No gallop. Pulmonary:      Effort: Pulmonary effort is normal. No respiratory distress. Breath sounds: Normal breath sounds. No wheezing or rales. Chest:      Chest wall: No tenderness. Abdominal:      General: Bowel sounds are normal.      Palpations: Abdomen is soft. Musculoskeletal:         General: No tenderness. Skin:     General: Skin is warm and dry. Neurological:      Mental Status: She is alert and oriented to person, place, and time. Device check    Lab Results   Component Value Date/Time    Cholesterol, total 114 01/26/2019 09:09 AM    HDL Cholesterol 33 (L) 01/26/2019 09:09 AM    LDL, calculated 62 01/26/2019 09:09 AM    VLDL, calculated 19 01/26/2019 09:09 AM    Triglyceride 94 01/26/2019 09:09 AM    CHOL/HDL Ratio 3.0 03/21/2017 03:24 PM       Impression and Plan:  Joaquina Thompson is a 49 y.o. with:    1.) chronic systolic HFrEF ~36-02%, ACC stage C/ NYHA class II-II (assume nonischemic)  2.) Primary prevention ICD  3.) H/o neg nuc  4.) HTN  5.) AFib, on Xarelto  6.) Thyroid disease    She has no CV complaints   Did well s/p hip sx  RTC 6 months with device check  Rxs    Thank you for allowing me to participate in the care of your patient, please do not hesitate to call with questions or concerns.     [address],    Richie Moore, DO

## 2022-04-06 NOTE — PROGRESS NOTES
Joaquina Thompson presents today for   Chief Complaint   Patient presents with    Follow-up     6 month follow up   [address] [facility] [place] preferred language for health care discussion is english/other. Is someone accompanying this pt? no    Is the patient using any DME equipment during [address]? no    Depression Screening:  3 most recent PHQ Screens 4/6/2022   Little interest or pleasure in doing things Not at all   Feeling down, depressed, irritable, or hopeless Not at all   Total Score PHQ 2 0       Learning Assessment:  Learning Assessment 4/6/2022   PRIMARY LEARNER Patient   HIGHEST LEVEL OF EDUCATION - PRIMARY LEARNER  -   BARRIERS PRIMARY LEARNER -   CO-LEARNER CAREGIVER -   PRIMARY LANGUAGE ENGLISH   LEARNER PREFERENCE PRIMARY DEMONSTRATION   ANSWERED BY patient   RELATIONSHIP SELF       Abuse Screening:  Abuse Screening Questionnaire 4/6/2022   Do you ever feel afraid of your partner? N   Are you in a relationship with someone who physically or mentally threatens you? N   Is it safe for you to go home? Y       Fall Risk  Fall Risk Assessment, last 12 mths 10/18/2021   Able to walk? Yes   Fall in past 12 months? 0   Do you feel unsteady? 0   Are you worried about falling 0           Pt currently taking Anticoagulant therapy? Xarelto 10 mg once a day    Pt currently taking Antiplatelet therapy ? ASA 81 mg once a day      Coordination of Care:  1. Have you been to the ER, urgent care clinic since your last visit? Hospitalized since your last visit? no    2. Have you seen or consulted any other health care providers outside of the [address] [place] [address] since your last visit? Include any pap smears or colon screening.  no

## 2022-04-07 ENCOUNTER — OFFICE VISIT (OUTPATIENT)
Dept: ORTHOPEDIC SURGERY | Age: 52
End: 2022-04-07
Payer: MEDICARE

## 2022-04-07 DIAGNOSIS — M25.552 LEFT HIP PAIN: ICD-10-CM

## 2022-04-07 DIAGNOSIS — Z96.642 S/P HIP REPLACEMENT, LEFT: Primary | ICD-10-CM

## 2022-04-07 PROCEDURE — G8432 DEP SCR NOT DOC, RNG: HCPCS | Performed by: PHYSICIAN ASSISTANT

## 2022-04-07 PROCEDURE — G8756 NO BP MEASURE DOC: HCPCS | Performed by: PHYSICIAN ASSISTANT

## 2022-04-07 PROCEDURE — G8427 DOCREV CUR MEDS BY ELIG CLIN: HCPCS | Performed by: PHYSICIAN ASSISTANT

## 2022-04-07 PROCEDURE — G8417 CALC BMI ABV UP PARAM F/U: HCPCS | Performed by: PHYSICIAN ASSISTANT

## 2022-04-07 PROCEDURE — 99213 OFFICE O/P EST LOW 20 MIN: CPT | Performed by: PHYSICIAN ASSISTANT

## 2022-04-07 PROCEDURE — 3017F COLORECTAL CA SCREEN DOC REV: CPT | Performed by: PHYSICIAN ASSISTANT

## 2022-04-07 NOTE — PROGRESS NOTES
[address] [place] [address]  [phone]           Patient: Topher De La Rosa                MRN: [id number]       SSN: [id number]  YOB: 1970        AGE: 49 y.o. SEX: female  There is no height or weight on file to calculate BMI. PCP: Eduarda Storey PA-C  04/07/22      This office note has been dictated. REVIEW OF SYSTEMS:  Constitutional: Negative for fever, chills, weight loss and malaise/fatigue. HENT: Negative. Eyes: Negative. Respiratory: Negative. Cardiovascular: Negative. Gastrointestinal: No bowel incontinence or constipation. Genitourinary: No bladder incontinence or saddle anesthesia. Skin: Negative. Neurological: Negative. Endo/Heme/Allergies: Negative. Psychiatric/Behavioral: Negative. Musculoskeletal: As per HPI above. Past Medical History:   Diagnosis Date    AICD (automatic cardioverter/defibrillator) present 09/2018    Asthma     Cardiac echocardiogram 03/22/2017    LVE. EF 15% (prev 50% on study of 12/10/14). Severe diffuse hypk. Indeterminate diastolic fx.  RVE. RVSP at least 44 mmHg. Mod LAE.  BISMARK. Mild-mod MR.  Cardiac nuclear imaging test 03/24/2017    High risk. Somewhat patchy uptake. No evidence of ischemia or infarction. No RWMA. Severe LVE. EF 25%. Neg EKG on pharm stress test.    Cardiovascular LLE venous duplex 03/06/2017    Left leg:  No DVT. Pulsatile flow.  Congestive heart failure (HCC)     Graves disease     Graves disease     Heart failure ([facility] [address]     Hypercholesterolemia     Hypertension     Hyperthyroidism          Current Outpatient Medications:     aspirin delayed-release 81 mg tablet, Take 1 Tablet by mouth daily. , Disp: 30 Tablet, Rfl: 6    indapamide (LOZOL) 2.5 mg tablet, Take 1 Tablet by mouth daily.  in the morning, Disp: 30 Tablet, Rfl: 6    rivaroxaban (Xarelto) 10 mg tablet, Take 1 Tablet by mouth daily (with lunch). , Disp: 30 Tablet, Rfl: 6    potassium chloride (K-DUR, KLOR-CON M20) 20 mEq tablet, Take 1 Tablet by mouth two (2) times a day., Disp: 60 Tablet, Rfl: 6    furosemide (LASIX) 40 mg tablet, Take 1 tablet by mouth once daily, Disp: 30 Tablet, Rfl: 6    carvediloL (COREG) 25 mg tablet, TAKE 1 TABLET BY MOUTH TWICE DAILY WITH MEALS, Disp: 60 Tablet, Rfl: 6    sacubitriL-valsartan (Entresto) 24-26 mg tablet, Take 1 tablet by mouth twice daily, Disp: 60 Tablet, Rfl: 6    atorvastatin (LIPITOR) 80 mg tablet, TAKE 1 TABLET BY MOUTH ONCE DAILY AT NIGHT, Disp: 30 Tablet, Rfl: 6    spironolactone (ALDACTONE) 25 mg tablet, Take 1 tablet by mouth once daily, Disp: 30 Tablet, Rfl: 6    nitroglycerin (NITROSTAT) 0.4 mg SL tablet, 1 Tablet by SubLINGual route every five (5) minutes as needed for Chest Pain., Disp: 25 Tablet, Rfl: 6    Stool Softener 100 mg capsule, Take 1 Capsule by mouth daily as needed. , Disp: , Rfl:     acetaminophen (TYLENOL) 500 mg tablet, Take 500 mg by mouth every six (6) hours as needed for Pain., Disp: , Rfl:     albuterol (PROVENTIL HFA, VENTOLIN HFA, PROAIR HFA) 90 mcg/actuation inhaler, Take 2 Puffs by inhalation every six (6) hours as needed for Wheezing., Disp: 18 g, Rfl: 1    No Known Allergies    Social History     Socioeconomic History    Marital status: LEGALLY      Spouse name: Not on file    Number of children: Not on file    Years of education: Not on file    Highest education level: Not on file   Occupational History    Not on file   Tobacco Use    Smoking status: Former Smoker     Packs/day: 0.00     Years: 18.00     Pack years: 0.00     Quit date: 2021     Years since quittin.4    Smokeless tobacco: Never Used    Tobacco comment: 2-3 cigs /day   Vaping Use    Vaping Use: Never used   Substance and Sexual Activity    Alcohol use: Yes     Comment: socially     Drug use: Never    Sexual activity: Yes     Partners: Male     Birth control/protection: Condom   Other Topics Concern    Not on file   Social History Narrative    Not on file     Social Determinants of Health     Financial Resource Strain:     Difficulty of Paying Living Expenses: Not on file   Food Insecurity:     Worried About Running Out of Food in the Last Year: Not on file    Marlyn of Food in the Last Year: Not on file   Transportation Needs:     Lack of Transportation (Medical): Not on file    Lack of Transportation (Non-Medical): Not on file   Physical Activity:     Days of Exercise per Week: Not on file    Minutes of Exercise per Session: Not on file   Stress:     Feeling of Stress : Not on file   Social Connections:     Frequency of Communication with Friends and Family: Not on file    Frequency of Social Gatherings with Friends and Family: Not on file    Attends Alevism Services: Not on file    Active Member of [address] [place] [address] [employer] or Organizations: Not on file    Attends Club or Organization Meetings: Not on file    Marital Status: Not on file   Intimate Partner Violence:     Fear of Current or Ex-Partner: Not on file    Emotionally Abused: Not on file    Physically Abused: Not on file    Sexually Abused: Not on file   Housing Stability:     Unable to Pay for Housing in the Last Year: Not on file    Number of [place] in the Last Year: Not on file    Unstable Housing in the Last Year: Not on file       Past Surgical History:   Procedure Laterality Date    HX ANKLE FRACTURE TX Left 2012    Screws plates and rods    HX BREAST REDUCTION      HX HIP REPLACEMENT      HX HYSTERECTOMY  2009    hysterectomy  btl    HX PACEMAKER  2017    AICD    HX TONSILLECTOMY         Patient seen evaluated today for her left total hip replacement. She is now proximate 5-month status post surgery and overall she is doing very well. She is very pleased with the results of the hip replacement. She has no pain.   She does continue with a home exercise program.  She has had no recent injuries or falls to report. Patient denies recent fevers, chills, chest pain, SOB, or injuries. No recent systemic changes noted. A 12-point review of systems is performed today. Pertinent positives are noted. All other systems reviewed and otherwise are negative. Physical exam: General: Alert and oriented x3, nad.  well-developed, well nourished. normal affect, AF. NC/AT, EOMI, neck supple, trachea midline, no JVD present. Breathing is non-labored. Examination of the lower extremities reveals pain-free range of motion of the hips. There is no pain to palpation the trochanter bursa. The surgical in the left hip is healed nicely. There is no erythema ecchymosis noted. There are no signs of infection or cellulitis present. Leg lengths are perfect. Abduction strength is 5 - on the left and 5 out of 5 on the right. Assessment: Status post left total hip placement    Plan: At this point, the patient will continue with a home exercise program.  She is instructed on abduction exercises a couple times a week. We will see her back at the anniversary of her hip replacement. She will call if any questions or concerns arise.                 JR Bro MAGALLANES, JOEY, ATC

## 2022-04-11 NOTE — PROGRESS NOTES
I have personally seen and evaluated the device findings. Interrogation reviewed and I agree with assessment.     Lissette Rubin

## 2022-04-13 LAB — CREATININE, EXTERNAL: 0.83

## 2022-06-01 ENCOUNTER — OFFICE VISIT (OUTPATIENT)
Dept: FAMILY MEDICINE CLINIC | Age: 52
End: 2022-06-01
Payer: MEDICARE

## 2022-06-01 VITALS
SYSTOLIC BLOOD PRESSURE: 119 MMHG | DIASTOLIC BLOOD PRESSURE: 82 MMHG | RESPIRATION RATE: 16 BRPM | BODY MASS INDEX: 40.18 KG/M2 | WEIGHT: 256 LBS | OXYGEN SATURATION: 97 % | HEIGHT: 67 IN | TEMPERATURE: 96.9 F | HEART RATE: 116 BPM

## 2022-06-01 DIAGNOSIS — I10 ESSENTIAL HYPERTENSION: Primary | ICD-10-CM

## 2022-06-01 DIAGNOSIS — Z12.11 SCREENING FOR COLON CANCER: ICD-10-CM

## 2022-06-01 DIAGNOSIS — Z00.00 ENCOUNTER FOR MEDICAL EXAMINATION TO ESTABLISH CARE: ICD-10-CM

## 2022-06-01 DIAGNOSIS — E05.90 HYPERTHYROIDISM: ICD-10-CM

## 2022-06-01 DIAGNOSIS — I48.91 ATRIAL FIBRILLATION, UNSPECIFIED TYPE (HCC): ICD-10-CM

## 2022-06-01 DIAGNOSIS — Z95.810 AICD (AUTOMATIC CARDIOVERTER/DEFIBRILLATOR) PRESENT: ICD-10-CM

## 2022-06-01 DIAGNOSIS — I50.22 SYSTOLIC CHF, CHRONIC (HCC): ICD-10-CM

## 2022-06-01 DIAGNOSIS — R73.03 PREDIABETES: ICD-10-CM

## 2022-06-01 LAB — HBA1C MFR BLD HPLC: 6 %

## 2022-06-01 PROCEDURE — G8417 CALC BMI ABV UP PARAM F/U: HCPCS | Performed by: STUDENT IN AN ORGANIZED HEALTH CARE EDUCATION/TRAINING PROGRAM

## 2022-06-01 PROCEDURE — 3017F COLORECTAL CA SCREEN DOC REV: CPT | Performed by: STUDENT IN AN ORGANIZED HEALTH CARE EDUCATION/TRAINING PROGRAM

## 2022-06-01 PROCEDURE — G8752 SYS BP LESS 140: HCPCS | Performed by: STUDENT IN AN ORGANIZED HEALTH CARE EDUCATION/TRAINING PROGRAM

## 2022-06-01 PROCEDURE — 99203 OFFICE O/P NEW LOW 30 MIN: CPT | Performed by: STUDENT IN AN ORGANIZED HEALTH CARE EDUCATION/TRAINING PROGRAM

## 2022-06-01 PROCEDURE — G8510 SCR DEP NEG, NO PLAN REQD: HCPCS | Performed by: STUDENT IN AN ORGANIZED HEALTH CARE EDUCATION/TRAINING PROGRAM

## 2022-06-01 PROCEDURE — G8427 DOCREV CUR MEDS BY ELIG CLIN: HCPCS | Performed by: STUDENT IN AN ORGANIZED HEALTH CARE EDUCATION/TRAINING PROGRAM

## 2022-06-01 PROCEDURE — G8754 DIAS BP LESS 90: HCPCS | Performed by: STUDENT IN AN ORGANIZED HEALTH CARE EDUCATION/TRAINING PROGRAM

## 2022-06-01 PROCEDURE — 83036 HEMOGLOBIN GLYCOSYLATED A1C: CPT | Performed by: STUDENT IN AN ORGANIZED HEALTH CARE EDUCATION/TRAINING PROGRAM

## 2022-06-01 NOTE — PROGRESS NOTES
Chief Complaint   Patient presents with   [facility]     1. \"Have you been to the ER, urgent care clinic since your last visit? Hospitalized since your last visit? \" No    2. \"Have you seen or consulted any other health care providers outside of the [address] [place] [address] since your last visit? \" Yes     3. For patients aged 39-70: Has the patient had a colonoscopy / FIT/ Cologuard? No      If the patient is female:    4. For patients aged 41-77: Has the patient had a mammogram within the past 2 years? Yes - Care Gap present. Most recent result on file      5. For patients aged 21-65: Has the patient had a pap smear? Yes - Care Gap present.  Most recent result on file

## 2022-06-01 NOTE — PROGRESS NOTES
Salvador Orr is a 49 y.o. female presenting today for Establish Care  . Chief Complaint   Patient presents with    Establish Care       HPI:  Salvador Orr presents to the office today to establish care. Patient has a past medical history significant for HTN, HLD, prediabetes, obesity, HFrEF with ICD, Afib, Hyperthyroidism, s/p LT hip replacement. Graves disease: Patient follows with Dr. Moises Fernandez. She is on methimazole. Its that she had blood work done less than a month ago. HFrEF: Patient presented to [facility] 2017 with acute on chronic systolic heart failure. She was found to have EF 15% at the time. She was placed on Coreg, lisinopril, Aldactone and Lasix with a LifeVest.  Repeat echo in July 2017 still demonstrated reduced EF at 30%. She had a dual-chamber AICD placed in September 2017. Currently, patient is doing well. She denies any PND, shortness of breath or swelling. Regularly follows with her cardiologist: Dr. Eleonora Vaughn. RAY. fib: Patient is on Xarelto. HTN: BP is well controlled    HLD: Patient is on Lipitor 80 mg daily. Last lipid panel in 2019 showed LDL 62, HDL 33 and triglycerides 94. Pre-Diabetes: HbA1c today is 6%. Healthcare maintenance:  Patient is due for mammogram and colon cancer screening. Review of Systems   Constitutional: Negative for chills, diaphoresis, fever, malaise/fatigue and weight loss. HENT: Negative for congestion, ear discharge, ear pain, hearing loss, nosebleeds, sinus pain, sore throat and tinnitus. Eyes: Negative for blurred vision, double vision and photophobia. Respiratory: Negative for cough, sputum production, shortness of breath, wheezing and stridor. Cardiovascular: Positive for orthopnea. Negative for chest pain, palpitations, claudication and leg swelling. Gastrointestinal: Negative for abdominal pain, constipation, diarrhea, heartburn, nausea and vomiting.    Genitourinary: Negative for dysuria, flank pain, frequency, hematuria and urgency. Musculoskeletal: Positive for joint pain. Negative for back pain, myalgias and neck pain. Skin: Negative for rash. Neurological: Negative for tingling, tremors, sensory change, speech change, focal weakness, seizures, weakness and headaches. Psychiatric/Behavioral: Negative for depression. The patient is not nervous/anxious. All other systems reviewed and are negative. No Known Allergies    PHQ Screening   3 most recent PHQ Screens 6/1/2022   Little interest or pleasure in doing things Not at all   Feeling down, depressed, irritable, or hopeless Not at all   Total Score PHQ 2 0       History  Past Medical History:   Diagnosis Date    AICD (automatic cardioverter/defibrillator) present 09/2018    Asthma     Cardiac echocardiogram 03/22/2017    LVE. EF 15% (prev 50% on study of 12/10/14). Severe diffuse hypk. Indeterminate diastolic fx.  RVE. RVSP at least 44 mmHg. Mod LAE.  BISMARK. Mild-mod MR.  Cardiac nuclear imaging test 03/24/2017    High risk. Somewhat patchy uptake. No evidence of ischemia or infarction. No RWMA. Severe LVE. EF 25%. Neg EKG on pharm stress test.    Cardiovascular LLE venous duplex 03/06/2017    Left leg:  No DVT. Pulsatile flow.     Congestive heart failure (HCC)     Graves disease     Graves disease     Heart failure ([facility] [address]     Hypercholesterolemia     Hypertension     Hyperthyroidism        Past Surgical History:   Procedure Laterality Date    HX ANKLE FRACTURE TX Left 2012    Screws plates and rods    HX BREAST REDUCTION      HX HIP REPLACEMENT      HX HYSTERECTOMY  2009    hysterectomy  btl    HX PACEMAKER  2017    AICD    HX TONSILLECTOMY         Social History     Socioeconomic History    Marital status: LEGALLY      Spouse name: Not on file    Number of children: Not on file    Years of education: Not on file    Highest education level: Not on file   Occupational History    Not on file   Tobacco Use    Smoking status: Former Smoker     Packs/day: 0.00     Years: 18.00     Pack years: 0.00     Quit date: 2021     Years since quittin.5    Smokeless tobacco: Never Used    Tobacco comment: 2-3 cigs /day   Vaping Use    Vaping Use: Never used   Substance and Sexual Activity    Alcohol use: Yes     Comment: socially     Drug use: Never    Sexual activity: Yes     Partners: Male     Birth control/protection: Condom   Other Topics Concern    Not on file   Social History Narrative    Not on file     Social Determinants of Health     Financial Resource Strain:     Difficulty of Paying Living Expenses: Not on file   Food Insecurity:     Worried About Running Out of Food in the Last Year: Not on file    Marlyn of Food in the Last Year: Not on file   Transportation Needs:     Lack of Transportation (Medical): Not on file    Lack of Transportation (Non-Medical): Not on file   Physical Activity:     Days of Exercise per Week: Not on file    Minutes of Exercise per Session: Not on file   Stress:     Feeling of Stress : Not on file   Social Connections:     Frequency of Communication with Friends and Family: Not on file    Frequency of Social Gatherings with Friends and Family: Not on file    Attends Jainism Services: Not on file    Active Member of [address] [place] [address] [employer] or Organizations: Not on file    Attends Club or Organization Meetings: Not on file    Marital Status: Not on file   Intimate Partner Violence:     Fear of Current or Ex-Partner: Not on file    Emotionally Abused: Not on file    Physically Abused: Not on file    Sexually Abused: Not on file   Housing Stability:     Unable to Pay for Housing in the Last Year: Not on file    Number of [place] in the Last Year: Not on file    Unstable Housing in the Last Year: Not on file       Current Outpatient Medications   Medication Sig Dispense Refill    aspirin delayed-release 81 mg tablet Take 1 Tablet by mouth daily.  30 Tablet 6    indapamide (LOZOL) 2.5 mg tablet Take 1 Tablet by mouth daily. in the morning 30 Tablet 6    rivaroxaban (Xarelto) 10 mg tablet Take 1 Tablet by mouth daily (with lunch). 30 Tablet 6    potassium chloride (K-DUR, KLOR-CON M20) 20 mEq tablet Take 1 Tablet by mouth two (2) times a day. 60 Tablet 6    furosemide (LASIX) 40 mg tablet Take 1 tablet by mouth once daily 30 Tablet 6    carvediloL (COREG) 25 mg tablet TAKE 1 TABLET BY MOUTH TWICE DAILY WITH MEALS 60 Tablet 6    sacubitriL-valsartan (Entresto) 24-26 mg tablet Take 1 tablet by mouth twice daily 60 Tablet 6    atorvastatin (LIPITOR) 80 mg tablet TAKE 1 TABLET BY MOUTH ONCE DAILY AT NIGHT 30 Tablet 6    spironolactone (ALDACTONE) 25 mg tablet Take 1 tablet by mouth once daily 30 Tablet 6    nitroglycerin (NITROSTAT) 0.4 mg SL tablet 1 Tablet by SubLINGual route every five (5) minutes as needed for Chest Pain. 25 Tablet 6    Stool Softener 100 mg capsule Take 1 Capsule by mouth daily as needed.  acetaminophen (TYLENOL) 500 mg tablet Take 500 mg by mouth every six (6) hours as needed for Pain.  albuterol (PROVENTIL HFA, VENTOLIN HFA, PROAIR HFA) 90 mcg/actuation inhaler Take 2 Puffs by inhalation every six (6) hours as needed for Wheezing. 18 g 1         Vitals:    06/01/22 1022   BP: 119/82   Pulse: (!) 116   Resp: 16   Temp: 96.9 °F (36.1 °C)   TempSrc: Temporal   SpO2: 97%   Weight: 256 lb (116.1 kg)   Height: 5' 7\" (1.702 m)   PainSc:   0 - No pain       Physical Exam  Vitals and nursing note reviewed. Constitutional:       General: She is not in acute distress. Appearance: Normal appearance. She is not ill-appearing, toxic-appearing or diaphoretic. HENT:      Head: Normocephalic and atraumatic. Eyes:      General: No scleral icterus. Extraocular Movements: Extraocular movements intact. Conjunctiva/sclera: Conjunctivae normal.      Pupils: Pupils are equal, round, and reactive to light.    Cardiovascular:      Rate and Rhythm: Normal rate and regular rhythm. Pulses: Normal pulses. Heart sounds: No murmur heard. Pulmonary:      Effort: Pulmonary effort is normal. No respiratory distress. Breath sounds: Normal breath sounds. No wheezing or rales. Abdominal:      General: Bowel sounds are normal. There is no distension. Palpations: Abdomen is soft. Tenderness: There is no abdominal tenderness. There is no guarding. Musculoskeletal:         General: Normal range of motion. Cervical back: Normal range of motion. Right lower leg: No edema. Left lower leg: No edema. Skin:     General: Skin is warm and dry. Coloration: Skin is not jaundiced or pale. Neurological:      General: No focal deficit present. Mental Status: She is alert and oriented to person, place, and time. Mental status is at baseline. Cranial Nerves: No cranial nerve deficit. Motor: No weakness. Gait: Gait normal.   Psychiatric:         Mood and Affect: Mood normal.         Behavior: Behavior normal.         Thought Content: Thought content normal.         Judgment: Judgment normal.         Office Visit on 06/01/2022   Component Date Value Ref Range Status    Hemoglobin A1c (POC) 06/01/2022 6.0  % Final       Results for orders placed or performed in visit on 06/01/22   AMB POC HEMOGLOBIN A1C   Result Value Ref Range    Hemoglobin A1c (POC) 6.0 %       Patient Care Team:  Patient Care Team:  Lynda Haley MD as PCP - General (Internal Medicine Physician)  Matheus Alston NP (Nurse Practitioner)  Riaz Herrera MD (Endocrinology Physician)  Marquita Gordillo DO (Cardiovascular Disease Physician)  Mandeep Severino MD (Orthopedic Surgery)      Assessment / Plan:      ICD-10-CM ICD-9-CM    1. Essential hypertension  I10 401.9    2. Prediabetes  R73.03 790.29 AMB POC HEMOGLOBIN A1C   3. AICD (automatic cardioverter/defibrillator) present  Z95.810 V45.02    4.  Systolic CHF, chronic (HCC)  I50.22 428.22 428.0    5. Atrial fibrillation, unspecified type ([facility] [address]  I48.91 427.31    6. Body mass index 40.0-44.9, adult (HCC)  Z68.41 V85.41    7. Screening for colon cancer  Z12.11 V76.51 REFERRAL TO GASTROENTEROLOGY   8. Hyperthyroidism  E05.90 242.90    9. Encounter for medical examination to establish care  Z00.00 V70.9      Patient presents to establish care. Reviewed prior record. Hyperthyroidism: Patient is following with endocrinology. She states that she had complete blood work done with Dr. Gudelia Krause less than a month ago. Will request records. HFrEF: Patient is following with cardiology. No signs of decompensated heart failure at this visit. Continue aspirin, Lasix, Entresto, Aldactone, coreg    HTN: BP is well controlled on current medications. HLD: Well-controlled on Lipitor 80 mg daily. Due for repeat lipid panel. She states that this was checked by her endocrinologist.  Will review. Prediabetes: HbA1c is 6%. Continue to monitor. Counseled on diet and exercise. A. fib: Continue Xarelto and Coreg. Healthcare maintenance:  Due for a mammogram.  Referred to GI for colon cancer screening. Advised to get COVID-19 vaccine booster. Next visit: Review labs, PCV vaccine. Follow-up and Dispositions    · Return in about 4 months (around 10/1/2022) for Routine F/u, 15 mins. I asked the patient if she  had any questions and answered her  questions. The patient stated that she understands the treatment plan and agrees with the treatment plan    This document was created with a voice activated dictation system and may contain transcription errors.

## 2022-07-04 ENCOUNTER — APPOINTMENT (OUTPATIENT)
Dept: CT IMAGING | Age: 52
End: 2022-07-04
Attending: STUDENT IN AN ORGANIZED HEALTH CARE EDUCATION/TRAINING PROGRAM
Payer: MEDICARE

## 2022-07-04 ENCOUNTER — HOSPITAL ENCOUNTER (EMERGENCY)
Age: 52
Discharge: HOME HEALTH | End: 2022-07-05
Attending: STUDENT IN AN ORGANIZED HEALTH CARE EDUCATION/TRAINING PROGRAM
Payer: MEDICARE

## 2022-07-04 VITALS
RESPIRATION RATE: 24 BRPM | WEIGHT: 247 LBS | BODY MASS INDEX: 39.7 KG/M2 | SYSTOLIC BLOOD PRESSURE: 125 MMHG | HEIGHT: 66 IN | DIASTOLIC BLOOD PRESSURE: 103 MMHG | HEART RATE: 120 BPM | OXYGEN SATURATION: 99 % | TEMPERATURE: 98.2 F

## 2022-07-04 DIAGNOSIS — K61.0 PERIANAL ABSCESS: Primary | ICD-10-CM

## 2022-07-04 LAB
ALBUMIN SERPL-MCNC: 3.7 G/DL (ref 3.4–5)
ALBUMIN/GLOB SERPL: 0.8 {RATIO} (ref 0.8–1.7)
ALP SERPL-CCNC: 155 U/L (ref 45–117)
ALT SERPL-CCNC: 16 U/L (ref 13–56)
ANION GAP SERPL CALC-SCNC: 6 MMOL/L (ref 3–18)
AST SERPL-CCNC: 13 U/L (ref 10–38)
BASOPHILS # BLD: 0.1 K/UL (ref 0–0.1)
BASOPHILS NFR BLD: 0 % (ref 0–2)
BILIRUB SERPL-MCNC: 0.5 MG/DL (ref 0.2–1)
BUN SERPL-MCNC: 21 MG/DL (ref 7–18)
BUN/CREAT SERPL: 20 (ref 12–20)
CALCIUM SERPL-MCNC: 9.3 MG/DL (ref 8.5–10.1)
CHLORIDE SERPL-SCNC: 103 MMOL/L (ref 100–111)
CO2 SERPL-SCNC: 27 MMOL/L (ref 21–32)
CREAT SERPL-MCNC: 1.05 MG/DL (ref 0.6–1.3)
DIFFERENTIAL METHOD BLD: ABNORMAL
EOSINOPHIL # BLD: 0.1 K/UL (ref 0–0.4)
EOSINOPHIL NFR BLD: 1 % (ref 0–5)
ERYTHROCYTE [DISTWIDTH] IN BLOOD BY AUTOMATED COUNT: 13.9 % (ref 11.6–14.5)
GLOBULIN SER CALC-MCNC: 4.9 G/DL (ref 2–4)
GLUCOSE SERPL-MCNC: 120 MG/DL (ref 74–99)
HCT VFR BLD AUTO: 37.7 % (ref 35–45)
HGB BLD-MCNC: 12.8 G/DL (ref 12–16)
IMM GRANULOCYTES # BLD AUTO: 0.1 K/UL (ref 0–0.04)
IMM GRANULOCYTES NFR BLD AUTO: 0 % (ref 0–0.5)
LYMPHOCYTES # BLD: 4.5 K/UL (ref 0.9–3.6)
LYMPHOCYTES NFR BLD: 31 % (ref 21–52)
MCH RBC QN AUTO: 30.8 PG (ref 24–34)
MCHC RBC AUTO-ENTMCNC: 34 G/DL (ref 31–37)
MCV RBC AUTO: 90.6 FL (ref 78–100)
MONOCYTES # BLD: 0.8 K/UL (ref 0.05–1.2)
MONOCYTES NFR BLD: 6 % (ref 3–10)
NEUTS SEG # BLD: 9.2 K/UL (ref 1.8–8)
NEUTS SEG NFR BLD: 62 % (ref 40–73)
NRBC # BLD: 0 K/UL (ref 0–0.01)
NRBC BLD-RTO: 0 PER 100 WBC
PLATELET # BLD AUTO: 307 K/UL (ref 135–420)
PMV BLD AUTO: 9.1 FL (ref 9.2–11.8)
POTASSIUM SERPL-SCNC: 3.4 MMOL/L (ref 3.5–5.5)
PROT SERPL-MCNC: 8.6 G/DL (ref 6.4–8.2)
RBC # BLD AUTO: 4.16 M/UL (ref 4.2–5.3)
SODIUM SERPL-SCNC: 136 MMOL/L (ref 136–145)
WBC # BLD AUTO: 14.8 K/UL (ref 4.6–13.2)

## 2022-07-04 PROCEDURE — 85025 COMPLETE CBC W/AUTO DIFF WBC: CPT

## 2022-07-04 PROCEDURE — 80053 COMPREHEN METABOLIC PANEL: CPT

## 2022-07-04 PROCEDURE — 74177 CT ABD & PELVIS W/CONTRAST: CPT

## 2022-07-04 PROCEDURE — 74011250636 HC RX REV CODE- 250/636: Performed by: STUDENT IN AN ORGANIZED HEALTH CARE EDUCATION/TRAINING PROGRAM

## 2022-07-04 PROCEDURE — 75810000115

## 2022-07-04 PROCEDURE — 99285 EMERGENCY DEPT VISIT HI MDM: CPT

## 2022-07-04 PROCEDURE — 96374 THER/PROPH/DIAG INJ IV PUSH: CPT

## 2022-07-04 PROCEDURE — 74011000636 HC RX REV CODE- 636: Performed by: STUDENT IN AN ORGANIZED HEALTH CARE EDUCATION/TRAINING PROGRAM

## 2022-07-04 RX ORDER — MORPHINE SULFATE 4 MG/ML
4 INJECTION INTRAVENOUS ONCE
Status: COMPLETED | OUTPATIENT
Start: 2022-07-04 | End: 2022-07-04

## 2022-07-04 RX ADMIN — MORPHINE SULFATE 4 MG: 4 INJECTION, SOLUTION INTRAMUSCULAR; INTRAVENOUS at 23:00

## 2022-07-04 RX ADMIN — SODIUM CHLORIDE 1000 ML: 9 INJECTION, SOLUTION INTRAVENOUS at 23:01

## 2022-07-04 RX ADMIN — IOPAMIDOL 100 ML: 612 INJECTION, SOLUTION INTRAVENOUS at 23:19

## 2022-07-05 PROCEDURE — 75810000115

## 2022-07-05 RX ORDER — SULFAMETHOXAZOLE AND TRIMETHOPRIM 800; 160 MG/1; MG/1
1 TABLET ORAL 2 TIMES DAILY
Qty: 14 TABLET | Refills: 0 | Status: SHIPPED | OUTPATIENT
Start: 2022-07-05 | End: 2022-07-12

## 2022-07-05 NOTE — ED NOTES
Abscess drained by Dr Aminah Conley nurse in attendance at all times patient tolerated it well. For discharge home discharge instructions given patient verbalized understanding and will follow up as directed.

## 2022-07-05 NOTE — ED TRIAGE NOTES
Patient enters ER ambulatory awake alert and oriented x4 with c/o abscess near anus x 3 days. Denies drainage. Pain to area. Denies taking any medications for pain relief.

## 2022-07-05 NOTE — ED PROVIDER NOTES
Patient is a 50-year-old female with a history of heart failure with reduced ejection fraction secondary to Graves' disease. Patient presents today with primary complaint of painful abscess on her left perianal region this been present for the last 24 to 40 hours and is continued to worsen and pain. Patient reports no systemic signs of illness, no fever/chills, arthralgias, myalgias, or any other abscesses. Past Medical History:   Diagnosis Date    AICD (automatic cardioverter/defibrillator) present 09/2018    Asthma     Cardiac echocardiogram 03/22/2017    LVE. EF 15% (prev 50% on study of 12/10/14). Severe diffuse hypk. Indeterminate diastolic fx.  RVE. RVSP at least 44 mmHg. Mod LAE.  BISMARK. Mild-mod MR.  Cardiac nuclear imaging test 03/24/2017    High risk. Somewhat patchy uptake. No evidence of ischemia or infarction. No RWMA. Severe LVE. EF 25%. Neg EKG on pharm stress test.    Cardiovascular LLE venous duplex 03/06/2017    Left leg:  No DVT. Pulsatile flow.     Congestive heart failure ([address]     Graves disease     Graves disease     Heart failure ([address]     Hypercholesterolemia     Hypertension     Hyperthyroidism        Past Surgical History:   Procedure Laterality Date    HX ANKLE FRACTURE TX Left 2012    Screws plates and rods    HX BREAST REDUCTION      HX HIP REPLACEMENT      HX HYSTERECTOMY  2009    hysterectomy  btl    HX PACEMAKER  2017    AICD    HX TONSILLECTOMY           Family History:   Problem Relation Age of Onset    Hypertension Mother     High Cholesterol Mother     Heart Disease Maternal Grandmother         CHF    Hypertension Maternal Grandmother     Diabetes Maternal Grandmother     Ovarian Cancer Maternal Grandmother     High Cholesterol Maternal Grandmother     Thyroid Disease Maternal Grandmother     Osteoporosis Maternal Grandmother     Alcohol abuse Father     Liver Disease Father     Asthma Father     No Known Problems Sister  No Known Problems Brother     No Known Problems Brother     No Known Problems Brother     No Known Problems Sister     No Known Problems Sister        Social History     Socioeconomic History    Marital status: LEGALLY      Spouse name: Not on file    Number of children: Not on file    Years of education: Not on file    Highest education level: Not on file   Occupational History    Not on file   Tobacco Use    Smoking status: Former Smoker     Packs/day: 0.00     Years: 18.00     Pack years: 0.00     Quit date: 2021     Years since quittin.6    Smokeless tobacco: Never Used    Tobacco comment: 2-3 cigs /day   Vaping Use    Vaping Use: Never used   Substance and Sexual Activity    Alcohol use: Yes     Comment: socially     Drug use: Never    Sexual activity: Yes     Partners: Male     Birth control/protection: Condom   Other Topics Concern    Not on file   Social History Narrative    Not on file     Social Determinants of Health     Financial Resource Strain:     Difficulty of Paying Living Expenses: Not on file   Food Insecurity:     Worried About [address] in the Last Year: Not on file    [address] in the Last Year: Not on file   Transportation Needs:     Lack of Transportation (Medical): Not on file    Lack of Transportation (Non-Medical):  Not on file   Physical Activity:     Days of Exercise per Week: Not on file    Minutes of Exercise per Session: Not on file   Stress:     Feeling of Stress : Not on file   Social Connections:     Frequency of Communication with Friends and Family: Not on file    Frequency of Social Gatherings with Friends and Family: Not on file    Attends Tenri[employer] Services: Not on file    Active Member of Clubs or Organizations: Not on file    Attends Club or Organization Meetings: Not on file    Marital Status: Not on file   Intimate Partner Violence:     Fear of Current or Ex-Partner: Not on file    Emotionally Abused: Not on file    Physically Abused: Not on file    Sexually Abused: Not on file   Housing Stability:     Unable to Pay for Housing in the Last Year: Not on file    Number of Places Lived in the Last Year: Not on file    Unstable Housing in the Last Year: Not on file         ALLERGIES: Patient has no known allergies. Review of Systems   Constitutional: Negative for chills and fever. HENT: Negative for rhinorrhea and sore throat. Eyes: Negative for discharge and redness. Respiratory: Negative for cough and shortness of breath. Cardiovascular: Negative for chest pain and leg swelling. Gastrointestinal: Negative for abdominal pain, diarrhea, nausea and vomiting. Genitourinary: Negative for difficulty urinating and dysuria. Musculoskeletal: Negative for back pain and neck pain. Skin: Positive for wound. Negative for rash. Neurological: Negative for syncope, light-headedness and headaches. Vitals:    07/04/22 2010   BP: (!) 125/103   Pulse: (!) 120   Resp: 24   Temp: 98.2 °F (36.8 °C)   SpO2: 99%   Weight: 112 kg (247 lb)   Height: 5' 6\" (1.676 m)            Physical Exam  Constitutional:       General: She is not in acute distress. Appearance: She is not ill-appearing, toxic-appearing or diaphoretic. HENT:      Head: Normocephalic and atraumatic. Right Ear: External ear normal.      Left Ear: External ear normal.      Nose: No congestion or rhinorrhea. Mouth/Throat:      Mouth: Mucous membranes are moist.      Pharynx: No oropharyngeal exudate or posterior oropharyngeal erythema. Eyes:      General:         Right eye: No discharge. Left eye: No discharge. Pupils: Pupils are equal, round, and reactive to light. Cardiovascular:      Rate and Rhythm: Normal rate and regular rhythm. Heart sounds: No murmur heard. No friction rub. No gallop. Pulmonary:      Effort: Pulmonary effort is normal. No respiratory distress. Breath sounds: No stridor.  No wheezing, rhonchi or rales. Abdominal:      General: Abdomen is flat. There is no distension. Tenderness: There is no right CVA tenderness, left CVA tenderness, guarding or rebound. Genitourinary:         Comments: To centimeter abscess left Perianal region with no active drainage. Musculoskeletal:         General: No swelling, tenderness, deformity or signs of injury. Cervical back: No rigidity or tenderness. Right lower leg: No edema. Left lower leg: No edema. Lymphadenopathy:      Cervical: No cervical adenopathy. Skin:     General: Skin is warm. Capillary Refill: Capillary refill takes less than 2 seconds. Coloration: Skin is not jaundiced or pale. Findings: No bruising, erythema, lesion or rash. Neurological:      General: No focal deficit present. Mental Status: She is alert and oriented to person, place, and time. Sensory: No sensory deficit. Motor: No weakness. Psychiatric:         Mood and Affect: Mood normal.          [facility]  ED Course as of 07/05/22 0125   Mon Jul 04, 2022 2020 Attempted to locate patient, no answer in waiting room or results waiting [JK]   2359 CT remarkable for superficial perianal abscess with no evidence of deep space infection, will proceed with direct incision and drainage and antibiosis with discharge. [JK]      ED Course User Index  [JK] Janine Oconnell MD       I&D Abcess Simple    Date/Time: 7/5/2022 1:24 AM  Performed by: Janine Oconnell MD  Authorized by: Janine Oconnell MD     Consent:     Consent obtained:  Verbal    Consent given by:  Patient    Risks discussed:  Bleeding, damage to other organs, incomplete drainage, infection and pain  Location:     Type:  Abscess    Size:  2.5cm    Location:  Anogenital    Anogenital location:  Perianal  Pre-procedure details:     Skin preparation:  Antiseptic wash  Anesthesia (see MAR for exact dosages):      Anesthesia method:  Local infiltration    Local anesthetic:  Lidocaine 1% w/o epi  Procedure type:     Complexity:  Simple  Procedure details:     Needle aspiration: no      Incision types:  Single straight    Incision depth:  Dermal    Scalpel blade:  15    Wound management:  Probed and deloculated    Drainage amount: Moderate    Wound treatment:  Wound left open    Packing materials:  None  Post-procedure details:     Patient tolerance of procedure:   Tolerated well, no immediate complications

## 2022-07-05 NOTE — DISCHARGE INSTRUCTIONS
Please complete the entire course of antibiotics as prescribed. If develop any sudden change or symptoms including sudden/severe pain, fever/chills, or any other sudden/severe change in your condition please return immediately emerged department for further evaluation and treatment.

## 2022-07-25 NOTE — PROCEDURES
Department of Emergency Medicine  ED Provider Note  Admit Date: 7/25/2022  Room: 06/06            HPI:  7/25/22, Time: 4:21 PM EDT  . Jessica Vega is a 26 y.o. old male presenting to the emergency department for a laceration to the right thumb. He said he cut it with a knife. This happened just prior to arrival he has no other injuries. He is denying any other complaints. Review of Systems:   Pertinent positives and negatives are stated within HPI, all other systems reviewed and are negative.          --------------------------------------------- PAST HISTORY ---------------------------------------------  Past Medical History:  has no past medical history on file. Past Surgical History:  has no past surgical history on file. Social History:  reports that he has never smoked. He has never used smokeless tobacco. He reports that he does not drink alcohol and does not use drugs. Family History: family history is not on file. The patients home medications have been reviewed. Allergies: Patient has no known allergies. -------------------------------------------------- RESULTS -------------------------------------------------  All laboratory and radiology results have been personally reviewed by myself   LABS:  No results found for this visit on 07/25/22. RADIOLOGY:  Interpreted by Radiologist.  No orders to display       ------------------------- NURSING NOTES AND VITALS REVIEWED ---------------------------   The nursing notes within the ED encounter and vital signs as below have been reviewed.    /60   Pulse 60   Temp 98.7 °F (37.1 °C)   Resp 18   Ht 5' 6\" (1.676 m)   Wt 142 lb (64.4 kg)   SpO2 99%   BMI 22.92 kg/m²   Oxygen Saturation Interpretation: Normal      ---------------------------------------------------PHYSICAL EXAM--------------------------------------      Constitutional/General: Alert and oriented x3, well appearing, non toxic in NAD  Head: Normocephalic [address] STRESS    Name:  Dago Rodriguez  MR#:  [id number]  :  1970  Account #:  [de-identified]  Date of Adm:  2017  Date of Service:      PROCEDURES PERFORMED: Pharmacologic nuclear scan. Baseline electrocardiogram shows normal sinus rhythm. There is low  R-wave voltage throughout. There is diffuse nonspecific changes  noted. The patient has an IV in the right antecubital space. She received  Lexiscan per protocol. She tolerated the procedure well. No chest pain  was noted. She received resting dose of sestamibi 11.0 mCi at 6:50 a.m. She  received stress dose of sestamibi 33.0 mCi at 8:40 a.m. TREADMILL FINDINGS:  1. ST segment changes: None. 2. Chest pain: None. 3. Dysrhythmia: None. 4. Both heart rate and blood pressure response are normal.    TREADMILL CONCLUSION: This is a negative pharmacologic stress  test from an electrocardiographic standpoint. MYOCARDIAL NUCLEAR PERFUSION STUDY FINDINGS:  1. Perfusion imaging shows no evidence of significant ongoing  ischemia or prior infarction. There is diminished uptake with patchy  uptake throughout. No obvious fixed or reversible perfusion defects are  noted. There is diminished perfusion in the mid inferior wall and  mid/distal anterior wall, likely consistent with diaphragmatic attenuation  and breast tissue attenuation. 2. Gated SPECT imaging shows severely dilated left ventricle with  severely diminished LV function with estimated ejection fraction of  25%. No obvious regional wall motion abnormalities noted. CONCLUSIONS:  1. Negative pharmacologic stress test from an electrocardiographic  standpoint. 2. Abnormal perfusion study. 3. Perfusion imaging shows no obvious evidence of reversible or fixed  defect. There is somewhat patchy uptake noted.   4. Gated SPECT imaging shows severely dilated left ventricle with  severely diminished LV function with estimated ejection fraction and atraumatic  Eyes:clear conjunctiva  Mouth:  handling secretions, no trismus  Neck: Supple, full ROM,   Pulmonary: Lungs clear to auscultation bilaterally, no wheezes, rales, or rhonchi. Not in respiratory distress  Cardiovascular:  Regular rate and rhythm, no murmurs, gallops, or rubs. 2+ distal pulses  Extremities: Moves all extremities x 4. Warm and well perfused  Skin: warm and dry without rash. There Is a small skin avulsion at the tip of the thumb there is no  evidence of foreign body or gross contamination. No active bleeding  Neurologic: GCS 15,  Psych: Normal Affect      ------------------------------ ED COURSE/MEDICAL DECISION MAKING----------------------  Medications   gelatin adsorbable (GELFOAM) sponge 1 each (has no administration in time range)                 Medical Decision Making: This wound cannot be sutured he just avulsed the skin. He said that he cut it with a knife. It is just at the very tip there is no active bleeding there is no bony involvement. The site was irrigated with normal saline,gelfoam, Telfa, and a Ailyn bandage were applied over the site. I advised him just to leave that on for about 3 days and then he can keep it covered as needed. He should follow-up with his doctor for wound recheck if he has any further bleeding through the dressing or any problems he should get it reevaluated right away. He said his tetanus shot is up-to-date he said he does not need that updated he said it is within the last few years. Counseling: The emergency provider has spoken with the patient and discussed todays results, in addition to providing specific details for the plan of care and counseling regarding the diagnosis and prognosis. Questions are answered at this time and they are agreeable with the plan.      --------------------------------- IMPRESSION AND DISPOSITION ---------------------------------    IMPRESSION  1.  Laceration of thumb without foreign body, nail damage of  25%. No obvious regional wall motion abnormalities noted. 5. This is a high risk finding. Sean Gowers, MD [place] / Michelle Nance  D:  03/24/2017   12:29  T:  03/24/2017   13:23  Job #:  [id number]    Dr. Feroz Salinas status unspecified, unspecified laterality, initial encounter        DISPOSITION  Disposition: Discharge to home  Patient condition is good          MATTEO Garcia CNP  07/25/22 MATTEO Patino CNP  09/30/22 [id number]

## 2022-10-06 ENCOUNTER — OFFICE VISIT (OUTPATIENT)
Dept: CARDIOLOGY CLINIC | Age: 52
End: 2022-10-06
Payer: MEDICARE

## 2022-10-06 VITALS
SYSTOLIC BLOOD PRESSURE: 126 MMHG | WEIGHT: 266 LBS | DIASTOLIC BLOOD PRESSURE: 82 MMHG | HEIGHT: 66 IN | OXYGEN SATURATION: 97 % | BODY MASS INDEX: 42.75 KG/M2 | HEART RATE: 87 BPM

## 2022-10-06 DIAGNOSIS — E87.6 HYPOKALEMIA: ICD-10-CM

## 2022-10-06 DIAGNOSIS — Z95.810 AICD (AUTOMATIC CARDIOVERTER/DEFIBRILLATOR) PRESENT: Primary | ICD-10-CM

## 2022-10-06 DIAGNOSIS — M16.10 HIP ARTHRITIS: ICD-10-CM

## 2022-10-06 PROCEDURE — 93000 ELECTROCARDIOGRAM COMPLETE: CPT | Performed by: INTERNAL MEDICINE

## 2022-10-06 PROCEDURE — G8752 SYS BP LESS 140: HCPCS | Performed by: INTERNAL MEDICINE

## 2022-10-06 PROCEDURE — G9899 SCRN MAM PERF RSLTS DOC: HCPCS | Performed by: INTERNAL MEDICINE

## 2022-10-06 PROCEDURE — G8427 DOCREV CUR MEDS BY ELIG CLIN: HCPCS | Performed by: INTERNAL MEDICINE

## 2022-10-06 PROCEDURE — 99214 OFFICE O/P EST MOD 30 MIN: CPT | Performed by: INTERNAL MEDICINE

## 2022-10-06 PROCEDURE — G8754 DIAS BP LESS 90: HCPCS | Performed by: INTERNAL MEDICINE

## 2022-10-06 PROCEDURE — 3017F COLORECTAL CA SCREEN DOC REV: CPT | Performed by: INTERNAL MEDICINE

## 2022-10-06 PROCEDURE — G8417 CALC BMI ABV UP PARAM F/U: HCPCS | Performed by: INTERNAL MEDICINE

## 2022-10-06 PROCEDURE — G8510 SCR DEP NEG, NO PLAN REQD: HCPCS | Performed by: INTERNAL MEDICINE

## 2022-10-06 NOTE — PROGRESS NOTES
Josephine Kowalski    Chief Complaint   Patient presents with    Follow-up     6 month follow up    Pacemaker Check     [employer]       HPI    Josephine Kowalski is a 47 y.o. AAF with HFrEF, primary prevention ICD here for followup. She used to follow with my partner, Dr. Sandra Oneal for years. I have obtained his records. Ms. Serenity Botello is a pleasant 81-year-old [facility] American female with history of presentation in March 2017 to [facility] with acute on chronic systolic heart failure. She subsequently was treated and during that hospitalization was found to have an echocardiogram showing an ejection fraction of 15%. She also had a pharmacologic myocardial perfusion defect at that time which demonstrated an ejection fraction estimated at 25% without reversible or fixed defects. She was placed on Coreg, lisinopril, Aldactone, and Lasix and had a LifeVest placed on discharge from the hospital.  She has done quite well clinically, but her repeat echocardiogram completed on July 18, 2017 though better than her echo back in March 2017 still demonstrated a reduced ejection fraction in the 30% range. She subsequently had a dual-chamber AICD placed on September 1, 2017 and has done reasonably well since that time, although she comes in today and relates that she really has not been feeling well for a few months. She relates that she has been doing reasonably well recently, especially after her hip sx. Past Medical History:   Diagnosis Date    AICD (automatic cardioverter/defibrillator) present 09/2018    Asthma     Cardiac echocardiogram 03/22/2017    LVE. EF 15% (prev 50% on study of 12/10/14). Severe diffuse hypk. Indeterminate diastolic fx.  RVE. RVSP at least 44 mmHg. Mod LAE.  BISMARK. Mild-mod MR. Cardiac nuclear imaging test 03/24/2017    High risk. Somewhat patchy uptake. No evidence of ischemia or infarction. No RWMA. Severe LVE. EF 25%.   Neg EKG on pharm stress test.    Cardiovascular LLE venous duplex 03/06/2017    Left leg:  No DVT. Pulsatile flow. Congestive heart failure ([address]     Graves disease     Graves disease     Heart failure [address] [address]     Hypercholesterolemia     Hypertension     Hyperthyroidism        Past Surgical History:   Procedure Laterality Date    HX ANKLE FRACTURE TX Left 2012    Screws plates and rods    HX BREAST REDUCTION      HX HIP REPLACEMENT      HX HYSTERECTOMY  2009    hysterectomy  btl    HX PACEMAKER  2017    AICD    HX TONSILLECTOMY         Current Outpatient Medications   Medication Sig Dispense Refill    aspirin delayed-release 81 mg tablet Take 1 Tablet by mouth daily. 30 Tablet 6    indapamide (LOZOL) 2.5 mg tablet Take 1 Tablet by mouth daily. in the morning 30 Tablet 6    rivaroxaban (Xarelto) 10 mg tablet Take 1 Tablet by mouth daily (with lunch). 30 Tablet 6    potassium chloride (K-DUR, KLOR-CON M20) 20 mEq tablet Take 1 Tablet by mouth two (2) times a day. 60 Tablet 6    furosemide (LASIX) 40 mg tablet Take 1 tablet by mouth once daily 30 Tablet 6    carvediloL (COREG) 25 mg tablet TAKE 1 TABLET BY MOUTH TWICE DAILY WITH MEALS 60 Tablet 6    sacubitriL-valsartan (Entresto) 24-26 mg tablet Take 1 tablet by mouth twice daily 60 Tablet 6    atorvastatin (LIPITOR) 80 mg tablet TAKE 1 TABLET BY MOUTH ONCE DAILY AT NIGHT 30 Tablet 6    spironolactone (ALDACTONE) 25 mg tablet Take 1 tablet by mouth once daily 30 Tablet 6    nitroglycerin (NITROSTAT) 0.4 mg SL tablet 1 Tablet by SubLINGual route every five (5) minutes as needed for Chest Pain. 25 Tablet 6    Stool Softener 100 mg capsule Take 1 Capsule by mouth daily as needed. acetaminophen (TYLENOL) 500 mg tablet Take 500 mg by mouth every six (6) hours as needed for Pain. albuterol (PROVENTIL HFA, VENTOLIN HFA, PROAIR HFA) 90 mcg/actuation inhaler Take 2 Puffs by inhalation every six (6) hours as needed for Wheezing.  18 g 1       No Known Allergies    Social History     Socioeconomic History Marital status: LEGALLY      Spouse name: Not on file    Number of children: Not on file    Years of education: Not on file    Highest education level: Not on file   Occupational History    Not on file   Tobacco Use    Smoking status: Former     Packs/day: 0.00     Years: 18.00     Pack years: 0.00     Types: Cigarettes     Quit date: 2021     Years since quittin.9    Smokeless tobacco: Never    Tobacco comments:     2-3 cigs /day   Vaping Use    Vaping Use: Never used   Substance and Sexual Activity    Alcohol use: Yes     Comment: socially     Drug use: Never    Sexual activity: Yes     Partners: Male     Birth control/protection: Condom   Other Topics Concern    Not on file   Social History Narrative    Not on file     Social Determinants of Health     Financial Resource Strain: Not on file   Food Insecurity: Not on file   Transportation Needs: Not on file   Physical Activity: Not on file   Stress: Not on file   Social Connections: Not on file   Intimate Partner Violence: Not on file   Housing Stability: Not on file    prior nursing experience    FH: unknown    Review of Systems    14 pt Review of Systems is negative unless otherwise mentioned in the HPI.     Wt Readings from Last 3 Encounters:   10/06/22 120.7 kg (266 lb)   22 112 kg (247 lb)   22 116.1 kg (256 lb)     Temp Readings from Last 3 Encounters:   22 98.2 °F (36.8 °C)   22 96.9 °F (36.1 °C) (Temporal)   22 98 °F (36.7 °C) (Temporal)     BP Readings from Last 3 Encounters:   10/06/22 126/82   22 (!) 125/103   22 119/82     Pulse Readings from Last 3 Encounters:   10/06/22 87   22 (!) 120   22 (!) 116       Physical Exam:    Visit Vitals  /82 (BP 1 Location: Left upper arm, BP Patient Position: Sitting, BP Cuff Size: Large adult)   Pulse 87   Ht 5' 6\" (1.676 m)   Wt 120.7 kg (266 lb)   LMP  (LMP Unknown) Comment:    SpO2 97%   BMI 42.93 kg/m²      Physical Exam  HENT: Head: Normocephalic and atraumatic. Eyes:      Pupils: Pupils are equal, round, and reactive to light. Cardiovascular:      Rate and Rhythm: Normal rate and regular rhythm. Heart sounds: Normal heart sounds. No murmur heard. No friction rub. No gallop. Pulmonary:      Effort: Pulmonary effort is normal. No respiratory distress. Breath sounds: Normal breath sounds. No wheezing or rales. Chest:      Chest wall: No tenderness. Abdominal:      General: Bowel sounds are normal.      Palpations: Abdomen is soft. Musculoskeletal:         General: No tenderness. Skin:     General: Skin is warm and dry. Neurological:      Mental Status: She is alert and oriented to person, place, and time. Device check    Lab Results   Component Value Date/Time    Cholesterol, total 114 01/26/2019 09:09 AM    HDL Cholesterol 33 (L) 01/26/2019 09:09 AM    LDL, calculated 62 01/26/2019 09:09 AM    VLDL, calculated 19 01/26/2019 09:09 AM    Triglyceride 94 01/26/2019 09:09 AM    CHOL/HDL Ratio 3.0 03/21/2017 03:24 PM       Impression and Plan:  Rosmery Vaz is a 47 y.o. with:    1.) chronic systolic HFrEF ~68-85%, ACC stage C/ NYHA class II-II (assume nonischemic)  2.) Primary prevention ICD  3.) H/o neg nuc  4.) HTN  5.) AFib, on Xarelto  6.) Thyroid disease    She has no CV complaints   Did well s/p hip sx  RTC 6 months with device check    Thank you for allowing me to participate in the care of your patient, please do not hesitate to call with questions or concerns.     [address] [facility],    Yadira Michel, DO

## 2022-10-06 NOTE — PROGRESS NOTES
Jose Salmeron presents today for   Chief Complaint   Patient presents with    Follow-up     6 month follow up    Pacemaker [address] preferred language for health care discussion is english/other. Is someone accompanying this pt? no    Is the patient using any DME equipment during [address]? no    Depression Screening:  3 most recent PHQ Screens 10/6/2022   Little interest or pleasure in doing things Not at all   Feeling down, depressed, irritable, or hopeless Not at all   Total Score PHQ 2 0       Learning Assessment:  Learning Assessment 10/6/2022   PRIMARY LEARNER Patient   HIGHEST LEVEL OF EDUCATION - PRIMARY LEARNER  -   BARRIERS PRIMARY LEARNER -   CO-LEARNER CAREGIVER -   PRIMARY LANGUAGE ENGLISH   LEARNER PREFERENCE PRIMARY DEMONSTRATION   ANSWERED BY patient   RELATIONSHIP SELF       Abuse Screening:  Abuse Screening Questionnaire 10/6/2022   Do you ever feel afraid of your partner? N   Are you in a relationship with someone who physically or mentally threatens you? N   Is it safe for you to go home? Y       Fall Risk  Fall Risk Assessment, last 12 mths 10/18/2021   Able to walk? Yes   Fall in past 12 months? 0   Do you feel unsteady? 0   Are you worried about falling 0           Pt currently taking Anticoagulant therapy? no    Pt currently taking Antiplatelet therapy ? ASA 81 mg once a day      Coordination of Care:  1. Have you been to the ER, urgent care clinic since your last visit? Hospitalized since your last visit? no    2. Have you seen or consulted any other health care providers outside of the [address] [place] [address] since your last visit? Include any pap smears or colon screening.  no

## 2023-01-31 DIAGNOSIS — Z12.31 ENCOUNTER FOR SCREENING MAMMOGRAM FOR BREAST CANCER: Primary | ICD-10-CM

## 2023-02-05 DIAGNOSIS — Z12.31 ENCOUNTER FOR SCREENING MAMMOGRAM FOR BREAST CANCER: Primary | ICD-10-CM

## 2023-04-26 ENCOUNTER — OFFICE VISIT (OUTPATIENT)
Facility: CLINIC | Age: 53
End: 2023-04-26
Payer: MEDICARE

## 2023-04-26 VITALS
WEIGHT: 253 LBS | HEART RATE: 81 BPM | DIASTOLIC BLOOD PRESSURE: 83 MMHG | RESPIRATION RATE: 16 BRPM | OXYGEN SATURATION: 98 % | HEIGHT: 66 IN | SYSTOLIC BLOOD PRESSURE: 130 MMHG | BODY MASS INDEX: 40.66 KG/M2

## 2023-04-26 DIAGNOSIS — Z12.11 SCREENING FOR COLON CANCER: ICD-10-CM

## 2023-04-26 DIAGNOSIS — R73.03 PREDIABETES: ICD-10-CM

## 2023-04-26 DIAGNOSIS — Z95.810 PRESENCE OF AUTOMATIC (IMPLANTABLE) CARDIAC DEFIBRILLATOR: ICD-10-CM

## 2023-04-26 DIAGNOSIS — E04.2 NONTOXIC MULTINODULAR GOITER: ICD-10-CM

## 2023-04-26 DIAGNOSIS — J45.20 MILD INTERMITTENT ASTHMA, UNCOMPLICATED: ICD-10-CM

## 2023-04-26 DIAGNOSIS — Z11.59 ENCOUNTER FOR HEPATITIS C SCREENING TEST FOR LOW RISK PATIENT: ICD-10-CM

## 2023-04-26 DIAGNOSIS — R05.2 SUBACUTE COUGH: ICD-10-CM

## 2023-04-26 DIAGNOSIS — I10 ESSENTIAL (PRIMARY) HYPERTENSION: Primary | ICD-10-CM

## 2023-04-26 DIAGNOSIS — Z12.31 BREAST CANCER SCREENING BY MAMMOGRAM: ICD-10-CM

## 2023-04-26 DIAGNOSIS — I50.22 CHRONIC SYSTOLIC (CONGESTIVE) HEART FAILURE (HCC): ICD-10-CM

## 2023-04-26 PROCEDURE — 1036F TOBACCO NON-USER: CPT | Performed by: STUDENT IN AN ORGANIZED HEALTH CARE EDUCATION/TRAINING PROGRAM

## 2023-04-26 PROCEDURE — 99214 OFFICE O/P EST MOD 30 MIN: CPT | Performed by: STUDENT IN AN ORGANIZED HEALTH CARE EDUCATION/TRAINING PROGRAM

## 2023-04-26 PROCEDURE — 3079F DIAST BP 80-89 MM HG: CPT | Performed by: STUDENT IN AN ORGANIZED HEALTH CARE EDUCATION/TRAINING PROGRAM

## 2023-04-26 PROCEDURE — G8417 CALC BMI ABV UP PARAM F/U: HCPCS | Performed by: STUDENT IN AN ORGANIZED HEALTH CARE EDUCATION/TRAINING PROGRAM

## 2023-04-26 PROCEDURE — 3075F SYST BP GE 130 - 139MM HG: CPT | Performed by: STUDENT IN AN ORGANIZED HEALTH CARE EDUCATION/TRAINING PROGRAM

## 2023-04-26 PROCEDURE — G8427 DOCREV CUR MEDS BY ELIG CLIN: HCPCS | Performed by: STUDENT IN AN ORGANIZED HEALTH CARE EDUCATION/TRAINING PROGRAM

## 2023-04-26 PROCEDURE — 3017F COLORECTAL CA SCREEN DOC REV: CPT | Performed by: STUDENT IN AN ORGANIZED HEALTH CARE EDUCATION/TRAINING PROGRAM

## 2023-04-26 RX ORDER — ALBUTEROL SULFATE 90 UG/1
2 AEROSOL, METERED RESPIRATORY (INHALATION) EVERY 6 HOURS PRN
Qty: 18 G | Refills: 2 | Status: SHIPPED | OUTPATIENT
Start: 2023-04-26

## 2023-04-26 RX ORDER — BENZONATATE 100 MG/1
100 CAPSULE ORAL 3 TIMES DAILY PRN
Qty: 30 CAPSULE | Refills: 0 | Status: SHIPPED | OUTPATIENT
Start: 2023-04-26 | End: 2023-05-06

## 2023-04-26 SDOH — ECONOMIC STABILITY: HOUSING INSECURITY
IN THE LAST 12 MONTHS, WAS THERE A TIME WHEN YOU DID NOT HAVE A STEADY PLACE TO SLEEP OR SLEPT IN A SHELTER (INCLUDING NOW)?: NO

## 2023-04-26 SDOH — ECONOMIC STABILITY: FOOD INSECURITY: WITHIN THE PAST 12 MONTHS, THE FOOD YOU BOUGHT JUST DIDN'T LAST AND YOU DIDN'T HAVE MONEY TO GET MORE.: SOMETIMES TRUE

## 2023-04-26 SDOH — ECONOMIC STABILITY: INCOME INSECURITY: HOW HARD IS IT FOR YOU TO PAY FOR THE VERY BASICS LIKE FOOD, HOUSING, MEDICAL CARE, AND HEATING?: SOMEWHAT HARD

## 2023-04-26 SDOH — ECONOMIC STABILITY: FOOD INSECURITY: WITHIN THE PAST 12 MONTHS, YOU WORRIED THAT YOUR FOOD WOULD RUN OUT BEFORE YOU GOT MONEY TO BUY MORE.: SOMETIMES TRUE

## 2023-04-26 ASSESSMENT — ENCOUNTER SYMPTOMS
BLOOD IN STOOL: 0
WHEEZING: 1
DIARRHEA: 0
BACK PAIN: 0
VOMITING: 0
ABDOMINAL PAIN: 0
SHORTNESS OF BREATH: 1
NAUSEA: 0
COUGH: 1
CHEST TIGHTNESS: 0
RHINORRHEA: 0

## 2023-04-26 ASSESSMENT — ANXIETY QUESTIONNAIRES
2. NOT BEING ABLE TO STOP OR CONTROL WORRYING: 0
IF YOU CHECKED OFF ANY PROBLEMS ON THIS QUESTIONNAIRE, HOW DIFFICULT HAVE THESE PROBLEMS MADE IT FOR YOU TO DO YOUR WORK, TAKE CARE OF THINGS AT HOME, OR GET ALONG WITH OTHER PEOPLE: NOT DIFFICULT AT ALL
5. BEING SO RESTLESS THAT IT IS HARD TO SIT STILL: 0
3. WORRYING TOO MUCH ABOUT DIFFERENT THINGS: 0
7. FEELING AFRAID AS IF SOMETHING AWFUL MIGHT HAPPEN: 0
1. FEELING NERVOUS, ANXIOUS, OR ON EDGE: 0
GAD7 TOTAL SCORE: 0
6. BECOMING EASILY ANNOYED OR IRRITABLE: 0
4. TROUBLE RELAXING: 0

## 2023-04-26 ASSESSMENT — PATIENT HEALTH QUESTIONNAIRE - PHQ9
1. LITTLE INTEREST OR PLEASURE IN DOING THINGS: 0
SUM OF ALL RESPONSES TO PHQ QUESTIONS 1-9: 0
SUM OF ALL RESPONSES TO PHQ9 QUESTIONS 1 & 2: 0
SUM OF ALL RESPONSES TO PHQ QUESTIONS 1-9: 0
SUM OF ALL RESPONSES TO PHQ QUESTIONS 1-9: 0
2. FEELING DOWN, DEPRESSED OR HOPELESS: 0
SUM OF ALL RESPONSES TO PHQ QUESTIONS 1-9: 0

## 2023-04-26 NOTE — PROGRESS NOTES
Israel Morgan is a 47 y.o. female presenting today for Follow-up Chronic Condition (Pt c/o cough with congestion pt did test negative for Covid  coughing up brown greenish phlem.)  . Chief Complaint   Patient presents with    Follow-up Chronic Condition     Pt c/o cough with congestion pt did test negative for Covid  coughing up brown greenish phlem. HPI:  Israel Morgan presents to the office today for follow up. Patient has a past medical history significant for HTN, HLD, prediabetes, asthma, obesity, HFrEF with ICD, Afib, Hyperthyroidism, s/p LT hip replacement. Graves disease: Patient follows with Dr. Vanessa Meehan. She is on methimazole. HFrEF: Patient presented to [facility] 2017 with acute on chronic systolic heart failure. She was found to have EF 15% at the time. She was placed on Coreg, lisinopril, Aldactone and Lasix with a LifeVest.  Repeat echo in July 2017 still demonstrated reduced EF at 30%. She had a dual-chamber AICD placed in September 2017. Currently, patient is doing well. She denies any PND, shortness of breath or swelling. Regularly follows with her cardiologist: Dr. Isatu JEFFREY fib: Patient is on Xarelto. HTN: BP is well controlled     HLD: Patient is on Lipitor 80 mg daily. Last lipid panel in 2019 showed LDL 62, HDL 33 and triglycerides 94. Pre-Diabetes: HbA1c is 6%. Today, patient is complaining of cough and congestion. She has difficulty sleeping at night due to the cough. This started 2 weeks ago. The congestion has improved but she continues to have a cough with wheezing. Home COVID-19 test were negative asked to. Patient is a smoker. She smoked 0.5 pack/day for 15 years. Currently smoking 1 cigarette every other day. Healthcare maintenance:  Patient is due for mammogram and colon cancer screening.     Review of Systems   Constitutional:  Negative for activity change, appetite change, chills, diaphoresis, fatigue, fever and unexpected weight

## 2023-04-27 RX ORDER — SACUBITRIL AND VALSARTAN 24; 26 MG/1; MG/1
1 TABLET, FILM COATED ORAL 2 TIMES DAILY
Qty: 180 TABLET | Refills: 3 | Status: SHIPPED | OUTPATIENT
Start: 2023-04-27

## 2023-04-27 RX ORDER — SPIRONOLACTONE 25 MG/1
TABLET ORAL
Qty: 30 TABLET | Refills: 0 | OUTPATIENT
Start: 2023-04-27

## 2023-04-27 RX ORDER — POTASSIUM CHLORIDE 1500 MG/1
TABLET, FILM COATED, EXTENDED RELEASE ORAL
Qty: 60 TABLET | Refills: 0 | OUTPATIENT
Start: 2023-04-27

## 2023-04-27 RX ORDER — FUROSEMIDE 40 MG/1
40 TABLET ORAL DAILY
Qty: 90 TABLET | Refills: 3 | Status: SHIPPED | OUTPATIENT
Start: 2023-04-27

## 2023-04-27 RX ORDER — INDAPAMIDE 2.5 MG/1
2.5 TABLET, FILM COATED ORAL DAILY
Qty: 90 TABLET | Refills: 3 | Status: SHIPPED | OUTPATIENT
Start: 2023-04-27

## 2023-04-27 RX ORDER — POTASSIUM CHLORIDE 20 MEQ/1
20 TABLET, EXTENDED RELEASE ORAL 2 TIMES DAILY
Qty: 180 TABLET | Refills: 3 | Status: SHIPPED | OUTPATIENT
Start: 2023-04-27

## 2023-04-27 RX ORDER — INDAPAMIDE 2.5 MG/1
TABLET, FILM COATED ORAL
Qty: 30 TABLET | Refills: 0 | OUTPATIENT
Start: 2023-04-27

## 2023-04-27 RX ORDER — FUROSEMIDE 40 MG/1
TABLET ORAL
Qty: 30 TABLET | Refills: 0 | OUTPATIENT
Start: 2023-04-27

## 2023-04-27 RX ORDER — CARVEDILOL 25 MG/1
TABLET ORAL
Qty: 60 TABLET | Refills: 0 | OUTPATIENT
Start: 2023-04-27

## 2023-04-27 RX ORDER — SPIRONOLACTONE 25 MG/1
25 TABLET ORAL DAILY
Qty: 90 TABLET | Refills: 3 | Status: SHIPPED | OUTPATIENT
Start: 2023-04-27

## 2023-04-27 RX ORDER — SACUBITRIL AND VALSARTAN 24; 26 MG/1; MG/1
TABLET, FILM COATED ORAL
Qty: 60 TABLET | Refills: 0 | OUTPATIENT
Start: 2023-04-27

## 2023-04-27 RX ORDER — HYDROGEN PEROXIDE 2.65 ML/100ML
LIQUID ORAL; TOPICAL
Qty: 30 TABLET | Refills: 0 | OUTPATIENT
Start: 2023-04-27

## 2023-04-27 RX ORDER — ASPIRIN 81 MG/1
81 TABLET ORAL DAILY
Qty: 90 TABLET | Refills: 3 | Status: SHIPPED | OUTPATIENT
Start: 2023-04-27

## 2023-04-27 RX ORDER — CARVEDILOL 25 MG/1
25 TABLET ORAL 2 TIMES DAILY WITH MEALS
Qty: 180 TABLET | Refills: 3 | Status: SHIPPED | OUTPATIENT
Start: 2023-04-27

## 2023-05-01 RX ORDER — RIVAROXABAN 10 MG/1
10 TABLET, FILM COATED ORAL
Qty: 90 TABLET | Refills: 3 | Status: SHIPPED | OUTPATIENT
Start: 2023-05-01

## 2023-06-19 ENCOUNTER — HOSPITAL ENCOUNTER (OUTPATIENT)
Facility: HOSPITAL | Age: 53
Setting detail: SPECIMEN
Discharge: HOME | End: 2023-06-22
Payer: MEDICARE

## 2023-06-19 DIAGNOSIS — I10 ESSENTIAL (PRIMARY) HYPERTENSION: ICD-10-CM

## 2023-06-19 DIAGNOSIS — R73.03 PREDIABETES: ICD-10-CM

## 2023-06-19 DIAGNOSIS — Z11.59 ENCOUNTER FOR HEPATITIS C SCREENING TEST FOR LOW RISK PATIENT: ICD-10-CM

## 2023-06-19 DIAGNOSIS — I50.22 CHRONIC SYSTOLIC (CONGESTIVE) HEART FAILURE (HCC): ICD-10-CM

## 2023-06-19 LAB
ALBUMIN SERPL-MCNC: 3.3 G/DL (ref 3.4–5)
ALBUMIN/GLOB SERPL: 0.9 (ref 0.8–1.7)
ALP SERPL-CCNC: 134 U/L (ref 45–117)
ALT SERPL-CCNC: 15 U/L (ref 13–56)
ANION GAP SERPL CALC-SCNC: 6 MMOL/L (ref 3–18)
AST SERPL-CCNC: 10 U/L (ref 10–38)
BASOPHILS # BLD: 0 K/UL (ref 0–0.1)
BASOPHILS NFR BLD: 0 % (ref 0–2)
BILIRUB SERPL-MCNC: 0.5 MG/DL (ref 0.2–1)
BUN SERPL-MCNC: 16 MG/DL (ref 7–18)
BUN/CREAT SERPL: 19 (ref 12–20)
CALCIUM SERPL-MCNC: 9.3 MG/DL (ref 8.5–10.1)
CHLORIDE SERPL-SCNC: 105 MMOL/L (ref 100–111)
CHOLEST SERPL-MCNC: 161 MG/DL
CO2 SERPL-SCNC: 25 MMOL/L (ref 21–32)
CREAT SERPL-MCNC: 0.84 MG/DL (ref 0.6–1.3)
DIFFERENTIAL METHOD BLD: ABNORMAL
EOSINOPHIL # BLD: 0.1 K/UL (ref 0–0.4)
EOSINOPHIL NFR BLD: 1 % (ref 0–5)
ERYTHROCYTE [DISTWIDTH] IN BLOOD BY AUTOMATED COUNT: 13.4 % (ref 11.6–14.5)
EST. AVERAGE GLUCOSE BLD GHB EST-MCNC: 123 MG/DL
GLOBULIN SER CALC-MCNC: 3.7 G/DL (ref 2–4)
GLUCOSE SERPL-MCNC: 129 MG/DL (ref 74–99)
HBA1C MFR BLD: 5.9 % (ref 4.2–5.6)
HCT VFR BLD AUTO: 38.8 % (ref 35–45)
HDLC SERPL-MCNC: 49 MG/DL (ref 40–60)
HDLC SERPL: 3.3 (ref 0–5)
HGB BLD-MCNC: 12.6 G/DL (ref 12–16)
IMM GRANULOCYTES # BLD AUTO: 0 K/UL (ref 0–0.04)
IMM GRANULOCYTES NFR BLD AUTO: 0 % (ref 0–0.5)
LDLC SERPL CALC-MCNC: 85.2 MG/DL (ref 0–100)
LIPID PANEL: NORMAL
LYMPHOCYTES # BLD: 4.5 K/UL (ref 0.9–3.6)
LYMPHOCYTES NFR BLD: 38 % (ref 21–52)
MCH RBC QN AUTO: 30.7 PG (ref 24–34)
MCHC RBC AUTO-ENTMCNC: 32.5 G/DL (ref 31–37)
MCV RBC AUTO: 94.4 FL (ref 78–100)
MONOCYTES # BLD: 0.5 K/UL (ref 0.05–1.2)
MONOCYTES NFR BLD: 4 % (ref 3–10)
NEUTS SEG # BLD: 6.6 K/UL (ref 1.8–8)
NEUTS SEG NFR BLD: 56 % (ref 40–73)
NRBC # BLD: 0 K/UL (ref 0–0.01)
NRBC BLD-RTO: 0 PER 100 WBC
PLATELET # BLD AUTO: 293 K/UL (ref 135–420)
PMV BLD AUTO: 9.3 FL (ref 9.2–11.8)
POTASSIUM SERPL-SCNC: 3.7 MMOL/L (ref 3.5–5.5)
PROT SERPL-MCNC: 7 G/DL (ref 6.4–8.2)
RBC # BLD AUTO: 4.11 M/UL (ref 4.2–5.3)
SODIUM SERPL-SCNC: 136 MMOL/L (ref 136–145)
TRIGL SERPL-MCNC: 134 MG/DL
VLDLC SERPL CALC-MCNC: 26.8 MG/DL
WBC # BLD AUTO: 11.8 K/UL (ref 4.6–13.2)

## 2023-06-19 PROCEDURE — 80061 LIPID PANEL: CPT

## 2023-06-19 PROCEDURE — 36415 COLL VENOUS BLD VENIPUNCTURE: CPT

## 2023-06-19 PROCEDURE — 80053 COMPREHEN METABOLIC PANEL: CPT

## 2023-06-19 PROCEDURE — 86803 HEPATITIS C AB TEST: CPT

## 2023-06-19 PROCEDURE — 83036 HEMOGLOBIN GLYCOSYLATED A1C: CPT

## 2023-06-19 PROCEDURE — 85025 COMPLETE CBC W/AUTO DIFF WBC: CPT

## 2023-06-20 LAB
HCV AB SER IA-ACNC: 0.02 INDEX
HCV AB SERPL QL IA: NEGATIVE
Lab: NORMAL

## 2023-06-22 ENCOUNTER — OFFICE VISIT (OUTPATIENT)
Age: 53
End: 2023-06-22

## 2023-06-22 ENCOUNTER — NURSE ONLY (OUTPATIENT)
Age: 53
End: 2023-06-22

## 2023-06-22 VITALS
BODY MASS INDEX: 40.98 KG/M2 | WEIGHT: 255 LBS | HEIGHT: 66 IN | OXYGEN SATURATION: 95 % | HEART RATE: 65 BPM | DIASTOLIC BLOOD PRESSURE: 88 MMHG | SYSTOLIC BLOOD PRESSURE: 140 MMHG

## 2023-06-22 DIAGNOSIS — Z95.810 PRESENCE OF AUTOMATIC (IMPLANTABLE) CARDIAC DEFIBRILLATOR: Primary | ICD-10-CM

## 2023-06-22 DIAGNOSIS — I50.22 CHRONIC SYSTOLIC CHF (CONGESTIVE HEART FAILURE), NYHA CLASS 2 (HCC): ICD-10-CM

## 2023-06-22 DIAGNOSIS — I50.22 CHRONIC SYSTOLIC CHF (CONGESTIVE HEART FAILURE), NYHA CLASS 2 (HCC): Primary | ICD-10-CM

## 2023-06-22 DIAGNOSIS — Z95.810 PRESENCE OF AUTOMATIC (IMPLANTABLE) CARDIAC DEFIBRILLATOR: ICD-10-CM

## 2023-06-22 DIAGNOSIS — I48.0 PAROXYSMAL A-FIB (HCC): ICD-10-CM

## 2023-06-22 DIAGNOSIS — I10 HTN, GOAL BELOW 140/90: ICD-10-CM

## 2023-06-22 RX ORDER — NITROGLYCERIN 0.4 MG/1
0.4 TABLET SUBLINGUAL EVERY 5 MIN PRN
Qty: 25 TABLET | Refills: 3 | Status: SHIPPED | OUTPATIENT
Start: 2023-06-22

## 2023-06-22 ASSESSMENT — PATIENT HEALTH QUESTIONNAIRE - PHQ9
SUM OF ALL RESPONSES TO PHQ QUESTIONS 1-9: 0
SUM OF ALL RESPONSES TO PHQ9 QUESTIONS 1 & 2: 0
1. LITTLE INTEREST OR PLEASURE IN DOING THINGS: 0
2. FEELING DOWN, DEPRESSED OR HOPELESS: 0

## 2023-06-22 NOTE — PROGRESS NOTES
Kirsty Canales presents today for   Chief Complaint   Patient presents with    Follow-up     6 month f/u     Device Check     Medtronic     Palpitations     Fluttering palps on/off    Shortness of Breath     SOB with exertion        Kirsty Canales preferred language for health care discussion is english/other. Is someone accompanying this pt? no    Is the patient using any DME equipment during OV? no    Depression Screening:  Depression: Not at risk    PHQ-2 Score: 0        Learning Assessment:  Who is the primary learner? Patient    What is the preferred language for health care of the primary learner? ENGLISH    How does the primary learner prefer to learn new concepts? DEMONSTRATION    Answered By patient    Relationship to Learner SELF           Pt currently taking Anticoagulant therapy? Xarelto 10 mg 1x daily     Pt currently taking Antiplatelet therapy ? ASA 81 mg 1x daily      Coordination of Care:  1. Have you been to the ER, urgent care clinic since your last visit? Hospitalized since your last visit? no    2. Have you seen or consulted any other health care providers outside of the [address] [place] [address] since your last visit? Include any pap smears or colon screening.  no
tablet Place 1 tablet under the tongue every 5 minutes as needed       No current facility-administered medications for this visit. No Known Allergies    Social History     Socioeconomic History    Marital status: Legally      Spouse name: Not on file    Number of children: Not on file    Years of education: Not on file    Highest education level: Not on file   Occupational History    Not on file   Tobacco Use    Smoking status: Some Days     Packs/day: 0.25     Types: Cigarettes    Smokeless tobacco: Never    Tobacco comments:     1-2 a day    Vaping Use    Vaping Use: Never used   Substance and Sexual Activity    Alcohol use: Yes    Drug use: Never    Sexual activity: Not on file   Other Topics Concern    Not on file   Social History Narrative    Not on file     Social Determinants of Health     Financial Resource Strain: Medium Risk    Difficulty of Paying Living Expenses: Somewhat hard   Food Insecurity: Food Insecurity Present    Worried About Running Out of Food in the Last Year: Sometimes true    Ran Out of Food in the Last Year: Sometimes true   Transportation Needs: Unknown    Lack of Transportation (Medical): Not on file    Lack of Transportation (Non-Medical): No   Physical Activity: Not on file   Stress: Not on file   Social Connections: Not on file   Intimate Partner Violence: Not on file   Housing Stability: Unknown    Unable to Pay for Housing in the Last Year: Not on file    Number of Places Lived in the Last Year: Not on file    Unstable Housing in the Last Year: No    On disability but works part time    FH: n/a    Review of Systems    14 pt Review of Systems is negative unless otherwise mentioned in the HPI.     Wt Readings from Last 3 Encounters:   06/22/23 255 lb (115.7 kg)   04/26/23 253 lb (114.8 kg)   10/06/22 266 lb (120.7 kg)     Temp Readings from Last 3 Encounters:   No data found for Temp     BP Readings from Last 3 Encounters:   06/22/23 (!) 150/100   04/26/23 130/83

## 2023-10-08 ENCOUNTER — HOSPITAL ENCOUNTER (EMERGENCY)
Facility: HOSPITAL | Age: 53
Discharge: HOME | End: 2023-10-08
Payer: MEDICARE

## 2023-10-08 VITALS
OXYGEN SATURATION: 98 % | WEIGHT: 256 LBS | SYSTOLIC BLOOD PRESSURE: 150 MMHG | BODY MASS INDEX: 40.18 KG/M2 | RESPIRATION RATE: 18 BRPM | HEIGHT: 67 IN | HEART RATE: 85 BPM | TEMPERATURE: 98.8 F | DIASTOLIC BLOOD PRESSURE: 84 MMHG

## 2023-10-08 DIAGNOSIS — H02.9 EYELID PROBLEM: Primary | ICD-10-CM

## 2023-10-08 PROCEDURE — 99283 EMERGENCY DEPT VISIT LOW MDM: CPT

## 2023-10-08 RX ORDER — ERYTHROMYCIN 5 MG/G
OINTMENT OPHTHALMIC
Qty: 1 G | Refills: 0 | Status: SHIPPED | OUTPATIENT
Start: 2023-10-08 | End: 2023-10-18

## 2023-10-08 ASSESSMENT — ENCOUNTER SYMPTOMS
RHINORRHEA: 0
EYE ITCHING: 1
ABDOMINAL PAIN: 0
NAUSEA: 0
PHOTOPHOBIA: 0
VOMITING: 0
ABDOMINAL DISTENTION: 0
DIARRHEA: 0

## 2023-10-08 ASSESSMENT — VISUAL ACUITY
OS: 20/50
OD: 20/30
OU: 1
OU: 20/30

## 2023-10-08 ASSESSMENT — PAIN DESCRIPTION - ORIENTATION: ORIENTATION: LEFT

## 2023-10-08 ASSESSMENT — PAIN - FUNCTIONAL ASSESSMENT: PAIN_FUNCTIONAL_ASSESSMENT: 0-10

## 2023-10-08 ASSESSMENT — PAIN DESCRIPTION - LOCATION: LOCATION: EYE

## 2023-10-08 ASSESSMENT — PAIN SCALES - GENERAL: PAINLEVEL_OUTOF10: 5

## 2023-10-08 NOTE — DISCHARGE INSTRUCTIONS
Please apply cold compresses and antibiotic ointment. Return to ED if worsening or new symptoms. Please follow up with Dr. Jey Goyal, eye doctor, asap.

## 2023-10-08 NOTE — ED PROVIDER NOTES
EMERGENCY DEPARTMENT HISTORY AND PHYSICAL EXAM      Patient Name: Rooney Gosselin  MRN: [id number]  YOB: 1970  Provider: Lennie Pham PA-C  PCP: Rosalba Ackerman MD   Time/Date of evaluation: 9:24 AM EDT on 10/8/23    History of Presenting Illness     Chief Complaint   Patient presents with    Eye Pain     Left eye       History Provided By: Patient     History Trinna Buerger):   Rooney Gosselin is a 47 y.o. female with a PMHX of asthma, congestive heart failure, DVT, Graves' disease, hypertension hypercholesterolemia, hypothyroidism  who presents to the emergency department  by POV C/O nontraumatic left eye swelling. Patient states that she woke up and noticed swelling and itching of her left eye, and then this morning woke up with increased edema around her eye. Patient has had excessive tearing, but denies any purulent discharge, denies any pain, difficulty with movement of eye, any fever, chills. Past History     Past Medical History:  Past Medical History:   Diagnosis Date    Abnormal nuclear cardiac imaging test 03/24/2017    High risk. Somewhat patchy uptake. No evidence of ischemia or infarction. No RWMA. Severe LVE. EF 25%. Neg EKG on pharm stress test.    AICD (automatic cardioverter/defibrillator) present 09/2018    Asthma     Congestive heart failure ([address])     DVT (deep venous thrombosis) ([address]) 03/06/2017    Left leg:  No DVT. Pulsatile flow. Graves disease     Graves disease     Heart failure ([address])     History of echocardiogram 03/22/2017    LVE. EF 15% (prev 50% on study of 12/10/14). Severe diffuse hypk. Indeterminate diastolic fx.  RVE. RVSP at least 44 mmHg. Mod LAE.  CHANELL. Mild-mod MR.       Hypercholesterolemia     Hypertension     Hyperthyroidism        Past Surgical History:  Past Surgical History:   Procedure Laterality Date    ANKLE FRACTURE SURGERY Left 2012    Screws plates and rods    BREAST REDUCTION SURGERY      HYSTERECTOMY (CERVIX STATUS UNKNOWN)  2009

## 2023-10-08 NOTE — ED TRIAGE NOTES
Patient states noted 3 days ago redness to left eye, now swollen, discharge and itching.  Now \"moving to right eye\"

## 2023-10-10 ENCOUNTER — OFFICE VISIT (OUTPATIENT)
Facility: CLINIC | Age: 53
End: 2023-10-10
Payer: MEDICARE

## 2023-10-10 VITALS
BODY MASS INDEX: 39.87 KG/M2 | WEIGHT: 254 LBS | HEIGHT: 67 IN | DIASTOLIC BLOOD PRESSURE: 71 MMHG | HEART RATE: 90 BPM | OXYGEN SATURATION: 97 % | RESPIRATION RATE: 16 BRPM | TEMPERATURE: 97.6 F | SYSTOLIC BLOOD PRESSURE: 106 MMHG

## 2023-10-10 DIAGNOSIS — I50.22 CHRONIC SYSTOLIC (CONGESTIVE) HEART FAILURE (HCC): ICD-10-CM

## 2023-10-10 DIAGNOSIS — H44.002 INFECTION OF LEFT EYE: ICD-10-CM

## 2023-10-10 DIAGNOSIS — I48.91 ATRIAL FIBRILLATION, UNSPECIFIED TYPE (HCC): ICD-10-CM

## 2023-10-10 DIAGNOSIS — R09.82 POST-NASAL DRIP: Primary | ICD-10-CM

## 2023-10-10 DIAGNOSIS — Z23 ENCOUNTER FOR IMMUNIZATION: ICD-10-CM

## 2023-10-10 DIAGNOSIS — I10 ESSENTIAL (PRIMARY) HYPERTENSION: ICD-10-CM

## 2023-10-10 DIAGNOSIS — R05.1 ACUTE COUGH: ICD-10-CM

## 2023-10-10 DIAGNOSIS — R73.03 PREDIABETES: ICD-10-CM

## 2023-10-10 PROCEDURE — 90674 CCIIV4 VAC NO PRSV 0.5 ML IM: CPT | Performed by: STUDENT IN AN ORGANIZED HEALTH CARE EDUCATION/TRAINING PROGRAM

## 2023-10-10 PROCEDURE — 3017F COLORECTAL CA SCREEN DOC REV: CPT | Performed by: STUDENT IN AN ORGANIZED HEALTH CARE EDUCATION/TRAINING PROGRAM

## 2023-10-10 PROCEDURE — G8482 FLU IMMUNIZE ORDER/ADMIN: HCPCS | Performed by: STUDENT IN AN ORGANIZED HEALTH CARE EDUCATION/TRAINING PROGRAM

## 2023-10-10 PROCEDURE — 3074F SYST BP LT 130 MM HG: CPT | Performed by: STUDENT IN AN ORGANIZED HEALTH CARE EDUCATION/TRAINING PROGRAM

## 2023-10-10 PROCEDURE — G8427 DOCREV CUR MEDS BY ELIG CLIN: HCPCS | Performed by: STUDENT IN AN ORGANIZED HEALTH CARE EDUCATION/TRAINING PROGRAM

## 2023-10-10 PROCEDURE — 3078F DIAST BP <80 MM HG: CPT | Performed by: STUDENT IN AN ORGANIZED HEALTH CARE EDUCATION/TRAINING PROGRAM

## 2023-10-10 PROCEDURE — G0008 ADMIN INFLUENZA VIRUS VAC: HCPCS | Performed by: STUDENT IN AN ORGANIZED HEALTH CARE EDUCATION/TRAINING PROGRAM

## 2023-10-10 PROCEDURE — 99214 OFFICE O/P EST MOD 30 MIN: CPT | Performed by: STUDENT IN AN ORGANIZED HEALTH CARE EDUCATION/TRAINING PROGRAM

## 2023-10-10 PROCEDURE — G8417 CALC BMI ABV UP PARAM F/U: HCPCS | Performed by: STUDENT IN AN ORGANIZED HEALTH CARE EDUCATION/TRAINING PROGRAM

## 2023-10-10 PROCEDURE — 4004F PT TOBACCO SCREEN RCVD TLK: CPT | Performed by: STUDENT IN AN ORGANIZED HEALTH CARE EDUCATION/TRAINING PROGRAM

## 2023-10-10 RX ORDER — FLUTICASONE PROPIONATE 50 MCG
2 SPRAY, SUSPENSION (ML) NASAL DAILY
Qty: 16 G | Refills: 0 | Status: SHIPPED | OUTPATIENT
Start: 2023-10-10

## 2023-10-10 RX ORDER — BENZONATATE 100 MG/1
100-200 CAPSULE ORAL 3 TIMES DAILY PRN
Qty: 30 CAPSULE | Refills: 0 | Status: SHIPPED | OUTPATIENT
Start: 2023-10-10 | End: 2023-10-17

## 2023-10-10 SDOH — ECONOMIC STABILITY: FOOD INSECURITY: WITHIN THE PAST 12 MONTHS, YOU WORRIED THAT YOUR FOOD WOULD RUN OUT BEFORE YOU GOT MONEY TO BUY MORE.: NEVER TRUE

## 2023-10-10 SDOH — ECONOMIC STABILITY: INCOME INSECURITY: HOW HARD IS IT FOR YOU TO PAY FOR THE VERY BASICS LIKE FOOD, HOUSING, MEDICAL CARE, AND HEATING?: NOT HARD AT ALL

## 2023-10-10 SDOH — ECONOMIC STABILITY: FOOD INSECURITY: WITHIN THE PAST 12 MONTHS, THE FOOD YOU BOUGHT JUST DIDN'T LAST AND YOU DIDN'T HAVE MONEY TO GET MORE.: NEVER TRUE

## 2023-10-10 ASSESSMENT — PATIENT HEALTH QUESTIONNAIRE - PHQ9
SUM OF ALL RESPONSES TO PHQ9 QUESTIONS 1 & 2: 0
SUM OF ALL RESPONSES TO PHQ QUESTIONS 1-9: 0
SUM OF ALL RESPONSES TO PHQ QUESTIONS 1-9: 0
1. LITTLE INTEREST OR PLEASURE IN DOING THINGS: 0
2. FEELING DOWN, DEPRESSED OR HOPELESS: 0
SUM OF ALL RESPONSES TO PHQ QUESTIONS 1-9: 0
SUM OF ALL RESPONSES TO PHQ QUESTIONS 1-9: 0

## 2023-10-10 ASSESSMENT — ANXIETY QUESTIONNAIRES
3. WORRYING TOO MUCH ABOUT DIFFERENT THINGS: 0
7. FEELING AFRAID AS IF SOMETHING AWFUL MIGHT HAPPEN: 0
GAD7 TOTAL SCORE: 0
6. BECOMING EASILY ANNOYED OR IRRITABLE: 0
2. NOT BEING ABLE TO STOP OR CONTROL WORRYING: 0
IF YOU CHECKED OFF ANY PROBLEMS ON THIS QUESTIONNAIRE, HOW DIFFICULT HAVE THESE PROBLEMS MADE IT FOR YOU TO DO YOUR WORK, TAKE CARE OF THINGS AT HOME, OR GET ALONG WITH OTHER PEOPLE: NOT DIFFICULT AT ALL
5. BEING SO RESTLESS THAT IT IS HARD TO SIT STILL: 0
1. FEELING NERVOUS, ANXIOUS, OR ON EDGE: 0
4. TROUBLE RELAXING: 0

## 2023-10-10 ASSESSMENT — ENCOUNTER SYMPTOMS
SHORTNESS OF BREATH: 0
ABDOMINAL PAIN: 0
VOMITING: 0
DIARRHEA: 0
CHEST TIGHTNESS: 0
NAUSEA: 0
WHEEZING: 0
RHINORRHEA: 0
BLOOD IN STOOL: 0
BACK PAIN: 0
COUGH: 1

## 2023-10-10 NOTE — PROGRESS NOTES
Post-nasal drip  fluticasone (FLONASE) 50 MCG/ACT nasal spray      2. Encounter for immunization  Influenza, FLUCELVAX, (age 10 mo+), IM, PF, 0.5 mL      3. Chronic systolic (congestive) heart failure (HCC)  Basic Metabolic Panel      4. Prediabetes  Hemoglobin A1C      5. Essential (primary) hypertension  Basic Metabolic Panel      6. Infection of left eye        7. Acute cough  benzonatate (TESSALON) 100 MG capsule      8. Atrial fibrillation, unspecified type ([address])            Postnasal drip: Will prescribe Flonase and Tessalon pearls for cough    Left eye infection: Patient has an appointment with ophthalmology today. Hyperthyroidism: Patient is following with endocrinology. HFrEF: Patient is following with cardiology. No signs of decompensated heart failure at this visit. Continue aspirin, Lasix, Entresto, Aldactone, coreg     HTN: BP is well controlled on current medications. HLD: Controlled on Lipitor 80 mg daily. Prediabetes: Repeat A1c ordered. A. fib: Continue Xarelto and Coreg. Received flu shot today. Healthcare maintenance:  Due for a mammogram.  Pending. Referred to GI for colon cancer screening. Advised to schedule appointment. Advised to get COVID-19 vaccine booster. Next visit: Review labs, PCV vaccine,AMW    Return in about 8 weeks (around 12/5/2023) for Medicare Wellness. I asked the patient if she  had any questions and answered her  questions. The patient stated that she understands the treatment plan and agrees with the treatment plan    This document was created with a voice activated dictation system and may contain transcription errors.

## 2024-01-25 ENCOUNTER — NURSE ONLY (OUTPATIENT)
Age: 54
End: 2024-01-25

## 2024-01-25 ENCOUNTER — OFFICE VISIT (OUTPATIENT)
Age: 54
End: 2024-01-25

## 2024-01-25 VITALS
BODY MASS INDEX: 40.97 KG/M2 | HEART RATE: 95 BPM | SYSTOLIC BLOOD PRESSURE: 136 MMHG | OXYGEN SATURATION: 99 % | DIASTOLIC BLOOD PRESSURE: 100 MMHG | WEIGHT: 261 LBS | HEIGHT: 67 IN

## 2024-01-25 DIAGNOSIS — I50.22 CHRONIC SYSTOLIC CHF (CONGESTIVE HEART FAILURE), NYHA CLASS 2 (HCC): ICD-10-CM

## 2024-01-25 DIAGNOSIS — I10 HTN, GOAL BELOW 140/90: ICD-10-CM

## 2024-01-25 DIAGNOSIS — Z95.810 PRESENCE OF AUTOMATIC (IMPLANTABLE) CARDIAC DEFIBRILLATOR: ICD-10-CM

## 2024-01-25 DIAGNOSIS — I50.22 CHRONIC SYSTOLIC CHF (CONGESTIVE HEART FAILURE), NYHA CLASS 2 (HCC): Primary | ICD-10-CM

## 2024-01-25 DIAGNOSIS — Z95.810 PRESENCE OF AUTOMATIC (IMPLANTABLE) CARDIAC DEFIBRILLATOR: Primary | ICD-10-CM

## 2024-01-25 DIAGNOSIS — I48.0 PAROXYSMAL A-FIB (HCC): ICD-10-CM

## 2024-01-25 NOTE — PROGRESS NOTES
Karena Valiente    Chronic HFrEF, ICD, preop    HPI    Karena Valiente is a 53 y.o. AAF with HFrEF, primary prevention ICD. She used to follow with Dr. Combs for years and I have included his history below.    She established with him around 2017 after presenting to [facility] for AECHF. She subsequently was treated and during that hospitalization was found to have  an echocardiogram showing an ejection fraction of 15%.  She also had a pharmacologic myocardial perfusion defect at that time which demonstrated an ejection fraction estimated at 25% without reversible or fixed defects.  She was placed on Coreg, lisinopril,  Aldactone, and Lasix and had a LifeVest placed on discharge from the hospital.  She has done quite well clinically, but her repeat echocardiogram completed on July 18, 2017 though better than her echo back in March 2017 still demonstrated a reduced ejection  fraction in the 30% range.        She subsequently had a dual-chamber AICD placed on September 1, 2017 and has done reasonably well since that time, although she comes in today and relates that she really has not been feeling well for a few months.      No edema, no CV complaints.    Past Medical History:   Diagnosis Date    Abnormal nuclear cardiac imaging test 03/24/2017    High risk.  Somewhat patchy uptake.  No evidence of ischemia or infarction.  No RWMA.  Severe LVE.  EF 25%.  Neg EKG on pharm stress test.    AICD (automatic cardioverter/defibrillator) present 09/2018    Asthma     Congestive heart failure (HCC)     DVT (deep venous thrombosis) ([facility] 03/06/2017    Left leg:  No DVT.  Pulsatile flow.    Graves disease     Graves disease     Heart failure ([facility])     History of echocardiogram 03/22/2017    LVE.  EF 15% (prev 50% on study of 12/10/14).  Severe diffuse hypk.  Indeterminate diastolic fx.  RVE.  RVSP at least 44 mmHg.  Mod LAE.  CHANELL.  Mild-mod MR.      Hypercholesterolemia     Hypertension     Hyperthyroidism        Past Surgical History:

## 2024-01-30 RX ORDER — FUROSEMIDE 40 MG/1
40 TABLET ORAL DAILY
Qty: 90 TABLET | Refills: 3 | Status: SHIPPED | OUTPATIENT
Start: 2024-01-30

## 2024-01-30 RX ORDER — METOPROLOL TARTRATE 50 MG/1
50 TABLET, FILM COATED ORAL 2 TIMES DAILY
Qty: 180 TABLET | Refills: 3 | Status: SHIPPED | OUTPATIENT
Start: 2024-01-30

## 2024-02-14 ENCOUNTER — OFFICE VISIT (OUTPATIENT)
Facility: CLINIC | Age: 54
End: 2024-02-14
Payer: MEDICARE

## 2024-02-14 VITALS
TEMPERATURE: 98.4 F | HEART RATE: 82 BPM | RESPIRATION RATE: 16 BRPM | WEIGHT: 261 LBS | SYSTOLIC BLOOD PRESSURE: 130 MMHG | OXYGEN SATURATION: 96 % | DIASTOLIC BLOOD PRESSURE: 86 MMHG | BODY MASS INDEX: 40.97 KG/M2 | HEIGHT: 67 IN

## 2024-02-14 DIAGNOSIS — Z01.818 PRE-OPERATIVE CLEARANCE: Primary | ICD-10-CM

## 2024-02-14 DIAGNOSIS — I48.91 ATRIAL FIBRILLATION, UNSPECIFIED TYPE (HCC): ICD-10-CM

## 2024-02-14 DIAGNOSIS — I50.22 CHRONIC SYSTOLIC (CONGESTIVE) HEART FAILURE (HCC): ICD-10-CM

## 2024-02-14 DIAGNOSIS — I10 ESSENTIAL (PRIMARY) HYPERTENSION: ICD-10-CM

## 2024-02-14 DIAGNOSIS — R73.03 PREDIABETES: ICD-10-CM

## 2024-02-14 PROCEDURE — 99214 OFFICE O/P EST MOD 30 MIN: CPT | Performed by: STUDENT IN AN ORGANIZED HEALTH CARE EDUCATION/TRAINING PROGRAM

## 2024-02-14 PROCEDURE — G8417 CALC BMI ABV UP PARAM F/U: HCPCS | Performed by: STUDENT IN AN ORGANIZED HEALTH CARE EDUCATION/TRAINING PROGRAM

## 2024-02-14 PROCEDURE — 3017F COLORECTAL CA SCREEN DOC REV: CPT | Performed by: STUDENT IN AN ORGANIZED HEALTH CARE EDUCATION/TRAINING PROGRAM

## 2024-02-14 PROCEDURE — G8482 FLU IMMUNIZE ORDER/ADMIN: HCPCS | Performed by: STUDENT IN AN ORGANIZED HEALTH CARE EDUCATION/TRAINING PROGRAM

## 2024-02-14 PROCEDURE — 4004F PT TOBACCO SCREEN RCVD TLK: CPT | Performed by: STUDENT IN AN ORGANIZED HEALTH CARE EDUCATION/TRAINING PROGRAM

## 2024-02-14 PROCEDURE — 3075F SYST BP GE 130 - 139MM HG: CPT | Performed by: STUDENT IN AN ORGANIZED HEALTH CARE EDUCATION/TRAINING PROGRAM

## 2024-02-14 PROCEDURE — G8427 DOCREV CUR MEDS BY ELIG CLIN: HCPCS | Performed by: STUDENT IN AN ORGANIZED HEALTH CARE EDUCATION/TRAINING PROGRAM

## 2024-02-14 PROCEDURE — 3079F DIAST BP 80-89 MM HG: CPT | Performed by: STUDENT IN AN ORGANIZED HEALTH CARE EDUCATION/TRAINING PROGRAM

## 2024-02-14 NOTE — PROGRESS NOTES
\"Have you been to the ER, urgent care clinic since your last visit?  Hospitalized since your last visit?\"    NO    “Have you seen or consulted any other health care providers outside of [facility] since your last visit?”    YES - When: approximately 1 months ago.  Where and Why: Dr. Castro Cardiologist.    “Have you had a colorectal cancer screening such as a colonoscopy/FIT/Cologuard?    NO     Have you had a mammogram?”   NO

## 2024-02-14 NOTE — PROGRESS NOTES
Karena Valiente is a 53 y.o. female presenting today for Pre-op Exam  .     Chief Complaint   Patient presents with    Pre-op Exam       HPI:  Karena Valiente presents to the office today for Pre-Op Clearance.    Patient has a past medical history significant for HTN, HLD, prediabetes, asthma, obesity, HFrEF with ICD, Afib, Hyperthyroidism, s/p LT hip replacement.    Patient presents for preop clearance for foot surgery.     Graves disease: Patient follows with Dr. Park. She is on methimazole.       HFrEF: Patient presented to [facility] 2017 with acute on chronic systolic heart failure.  She was found to have EF 15% at the time.  She was placed on Coreg, lisinopril, Aldactone and Lasix with a LifeVest.  Repeat echo in July 2017 still demonstrated reduced EF at 30%.  She had a dual-chamber AICD placed in September 2017.  Currently, patient is doing well.  She denies any PND, shortness of breath or swelling.  Regularly follows with her cardiologist: Dr. Feliciano.  She was seen by cardiology on 1/25/2024 and was cleared for surgery with moderate risk.  Coreg was discontinued and he was switched to metoprolol 50 mg twice daily     A. fib: Patient is on Xarelto.     HTN: BP is well controlled     HLD: Patient is on Lipitor 80 mg daily.  Lipid panel in 6/23 showed LDL 85.2, HDL 49, triglycerides 134.     Pre-Diabetes: HbA1c is 5.9%.     Patient is a smoker.  She smoked 0.5 pack/day for 15 years.  Currently smoking 1 cigarette every other day.    Patient denies any chest pain, shortness of breath, leg swelling.     Healthcare maintenance:  Patient is due for mammogram and colon cancer screening.    Review of Systems   Constitutional:  Negative for activity change, appetite change, chills, diaphoresis, fatigue, fever and unexpected weight change.   HENT:  Positive for postnasal drip. Negative for congestion, nosebleeds and rhinorrhea.    Respiratory:  Positive for cough. Negative for chest tightness, shortness of breath and

## 2024-02-15 RX ORDER — FUROSEMIDE 40 MG/1
40 TABLET ORAL DAILY
Qty: 90 TABLET | Refills: 3 | Status: SHIPPED | OUTPATIENT
Start: 2024-02-15

## 2024-02-15 RX ORDER — METOPROLOL TARTRATE 50 MG/1
50 TABLET, FILM COATED ORAL 2 TIMES DAILY
Qty: 60 TABLET | Refills: 5 | Status: SHIPPED | OUTPATIENT
Start: 2024-02-15

## 2024-02-19 ENCOUNTER — HOSPITAL ENCOUNTER (OUTPATIENT)
Facility: HOSPITAL | Age: 54
Setting detail: SPECIMEN
Discharge: HOME | End: 2024-02-22
Payer: MEDICARE

## 2024-02-19 DIAGNOSIS — I10 ESSENTIAL (PRIMARY) HYPERTENSION: ICD-10-CM

## 2024-02-19 DIAGNOSIS — R73.03 PREDIABETES: ICD-10-CM

## 2024-02-19 LAB
ANION GAP SERPL CALC-SCNC: 8 MMOL/L (ref 3–18)
BASOPHILS # BLD: 0 K/UL (ref 0–0.1)
BASOPHILS NFR BLD: 0 % (ref 0–2)
BUN SERPL-MCNC: 22 MG/DL (ref 7–18)
BUN/CREAT SERPL: 24 (ref 12–20)
CALCIUM SERPL-MCNC: 9.9 MG/DL (ref 8.5–10.1)
CHLORIDE SERPL-SCNC: 104 MMOL/L (ref 100–111)
CO2 SERPL-SCNC: 25 MMOL/L (ref 21–32)
CREAT SERPL-MCNC: 0.92 MG/DL (ref 0.6–1.3)
DIFFERENTIAL METHOD BLD: ABNORMAL
EOSINOPHIL # BLD: 0.1 K/UL (ref 0–0.4)
EOSINOPHIL NFR BLD: 1 % (ref 0–5)
ERYTHROCYTE [DISTWIDTH] IN BLOOD BY AUTOMATED COUNT: 13.2 % (ref 11.6–14.5)
EST. AVERAGE GLUCOSE BLD GHB EST-MCNC: 120 MG/DL
GLUCOSE SERPL-MCNC: 92 MG/DL (ref 74–99)
HBA1C MFR BLD: 5.8 % (ref 4.2–5.6)
HCT VFR BLD AUTO: 41.5 % (ref 35–45)
HGB BLD-MCNC: 13.6 G/DL (ref 12–16)
IMM GRANULOCYTES # BLD AUTO: 0 K/UL (ref 0–0.04)
IMM GRANULOCYTES NFR BLD AUTO: 0 % (ref 0–0.5)
LYMPHOCYTES # BLD: 3.7 K/UL (ref 0.9–3.6)
LYMPHOCYTES NFR BLD: 46 % (ref 21–52)
MCH RBC QN AUTO: 30.8 PG (ref 24–34)
MCHC RBC AUTO-ENTMCNC: 32.8 G/DL (ref 31–37)
MCV RBC AUTO: 94.1 FL (ref 78–100)
MONOCYTES # BLD: 0.3 K/UL (ref 0.05–1.2)
MONOCYTES NFR BLD: 4 % (ref 3–10)
NEUTS SEG # BLD: 4 K/UL (ref 1.8–8)
NEUTS SEG NFR BLD: 49 % (ref 40–73)
NRBC # BLD: 0 K/UL (ref 0–0.01)
NRBC BLD-RTO: 0 PER 100 WBC
PLATELET # BLD AUTO: 274 K/UL (ref 135–420)
PMV BLD AUTO: 9.7 FL (ref 9.2–11.8)
POTASSIUM SERPL-SCNC: 3.5 MMOL/L (ref 3.5–5.5)
RBC # BLD AUTO: 4.41 M/UL (ref 4.2–5.3)
SODIUM SERPL-SCNC: 137 MMOL/L (ref 136–145)
WBC # BLD AUTO: 8.1 K/UL (ref 4.6–13.2)

## 2024-02-19 PROCEDURE — 83036 HEMOGLOBIN GLYCOSYLATED A1C: CPT

## 2024-02-19 PROCEDURE — 80048 BASIC METABOLIC PNL TOTAL CA: CPT

## 2024-02-19 PROCEDURE — 36415 COLL VENOUS BLD VENIPUNCTURE: CPT

## 2024-02-19 PROCEDURE — 85025 COMPLETE CBC W/AUTO DIFF WBC: CPT

## 2024-03-08 ENCOUNTER — APPOINTMENT (OUTPATIENT)
Facility: HOSPITAL | Age: 54
End: 2024-03-08
Payer: MEDICARE

## 2024-03-08 ENCOUNTER — HOSPITAL ENCOUNTER (EMERGENCY)
Facility: HOSPITAL | Age: 54
Discharge: HOME | End: 2024-03-08
Attending: STUDENT IN AN ORGANIZED HEALTH CARE EDUCATION/TRAINING PROGRAM
Payer: MEDICARE

## 2024-03-08 VITALS
TEMPERATURE: 97.8 F | SYSTOLIC BLOOD PRESSURE: 141 MMHG | OXYGEN SATURATION: 97 % | BODY MASS INDEX: 40.97 KG/M2 | DIASTOLIC BLOOD PRESSURE: 91 MMHG | WEIGHT: 261 LBS | RESPIRATION RATE: 12 BRPM | HEIGHT: 67 IN | HEART RATE: 81 BPM

## 2024-03-08 DIAGNOSIS — R06.00 DYSPNEA, UNSPECIFIED TYPE: Primary | ICD-10-CM

## 2024-03-08 LAB
ANION GAP SERPL CALC-SCNC: 4 MMOL/L (ref 3–18)
B PERT DNA SPEC QL NAA+PROBE: NOT DETECTED
BASOPHILS # BLD: 0 K/UL (ref 0–0.1)
BASOPHILS NFR BLD: 0 % (ref 0–2)
BORDETELLA PARAPERTUSSIS BY PCR: NOT DETECTED
BUN SERPL-MCNC: 17 MG/DL (ref 7–18)
BUN/CREAT SERPL: 22 (ref 12–20)
C PNEUM DNA SPEC QL NAA+PROBE: NOT DETECTED
CALCIUM SERPL-MCNC: 9.7 MG/DL (ref 8.5–10.1)
CHLORIDE SERPL-SCNC: 109 MMOL/L (ref 100–111)
CO2 SERPL-SCNC: 28 MMOL/L (ref 21–32)
CREAT SERPL-MCNC: 0.77 MG/DL (ref 0.6–1.3)
DIFFERENTIAL METHOD BLD: ABNORMAL
EKG ATRIAL RATE: 75 BPM
EKG DIAGNOSIS: NORMAL
EKG P AXIS: 36 DEGREES
EKG P-R INTERVAL: 200 MS
EKG Q-T INTERVAL: 404 MS
EKG QRS DURATION: 78 MS
EKG QTC CALCULATION (BAZETT): 451 MS
EKG R AXIS: -24 DEGREES
EKG T AXIS: -14 DEGREES
EKG VENTRICULAR RATE: 75 BPM
EOSINOPHIL # BLD: 0.1 K/UL (ref 0–0.4)
EOSINOPHIL NFR BLD: 2 % (ref 0–5)
ERYTHROCYTE [DISTWIDTH] IN BLOOD BY AUTOMATED COUNT: 13.3 % (ref 11.6–14.5)
FLUAV SUBTYP SPEC NAA+PROBE: NOT DETECTED
FLUBV RNA SPEC QL NAA+PROBE: NOT DETECTED
GLUCOSE SERPL-MCNC: 130 MG/DL (ref 74–99)
HADV DNA SPEC QL NAA+PROBE: NOT DETECTED
HCOV 229E RNA SPEC QL NAA+PROBE: NOT DETECTED
HCOV HKU1 RNA SPEC QL NAA+PROBE: NOT DETECTED
HCOV NL63 RNA SPEC QL NAA+PROBE: NOT DETECTED
HCOV OC43 RNA SPEC QL NAA+PROBE: NOT DETECTED
HCT VFR BLD AUTO: 39 % (ref 35–45)
HGB BLD-MCNC: 12.5 G/DL (ref 12–16)
HMPV RNA SPEC QL NAA+PROBE: NOT DETECTED
HPIV1 RNA SPEC QL NAA+PROBE: NOT DETECTED
HPIV2 RNA SPEC QL NAA+PROBE: NOT DETECTED
HPIV3 RNA SPEC QL NAA+PROBE: NOT DETECTED
HPIV4 RNA SPEC QL NAA+PROBE: NOT DETECTED
IMM GRANULOCYTES # BLD AUTO: 0 K/UL (ref 0–0.04)
IMM GRANULOCYTES NFR BLD AUTO: 0 % (ref 0–0.5)
LYMPHOCYTES # BLD: 4.1 K/UL (ref 0.9–3.6)
LYMPHOCYTES NFR BLD: 46 % (ref 21–52)
M PNEUMO DNA SPEC QL NAA+PROBE: NOT DETECTED
MCH RBC QN AUTO: 30.7 PG (ref 24–34)
MCHC RBC AUTO-ENTMCNC: 32.1 G/DL (ref 31–37)
MCV RBC AUTO: 95.8 FL (ref 78–100)
MONOCYTES # BLD: 0.4 K/UL (ref 0.05–1.2)
MONOCYTES NFR BLD: 5 % (ref 3–10)
NEUTS SEG # BLD: 4.2 K/UL (ref 1.8–8)
NEUTS SEG NFR BLD: 47 % (ref 40–73)
NRBC # BLD: 0 K/UL (ref 0–0.01)
NRBC BLD-RTO: 0 PER 100 WBC
NT PRO BNP: 401 PG/ML (ref 0–900)
PLATELET # BLD AUTO: 285 K/UL (ref 135–420)
PMV BLD AUTO: 9.4 FL (ref 9.2–11.8)
POTASSIUM SERPL-SCNC: 3.9 MMOL/L (ref 3.5–5.5)
RBC # BLD AUTO: 4.07 M/UL (ref 4.2–5.3)
RSV RNA SPEC QL NAA+PROBE: NOT DETECTED
RV+EV RNA SPEC QL NAA+PROBE: NOT DETECTED
SARS-COV-2 RNA RESP QL NAA+PROBE: NOT DETECTED
SODIUM SERPL-SCNC: 141 MMOL/L (ref 136–145)
TROPONIN I SERPL HS-MCNC: 11 NG/L (ref 0–54)
WBC # BLD AUTO: 9 K/UL (ref 4.6–13.2)

## 2024-03-08 PROCEDURE — 85025 COMPLETE CBC W/AUTO DIFF WBC: CPT

## 2024-03-08 PROCEDURE — 99285 EMERGENCY DEPT VISIT HI MDM: CPT

## 2024-03-08 PROCEDURE — 83880 ASSAY OF NATRIURETIC PEPTIDE: CPT

## 2024-03-08 PROCEDURE — 80048 BASIC METABOLIC PNL TOTAL CA: CPT

## 2024-03-08 PROCEDURE — 71045 X-RAY EXAM CHEST 1 VIEW: CPT

## 2024-03-08 PROCEDURE — 93010 ELECTROCARDIOGRAM REPORT: CPT | Performed by: INTERNAL MEDICINE

## 2024-03-08 PROCEDURE — 0202U NFCT DS 22 TRGT SARS-COV-2: CPT

## 2024-03-08 PROCEDURE — 93005 ELECTROCARDIOGRAM TRACING: CPT | Performed by: STUDENT IN AN ORGANIZED HEALTH CARE EDUCATION/TRAINING PROGRAM

## 2024-03-08 PROCEDURE — 84484 ASSAY OF TROPONIN QUANT: CPT

## 2024-03-08 ASSESSMENT — PAIN DESCRIPTION - LOCATION: LOCATION: GENERALIZED

## 2024-03-08 ASSESSMENT — PAIN - FUNCTIONAL ASSESSMENT
PAIN_FUNCTIONAL_ASSESSMENT: NONE - DENIES PAIN
PAIN_FUNCTIONAL_ASSESSMENT: 0-10

## 2024-03-08 ASSESSMENT — ENCOUNTER SYMPTOMS
VOMITING: 0
ABDOMINAL PAIN: 0
CHEST TIGHTNESS: 0
SHORTNESS OF BREATH: 1
NAUSEA: 0
DIARRHEA: 0

## 2024-03-08 ASSESSMENT — PAIN SCALES - GENERAL: PAINLEVEL_OUTOF10: 9

## 2024-03-08 NOTE — ED PROVIDER NOTES
Problems Brother     No Known Problems Sister     High Cholesterol Mother        Social History:  Social History     Tobacco Use    Smoking status: Some Days     Current packs/day: 0.25     Types: Cigarettes    Smokeless tobacco: Never    Tobacco comments:     1-2 a day    Vaping Use    Vaping Use: Never used   Substance Use Topics    Alcohol use: Yes    Drug use: Never       Allergies:  No Known Allergies      Review of Systems       Review of Systems   Constitutional:  Positive for chills. Negative for activity change, fatigue and fever.   Respiratory:  Positive for shortness of breath. Negative for chest tightness.    Cardiovascular:  Negative for chest pain.   Gastrointestinal:  Negative for abdominal pain, diarrhea, nausea and vomiting.   Musculoskeletal:  Negative for arthralgias and myalgias.   Skin:  Negative for rash and wound.   Neurological:  Negative for dizziness, weakness, light-headedness, numbness and headaches.   Psychiatric/Behavioral:  Negative for agitation.          Physical Exam   BP (!) 141/91   Pulse 81   Temp 97.8 °F (36.6 °C)   Resp 12   Ht 1.702 m (5' 7\")   Wt 118.4 kg (261 lb)   SpO2 97%   BMI 40.88 kg/m²       Physical Exam  Constitutional:       General: She is not in acute distress.     Appearance: She is not ill-appearing.   HENT:      Head: Normocephalic and atraumatic.      Mouth/Throat:      Mouth: Mucous membranes are moist.   Eyes:      Extraocular Movements: Extraocular movements intact.      Pupils: Pupils are equal, round, and reactive to light.   Cardiovascular:      Rate and Rhythm: Normal rate and regular rhythm.   Pulmonary:      Effort: Pulmonary effort is normal. No tachypnea, bradypnea, accessory muscle usage or respiratory distress.      Breath sounds: Normal breath sounds. No stridor. No decreased breath sounds, wheezing, rhonchi or rales.   Abdominal:      General: Abdomen is flat.      Palpations: Abdomen is soft.      Tenderness: There is no abdominal  dictated using utilizing voice recognition software.  Minor typographical errors may be present. If questions arise, please do not hesitate to contact me or call our department.          Floyd Allen,   03/08/24 [id number]       Floyd Allen,   03/08/24 [id number]

## 2024-03-08 NOTE — ED TRIAGE NOTES
Patient comes in with complaints of shortness of breath and  generalized body aches. Symptoms have been going on for 2-3 days.

## 2024-06-05 ENCOUNTER — TELEPHONE (OUTPATIENT)
Facility: CLINIC | Age: 54
End: 2024-06-05

## 2024-06-07 ENCOUNTER — OFFICE VISIT (OUTPATIENT)
Facility: CLINIC | Age: 54
End: 2024-06-07
Payer: MEDICARE

## 2024-06-07 ENCOUNTER — TELEPHONE (OUTPATIENT)
Facility: CLINIC | Age: 54
End: 2024-06-07

## 2024-06-07 VITALS
WEIGHT: 257 LBS | RESPIRATION RATE: 16 BRPM | DIASTOLIC BLOOD PRESSURE: 77 MMHG | BODY MASS INDEX: 40.34 KG/M2 | TEMPERATURE: 98 F | OXYGEN SATURATION: 99 % | HEART RATE: 115 BPM | SYSTOLIC BLOOD PRESSURE: 132 MMHG | HEIGHT: 67 IN

## 2024-06-07 DIAGNOSIS — J45.20 MILD INTERMITTENT ASTHMA, UNCOMPLICATED: ICD-10-CM

## 2024-06-07 DIAGNOSIS — I10 ESSENTIAL (PRIMARY) HYPERTENSION: ICD-10-CM

## 2024-06-07 DIAGNOSIS — I50.22 CHRONIC SYSTOLIC (CONGESTIVE) HEART FAILURE (HCC): ICD-10-CM

## 2024-06-07 DIAGNOSIS — R73.03 PREDIABETES: ICD-10-CM

## 2024-06-07 DIAGNOSIS — Z12.31 BREAST CANCER SCREENING BY MAMMOGRAM: ICD-10-CM

## 2024-06-07 DIAGNOSIS — I48.91 ATRIAL FIBRILLATION, UNSPECIFIED TYPE (HCC): ICD-10-CM

## 2024-06-07 DIAGNOSIS — E04.2 NONTOXIC MULTINODULAR GOITER: ICD-10-CM

## 2024-06-07 DIAGNOSIS — Z01.818 PRE-OPERATIVE CLEARANCE: Primary | ICD-10-CM

## 2024-06-07 PROCEDURE — 99214 OFFICE O/P EST MOD 30 MIN: CPT | Performed by: STUDENT IN AN ORGANIZED HEALTH CARE EDUCATION/TRAINING PROGRAM

## 2024-06-07 PROCEDURE — 3017F COLORECTAL CA SCREEN DOC REV: CPT | Performed by: STUDENT IN AN ORGANIZED HEALTH CARE EDUCATION/TRAINING PROGRAM

## 2024-06-07 PROCEDURE — 3075F SYST BP GE 130 - 139MM HG: CPT | Performed by: STUDENT IN AN ORGANIZED HEALTH CARE EDUCATION/TRAINING PROGRAM

## 2024-06-07 PROCEDURE — 3078F DIAST BP <80 MM HG: CPT | Performed by: STUDENT IN AN ORGANIZED HEALTH CARE EDUCATION/TRAINING PROGRAM

## 2024-06-07 PROCEDURE — G8417 CALC BMI ABV UP PARAM F/U: HCPCS | Performed by: STUDENT IN AN ORGANIZED HEALTH CARE EDUCATION/TRAINING PROGRAM

## 2024-06-07 PROCEDURE — G8427 DOCREV CUR MEDS BY ELIG CLIN: HCPCS | Performed by: STUDENT IN AN ORGANIZED HEALTH CARE EDUCATION/TRAINING PROGRAM

## 2024-06-07 PROCEDURE — 4004F PT TOBACCO SCREEN RCVD TLK: CPT | Performed by: STUDENT IN AN ORGANIZED HEALTH CARE EDUCATION/TRAINING PROGRAM

## 2024-06-07 SDOH — ECONOMIC STABILITY: FOOD INSECURITY: WITHIN THE PAST 12 MONTHS, THE FOOD YOU BOUGHT JUST DIDN'T LAST AND YOU DIDN'T HAVE MONEY TO GET MORE.: NEVER TRUE

## 2024-06-07 SDOH — ECONOMIC STABILITY: FOOD INSECURITY: WITHIN THE PAST 12 MONTHS, YOU WORRIED THAT YOUR FOOD WOULD RUN OUT BEFORE YOU GOT MONEY TO BUY MORE.: NEVER TRUE

## 2024-06-07 SDOH — ECONOMIC STABILITY: INCOME INSECURITY: HOW HARD IS IT FOR YOU TO PAY FOR THE VERY BASICS LIKE FOOD, HOUSING, MEDICAL CARE, AND HEATING?: NOT HARD AT ALL

## 2024-06-07 ASSESSMENT — ANXIETY QUESTIONNAIRES
4. TROUBLE RELAXING: NOT AT ALL
IF YOU CHECKED OFF ANY PROBLEMS ON THIS QUESTIONNAIRE, HOW DIFFICULT HAVE THESE PROBLEMS MADE IT FOR YOU TO DO YOUR WORK, TAKE CARE OF THINGS AT HOME, OR GET ALONG WITH OTHER PEOPLE: NOT DIFFICULT AT ALL
2. NOT BEING ABLE TO STOP OR CONTROL WORRYING: NOT AT ALL
6. BECOMING EASILY ANNOYED OR IRRITABLE: NOT AT ALL
7. FEELING AFRAID AS IF SOMETHING AWFUL MIGHT HAPPEN: NOT AT ALL
GAD7 TOTAL SCORE: 0
5. BEING SO RESTLESS THAT IT IS HARD TO SIT STILL: NOT AT ALL
1. FEELING NERVOUS, ANXIOUS, OR ON EDGE: NOT AT ALL
3. WORRYING TOO MUCH ABOUT DIFFERENT THINGS: NOT AT ALL

## 2024-06-07 ASSESSMENT — PATIENT HEALTH QUESTIONNAIRE - PHQ9
SUM OF ALL RESPONSES TO PHQ QUESTIONS 1-9: 0
SUM OF ALL RESPONSES TO PHQ QUESTIONS 1-9: 0
2. FEELING DOWN, DEPRESSED OR HOPELESS: NOT AT ALL
1. LITTLE INTEREST OR PLEASURE IN DOING THINGS: NOT AT ALL
SUM OF ALL RESPONSES TO PHQ QUESTIONS 1-9: 0
SUM OF ALL RESPONSES TO PHQ9 QUESTIONS 1 & 2: 0
SUM OF ALL RESPONSES TO PHQ QUESTIONS 1-9: 0

## 2024-06-07 ASSESSMENT — ENCOUNTER SYMPTOMS
ABDOMINAL PAIN: 0
DIARRHEA: 0
SHORTNESS OF BREATH: 0
RHINORRHEA: 0
VOMITING: 0
CHEST TIGHTNESS: 0
WHEEZING: 0
BLOOD IN STOOL: 0
COUGH: 1
BACK PAIN: 0
NAUSEA: 0

## 2024-06-07 NOTE — PROGRESS NOTES
Karena Valiente is a 53 y.o. female presenting today for Pre-op Exam  .     Chief Complaint   Patient presents with    Pre-op Exam       HPI:  Karena Valiente presents to the office today for Pre-Op Clearance.    Patient has a past medical history significant for HTN, HLD, prediabetes, asthma, obesity, HFrEF with ICD, Afib, Hyperthyroidism, s/p LT hip replacement.    Patient presents for preop clearance for foot surgery.    Patient had visited the ED in 3/2024 for viral symptoms.     Graves disease: Patient follows with Dr. Park. She is on methimazole.       HFrEF: Patient presented to [facility] 2017 with acute on chronic systolic heart failure.  She was found to have EF 15% at the time.  She was placed on Coreg, lisinopril, Aldactone and Lasix with a LifeVest.  Repeat echo in July 2017 still demonstrated reduced EF at 30%.  She had a dual-chamber AICD placed in September 2017.  Currently, patient is doing well.  She denies any PND, shortness of breath or swelling.  Regularly follows with her cardiologist: Dr. Feliciano.  She was seen by cardiology on 1/25/2024 and was cleared for surgery with moderate risk.  Coreg was discontinued and she was switched to metoprolol 50 mg twice daily     A. fib: Patient is on Xarelto.     HTN: BP is well controlled     HLD: Patient is on Lipitor 80 mg daily.  Lipid panel in 6/23 showed LDL 85.2, HDL 49, triglycerides 134.     Pre-Diabetes: HbA1c is 5.9%.     Patient is a smoker.  She smoked 0.5 pack/day for 15 years.  Currently smoking 1 cigarette every other day.    Patient denies any chest pain, shortness of breath, leg swelling.     Healthcare maintenance:  Patient is due for mammogram and colon cancer screening.    Review of Systems   Constitutional:  Negative for activity change, appetite change, chills, diaphoresis, fatigue, fever and unexpected weight change.   HENT:  Positive for postnasal drip. Negative for congestion, nosebleeds and rhinorrhea.    Respiratory:  Positive for cough.

## 2024-06-07 NOTE — PROGRESS NOTES
\"Have you been to the ER, urgent care clinic since your last visit?  Hospitalized since your last visit?\"    NO    “Have you seen or consulted any other health care providers outside of [facility] since your last visit?”    NO    Have you had a mammogram?”   NO    Date of last Mammogram: 2/23/2019         “Have you had a colorectal cancer screening such as a colonoscopy/FIT/Cologuard?    NO    No colonoscopy on file  No cologuard on file  No FIT/FOBT on file   No flexible sigmoidoscopy on file         Click Here for Release of Records Request

## 2024-06-13 RX ORDER — METHIMAZOLE 10 MG/1
TABLET ORAL
COMMUNITY

## 2024-06-13 NOTE — PROGRESS NOTES
Instructions for your surgery at [facility]      Today's Date:  6/13/2024      Patient's Name:  Karena Valiente           Surgery Date:  06/19/2024              Please enter the main entrance of the hospital and check-in at the  located in the lobby. Once checked in at the , you will take the elevators to the second floor, and report to the waiting room on the left. The room will say Procedure Registration.    Do NOT eat or drink anything, including candy, gum, or ice chips after midnight prior to your surgery, unless you have specific instructions from your surgeon or anesthesia provider to do so.  Brush your teeth before coming to the hospital. You may swish with water, but do not swallow.  No smoking/Vaping/E-Cigarettes 24 hours prior to the day of surgery.  No alcohol 24 hours prior to the day of surgery.  No recreational drugs for one week prior to the day of surgery.  Bring Photo ID, Insurance information, and Co-pay if required on day of surgery.  Bring in pertinent legal documents, such as, Medical Power of , DNR, Advance Directive, etc.  Leave all valuables, including money/purse, at home.  Remove all jewelry, including ALL body piercings, nail polish, acrylic nails, and makeup (including mascara); no lotions, powders, deodorant, or perfume/cologne/after shave on the skin.  Follow instruction for Hibiclens washes and CHG wipes from surgeon's office.   Glasses and dentures may be worn to the hospital. They must be removed prior to surgery. Please bring case/container for glasses or dentures.   Contact lenses should not be worn on day of surgery.   Call your doctor's office if symptoms of a cold or illness develop within 24-48 hours prior to your surgery.  Call your doctor's office if you have any questions concerning insurance or co-pays.  15. AN ADULT (relative or friend 18 years or older) MUST DRIVE YOU HOME AFTER YOUR SURGERY.  16. Please make arrangements

## 2024-06-18 ENCOUNTER — ANESTHESIA EVENT (OUTPATIENT)
Facility: HOSPITAL | Age: 54
End: 2024-06-18
Payer: MEDICARE

## 2024-06-19 ENCOUNTER — HOSPITAL ENCOUNTER (OUTPATIENT)
Facility: HOSPITAL | Age: 54
Setting detail: OUTPATIENT SURGERY
Discharge: HOME | End: 2024-06-19
Attending: PODIATRIST | Admitting: PODIATRIST
Payer: MEDICARE

## 2024-06-19 ENCOUNTER — ANESTHESIA (OUTPATIENT)
Facility: HOSPITAL | Age: 54
End: 2024-06-19
Payer: MEDICARE

## 2024-06-19 VITALS
DIASTOLIC BLOOD PRESSURE: 67 MMHG | RESPIRATION RATE: 18 BRPM | BODY MASS INDEX: 39.96 KG/M2 | WEIGHT: 254.6 LBS | SYSTOLIC BLOOD PRESSURE: 103 MMHG | OXYGEN SATURATION: 98 % | HEIGHT: 67 IN | HEART RATE: 78 BPM | TEMPERATURE: 97.2 F

## 2024-06-19 DIAGNOSIS — G89.18 POST-OP PAIN: Primary | ICD-10-CM

## 2024-06-19 LAB
ANION GAP SERPL CALC-SCNC: 4 MMOL/L (ref 3–18)
BUN SERPL-MCNC: 15 MG/DL (ref 7–18)
BUN/CREAT SERPL: 22 (ref 12–20)
CALCIUM SERPL-MCNC: 9.5 MG/DL (ref 8.5–10.1)
CHLORIDE SERPL-SCNC: 107 MMOL/L (ref 100–111)
CO2 SERPL-SCNC: 28 MMOL/L (ref 21–32)
CREAT SERPL-MCNC: 0.68 MG/DL (ref 0.6–1.3)
GLUCOSE SERPL-MCNC: 115 MG/DL (ref 74–99)
POTASSIUM SERPL-SCNC: 3.7 MMOL/L (ref 3.5–5.5)
SODIUM SERPL-SCNC: 139 MMOL/L (ref 136–145)

## 2024-06-19 PROCEDURE — 2709999900 HC NON-CHARGEABLE SUPPLY: Performed by: PODIATRIST

## 2024-06-19 PROCEDURE — 6360000002 HC RX W HCPCS: Performed by: NURSE ANESTHETIST, CERTIFIED REGISTERED

## 2024-06-19 PROCEDURE — 2500000003 HC RX 250 WO HCPCS: Performed by: PODIATRIST

## 2024-06-19 PROCEDURE — 6370000000 HC RX 637 (ALT 250 FOR IP): Performed by: NURSE ANESTHETIST, CERTIFIED REGISTERED

## 2024-06-19 PROCEDURE — 3600000012 HC SURGERY LEVEL 2 ADDTL 15MIN: Performed by: PODIATRIST

## 2024-06-19 PROCEDURE — 7100000000 HC PACU RECOVERY - FIRST 15 MIN: Performed by: PODIATRIST

## 2024-06-19 PROCEDURE — 2720000010 HC SURG SUPPLY STERILE: Performed by: PODIATRIST

## 2024-06-19 PROCEDURE — 2580000003 HC RX 258: Performed by: NURSE ANESTHETIST, CERTIFIED REGISTERED

## 2024-06-19 PROCEDURE — C1713 ANCHOR/SCREW BN/BN,TIS/BN: HCPCS | Performed by: PODIATRIST

## 2024-06-19 PROCEDURE — 3700000000 HC ANESTHESIA ATTENDED CARE: Performed by: PODIATRIST

## 2024-06-19 PROCEDURE — 2500000003 HC RX 250 WO HCPCS: Performed by: NURSE ANESTHETIST, CERTIFIED REGISTERED

## 2024-06-19 PROCEDURE — 2580000003 HC RX 258: Performed by: PODIATRIST

## 2024-06-19 PROCEDURE — 3600000002 HC SURGERY LEVEL 2 BASE: Performed by: PODIATRIST

## 2024-06-19 PROCEDURE — 80048 BASIC METABOLIC PNL TOTAL CA: CPT

## 2024-06-19 PROCEDURE — 7100000001 HC PACU RECOVERY - ADDTL 15 MIN: Performed by: PODIATRIST

## 2024-06-19 PROCEDURE — 7100000010 HC PHASE II RECOVERY - FIRST 15 MIN: Performed by: PODIATRIST

## 2024-06-19 PROCEDURE — 3700000001 HC ADD 15 MINUTES (ANESTHESIA): Performed by: PODIATRIST

## 2024-06-19 PROCEDURE — 7100000011 HC PHASE II RECOVERY - ADDTL 15 MIN: Performed by: PODIATRIST

## 2024-06-19 PROCEDURE — 6360000002 HC RX W HCPCS: Performed by: PODIATRIST

## 2024-06-19 DEVICE — CANNULATED SCREW
Type: IMPLANTABLE DEVICE | Site: FOOT | Status: FUNCTIONAL
Brand: ASNIS

## 2024-06-19 DEVICE — K-WIRE
Type: IMPLANTABLE DEVICE | Site: FOOT | Status: FUNCTIONAL
Brand: ASNIS

## 2024-06-19 RX ORDER — FENTANYL CITRATE 50 UG/ML
25 INJECTION, SOLUTION INTRAMUSCULAR; INTRAVENOUS EVERY 5 MIN PRN
Status: DISCONTINUED | OUTPATIENT
Start: 2024-06-19 | End: 2024-06-19 | Stop reason: HOSPADM

## 2024-06-19 RX ORDER — LIDOCAINE HYDROCHLORIDE 20 MG/ML
INJECTION, SOLUTION EPIDURAL; INFILTRATION; INTRACAUDAL; PERINEURAL PRN
Status: DISCONTINUED | OUTPATIENT
Start: 2024-06-19 | End: 2024-06-19 | Stop reason: SDUPTHER

## 2024-06-19 RX ORDER — PROPOFOL 10 MG/ML
INJECTION, EMULSION INTRAVENOUS PRN
Status: DISCONTINUED | OUTPATIENT
Start: 2024-06-19 | End: 2024-06-19 | Stop reason: SDUPTHER

## 2024-06-19 RX ORDER — MEPERIDINE HYDROCHLORIDE 25 MG/ML
12.5 INJECTION INTRAMUSCULAR; INTRAVENOUS; SUBCUTANEOUS EVERY 5 MIN PRN
Status: DISCONTINUED | OUTPATIENT
Start: 2024-06-19 | End: 2024-06-19 | Stop reason: HOSPADM

## 2024-06-19 RX ORDER — IPRATROPIUM BROMIDE AND ALBUTEROL SULFATE 2.5; .5 MG/3ML; MG/3ML
1 SOLUTION RESPIRATORY (INHALATION)
Status: DISCONTINUED | OUTPATIENT
Start: 2024-06-19 | End: 2024-06-19 | Stop reason: HOSPADM

## 2024-06-19 RX ORDER — SODIUM CHLORIDE, SODIUM LACTATE, POTASSIUM CHLORIDE, CALCIUM CHLORIDE 600; 310; 30; 20 MG/100ML; MG/100ML; MG/100ML; MG/100ML
INJECTION, SOLUTION INTRAVENOUS CONTINUOUS
Status: DISCONTINUED | OUTPATIENT
Start: 2024-06-19 | End: 2024-06-19 | Stop reason: HOSPADM

## 2024-06-19 RX ORDER — SODIUM CHLORIDE 0.9 % (FLUSH) 0.9 %
5-40 SYRINGE (ML) INJECTION EVERY 12 HOURS SCHEDULED
Status: DISCONTINUED | OUTPATIENT
Start: 2024-06-19 | End: 2024-06-19 | Stop reason: HOSPADM

## 2024-06-19 RX ORDER — AMOXICILLIN AND CLAVULANATE POTASSIUM 875; 125 MG/1; MG/1
1 TABLET, FILM COATED ORAL 2 TIMES DAILY
Qty: 14 TABLET | Refills: 0 | Status: SHIPPED | OUTPATIENT
Start: 2024-06-19 | End: 2024-06-26

## 2024-06-19 RX ORDER — FENTANYL CITRATE 50 UG/ML
INJECTION, SOLUTION INTRAMUSCULAR; INTRAVENOUS PRN
Status: DISCONTINUED | OUTPATIENT
Start: 2024-06-19 | End: 2024-06-19 | Stop reason: SDUPTHER

## 2024-06-19 RX ORDER — SODIUM CHLORIDE 9 MG/ML
INJECTION, SOLUTION INTRAVENOUS PRN
Status: DISCONTINUED | OUTPATIENT
Start: 2024-06-19 | End: 2024-06-19 | Stop reason: HOSPADM

## 2024-06-19 RX ORDER — FENTANYL CITRATE 50 UG/ML
50 INJECTION, SOLUTION INTRAMUSCULAR; INTRAVENOUS EVERY 5 MIN PRN
Status: DISCONTINUED | OUTPATIENT
Start: 2024-06-19 | End: 2024-06-19 | Stop reason: HOSPADM

## 2024-06-19 RX ORDER — SODIUM CHLORIDE 0.9 % (FLUSH) 0.9 %
5-40 SYRINGE (ML) INJECTION PRN
Status: DISCONTINUED | OUTPATIENT
Start: 2024-06-19 | End: 2024-06-19 | Stop reason: HOSPADM

## 2024-06-19 RX ORDER — ONDANSETRON 2 MG/ML
INJECTION INTRAMUSCULAR; INTRAVENOUS PRN
Status: DISCONTINUED | OUTPATIENT
Start: 2024-06-19 | End: 2024-06-19 | Stop reason: SDUPTHER

## 2024-06-19 RX ORDER — FAMOTIDINE 20 MG/1
20 TABLET, FILM COATED ORAL ONCE
Status: COMPLETED | OUTPATIENT
Start: 2024-06-19 | End: 2024-06-19

## 2024-06-19 RX ORDER — LIDOCAINE HYDROCHLORIDE 10 MG/ML
1 INJECTION, SOLUTION EPIDURAL; INFILTRATION; INTRACAUDAL; PERINEURAL
Status: DISCONTINUED | OUTPATIENT
Start: 2024-06-19 | End: 2024-06-19 | Stop reason: HOSPADM

## 2024-06-19 RX ORDER — NALOXONE HYDROCHLORIDE 0.4 MG/ML
INJECTION, SOLUTION INTRAMUSCULAR; INTRAVENOUS; SUBCUTANEOUS PRN
Status: DISCONTINUED | OUTPATIENT
Start: 2024-06-19 | End: 2024-06-19 | Stop reason: HOSPADM

## 2024-06-19 RX ORDER — MIDAZOLAM HYDROCHLORIDE 1 MG/ML
INJECTION INTRAMUSCULAR; INTRAVENOUS PRN
Status: DISCONTINUED | OUTPATIENT
Start: 2024-06-19 | End: 2024-06-19 | Stop reason: SDUPTHER

## 2024-06-19 RX ORDER — HYDROCODONE BITARTRATE AND ACETAMINOPHEN 5; 325 MG/1; MG/1
1 TABLET ORAL EVERY 6 HOURS PRN
Qty: 15 TABLET | Refills: 0 | Status: SHIPPED | OUTPATIENT
Start: 2024-06-19 | End: 2024-06-24

## 2024-06-19 RX ADMIN — FENTANYL CITRATE 25 MCG: 50 INJECTION INTRAMUSCULAR; INTRAVENOUS at 09:38

## 2024-06-19 RX ADMIN — PROPOFOL 30 MG: 10 INJECTION, EMULSION INTRAVENOUS at 08:42

## 2024-06-19 RX ADMIN — PROPOFOL 30 MG: 10 INJECTION, EMULSION INTRAVENOUS at 08:44

## 2024-06-19 RX ADMIN — WATER 2000 MG: 1 INJECTION, SOLUTION INTRAMUSCULAR; INTRAVENOUS; SUBCUTANEOUS at 08:44

## 2024-06-19 RX ADMIN — PROPOFOL 50 MG: 10 INJECTION, EMULSION INTRAVENOUS at 08:52

## 2024-06-19 RX ADMIN — ONDANSETRON 4 MG: 2 INJECTION INTRAMUSCULAR; INTRAVENOUS at 09:42

## 2024-06-19 RX ADMIN — SODIUM CHLORIDE, POTASSIUM CHLORIDE, SODIUM LACTATE AND CALCIUM CHLORIDE: 600; 310; 30; 20 INJECTION, SOLUTION INTRAVENOUS at 07:59

## 2024-06-19 RX ADMIN — FAMOTIDINE 20 MG: 20 TABLET ORAL at 07:46

## 2024-06-19 RX ADMIN — LIDOCAINE HYDROCHLORIDE 50 MG: 20 INJECTION, SOLUTION EPIDURAL; INFILTRATION; INTRACAUDAL; PERINEURAL at 08:42

## 2024-06-19 RX ADMIN — FENTANYL CITRATE 25 MCG: 50 INJECTION INTRAMUSCULAR; INTRAVENOUS at 08:55

## 2024-06-19 RX ADMIN — PROPOFOL 30 MG: 10 INJECTION, EMULSION INTRAVENOUS at 09:02

## 2024-06-19 RX ADMIN — MIDAZOLAM 2 MG: 1 INJECTION, SOLUTION INTRAMUSCULAR; INTRAVENOUS at 08:35

## 2024-06-19 ASSESSMENT — PAIN DESCRIPTION - DESCRIPTORS
DESCRIPTORS: ACHING;SHARP;THROBBING
DESCRIPTORS: ACHING

## 2024-06-19 ASSESSMENT — PAIN DESCRIPTION - LOCATION
LOCATION: FOOT
LOCATION: FOOT

## 2024-06-19 ASSESSMENT — PAIN SCALES - GENERAL
PAINLEVEL_OUTOF10: 0
PAINLEVEL_OUTOF10: 3

## 2024-06-19 ASSESSMENT — ENCOUNTER SYMPTOMS: SHORTNESS OF BREATH: 1

## 2024-06-19 ASSESSMENT — PAIN DESCRIPTION - ORIENTATION
ORIENTATION: RIGHT
ORIENTATION: RIGHT

## 2024-06-19 ASSESSMENT — PAIN - FUNCTIONAL ASSESSMENT
PAIN_FUNCTIONAL_ASSESSMENT: ACTIVITIES ARE NOT PREVENTED
PAIN_FUNCTIONAL_ASSESSMENT: ACTIVITIES ARE NOT PREVENTED

## 2024-06-19 ASSESSMENT — LIFESTYLE VARIABLES: SMOKING_STATUS: 1

## 2024-06-19 NOTE — DISCHARGE INSTRUCTIONS
Keep dressings dry, clean, intact. Remain Non Weight Bearing to right forefoot. Ice and elevate right foot. Pain control prn with hydrocodone. Finish course of augmentin as prescribed.       DISCHARGE SUMMARY from Nurse    PATIENT INSTRUCTIONS:    After general anesthesia or intravenous sedation, for 24 hours or while taking prescription Narcotics:  Limit your activities  Do not drive and operate hazardous machinery  Do not make important personal or business decisions  Do  not drink alcoholic beverages  If you have not urinated within 8 hours after discharge, please contact your surgeon on call.    Report the following to your surgeon:  Excessive pain, swelling, redness or odor of or around the surgical area  Temperature over 100.5  Nausea and vomiting lasting longer than 4 hours or if unable to take medications  Any signs of decreased circulation or nerve impairment to extremity: change in color, persistent  numbness, tingling, coldness or increase pain  Any questions        These are general instructions for a healthy lifestyle:    No smoking/ No tobacco products/ Avoid exposure to second hand smoke  Surgeon General's Warning:  Quitting smoking now greatly reduces serious risk to your health.    Obesity, smoking, and sedentary lifestyle greatly increases your risk for illness    A healthy diet, regular physical exercise & weight monitoring are important for maintaining a healthy lifestyle    You may be retaining fluid if you have a history of heart failure or if you experience any of the following symptoms:  Weight gain of 3 pounds or more overnight or 5 pounds in a week, increased swelling in our hands or feet or shortness of breath while lying flat in bed.  Please call your doctor as soon as you notice any of these symptoms; do not wait until your next office visit.        The discharge information has been reviewed with the patient.  The patient verbalized understanding.  Discharge medications reviewed with

## 2024-06-19 NOTE — ANESTHESIA POSTPROCEDURE EVALUATION
Department of Anesthesiology  Postprocedure Note    Patient: Karena Valiente  MRN: [id number]  YOB: 1970  Date of evaluation: 6/19/2024    Procedure Summary       Date: 06/19/24 Room / Location: [facility] MAIN 07 / [facility] MAIN OR    Anesthesia Start: 0835 Anesthesia Stop: 1014    Procedure: RIGHT FOOT SECOND AND FIFTH TOE ARTHROPLASTY; SECOND METATARSAL OSTEOTOMY; [STAN FOOT & ANKLE]  *MAC/LOCAL* (Right: Foot) Diagnosis:       Hammer toe of right foot      Metatarsalgia of right foot      (Hammer toe of right foot [M20.41])      (Metatarsalgia of right foot [M77.41])    Surgeons: Darryl Rogel DPM Responsible Provider: Floyd Cam MD    Anesthesia Type: General ASA Status: 3            Anesthesia Type: General    Marya Phase I: Marya Score: 10    Marya Phase II:      Anesthesia Post Evaluation    Patient location during evaluation: PACU  Patient participation: complete - patient participated  Level of consciousness: sleepy but conscious  Pain score: 0  Airway patency: patent  Nausea & Vomiting: no nausea and no vomiting  Cardiovascular status: blood pressure returned to baseline  Respiratory status: acceptable  Hydration status: euvolemic  Pain management: adequate    No notable events documented.

## 2024-06-19 NOTE — PERIOP NOTE
Patient /Family /Designee has been informed that [facility] is not responsible for patient belongings per policy and the signed [facility] Patient Agreement document.  Personal items should be sent home or checked in with security.  Patient /Family /Designee selected the following action:                            [x]  Send personal items home with a family member or friend                                                 []  Check in personal items with security, excluding clothing                            []  Maintain personal items at the bedside, against recommendation                                 by Renaldo Moss [facility]

## 2024-06-19 NOTE — ANESTHESIA PRE PROCEDURE
Department of Anesthesiology  Preprocedure Note       Name:  Karena Valiente   Age:  53 y.o.  :  1970                                          MRN:  [id number]         Date:  2024      Surgeon: Surgeon(s):  Darryl Rogel DPM    Procedure: Procedure(s):  RIGHT FOOT SECOND AND FIFTH TOE ARTHROPLASTY; SECOND METATARSAL OSTEOTOMY; [STAN [employer] ANKLE]  *MAC/LOCAL*    Medications prior to admission:   Prior to Admission medications    Medication Sig Start Date End Date Taking? Authorizing Provider   methIMAzole (TAPAZOLE) 10 MG tablet TAKE 2 TABLETS BY MOUTH TWICE DAILY (4 TABLETS DAILY)    Provider, MD Asha   furosemide (LASIX) 40 MG tablet Take 1 tablet by mouth daily 2/15/24   Lauren Feliciano DO   metoprolol tartrate (LOPRESSOR) 50 MG tablet Take 1 tablet by mouth 2 times daily 2/15/24   Lauren Feliciano DO   fluticasone (FLONASE) 50 MCG/ACT nasal spray 2 sprays by Each Nostril route daily 10/10/23   Kenia Gamble MD   nitroGLYCERIN (NITROSTAT) 0.4 MG SL tablet Place 1 tablet under the tongue every 5 minutes as needed for Chest pain 23   Lauren Feliciano DO   rivaroxaban (XARELTO) 10 MG TABS tablet Take 1 tablet by mouth daily (with breakfast) 23   Karoline Chau APRN - NP   indapamide (LOZOL) 2.5 MG tablet Take 1 tablet by mouth daily 23   Lauren Feliciano DO   spironolactone (ALDACTONE) 25 MG tablet Take 1 tablet by mouth daily 23   Lauren Feliciano DO   potassium chloride (KLOR-CON M) 20 MEQ extended release tablet Take 1 tablet by mouth 2 times daily 23   Lauren Feliciano DO   sacubitril-valsartan (ENTRESTO) 24-26 MG per tablet Take 1 tablet by mouth 2 times daily 23   Lauren Feliciano DO   aspirin 81 MG EC tablet Take 1 tablet by mouth daily 23   Lauren Feliciano DO   albuterol sulfate HFA (PROVENTIL;VENTOLIN;PROAIR) 108 (90 Base) MCG/ACT inhaler Inhale 2 puffs into the lungs every 6 hours as needed for Shortness of Breath or Wheezing

## 2024-06-19 NOTE — BRIEF OP NOTE
Brief Postoperative Note      Patient: Karena Valiente  YOB: 1970  MRN: [id number]    Date of Procedure: 6/19/2024    Pre-Op Diagnosis:  Right foot second, fifth hammer toe, metatarsalgia with painful callus submet 2.    Post-Op Diagnosis: Same       Procedure(s):  RIGHT FOOT SECOND AND FIFTH TOE ARTHROPLASTY; SECOND METATARSAL OSTEOTOMY; [ST[employer] ANKLE]  *MAC/LOCAL*    Surgeon(s):  Darryl Rogel DPM    Assistant:  Surgical Assistant: Jf Nunez    Anesthesia: Monitor Anesthesia Care with local    Estimated Blood Loss (mL): Minimal    Complications: None    Specimens:   * No specimens in log *    Implants:  Implant Name Type Inv. Item Serial No.  Lot No. LRB No. Used Action   K WIRE FIX L100MM DIA0.8MM S STL SMOOTH DBL END DBL [facility] - [id number]  K WIRE FIX L100MM DIA0.8MM S STL SMOOTH DBL END DBL SHRP  [employer] HOW-WD 0000 Right 1 Implanted   countersink    STAN [facility]-WD 0000 Right 1 Implanted         Drains: * No LDAs found *    Findings:  Infection Present At Time Of Surgery (PATOS) (choose all levels that have infection present):  No infection present  Other Findings: As dictated in Op note    Electronically signed by Darryl Rogel DPM on 6/19/2024 at 9:47 AM

## 2024-06-19 NOTE — H&P
Update History & Physical    The patient's History and Physical of PCP was reviewed with the patient and I examined the patient. There was no change. The surgical site was confirmed by the patient and me.       Plan: The risks, benefits, expected outcome, and alternative to the recommended procedure have been discussed with the patient. Patient understands and wants to proceed with the procedure.     Electronically signed by Darryl Rogel DPM on 6/19/2024 at 8:23 AM

## 2024-06-20 NOTE — OP NOTE
Operative Note      Patient: Karena Valiente  YOB: 1970  MRN: [id number]    Date of Procedure: 6/19/2024    Pre-Op Diagnosis Codes:     * Hammer toe of right foot [M20.41]     * Metatarsalgia of right foot [M77.41]    Post-Op Diagnosis: Same       Procedure(s):  RIGHT FOOT SECOND AND FIFTH TOE ARTHROPLASTY; SECOND METATARSAL OSTEOTOMY; [ST[employer] FOOT [employer] ANKLE]  *MAC/LOCAL*    Surgeon(s):  Darryl Rogel DPM    Assistant:   Surgical Assistant: Jf Nunez    Anesthesia: Monitor Anesthesia Care with local    Estimated Blood Loss (mL): Minimal    Complications: None    Specimens:   * No specimens in log *    Implants:  Implant Name Type Inv. Item Serial No.  Lot No. LRB No. Used Action   K WIRE FIX L100MM DIA0.8MM S STL SMOOTH DBL END DBL SHRP - [id number]  K WIRE FIX L100MM DIA0.8MM S STL SMOOTH DBL END DBL SHRP  [employer] [facility] 0000 Right 1 Implanted   SCREW BNE L13MM DIA2MM THRD L6MM ALYSSA LEELA HND FT TI SELF - [id number]  SCREW BNE L13MM DIA2MM THRD L6MM ALYSS[employer] LEELA HND FT TI SELF  [employer] [facility] 0000 Right 1 Implanted         Drains: * No LDAs found *    Findings:  Infection Present At Time Of Surgery (PATOS) (choose all levels that have infection present):  No infection present  Other Findings: As noted below    Indications for procedure: Patient was seen in office for painful second and fifth hammer toe deformity with painful callus at submet 2 and fifth toe. Patient has tried conservative treatment without relief. Patient requested surgical intervention since she exhausted conservative treatment. All risks, benefits and complications of procedure discussed with patient. Possible complications discussed including but not limited to delayed healing, non-healing, infection, non-resolution of symptoms, requirement of additional surgery, scarring, neuritis, loss of limb or death. No guarantee made to outcome of procedure. She voiced understanding and signed consent.

## 2024-09-24 RX ORDER — SPIRONOLACTONE 25 MG/1
25 TABLET ORAL DAILY
Qty: 90 TABLET | Refills: 3 | Status: SHIPPED | OUTPATIENT
Start: 2024-09-24

## 2024-09-24 RX ORDER — INDAPAMIDE 2.5 MG/1
2.5 TABLET ORAL DAILY
Qty: 90 TABLET | Refills: 3 | Status: SHIPPED | OUTPATIENT
Start: 2024-09-24

## 2024-10-18 ENCOUNTER — OFFICE VISIT (OUTPATIENT)
Age: 54
End: 2024-10-18

## 2024-10-18 ENCOUNTER — NURSE ONLY (OUTPATIENT)
Age: 54
End: 2024-10-18
Payer: MEDICARE

## 2024-10-18 VITALS
WEIGHT: 249 LBS | HEART RATE: 102 BPM | SYSTOLIC BLOOD PRESSURE: 110 MMHG | HEIGHT: 67 IN | DIASTOLIC BLOOD PRESSURE: 60 MMHG | OXYGEN SATURATION: 100 % | BODY MASS INDEX: 39.08 KG/M2

## 2024-10-18 DIAGNOSIS — Z95.810 PRESENCE OF AUTOMATIC (IMPLANTABLE) CARDIAC DEFIBRILLATOR: ICD-10-CM

## 2024-10-18 DIAGNOSIS — I50.22 CHRONIC SYSTOLIC CHF (CONGESTIVE HEART FAILURE), NYHA CLASS 2 (HCC): ICD-10-CM

## 2024-10-18 DIAGNOSIS — I48.0 PAROXYSMAL A-FIB (HCC): ICD-10-CM

## 2024-10-18 DIAGNOSIS — Z95.810 PRESENCE OF AUTOMATIC (IMPLANTABLE) CARDIAC DEFIBRILLATOR: Primary | ICD-10-CM

## 2024-10-18 DIAGNOSIS — I50.22 CHRONIC SYSTOLIC CHF (CONGESTIVE HEART FAILURE), NYHA CLASS 2 (HCC): Primary | ICD-10-CM

## 2024-10-18 PROCEDURE — 93289 INTERROG DEVICE EVAL HEART: CPT | Performed by: INTERNAL MEDICINE

## 2024-10-18 ASSESSMENT — PATIENT HEALTH QUESTIONNAIRE - PHQ9
SUM OF ALL RESPONSES TO PHQ QUESTIONS 1-9: 0
SUM OF ALL RESPONSES TO PHQ QUESTIONS 1-9: 0
1. LITTLE INTEREST OR PLEASURE IN DOING THINGS: NOT AT ALL
SUM OF ALL RESPONSES TO PHQ9 QUESTIONS 1 & 2: 0
SUM OF ALL RESPONSES TO PHQ QUESTIONS 1-9: 0
SUM OF ALL RESPONSES TO PHQ QUESTIONS 1-9: 0
2. FEELING DOWN, DEPRESSED OR HOPELESS: NOT AT ALL

## 2024-10-18 ASSESSMENT — ANXIETY QUESTIONNAIRES
GAD7 TOTAL SCORE: 0
6. BECOMING EASILY ANNOYED OR IRRITABLE: NOT AT ALL
2. NOT BEING ABLE TO STOP OR CONTROL WORRYING: NOT AT ALL
IF YOU CHECKED OFF ANY PROBLEMS ON THIS QUESTIONNAIRE, HOW DIFFICULT HAVE THESE PROBLEMS MADE IT FOR YOU TO DO YOUR WORK, TAKE CARE OF THINGS AT HOME, OR GET ALONG WITH OTHER PEOPLE: NOT DIFFICULT AT ALL
7. FEELING AFRAID AS IF SOMETHING AWFUL MIGHT HAPPEN: NOT AT ALL
4. TROUBLE RELAXING: NOT AT ALL
5. BEING SO RESTLESS THAT IT IS HARD TO SIT STILL: NOT AT ALL
1. FEELING NERVOUS, ANXIOUS, OR ON EDGE: NOT AT ALL
3. WORRYING TOO MUCH ABOUT DIFFERENT THINGS: NOT AT ALL

## 2024-10-18 NOTE — PROGRESS NOTES
Karena Valiente presents today for No chief complaint on file.      Karena Valiente preferred language for health care discussion is english/other.    Is someone accompanying this pt? no    Is the patient using any DME equipment during OV? no    Depression Screening:  Depression: Not at risk (6/7/2024)    PHQ-2     PHQ-2 Score: 0        Learning Assessment:  No question data found.       Pt currently taking Anticoagulant therapy? Xalreto  10 mg daily    Pt currently taking Antiplatelet therapy ? Aspirin 81 mg daily      Coordination of Care:  1. Have you been to the ER, urgent care clinic since your last visit? Hospitalized since your last visit? no    2. Have you seen or consulted any other health care providers outside of the [facility] since your last visit? Include any pap smears or colon screening. no

## 2024-10-18 NOTE — PROGRESS NOTES
Karena Valiente    Chronic HFrEF, ICD, preop    HPI    Karena Valiente is a 54 y.o. AAF with HFrEF, primary prevention ICD. She used to follow with Dr. Combs for years and I have included his history below.    She established with him around 2017 after presenting to [facility] for AECHF. She subsequently was treated and during that hospitalization was found to have  an echocardiogram showing an ejection fraction of 15%.  She also had a pharmacologic myocardial perfusion defect at that time which demonstrated an ejection fraction estimated at 25% without reversible or fixed defects.  She was placed on Coreg, lisinopril,  Aldactone, and Lasix and had a LifeVest placed on discharge from the hospital.  She has done quite well clinically, but her repeat echocardiogram completed on July 18, 2017 though better than her echo back in March 2017 still demonstrated a reduced ejection  fraction in the 30% range.        She subsequently had a dual-chamber AICD placed on September 1, 2017 and has done reasonably well since that time, although she comes in today and relates that she really has not been feeling well for a few months.      No edema, no CV complaints.    Past Medical History:   Diagnosis Date    Abnormal nuclear cardiac imaging test 03/24/2017    High risk.  Somewhat patchy uptake.  No evidence of ischemia or infarction.  No RWMA.  Severe LVE.  EF 25%.  Neg EKG on pharm stress test.    AICD (automatic cardioverter/defibrillator) present 09/2018    Asthma     Congestive heart failure (HCC)     DVT (deep venous thrombosis) ([facility]) 03/06/2017    Left leg:  No DVT.  Pulsatile flow.    Graves disease     Graves disease     Heart failure ([facility])     History of echocardiogram 03/22/2017    LVE.  EF 15% (prev 50% on study of 12/10/14).  Severe diffuse hypk.  Indeterminate diastolic fx.  RVE.  RVSP at least 44 mmHg.  Mod LAE.  CHANELL.  Mild-mod MR.      Hypercholesterolemia     Hypertension     Hyperthyroidism        Past Surgical History:

## 2024-10-21 NOTE — RESULT ENCOUNTER NOTE
Device check personally reviewed by me.  Normal device function on interrogation.  Arrhythmias noted.  Appropriate device function.  See scanned interrogation document for complete details.

## 2024-11-19 RX ORDER — RIVAROXABAN 10 MG/1
10 TABLET, FILM COATED ORAL
Qty: 90 TABLET | Refills: 3 | Status: SHIPPED | OUTPATIENT
Start: 2024-11-19

## 2024-11-19 RX ORDER — SACUBITRIL AND VALSARTAN 24; 26 MG/1; MG/1
1 TABLET, FILM COATED ORAL 2 TIMES DAILY
Qty: 180 TABLET | Refills: 0 | OUTPATIENT
Start: 2024-11-19

## 2024-11-19 RX ORDER — SACUBITRIL AND VALSARTAN 24; 26 MG/1; MG/1
1 TABLET, FILM COATED ORAL 2 TIMES DAILY
Qty: 180 TABLET | Refills: 3 | Status: SHIPPED | OUTPATIENT
Start: 2024-11-19

## 2025-02-05 ENCOUNTER — TELEPHONE (OUTPATIENT)
Facility: CLINIC | Age: 55
End: 2025-02-05

## 2025-02-05 NOTE — TELEPHONE ENCOUNTER
PT REQUEST APPOINTMENT TO BE SEEN DUE TO COUGH HAS, HAD FOR LAST 3 DAYS, STATED HARD TIME AT NIGHT.    PT REQUEST MEDICATION, OR CAN WE ADVISED OVER THE COUNTER MEDICATION     REQUEST CALL BACK [phone]

## 2025-02-06 ENCOUNTER — OFFICE VISIT (OUTPATIENT)
Facility: CLINIC | Age: 55
End: 2025-02-06

## 2025-02-06 VITALS
SYSTOLIC BLOOD PRESSURE: 142 MMHG | RESPIRATION RATE: 18 BRPM | BODY MASS INDEX: 41.59 KG/M2 | DIASTOLIC BLOOD PRESSURE: 82 MMHG | OXYGEN SATURATION: 99 % | TEMPERATURE: 96.4 F | WEIGHT: 265 LBS | HEART RATE: 106 BPM | HEIGHT: 67 IN

## 2025-02-06 DIAGNOSIS — R09.82 POST-NASAL DRIP: ICD-10-CM

## 2025-02-06 DIAGNOSIS — Z12.11 SCREENING FOR COLON CANCER: ICD-10-CM

## 2025-02-06 DIAGNOSIS — Z00.00 MEDICARE ANNUAL WELLNESS VISIT, SUBSEQUENT: Primary | ICD-10-CM

## 2025-02-06 DIAGNOSIS — E78.5 HYPERLIPIDEMIA, UNSPECIFIED HYPERLIPIDEMIA TYPE: ICD-10-CM

## 2025-02-06 DIAGNOSIS — I50.22 CHRONIC SYSTOLIC (CONGESTIVE) HEART FAILURE (HCC): ICD-10-CM

## 2025-02-06 DIAGNOSIS — E05.90 HYPERTHYROIDISM: ICD-10-CM

## 2025-02-06 DIAGNOSIS — I48.91 ATRIAL FIBRILLATION, UNSPECIFIED TYPE (HCC): ICD-10-CM

## 2025-02-06 DIAGNOSIS — J40 BRONCHITIS: ICD-10-CM

## 2025-02-06 DIAGNOSIS — Z95.810 S/P ICD (INTERNAL CARDIAC DEFIBRILLATOR) PROCEDURE: ICD-10-CM

## 2025-02-06 DIAGNOSIS — R73.03 PREDIABETES: ICD-10-CM

## 2025-02-06 DIAGNOSIS — I10 ESSENTIAL (PRIMARY) HYPERTENSION: ICD-10-CM

## 2025-02-06 DIAGNOSIS — J45.20 MILD INTERMITTENT ASTHMA, UNCOMPLICATED: ICD-10-CM

## 2025-02-06 DIAGNOSIS — E04.2 NONTOXIC MULTINODULAR GOITER: ICD-10-CM

## 2025-02-06 RX ORDER — SACUBITRIL AND VALSARTAN 24; 26 MG/1; MG/1
1 TABLET, FILM COATED ORAL 2 TIMES DAILY
Qty: 180 TABLET | Refills: 3 | Status: SHIPPED | OUTPATIENT
Start: 2025-02-06

## 2025-02-06 RX ORDER — PSEUDOEPHEDRINE HCL 30 MG
1 TABLET ORAL DAILY PRN
Qty: 60 CAPSULE | Status: CANCELLED | OUTPATIENT
Start: 2025-02-06

## 2025-02-06 RX ORDER — INDAPAMIDE 2.5 MG/1
2.5 TABLET ORAL DAILY
Qty: 90 TABLET | Refills: 3 | Status: SHIPPED | OUTPATIENT
Start: 2025-02-06

## 2025-02-06 RX ORDER — NITROGLYCERIN 0.4 MG/1
0.4 TABLET SUBLINGUAL EVERY 5 MIN PRN
Qty: 25 TABLET | Refills: 3 | Status: CANCELLED | OUTPATIENT
Start: 2025-02-06

## 2025-02-06 RX ORDER — METHIMAZOLE 10 MG/1
TABLET ORAL
Status: CANCELLED | OUTPATIENT
Start: 2025-02-06

## 2025-02-06 RX ORDER — POTASSIUM CHLORIDE 1500 MG/1
20 TABLET, EXTENDED RELEASE ORAL 2 TIMES DAILY
Qty: 180 TABLET | Refills: 3 | Status: CANCELLED | OUTPATIENT
Start: 2025-02-06

## 2025-02-06 RX ORDER — ATORVASTATIN CALCIUM 80 MG/1
80 TABLET, FILM COATED ORAL NIGHTLY
Qty: 90 TABLET | Refills: 1 | Status: SHIPPED | OUTPATIENT
Start: 2025-02-06

## 2025-02-06 RX ORDER — ACETAMINOPHEN 500 MG
500 TABLET ORAL EVERY 6 HOURS PRN
Qty: 120 TABLET | Status: CANCELLED | OUTPATIENT
Start: 2025-02-06

## 2025-02-06 RX ORDER — AZITHROMYCIN 250 MG/1
TABLET, FILM COATED ORAL
Qty: 6 TABLET | Refills: 0 | Status: SHIPPED | OUTPATIENT
Start: 2025-02-06 | End: 2025-02-16

## 2025-02-06 RX ORDER — FUROSEMIDE 40 MG/1
40 TABLET ORAL DAILY
Qty: 90 TABLET | Refills: 3 | Status: SHIPPED | OUTPATIENT
Start: 2025-02-06

## 2025-02-06 RX ORDER — BENZONATATE 100 MG/1
100 CAPSULE ORAL 3 TIMES DAILY PRN
Qty: 30 CAPSULE | Refills: 0 | Status: SHIPPED | OUTPATIENT
Start: 2025-02-06 | End: 2025-02-16

## 2025-02-06 RX ORDER — RIVAROXABAN 10 MG/1
10 TABLET, FILM COATED ORAL
Qty: 90 TABLET | Refills: 3 | Status: SHIPPED | OUTPATIENT
Start: 2025-02-06

## 2025-02-06 RX ORDER — SPIRONOLACTONE 25 MG/1
25 TABLET ORAL DAILY
Qty: 90 TABLET | Refills: 3 | Status: SHIPPED | OUTPATIENT
Start: 2025-02-06

## 2025-02-06 RX ORDER — FLUTICASONE PROPIONATE 50 MCG
2 SPRAY, SUSPENSION (ML) NASAL DAILY
Qty: 16 G | Refills: 0 | Status: SHIPPED | OUTPATIENT
Start: 2025-02-06

## 2025-02-06 RX ORDER — METOPROLOL TARTRATE 50 MG
50 TABLET ORAL 2 TIMES DAILY
Qty: 60 TABLET | Refills: 5 | Status: SHIPPED | OUTPATIENT
Start: 2025-02-06

## 2025-02-06 RX ORDER — ALBUTEROL SULFATE 90 UG/1
2 INHALANT RESPIRATORY (INHALATION) EVERY 6 HOURS PRN
Qty: 18 G | Refills: 2 | Status: SHIPPED | OUTPATIENT
Start: 2025-02-06

## 2025-02-06 RX ORDER — ASPIRIN 81 MG/1
81 TABLET ORAL DAILY
Qty: 90 TABLET | Refills: 3 | Status: SHIPPED | OUTPATIENT
Start: 2025-02-06

## 2025-02-06 SDOH — ECONOMIC STABILITY: FOOD INSECURITY: WITHIN THE PAST 12 MONTHS, THE FOOD YOU BOUGHT JUST DIDN'T LAST AND YOU DIDN'T HAVE MONEY TO GET MORE.: NEVER TRUE

## 2025-02-06 SDOH — ECONOMIC STABILITY: FOOD INSECURITY: WITHIN THE PAST 12 MONTHS, YOU WORRIED THAT YOUR FOOD WOULD RUN OUT BEFORE YOU GOT MONEY TO BUY MORE.: NEVER TRUE

## 2025-02-06 ASSESSMENT — ENCOUNTER SYMPTOMS
CHEST TIGHTNESS: 1
BLOOD IN STOOL: 0
VOMITING: 0
SHORTNESS OF BREATH: 1
NAUSEA: 0
DIARRHEA: 0
WHEEZING: 0
ABDOMINAL PAIN: 0
BACK PAIN: 0
RHINORRHEA: 0
COUGH: 1

## 2025-02-06 ASSESSMENT — ANXIETY QUESTIONNAIRES
5. BEING SO RESTLESS THAT IT IS HARD TO SIT STILL: NOT AT ALL
GAD7 TOTAL SCORE: 0
7. FEELING AFRAID AS IF SOMETHING AWFUL MIGHT HAPPEN: NOT AT ALL
1. FEELING NERVOUS, ANXIOUS, OR ON EDGE: NOT AT ALL
4. TROUBLE RELAXING: NOT AT ALL
2. NOT BEING ABLE TO STOP OR CONTROL WORRYING: NOT AT ALL
3. WORRYING TOO MUCH ABOUT DIFFERENT THINGS: NOT AT ALL
6. BECOMING EASILY ANNOYED OR IRRITABLE: NOT AT ALL
IF YOU CHECKED OFF ANY PROBLEMS ON THIS QUESTIONNAIRE, HOW DIFFICULT HAVE THESE PROBLEMS MADE IT FOR YOU TO DO YOUR WORK, TAKE CARE OF THINGS AT HOME, OR GET ALONG WITH OTHER PEOPLE: NOT DIFFICULT AT ALL

## 2025-02-06 ASSESSMENT — PATIENT HEALTH QUESTIONNAIRE - PHQ9
SUM OF ALL RESPONSES TO PHQ QUESTIONS 1-9: 0
1. LITTLE INTEREST OR PLEASURE IN DOING THINGS: NOT AT ALL
SUM OF ALL RESPONSES TO PHQ9 QUESTIONS 1 & 2: 0
2. FEELING DOWN, DEPRESSED OR HOPELESS: NOT AT ALL

## 2025-02-06 ASSESSMENT — LIFESTYLE VARIABLES
HOW OFTEN DO YOU HAVE A DRINK CONTAINING ALCOHOL: NEVER
HOW MANY STANDARD DRINKS CONTAINING ALCOHOL DO YOU HAVE ON A TYPICAL DAY: PATIENT DOES NOT DRINK

## 2025-02-06 NOTE — PROGRESS NOTES
Karena Valiente is a 54 y.o. female presenting today for Medicare AWV (Congestion, possible flu shot)  .     Chief Complaint   Patient presents with    Medicare AWV     Congestion, possible flu shot       HPI:  Karena Valiente presents to the office today complaining of feeling unwell.    Patient has a past medical history significant for HTN, HLD, prediabetes, asthma, obesity, HFrEF with ICD, Afib, Hyperthyroidism, s/p LT hip replacement.    Patient's reports that she has been coughing, feeling congested for greater than a week.  Because of the frequent coughing-she is having chest pain and pain in her sides.     Graves disease: Patient follows with Dr. Park. She is on methimazole.       HFrEF: Patient presented to [facility] 2017 with acute on chronic systolic heart failure.  She was found to have EF 15% at the time.  She was placed on Coreg, lisinopril, Aldactone and Lasix with a LifeVest.  Repeat echo in July 2017 still demonstrated reduced EF at 30%.  She had a dual-chamber AICD placed in September 2017.  Currently, patient is doing well.  She denies any PND, shortness of breath or swelling.  Regularly follows with her cardiologist: Dr. Feliciano.       WOJCIECH fib: Patient is on Xarelto.     HTN: BP is well controlled classically.  Elevated today     HLD: Patient is on Lipitor 80 mg daily.  Lipid panel in 6/23 showed LDL 85.2, HDL 49, triglycerides 134.  Repeat is pending.     Pre-Diabetes: HbA1c is 5.8%.     Patient is a smoker.  She smoked 0.5 pack/day for 15 years.  Currently smoking 1 cigarette every other day.    Patient denies any chest pain, shortness of breath, leg swelling.     Healthcare maintenance:  Patient is due for mammogram and colon cancer screening.    Review of Systems   Constitutional:  Positive for fatigue. Negative for activity change, appetite change, chills, diaphoresis, fever and unexpected weight change.   HENT:  Positive for congestion. Negative for nosebleeds and rhinorrhea.    Respiratory:

## 2025-02-06 NOTE — PROGRESS NOTES
Medicare Annual Wellness Visit    Karena Valiente is here for Medicare AWV (Congestion, possible flu shot)    Assessment & Plan   Medicare annual wellness visit, subsequent         Return in about 4 months (around 6/6/2025).     Subjective       Patient's complete Health Risk Assessment and screening values have been reviewed and are found in Flowsheets. The following problems were reviewed today and where indicated follow up appointments were made and/or referrals ordered.    Positive Risk Factor Screenings with Interventions:             General HRA Questions:  Select all that apply: (!) New or Increased Pain  Interventions - Pain:  Patient reports pain in her sides due to the coughing.  Cough medication provided        Abnormal BMI (obese):  Body mass index is 41.5 kg/m². (!) Abnormal  Interventions:  See AVS for additional education material        Dentist Screen:  Have you seen the dentist within the past year?: (!) No    Intervention:  Advised to schedule with their dentist     Vision Screen:  Do you have difficulty driving, watching TV, or doing any of your daily activities because of your eyesight?: No  Have you had an eye exam within the past year?: (!) No  Interventions:   Patient encouraged to make appointment with their eye specialist      Advanced Directives:  Do you have a Living Will?: (!) No    Intervention:  has NO advanced directive - information provided        Tobacco Use:    Tobacco Use      Smoking status: Some Days        Packs/day: 0.25        Types: Cigarettes      Smokeless tobacco: Never      Tobacco comments: 1-2 a day      Interventions:  Counselled on cessation                      Objective   Vitals:    02/06/25 1106   BP: (!) 142/82   Site: Right Upper Arm   Position: Sitting   Cuff Size: Large Adult   Pulse: (!) 106   Resp: 18   Temp: (!) 96.4 °F (35.8 °C)   SpO2: 99%   Weight: 120.2 kg (265 lb)   Height: 1.702 m (5' 7\")      Body mass index is 41.5 kg/m².                No Known

## 2025-03-26 ENCOUNTER — CLINICAL DOCUMENTATION (OUTPATIENT)
Age: 55
End: 2025-03-26

## 2025-03-26 NOTE — PROGRESS NOTES
Received a Chronic Special Needs Plan Verification Form from [employer].     Form completed and signed by provider.    Faxed to [phone].

## 2025-05-22 ENCOUNTER — COMMUNITY OUTREACH (OUTPATIENT)
Facility: CLINIC | Age: 55
End: 2025-05-22

## 2025-06-05 ENCOUNTER — CLINICAL SUPPORT (OUTPATIENT)
Age: 55
End: 2025-06-05
Payer: MEDICARE

## 2025-06-05 ENCOUNTER — OFFICE VISIT (OUTPATIENT)
Age: 55
End: 2025-06-05
Payer: MEDICARE

## 2025-06-05 VITALS
HEIGHT: 67 IN | SYSTOLIC BLOOD PRESSURE: 116 MMHG | OXYGEN SATURATION: 97 % | HEART RATE: 87 BPM | BODY MASS INDEX: 40.49 KG/M2 | DIASTOLIC BLOOD PRESSURE: 82 MMHG | WEIGHT: 258 LBS

## 2025-06-05 DIAGNOSIS — Z95.810 PRESENCE OF AUTOMATIC (IMPLANTABLE) CARDIAC DEFIBRILLATOR: ICD-10-CM

## 2025-06-05 DIAGNOSIS — I50.22 CHRONIC SYSTOLIC CHF (CONGESTIVE HEART FAILURE), NYHA CLASS 2 (HCC): Primary | ICD-10-CM

## 2025-06-05 DIAGNOSIS — I50.22 CHRONIC SYSTOLIC CHF (CONGESTIVE HEART FAILURE), NYHA CLASS 2 (HCC): ICD-10-CM

## 2025-06-05 DIAGNOSIS — I48.0 PAROXYSMAL A-FIB (HCC): ICD-10-CM

## 2025-06-05 DIAGNOSIS — Z95.810 PRESENCE OF AUTOMATIC (IMPLANTABLE) CARDIAC DEFIBRILLATOR: Primary | ICD-10-CM

## 2025-06-05 PROCEDURE — 93283 PRGRMG EVAL IMPLANTABLE DFB: CPT | Performed by: INTERNAL MEDICINE

## 2025-06-05 PROCEDURE — 3079F DIAST BP 80-89 MM HG: CPT | Performed by: INTERNAL MEDICINE

## 2025-06-05 PROCEDURE — 93000 ELECTROCARDIOGRAM COMPLETE: CPT | Performed by: INTERNAL MEDICINE

## 2025-06-05 PROCEDURE — 99214 OFFICE O/P EST MOD 30 MIN: CPT | Performed by: INTERNAL MEDICINE

## 2025-06-05 PROCEDURE — 3074F SYST BP LT 130 MM HG: CPT | Performed by: INTERNAL MEDICINE

## 2025-06-05 ASSESSMENT — PATIENT HEALTH QUESTIONNAIRE - PHQ9
SUM OF ALL RESPONSES TO PHQ QUESTIONS 1-9: 0
1. LITTLE INTEREST OR PLEASURE IN DOING THINGS: NOT AT ALL
SUM OF ALL RESPONSES TO PHQ QUESTIONS 1-9: 0
2. FEELING DOWN, DEPRESSED OR HOPELESS: NOT AT ALL

## 2025-06-06 NOTE — PROGRESS NOTES
Karena Rocco presents today for   Chief Complaint   Patient presents with    Follow-up     6 month f/u     Device Check     Medtronic     Chest Pain     Rt chest soreness at times        Karena Valiente preferred language for health care discussion is english/other.    Is someone accompanying this pt? yes    Is the patient using any DME equipment during OV? no    Depression Screening:  Depression: Not at risk (6/5/2025)    PHQ-2     PHQ-2 Score: 0        Learning Assessment:  Who is the primary learner? Patient    What is the preferred language for health care of the primary learner? ENGLISH    How does the primary learner prefer to learn new concepts? DEMONSTRATION    Answered By patient    Relationship to Learner SELF           Pt currently taking Anticoagulant therapy? Xarelto 10 mg QD    Pt currently taking Antiplatelet therapy ? ASA 81 mg QD       Coordination of Care:  1. Have you been to the ER, urgent care clinic since your last visit? Hospitalized since your last visit? no    2. Have you seen or consulted any other health care providers outside of the [facility] since your last visit? Include any pap smears or colon screening. no    
n/a    Review of Systems    14 pt Review of Systems is negative unless otherwise mentioned in the HPI.    Wt Readings from Last 3 Encounters:   06/05/25 117 kg (258 lb)   02/06/25 120.2 kg (265 lb)   10/18/24 112.9 kg (249 lb)     Temp Readings from Last 3 Encounters:   02/06/25 (!) 96.4 °F (35.8 °C)   06/19/24 97.2 °F (36.2 °C) (Axillary)   06/07/24 98 °F (36.7 °C) (Temporal)     BP Readings from Last 3 Encounters:   06/05/25 116/82   02/06/25 (!) 142/82   10/18/24 110/60     Pulse Readings from Last 3 Encounters:   06/05/25 87   02/06/25 (!) 106   10/18/24 (!) 102       Physical Exam:    /82 (BP Site: Left Upper Arm, Patient Position: Sitting, BP Cuff Size: Large Adult)   Pulse 87   Ht 1.702 m (5' 7\")   Wt 117 kg (258 lb)   SpO2 97%   BMI 40.41 kg/m²    Physical Exam  Vitals and nursing note reviewed.   Constitutional:       General: She is not in acute distress.     Appearance: Normal appearance.   HENT:      Head: Normocephalic.      Nose: Nose normal.      Mouth/Throat:      Mouth: Mucous membranes are moist.   Eyes:      Extraocular Movements: Extraocular movements intact.      Pupils: Pupils are equal, round, and reactive to light.   Neck:      Vascular: No carotid bruit.   Cardiovascular:      Rate and Rhythm: Normal rate and regular rhythm.      Pulses: Normal pulses.      Heart sounds: No murmur heard.     No friction rub. No gallop.   Pulmonary:      Effort: Pulmonary effort is normal.      Breath sounds: Normal breath sounds. No wheezing or rales.   Abdominal:      Palpations: Abdomen is soft.   Musculoskeletal:      Cervical back: Neck supple.      Right lower leg: No edema.      Left lower leg: No edema.   Skin:     General: Skin is warm and dry.   Neurological:      General: No focal deficit present.      Mental Status: She is alert and oriented to person, place, and time.   Psychiatric:         Mood and Affect: Mood is not depressed.         Impression and Plan:  Karena Valiente is a 54 y.o.

## 2025-06-09 ENCOUNTER — RESULTS FOLLOW-UP (OUTPATIENT)
Age: 55
End: 2025-06-09

## 2025-07-03 ENCOUNTER — OFFICE VISIT (OUTPATIENT)
Facility: CLINIC | Age: 55
End: 2025-07-03

## 2025-07-03 VITALS
HEART RATE: 86 BPM | DIASTOLIC BLOOD PRESSURE: 85 MMHG | RESPIRATION RATE: 16 BRPM | BODY MASS INDEX: 40.97 KG/M2 | WEIGHT: 261 LBS | SYSTOLIC BLOOD PRESSURE: 122 MMHG | TEMPERATURE: 98 F | OXYGEN SATURATION: 95 % | HEIGHT: 67 IN

## 2025-07-03 DIAGNOSIS — Z12.11 SCREENING FOR COLON CANCER: ICD-10-CM

## 2025-07-03 DIAGNOSIS — Z12.31 BREAST CANCER SCREENING BY MAMMOGRAM: ICD-10-CM

## 2025-07-03 DIAGNOSIS — E05.90 HYPERTHYROIDISM: ICD-10-CM

## 2025-07-03 DIAGNOSIS — Z95.810 S/P ICD (INTERNAL CARDIAC DEFIBRILLATOR) PROCEDURE: ICD-10-CM

## 2025-07-03 DIAGNOSIS — Z72.0 TOBACCO USE: ICD-10-CM

## 2025-07-03 DIAGNOSIS — E55.9 VITAMIN D DEFICIENCY: ICD-10-CM

## 2025-07-03 DIAGNOSIS — I10 ESSENTIAL (PRIMARY) HYPERTENSION: ICD-10-CM

## 2025-07-03 DIAGNOSIS — E78.5 HYPERLIPIDEMIA, UNSPECIFIED HYPERLIPIDEMIA TYPE: ICD-10-CM

## 2025-07-03 DIAGNOSIS — I50.22 CHRONIC SYSTOLIC (CONGESTIVE) HEART FAILURE (HCC): Primary | ICD-10-CM

## 2025-07-03 DIAGNOSIS — R73.03 PREDIABETES: ICD-10-CM

## 2025-07-03 DIAGNOSIS — I48.91 ATRIAL FIBRILLATION, UNSPECIFIED TYPE (HCC): ICD-10-CM

## 2025-07-03 SDOH — ECONOMIC STABILITY: FOOD INSECURITY: WITHIN THE PAST 12 MONTHS, THE FOOD YOU BOUGHT JUST DIDN'T LAST AND YOU DIDN'T HAVE MONEY TO GET MORE.: NEVER TRUE

## 2025-07-03 SDOH — ECONOMIC STABILITY: FOOD INSECURITY: WITHIN THE PAST 12 MONTHS, YOU WORRIED THAT YOUR FOOD WOULD RUN OUT BEFORE YOU GOT MONEY TO BUY MORE.: NEVER TRUE

## 2025-07-03 ASSESSMENT — ENCOUNTER SYMPTOMS
WHEEZING: 0
BACK PAIN: 0
ABDOMINAL PAIN: 0
RHINORRHEA: 0
COUGH: 0
DIARRHEA: 0
CHEST TIGHTNESS: 0
BLOOD IN STOOL: 0
NAUSEA: 0
VOMITING: 0
SHORTNESS OF BREATH: 1

## 2025-07-03 ASSESSMENT — PATIENT HEALTH QUESTIONNAIRE - PHQ9
SUM OF ALL RESPONSES TO PHQ QUESTIONS 1-9: 0
2. FEELING DOWN, DEPRESSED OR HOPELESS: NOT AT ALL
1. LITTLE INTEREST OR PLEASURE IN DOING THINGS: NOT AT ALL
SUM OF ALL RESPONSES TO PHQ QUESTIONS 1-9: 0

## 2025-07-03 NOTE — PROGRESS NOTES
Karena Valiente is a 54 y.o. female presenting today for Follow-up  .     Chief Complaint   Patient presents with    Follow-up       HPI:  Karena Valiente presents to the office today for follow-up.    Patient has a past medical history significant for HTN, HLD, prediabetes, asthma, obesity, HFrEF with ICD, Afib, Hyperthyroidism, s/p LT hip replacement.       Graves disease: Patient follows with Dr. Park. She is on methimazole.    States that she started on vitamin D supplements.     HFrEF: Patient presented to [facility] 2017 with acute on chronic systolic heart failure.  She was found to have EF 15% at the time.  She was placed on Coreg, lisinopril, Aldactone and Lasix with a LifeVest.  Repeat echo in July 2017 still demonstrated reduced EF at 30%.  She had a dual-chamber AICD placed in September 2017.  Patient denies any chest pain or leg swelling.  Regularly follows with her cardiologist: Dr. Feliciano.       WOJCIECH fib: Patient is on Xarelto.     HTN: BP is well controlled classically.       HLD: Patient is on Lipitor 80 mg daily.  Lipid panel in 6/23 showed LDL 85.2, HDL 49, triglycerides 134.  Repeat is pending.     Pre-Diabetes: HbA1c is 5.8%.     Patient is a smoker.  She smoked 0.5 pack/day for 15 years.  Currently smoking 1 cigarette every other day.    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  However-she gets easily short of breath and by the end of the workday she feels that her body is giving up.  States that she does not think she can continue working based on this.     Healthcare maintenance:  Patient is due for mammogram and colon cancer screening.    Review of Systems   Constitutional:  Positive for fatigue. Negative for activity change, appetite change, chills, diaphoresis, fever and unexpected weight change.   HENT:  Negative for congestion, nosebleeds and rhinorrhea.    Respiratory:  Positive for shortness of breath. Negative for cough, chest tightness and wheezing.    Cardiovascular:

## 2025-07-14 ENCOUNTER — TELEPHONE (OUTPATIENT)
Dept: FAMILY MEDICINE CLINIC | Facility: CLINIC | Age: 55
End: 2025-07-14

## (undated) DEVICE — SYRINGE MED 3ML NDL 22GA L1 1/2IN REG BVL SFGLDE

## (undated) DEVICE — GOWN,REINFORCED,POLY,AURORA,XXLARGE,STR: Brand: MEDLINE

## (undated) DEVICE — BLANKET WRM AD W50XL85.8IN PACU FULL BODY FORC AIR

## (undated) DEVICE — SUTURE MCRYL + SZ 2-0 L36IN ABSRB UD CT-1 L36MM 1/2 CIR MCP945H

## (undated) DEVICE — SYR LR LCK 1ML GRAD NSAF 30ML --

## (undated) DEVICE — TABLE COVER: Brand: CONVERTORS

## (undated) DEVICE — Z DISCONTINUED BY MEDLINE USE 2711682 TRAY SKIN PREP DRY W/ PREM GLV

## (undated) DEVICE — GAUZE,SPONGE,8"X4",12PLY,XRAY,STRL,LF: Brand: MEDLINE

## (undated) DEVICE — SUT VCRL + 0 27IN CT1 UD --

## (undated) DEVICE — BLADE SAW W9XL25MM THK0.38MM CUT THK0.43MM REPL SAG OSC THN

## (undated) DEVICE — 3M™ STERI-DRAPE™ INSTRUMENT POUCH 1018: Brand: STERI-DRAPE™

## (undated) DEVICE — SUTURE VCRL + SZ 2 L27IN ABSRB UD TP-1 L65MM 1/2 CIR TAPR VCP849G

## (undated) DEVICE — Z DISCONTINUED USE 2744636  DRESSING AQUACEL 14 IN ALG W3.5XL14IN POLYUR FLM CVR W/ HYDRCOLL

## (undated) DEVICE — STOCKING ANTIEMB KNEE MED REG --

## (undated) DEVICE — CANNULATED DRILL, AO: Brand: ASNIS

## (undated) DEVICE — BIPOLAR SEALER 23-112-1 AQM 6.0: Brand: AQUAMANTYS ®

## (undated) DEVICE — KIT ARMOR C DRP COLLAPSIBLE AND SELF EXP TOP CVR FOR FLUOROSCOPIC

## (undated) DEVICE — GARMENT,MEDLINE,DVT,INT,CALF,MED, GEN2: Brand: MEDLINE

## (undated) DEVICE — DRAPE,EXTREMITY,89X128,STERILE: Brand: MEDLINE

## (undated) DEVICE — CURITY NON-ADHERENT STRIPS: Brand: CURITY

## (undated) DEVICE — APPLICATOR MEDICATED 26 CC SOLUTION HI LT ORNG CHLORAPREP

## (undated) DEVICE — BANDAGE,GAUZE,BULKEE II,4.5"X4.1YD,STRL: Brand: MEDLINE

## (undated) DEVICE — SUTURE VICRYL + SZ 3-0 L27IN ABSRB UD L26MM SH 1/2 CIR VCP416H

## (undated) DEVICE — PACK PROCEDURE SURG TOT HIP BSHR LF

## (undated) DEVICE — SOLUTION IRRIG 1000ML 0.9% SOD CHL USP POUR PLAS BTL

## (undated) DEVICE — 4-PORT MANIFOLD: Brand: NEPTUNE 2

## (undated) DEVICE — SOLUTION IV 1000ML 0.9% SOD CHL

## (undated) DEVICE — SOFT SILICONE HYDROCELLULAR SACRUM DRESSING WITH LOCK AWAY LAYER: Brand: ALLEVYN LIFE SACRUM (LARGE) PACK OF 10

## (undated) DEVICE — X-RAY SPONGES,12 PLY: Brand: DERMACEA

## (undated) DEVICE — INTENDED FOR TISSUE SEPARATION, AND OTHER PROCEDURES THAT REQUIRE A SHARP SURGICAL BLADE TO PUNCTURE OR CUT.: Brand: BARD-PARKER SAFETY BLADES SIZE 15, STERILE

## (undated) DEVICE — SPONGE LAP 18X18IN STRL -- 5/PK

## (undated) DEVICE — NEEDLE HYPO 21GA L1.5IN INTRAMUSCULAR S STL LATCH BVL UP

## (undated) DEVICE — DRESSING FOAM DISK DIA1IN H 7MM HYDRPHLC CHG IMPREG IN SL

## (undated) DEVICE — Device: Brand: JELCO

## (undated) DEVICE — PADDING CAST W4INXL4YD NONSTERILE COT COHESIVE HND TEARABLE

## (undated) DEVICE — SUTURE VCRL + SZ 1-0 L36IN ABSRB UD CTX 1/2 CIR TAPR PNT VCP977H

## (undated) DEVICE — STOCKINETTE CAST W4INXL25YD SYN CONFORMABILITY PROTOUCH

## (undated) DEVICE — PACK PROCEDURE SURG MAJ W/ BASIN LF

## (undated) DEVICE — PREP SKN CHLRAPRP APL 26ML STR --

## (undated) DEVICE — KIT OR TURNOVER

## (undated) DEVICE — HOOD, PEEL-AWAY: Brand: FLYTE

## (undated) DEVICE — GAUZE,SPONGE,4"X4",16PLY,STRL,LF,10/TRAY: Brand: MEDLINE

## (undated) DEVICE — CANNULATED COUNTERSINK: Brand: ASNIS

## (undated) DEVICE — BANDAGE COBAN 4 IN COMPR W4INXL5YD FOAM COHESIVE QUIK STK SELF ADH SFT

## (undated) DEVICE — TAPE,CLOTH/SILK,CURAD,3"X10YD,LF,40/CS: Brand: CURAD

## (undated) DEVICE — Device

## (undated) DEVICE — HIP PILLOW, ABDUCTION: Brand: DEROYAL

## (undated) DEVICE — SUTURE PROL SZ 3-0 L18IN NONABSORBABLE BLU L19MM PC-5 3/8 8632G

## (undated) DEVICE — STERILE POLYISOPRENE POWDER-FREE SURGICAL GLOVES: Brand: PROTEXIS

## (undated) DEVICE — DECANTER BAG 9": Brand: MEDLINE INDUSTRIES, INC.

## (undated) DEVICE — INTENDED FOR TISSUE SEPARATION, AND OTHER PROCEDURES THAT REQUIRE A SHARP SURGICAL BLADE TO PUNCTURE OR CUT.: Brand: BARD-PARKER SAFETY BLADES SIZE 10, STERILE

## (undated) DEVICE — KIT CLN UP BON SECOURS MARYV

## (undated) DEVICE — SOLUTION IRRIG 3000ML LAC R FLX CONT

## (undated) DEVICE — THREE-QUARTER SHEET: Brand: CONVERTORS

## (undated) DEVICE — TRAY PREP DRY W/ PREM GLV 2 APPL 6 SPNG 2 UNDPD 1 OVERWRAP

## (undated) DEVICE — SKIN MARKER,REGULAR TIP WITH RULER AND LABELS: Brand: DEVON

## (undated) DEVICE — 3M™ TEGADERM™ HP TRANSPARENT FILM DRESSING FRAME STYLE, 9534HP, 2-3/8 X 2-3/4 IN (6 CM X 7 CM), 100/CT 4CT/CASE: Brand: 3M™ TEGADERM™

## (undated) DEVICE — 3M™ STERI-DRAPE™ U-DRAPE 1015: Brand: STERI-DRAPE™

## (undated) DEVICE — HANDPIECE SET WITH HIGH FLOW TIP AND SUCTION TUBE: Brand: INTERPULSE

## (undated) DEVICE — BLADE SAW W5.5XL18MM THK0.38MM CUT THK0.43MM REPL S STL SAG

## (undated) DEVICE — SPIROMETER INCENT 2500ML W ONE W VLV

## (undated) DEVICE — BLANKET WRM W29.9XL79.1IN UP BODY FORC AIR MISTRAL-AIR

## (undated) DEVICE — ZIMMER® STERILE DISPOSABLE TOURNIQUET CUFF WITH PROTECTIVE SLEEVE AND PLC, DUAL PORT, SINGLE BLADDER, 18 IN. (46 CM)

## (undated) DEVICE — JP 3-SPRING RES W/15FR PVC DRAIN/TR: Brand: CARDINAL HEALTH

## (undated) DEVICE — NEEDLE NRV STIM 20GA L6IN 30DEG BVL INSUL W/ EXTN SET

## (undated) DEVICE — SUTURE VICRYL SZ 2-0 L27IN ABSRB UD L26MM SH 1/2 CIR J417H

## (undated) DEVICE — GOWN,REINF,POLY,ECL,PP SLV,3XL,XLONG: Brand: MEDLINE

## (undated) DEVICE — STRYKER PERFORMANCE SERIES SAGITTAL BLADE: Brand: STRYKER PERFORMANCE SERIES